# Patient Record
Sex: FEMALE | Race: WHITE | NOT HISPANIC OR LATINO | Employment: OTHER
[De-identification: names, ages, dates, MRNs, and addresses within clinical notes are randomized per-mention and may not be internally consistent; named-entity substitution may affect disease eponyms.]

---

## 2021-04-15 DIAGNOSIS — Z23 ENCOUNTER FOR IMMUNIZATION: ICD-10-CM

## 2023-11-08 ENCOUNTER — TRANSCRIBE ORDERS (OUTPATIENT)
Dept: SCHEDULING | Age: 73
End: 2023-11-08

## 2023-11-08 DIAGNOSIS — N95.0 POSTMENOPAUSAL BLEEDING: Primary | ICD-10-CM

## 2024-12-04 ENCOUNTER — TRANSCRIBE ORDERS (OUTPATIENT)
Dept: SCHEDULING | Age: 74
End: 2024-12-04

## 2024-12-04 DIAGNOSIS — N95.0 POSTMENOPAUSAL BLEEDING: Primary | ICD-10-CM

## 2024-12-11 ENCOUNTER — TRANSCRIBE ORDERS (OUTPATIENT)
Dept: SCHEDULING | Age: 74
End: 2024-12-11

## 2024-12-11 DIAGNOSIS — R31.0 GROSS HEMATURIA: Primary | ICD-10-CM

## 2024-12-19 ENCOUNTER — HOSPITAL ENCOUNTER (OUTPATIENT)
Dept: RADIOLOGY | Age: 74
Discharge: HOME | End: 2024-12-19
Attending: STUDENT IN AN ORGANIZED HEALTH CARE EDUCATION/TRAINING PROGRAM
Payer: MEDICARE

## 2024-12-19 DIAGNOSIS — R31.0 GROSS HEMATURIA: ICD-10-CM

## 2024-12-19 PROCEDURE — 74178 CT ABD&PLV WO CNTR FLWD CNTR: CPT

## 2024-12-19 PROCEDURE — 63600105 HC IODINE BASED CONTRAST: Mod: JZ | Performed by: STUDENT IN AN ORGANIZED HEALTH CARE EDUCATION/TRAINING PROGRAM

## 2024-12-19 RX ORDER — IOPAMIDOL 755 MG/ML
100 INJECTION, SOLUTION INTRAVASCULAR ONCE
Status: COMPLETED | OUTPATIENT
Start: 2024-12-19 | End: 2024-12-19

## 2024-12-19 RX ADMIN — IOPAMIDOL 100 ML: 755 INJECTION, SOLUTION INTRAVENOUS at 13:25

## 2024-12-19 NOTE — PROGRESS NOTES
"Gladys Hendrickson Amna   [id number]    Your doctor has referred you for a CT UROGRAM that requires the injection of an iodinated contrast material into your bloodstream. This iodinated contrast material, sometimes referred to as \"x-ray dye\" allows for better interpretation of the x-ray films or CT images and results in a more accurate interpretation of the examination.     Without the use of iodinated contrast (x-ray dye), the examination may be less informative and result in a suboptimal examination, and possibly a delay in diagnosis and, if needed, treatment.     The iodinated contrast material is given through a small needle or catheter placed into a vein, usually on the inside of the elbow, on the back of hand, or in a vein in the foot or lower leg.    The most common, though still rare, potential reaction to an intravenous contrast injection is an allergic-like reaction. Most allergic-like reactions are mild, though a small subset of people can have more severe reactions. Mild reactions include mild / scattered hives, itching, scratchy throat, sneezing and nasal congestion. More severe reactions include facial swelling, severe difficulty breathing, wheezing and anaphylactic shock. Those with a prior history allergic-like reaction to the same class of contrast media (such as CT contrast or MRI contrast) are at the highest risk for an allergic reaction.     If you believe you had an allergic reaction to contrast in the past, please let our staff know. We can determine if this increases your risk for a future reaction and provide steps to decrease the risk. This may delay your examination, but it decreases the risk of having a new and possibly more severe reaction to the contrast injection.    People with a history of prior allergic reactions to other substances (such as unrelated medications and food) and patients with a history of asthma have slightly increased risk for an allergic reaction from contrast " material when compared with the general population, but do not require to be pretreated prior to a contrast injection.    You should notify the physician, nurse or technologist if you have ever had any of these conditions or if you have any questions.

## 2025-01-08 ENCOUNTER — TRANSCRIBE ORDERS (OUTPATIENT)
Dept: LAB | Age: 75
End: 2025-01-08

## 2025-01-08 ENCOUNTER — APPOINTMENT (OUTPATIENT)
Dept: LAB | Age: 75
End: 2025-01-08
Attending: STUDENT IN AN ORGANIZED HEALTH CARE EDUCATION/TRAINING PROGRAM
Payer: MEDICARE

## 2025-01-08 DIAGNOSIS — Z01.818 ENCOUNTER FOR OTHER PREPROCEDURAL EXAMINATION: ICD-10-CM

## 2025-01-08 DIAGNOSIS — D41.4 NEOPLASM OF UNCERTAIN BEHAVIOR OF BLADDER: Primary | ICD-10-CM

## 2025-01-08 DIAGNOSIS — D41.4 NEOPLASM OF UNCERTAIN BEHAVIOR OF BLADDER: ICD-10-CM

## 2025-01-08 LAB
ANION GAP SERPL CALC-SCNC: 7 MEQ/L (ref 3–15)
BASOPHILS # BLD: 0.1 K/UL (ref 0.01–0.1)
BASOPHILS NFR BLD: 0.5 %
BUN SERPL-MCNC: 16 MG/DL (ref 7–25)
CALCIUM SERPL-MCNC: 9.2 MG/DL (ref 8.6–10.3)
CHLORIDE SERPL-SCNC: 104 MEQ/L (ref 98–107)
CO2 SERPL-SCNC: 27 MEQ/L (ref 21–31)
CREAT SERPL-MCNC: 1.7 MG/DL (ref 0.6–1.2)
DIFFERENTIAL METHOD BLD: ABNORMAL
EGFRCR SERPLBLD CKD-EPI 2021: 31.5 ML/MIN/1.73M*2
EOSINOPHIL # BLD: 0.74 K/UL (ref 0.04–0.36)
EOSINOPHIL NFR BLD: 3.7 %
ERYTHROCYTE [DISTWIDTH] IN BLOOD BY AUTOMATED COUNT: 12.6 % (ref 11.7–14.4)
GLUCOSE SERPL-MCNC: 112 MG/DL (ref 70–99)
HCT VFR BLD AUTO: 30.7 % (ref 35–45)
HGB BLD-MCNC: 9.4 G/DL (ref 11.8–15.7)
IMM GRANULOCYTES # BLD AUTO: 0.1 K/UL (ref 0–0.08)
IMM GRANULOCYTES NFR BLD AUTO: 0.5 %
LYMPHOCYTES # BLD: 3 K/UL (ref 1.2–3.5)
LYMPHOCYTES NFR BLD: 14.8 %
MCH RBC QN AUTO: 28.7 PG (ref 28–33.2)
MCHC RBC AUTO-ENTMCNC: 30.6 G/DL (ref 32.2–35.5)
MCV RBC AUTO: 93.6 FL (ref 83–98)
MONOCYTES # BLD: 1.19 K/UL (ref 0.28–0.8)
MONOCYTES NFR BLD: 5.9 %
NEUTROPHILS # BLD: 15.12 K/UL (ref 1.7–7)
NEUTS SEG NFR BLD: 74.6 %
NRBC BLD-RTO: 0 %
PLATELET # BLD AUTO: 431 K/UL (ref 150–369)
PMV BLD AUTO: 9.7 FL (ref 9.4–12.3)
POTASSIUM SERPL-SCNC: 4.9 MEQ/L (ref 3.5–5.1)
RBC # BLD AUTO: 3.28 M/UL (ref 3.93–5.22)
SODIUM SERPL-SCNC: 138 MEQ/L (ref 136–145)
WBC # BLD AUTO: 20.25 K/UL (ref 3.8–10.5)

## 2025-01-08 PROCEDURE — 85025 COMPLETE CBC W/AUTO DIFF WBC: CPT

## 2025-01-08 PROCEDURE — 36415 COLL VENOUS BLD VENIPUNCTURE: CPT

## 2025-01-08 PROCEDURE — 80048 BASIC METABOLIC PNL TOTAL CA: CPT

## 2025-01-24 ENCOUNTER — HOSPITAL ENCOUNTER (INPATIENT)
Facility: HOSPITAL | Age: 75
LOS: 7 days | Discharge: HOME HEALTH | DRG: 669 | End: 2025-02-01
Attending: EMERGENCY MEDICINE | Admitting: STUDENT IN AN ORGANIZED HEALTH CARE EDUCATION/TRAINING PROGRAM
Payer: MEDICARE

## 2025-01-24 ENCOUNTER — APPOINTMENT (EMERGENCY)
Dept: RADIOLOGY | Facility: HOSPITAL | Age: 75
DRG: 669 | End: 2025-01-24
Payer: MEDICARE

## 2025-01-24 ENCOUNTER — APPOINTMENT (EMERGENCY)
Dept: RADIOLOGY | Facility: HOSPITAL | Age: 75
DRG: 669 | End: 2025-01-24
Attending: EMERGENCY MEDICINE
Payer: MEDICARE

## 2025-01-24 DIAGNOSIS — N32.89 BLADDER MASS: Primary | ICD-10-CM

## 2025-01-24 DIAGNOSIS — N13.5 OBSTRUCTION OF URETER, UNSPECIFIED LATERALITY: ICD-10-CM

## 2025-01-24 DIAGNOSIS — I47.10 SVT (SUPRAVENTRICULAR TACHYCARDIA) (CMS/HCC): ICD-10-CM

## 2025-01-24 PROBLEM — F41.1 GENERALIZED ANXIETY DISORDER WITH PANIC ATTACKS: Status: ACTIVE | Noted: 2024-10-01

## 2025-01-24 PROBLEM — I49.9 ARRHYTHMIA: Status: ACTIVE | Noted: 2024-10-01

## 2025-01-24 PROBLEM — F32.9 MAJOR DEPRESSIVE DISORDER: Status: ACTIVE | Noted: 2024-10-01

## 2025-01-24 PROBLEM — G47.33 OBSTRUCTIVE SLEEP APNEA: Status: ACTIVE | Noted: 2024-11-22

## 2025-01-24 PROBLEM — K64.9 HEMORRHOIDS: Status: ACTIVE | Noted: 2025-01-24

## 2025-01-24 PROBLEM — F33.41 RECURRENT MAJOR DEPRESSIVE DISORDER IN PARTIAL REMISSION (CMS/HCC): Status: ACTIVE | Noted: 2025-01-24

## 2025-01-24 PROBLEM — D41.4 NEOPLASM OF UNCERTAIN BEHAVIOR OF BLADDER: Status: ACTIVE | Noted: 2024-12-18

## 2025-01-24 PROBLEM — R31.0 FRANK HEMATURIA: Status: ACTIVE | Noted: 2024-12-10

## 2025-01-24 PROBLEM — F41.0 GENERALIZED ANXIETY DISORDER WITH PANIC ATTACKS: Status: ACTIVE | Noted: 2024-10-01

## 2025-01-24 PROBLEM — R93.89 ABNORMAL FINDING OF DIAGNOSTIC IMAGING: Status: ACTIVE | Noted: 2025-01-24

## 2025-01-24 PROBLEM — E78.5 HYPERLIPEMIA: Status: ACTIVE | Noted: 2024-10-01

## 2025-01-24 PROBLEM — Z95.0 PACEMAKER: Status: ACTIVE | Noted: 2019-10-24

## 2025-01-24 LAB
ALBUMIN SERPL-MCNC: 3.6 G/DL (ref 3.5–5.7)
ALP SERPL-CCNC: 67 IU/L (ref 34–125)
ALT SERPL-CCNC: 11 IU/L (ref 7–52)
ANION GAP SERPL CALC-SCNC: 9 MEQ/L (ref 3–15)
AST SERPL-CCNC: 20 IU/L (ref 13–39)
BASOPHILS # BLD: 0 K/UL (ref 0.01–0.1)
BASOPHILS NFR BLD: 0 %
BILIRUB SERPL-MCNC: 0.5 MG/DL (ref 0.3–1.2)
BUN SERPL-MCNC: 16 MG/DL (ref 7–25)
CALCIUM SERPL-MCNC: 9.3 MG/DL (ref 8.6–10.3)
CHLORIDE SERPL-SCNC: 101 MEQ/L (ref 98–107)
CO2 SERPL-SCNC: 23 MEQ/L (ref 21–31)
CREAT SERPL-MCNC: 1.6 MG/DL (ref 0.6–1.2)
D DIMER PPP IA.FEU-MCNC: 1.16 UG/ML FEU (ref 0–0.5)
DIFFERENTIAL METHOD BLD: ABNORMAL
EGFRCR SERPLBLD CKD-EPI 2021: 33.9 ML/MIN/1.73M*2
EOSINOPHIL # BLD: 0.27 K/UL (ref 0.04–0.36)
EOSINOPHIL NFR BLD: 1 %
ERYTHROCYTE [DISTWIDTH] IN BLOOD BY AUTOMATED COUNT: 12.7 % (ref 11.7–14.4)
GLUCOSE SERPL-MCNC: 122 MG/DL (ref 70–99)
HCT VFR BLD AUTO: 31.5 % (ref 35–45)
HGB BLD-MCNC: 9.8 G/DL (ref 11.8–15.7)
LACTATE SERPL-SCNC: 1.2 MMOL/L (ref 0.4–2)
LYMPHOCYTES # BLD: 3.23 K/UL (ref 1.2–3.5)
LYMPHOCYTES NFR BLD: 12 %
MAGNESIUM SERPL-MCNC: 2.1 MG/DL (ref 1.8–2.5)
MCH RBC QN AUTO: 28.3 PG (ref 28–33.2)
MCHC RBC AUTO-ENTMCNC: 31.1 G/DL (ref 32.2–35.5)
MCV RBC AUTO: 91 FL (ref 83–98)
METAMYELOCYTES # BLD MANUAL: 0.27 K/UL
METAMYELOCYTES NFR BLD MANUAL: 1 %
MONOCYTES # BLD: 1.88 K/UL (ref 0.28–0.8)
MONOCYTES NFR BLD: 7 %
NEUTS BAND # BLD: 0.81 K/UL (ref 0–0.53)
NEUTS BAND # BLD: 20.46 K/UL (ref 1.7–7)
NEUTS BAND NFR BLD: 3 %
NEUTS SEG NFR BLD: 76 %
PLAT MORPH BLD: NORMAL
PLATELET # BLD AUTO: 512 K/UL (ref 150–369)
PLATELET # BLD EST: ABNORMAL 10*3/UL
PMV BLD AUTO: 9.7 FL (ref 9.4–12.3)
POTASSIUM SERPL-SCNC: 4.5 MEQ/L (ref 3.5–5.1)
PROT SERPL-MCNC: 7.2 G/DL (ref 6–8.2)
RBC # BLD AUTO: 3.46 M/UL (ref 3.93–5.22)
SODIUM SERPL-SCNC: 133 MEQ/L (ref 136–145)
TOXIC GRANULES BLD QL SMEAR: ABNORMAL
TROPONIN I SERPL HS-MCNC: 9.4 PG/ML
TSH SERPL DL<=0.05 MIU/L-ACNC: 4.7 MIU/L (ref 0.34–5.6)
WBC # BLD AUTO: 26.92 K/UL (ref 3.8–10.5)

## 2025-01-24 PROCEDURE — 25800000 HC PHARMACY IV SOLUTIONS: Performed by: STUDENT IN AN ORGANIZED HEALTH CARE EDUCATION/TRAINING PROGRAM

## 2025-01-24 PROCEDURE — 87637 SARSCOV2&INF A&B&RSV AMP PRB: CPT | Performed by: STUDENT IN AN ORGANIZED HEALTH CARE EDUCATION/TRAINING PROGRAM

## 2025-01-24 PROCEDURE — 84484 ASSAY OF TROPONIN QUANT: CPT | Mod: 91 | Performed by: EMERGENCY MEDICINE

## 2025-01-24 PROCEDURE — 84484 ASSAY OF TROPONIN QUANT: CPT | Performed by: EMERGENCY MEDICINE

## 2025-01-24 PROCEDURE — 93005 ELECTROCARDIOGRAM TRACING: CPT | Performed by: EMERGENCY MEDICINE

## 2025-01-24 PROCEDURE — 71275 CT ANGIOGRAPHY CHEST: CPT

## 2025-01-24 PROCEDURE — 83605 ASSAY OF LACTIC ACID: CPT | Performed by: STUDENT IN AN ORGANIZED HEALTH CARE EDUCATION/TRAINING PROGRAM

## 2025-01-24 PROCEDURE — 81003 URINALYSIS AUTO W/O SCOPE: CPT | Performed by: EMERGENCY MEDICINE

## 2025-01-24 PROCEDURE — 99285 EMERGENCY DEPT VISIT HI MDM: CPT | Mod: 25

## 2025-01-24 PROCEDURE — 96361 HYDRATE IV INFUSION ADD-ON: CPT

## 2025-01-24 PROCEDURE — 36415 COLL VENOUS BLD VENIPUNCTURE: CPT | Performed by: EMERGENCY MEDICINE

## 2025-01-24 PROCEDURE — 87086 URINE CULTURE/COLONY COUNT: CPT | Performed by: EMERGENCY MEDICINE

## 2025-01-24 PROCEDURE — 74177 CT ABD & PELVIS W/CONTRAST: CPT

## 2025-01-24 PROCEDURE — 85025 COMPLETE CBC W/AUTO DIFF WBC: CPT | Performed by: EMERGENCY MEDICINE

## 2025-01-24 PROCEDURE — 84443 ASSAY THYROID STIM HORMONE: CPT | Performed by: STUDENT IN AN ORGANIZED HEALTH CARE EDUCATION/TRAINING PROGRAM

## 2025-01-24 PROCEDURE — 83735 ASSAY OF MAGNESIUM: CPT | Performed by: STUDENT IN AN ORGANIZED HEALTH CARE EDUCATION/TRAINING PROGRAM

## 2025-01-24 PROCEDURE — 63600000 HC DRUGS/DETAIL CODE: Mod: JZ | Performed by: EMERGENCY MEDICINE

## 2025-01-24 PROCEDURE — 71046 X-RAY EXAM CHEST 2 VIEWS: CPT

## 2025-01-24 PROCEDURE — 96374 THER/PROPH/DIAG INJ IV PUSH: CPT | Mod: 59

## 2025-01-24 PROCEDURE — 85379 FIBRIN DEGRADATION QUANT: CPT | Performed by: STUDENT IN AN ORGANIZED HEALTH CARE EDUCATION/TRAINING PROGRAM

## 2025-01-24 PROCEDURE — 80053 COMPREHEN METABOLIC PANEL: CPT | Performed by: EMERGENCY MEDICINE

## 2025-01-24 RX ORDER — MORPHINE SULFATE 4 MG/ML
4 INJECTION, SOLUTION INTRAMUSCULAR; INTRAVENOUS ONCE
Status: COMPLETED | OUTPATIENT
Start: 2025-01-24 | End: 2025-01-24

## 2025-01-24 RX ORDER — IOPAMIDOL 755 MG/ML
100 INJECTION, SOLUTION INTRAVASCULAR
Status: COMPLETED | OUTPATIENT
Start: 2025-01-24 | End: 2025-01-25

## 2025-01-24 RX ADMIN — MORPHINE SULFATE 4 MG: 4 INJECTION, SOLUTION INTRAMUSCULAR; INTRAVENOUS at 23:51

## 2025-01-24 RX ADMIN — SODIUM CHLORIDE 1000 ML: 9 INJECTION, SOLUTION INTRAVENOUS at 23:22

## 2025-01-24 ASSESSMENT — ENCOUNTER SYMPTOMS
FATIGUE: 1
ABDOMINAL PAIN: 1
COUGH: 0
FEVER: 0
DIZZINESS: 0
LIGHT-HEADEDNESS: 0
FREQUENCY: 0
HEADACHES: 1
DYSURIA: 1
NAUSEA: 1
BACK PAIN: 0
ARTHRALGIAS: 0
DIARRHEA: 0
CONSTIPATION: 1
PALPITATIONS: 0
CHILLS: 1
DIFFICULTY URINATING: 1
SHORTNESS OF BREATH: 1
CHEST TIGHTNESS: 0
VOMITING: 0
WHEEZING: 0
APPETITE CHANGE: 1
HEMATURIA: 1
ACTIVITY CHANGE: 1

## 2025-01-25 PROBLEM — C67.9 BLADDER CANCER (CMS/HCC): Status: ACTIVE | Noted: 2025-01-25

## 2025-01-25 PROBLEM — N32.89 BLADDER MASS: Status: ACTIVE | Noted: 2025-01-25

## 2025-01-25 PROBLEM — N18.9 CKD (CHRONIC KIDNEY DISEASE): Status: ACTIVE | Noted: 2025-01-25

## 2025-01-25 LAB
AMORPH CRY #/AREA URNS HPF: 1 /HPF
ANION GAP SERPL CALC-SCNC: 6 MEQ/L (ref 3–15)
ATRIAL RATE: 103
BACTERIA #/AREA URNS HPF: ABNORMAL /HPF
BILIRUB UR QL STRIP.AUTO: NEGATIVE MG/DL
BUN SERPL-MCNC: 14 MG/DL (ref 7–25)
CALCIUM SERPL-MCNC: 8 MG/DL (ref 8.6–10.3)
CHLORIDE SERPL-SCNC: 107 MEQ/L (ref 98–107)
CLARITY UR REFRACT.AUTO: ABNORMAL
CO2 SERPL-SCNC: 21 MEQ/L (ref 21–31)
COLOR UR AUTO: ABNORMAL
CREAT SERPL-MCNC: 1.4 MG/DL (ref 0.6–1.2)
EGFRCR SERPLBLD CKD-EPI 2021: 39.8 ML/MIN/1.73M*2
FLUAV RNA SPEC QL NAA+PROBE: NEGATIVE
FLUBV RNA SPEC QL NAA+PROBE: NEGATIVE
GLUCOSE SERPL-MCNC: 153 MG/DL (ref 70–99)
GLUCOSE UR STRIP.AUTO-MCNC: NEGATIVE MG/DL
HGB UR QL STRIP.AUTO: 3
HYALINE CASTS #/AREA URNS LPF: ABNORMAL /LPF
IRON SATN MFR SERPL: 9 % (ref 15–45)
IRON SERPL-MCNC: 16 UG/DL (ref 35–150)
KETONES UR STRIP.AUTO-MCNC: NEGATIVE MG/DL
LEUKOCYTE ESTERASE UR QL STRIP.AUTO: 3
MAGNESIUM SERPL-MCNC: 1.9 MG/DL (ref 1.8–2.5)
NITRITE UR QL STRIP.AUTO: NEGATIVE
P AXIS: 42
PH UR STRIP.AUTO: 6.5 [PH]
POTASSIUM SERPL-SCNC: 4 MEQ/L (ref 3.5–5.1)
PR INTERVAL: 154
PROT UR QL STRIP.AUTO: 3
QRS DURATION: 132
QT INTERVAL: 384
QTC CALCULATION(BAZETT): 503
R AXIS: -59
RBC #/AREA URNS HPF: ABNORMAL /HPF
RSV RNA SPEC QL NAA+PROBE: NEGATIVE
SARS-COV-2 RNA RESP QL NAA+PROBE: NEGATIVE
SODIUM SERPL-SCNC: 134 MEQ/L (ref 136–145)
SP GR UR REFRACT.AUTO: 1.02
SQUAMOUS #/AREA URNS HPF: 1 /HPF
T WAVE AXIS: 94
TIBC SERPL-MCNC: 188 UG/DL (ref 270–460)
TRANS CELLS URNS QL MICRO: 1 /HPF
TROPONIN I SERPL HS-MCNC: 9.9 PG/ML
UIBC SERPL-MCNC: 172 UG/DL (ref 180–360)
UROBILINOGEN UR STRIP-ACNC: 0.2 EU/DL
VENTRICULAR RATE: 103
WBC #/AREA URNS HPF: ABNORMAL /HPF

## 2025-01-25 PROCEDURE — 96361 HYDRATE IV INFUSION ADD-ON: CPT

## 2025-01-25 PROCEDURE — 200200 PR NO CHARGE: Performed by: STUDENT IN AN ORGANIZED HEALTH CARE EDUCATION/TRAINING PROGRAM

## 2025-01-25 PROCEDURE — G0378 HOSPITAL OBSERVATION PER HR: HCPCS

## 2025-01-25 PROCEDURE — 63600000 HC DRUGS/DETAIL CODE: Mod: JZ | Performed by: INTERNAL MEDICINE

## 2025-01-25 PROCEDURE — 36415 COLL VENOUS BLD VENIPUNCTURE: CPT | Performed by: STUDENT IN AN ORGANIZED HEALTH CARE EDUCATION/TRAINING PROGRAM

## 2025-01-25 PROCEDURE — 63700000 HC SELF-ADMINISTRABLE DRUG: Performed by: STUDENT IN AN ORGANIZED HEALTH CARE EDUCATION/TRAINING PROGRAM

## 2025-01-25 PROCEDURE — 63600105 HC IODINE BASED CONTRAST: Mod: JZ | Performed by: EMERGENCY MEDICINE

## 2025-01-25 PROCEDURE — 83735 ASSAY OF MAGNESIUM: CPT | Performed by: INTERNAL MEDICINE

## 2025-01-25 PROCEDURE — 12000000 HC ROOM AND CARE MED/SURG

## 2025-01-25 PROCEDURE — 63600000 HC DRUGS/DETAIL CODE: Mod: JZ | Performed by: STUDENT IN AN ORGANIZED HEALTH CARE EDUCATION/TRAINING PROGRAM

## 2025-01-25 PROCEDURE — 63700000 HC SELF-ADMINISTRABLE DRUG: Performed by: INTERNAL MEDICINE

## 2025-01-25 PROCEDURE — 83540 ASSAY OF IRON: CPT | Performed by: INTERNAL MEDICINE

## 2025-01-25 PROCEDURE — 80048 BASIC METABOLIC PNL TOTAL CA: CPT | Performed by: INTERNAL MEDICINE

## 2025-01-25 PROCEDURE — 87040 BLOOD CULTURE FOR BACTERIA: CPT | Performed by: STUDENT IN AN ORGANIZED HEALTH CARE EDUCATION/TRAINING PROGRAM

## 2025-01-25 PROCEDURE — 99223 1ST HOSP IP/OBS HIGH 75: CPT | Performed by: INTERNAL MEDICINE

## 2025-01-25 PROCEDURE — 96365 THER/PROPH/DIAG IV INF INIT: CPT | Mod: 59

## 2025-01-25 PROCEDURE — 25800000 HC PHARMACY IV SOLUTIONS: Performed by: EMERGENCY MEDICINE

## 2025-01-25 PROCEDURE — 1124F ACP DISCUSS-NO DSCNMKR DOCD: CPT | Performed by: INTERNAL MEDICINE

## 2025-01-25 RX ORDER — CEFTRIAXONE 1 G/50ML
1 INJECTION, SOLUTION INTRAVENOUS ONCE
Status: COMPLETED | OUTPATIENT
Start: 2025-01-25 | End: 2025-01-25

## 2025-01-25 RX ORDER — IBUPROFEN/PSEUDOEPHEDRINE HCL 200MG-30MG
3 TABLET ORAL NIGHTLY PRN
Status: DISCONTINUED | OUTPATIENT
Start: 2025-01-25 | End: 2025-02-01 | Stop reason: HOSPADM

## 2025-01-25 RX ORDER — ONDANSETRON HYDROCHLORIDE 2 MG/ML
4 INJECTION, SOLUTION INTRAVENOUS EVERY 8 HOURS PRN
Status: DISCONTINUED | OUTPATIENT
Start: 2025-01-25 | End: 2025-01-25

## 2025-01-25 RX ORDER — MORPHINE SULFATE 4 MG/ML
3 INJECTION, SOLUTION INTRAMUSCULAR; INTRAVENOUS
Status: DISCONTINUED | OUTPATIENT
Start: 2025-01-25 | End: 2025-02-01 | Stop reason: HOSPADM

## 2025-01-25 RX ORDER — CEFTRIAXONE 1 G/50ML
1 INJECTION, SOLUTION INTRAVENOUS
Status: DISCONTINUED | OUTPATIENT
Start: 2025-01-26 | End: 2025-01-27

## 2025-01-25 RX ORDER — BUSPIRONE HYDROCHLORIDE 5 MG/1
5 TABLET ORAL 3 TIMES DAILY
Status: DISCONTINUED | OUTPATIENT
Start: 2025-01-25 | End: 2025-02-01 | Stop reason: HOSPADM

## 2025-01-25 RX ORDER — ROSUVASTATIN CALCIUM 40 MG/1
40 TABLET, COATED ORAL DAILY
Status: DISCONTINUED | OUTPATIENT
Start: 2025-01-25 | End: 2025-02-01 | Stop reason: HOSPADM

## 2025-01-25 RX ORDER — DEXTROSE 50 % IN WATER (D50W) INTRAVENOUS SYRINGE
25 AS NEEDED
Status: DISCONTINUED | OUTPATIENT
Start: 2025-01-25 | End: 2025-02-01 | Stop reason: HOSPADM

## 2025-01-25 RX ORDER — POTASSIUM CHLORIDE 20 MEQ/1
40 TABLET, EXTENDED RELEASE ORAL AS NEEDED
Status: DISCONTINUED | OUTPATIENT
Start: 2025-01-25 | End: 2025-02-01 | Stop reason: HOSPADM

## 2025-01-25 RX ORDER — IBUPROFEN 200 MG
16-32 TABLET ORAL AS NEEDED
Status: DISCONTINUED | OUTPATIENT
Start: 2025-01-25 | End: 2025-02-01 | Stop reason: HOSPADM

## 2025-01-25 RX ORDER — ENOXAPARIN SODIUM 100 MG/ML
40 INJECTION SUBCUTANEOUS
Status: DISCONTINUED | OUTPATIENT
Start: 2025-01-25 | End: 2025-02-01 | Stop reason: HOSPADM

## 2025-01-25 RX ORDER — POTASSIUM CHLORIDE 20 MEQ/1
20 TABLET, EXTENDED RELEASE ORAL AS NEEDED
Status: DISCONTINUED | OUTPATIENT
Start: 2025-01-25 | End: 2025-02-01 | Stop reason: HOSPADM

## 2025-01-25 RX ORDER — DEXTROSE 40 %
15-30 GEL (GRAM) ORAL AS NEEDED
Status: DISCONTINUED | OUTPATIENT
Start: 2025-01-25 | End: 2025-02-01 | Stop reason: HOSPADM

## 2025-01-25 RX ORDER — ACETAMINOPHEN 325 MG/1
650 TABLET ORAL EVERY 4 HOURS PRN
Status: DISCONTINUED | OUTPATIENT
Start: 2025-01-25 | End: 2025-02-01 | Stop reason: HOSPADM

## 2025-01-25 RX ORDER — AMOXICILLIN 250 MG
1 CAPSULE ORAL 2 TIMES DAILY
Status: DISCONTINUED | OUTPATIENT
Start: 2025-01-25 | End: 2025-02-01 | Stop reason: HOSPADM

## 2025-01-25 RX ADMIN — ROSUVASTATIN CALCIUM 40 MG: 40 TABLET, FILM COATED ORAL at 17:04

## 2025-01-25 RX ADMIN — SENNOSIDES AND DOCUSATE SODIUM 1 TABLET: 8.6; 5 TABLET ORAL at 08:49

## 2025-01-25 RX ADMIN — BUSPIRONE HYDROCHLORIDE 5 MG: 5 TABLET ORAL at 13:05

## 2025-01-25 RX ADMIN — IOPAMIDOL 100 ML: 755 INJECTION, SOLUTION INTRAVENOUS at 00:18

## 2025-01-25 RX ADMIN — MORPHINE SULFATE 3 MG: 4 INJECTION, SOLUTION INTRAMUSCULAR; INTRAVENOUS at 21:09

## 2025-01-25 RX ADMIN — BUSPIRONE HYDROCHLORIDE 5 MG: 5 TABLET ORAL at 08:49

## 2025-01-25 RX ADMIN — ENOXAPARIN SODIUM 40 MG: 40 INJECTION SUBCUTANEOUS at 17:05

## 2025-01-25 RX ADMIN — CEFTRIAXONE 1 G: 1 INJECTION, SOLUTION INTRAVENOUS at 01:08

## 2025-01-25 RX ADMIN — SENNOSIDES AND DOCUSATE SODIUM 1 TABLET: 8.6; 5 TABLET ORAL at 22:46

## 2025-01-25 RX ADMIN — ACETAMINOPHEN 650 MG: 325 TABLET, FILM COATED ORAL at 19:49

## 2025-01-25 RX ADMIN — SODIUM CHLORIDE 1000 ML: 9 INJECTION, SOLUTION INTRAVENOUS at 02:25

## 2025-01-25 RX ADMIN — BUSPIRONE HYDROCHLORIDE 5 MG: 5 TABLET ORAL at 22:46

## 2025-01-25 ASSESSMENT — COGNITIVE AND FUNCTIONAL STATUS - GENERAL
STANDING UP FROM CHAIR USING ARMS: 4 - NONE
MOVING TO AND FROM BED TO CHAIR: 4 - NONE
MOVING TO AND FROM BED TO CHAIR: 4 - NONE
WALKING IN HOSPITAL ROOM: 4 - NONE
WALKING IN HOSPITAL ROOM: 4 - NONE
STANDING UP FROM CHAIR USING ARMS: 4 - NONE
CLIMB 3 TO 5 STEPS WITH RAILING: 4 - NONE
CLIMB 3 TO 5 STEPS WITH RAILING: 4 - NONE

## 2025-01-25 NOTE — UM PHYSICIAN REVIEW NOTE
Utilization Secondary Review Note      Patient Name: Gladys Woo      MRN: [id number]  Insurance: MEDICARE  Admission date: 1/24/2025  Initial order:    Observation  Planned admission: No  Post Discharge Review: No            Inpatient services are appropriate for this 74 y.o.  female admitted for malaise and fatigue concerning for UTI and new L hydroureteronephrosis. Urology consulted with plans for continued IV Abx , IVF and continued monitoring of culture results. Patient will require > 2 Mn of hLOC.       Manisha Rucker DO  1/25/2025

## 2025-01-25 NOTE — CONSULTS
UROLOGY CONSULTATION    Subjective   74-year-old female with known bladder tumor, R hydronephrosis admitted for concerns of worsening fatigue, nausea, vomiting, reduced urine output.  She is scheduled for TURBT 1/28/25.  On admission, she was note to have a creatinine of 1.4, improved.  CT shows new left mild hydroureteronephrosis in addition to her previously identified right hydronephrosis and her 9 cm bladder mass resting on the bladder base and right lateral wall.  Since admission, with hydration and antiemetics, she is feeling markedly better.      Pertinent radiology results reviewed. Pertinent lab results reviewed..    Medical History:   Past Medical History:   Diagnosis Date    Coronary artery disease     KERRI (generalized anxiety disorder)     TREVOR (obstructive sleep apnea)        Surgical History:   Past Surgical History   Procedure Laterality Date    A-v cardiac pacemaker insertion      Insert / replace / remove pacemaker      Tonsillectomy         Social History:   Social History     Socioeconomic History    Marital status:      Spouse name: Not on file    Number of children: Not on file    Years of education: Not on file    Highest education level: Not on file   Occupational History    Not on file   Tobacco Use    Smoking status: Former     Types: Cigarettes    Smokeless tobacco: Never   Substance and Sexual Activity    Alcohol use: Yes     Alcohol/week: 2.0 standard drinks of alcohol     Types: 2 Glasses of wine per week     Comment: socially    Drug use: Never    Sexual activity: Not on file   Other Topics Concern    Not on file   Social History Narrative    Not on file     Social Drivers of Health     Financial Resource Strain: Not on file   Food Insecurity: No Food Insecurity (1/24/2025)    Hunger Vital Sign     Worried About Running Out of Food in the Last Year: Never true     Ran Out of Food in the Last Year: Never true   Transportation Needs: Not on file   Physical Activity: Not on file    Stress: Not on file   Social Connections: Not on file   Intimate Partner Violence: Not on file   Housing Stability: Not on file       Family History:   Family History   Problem Relation Name Age of Onset    Diabetes Biological Mother      Coronary artery disease Biological Father         Allergies: Amoxicillin    Current Facility-Administered Medications   Medication Dose Route Frequency Provider Last Rate Last Admin    acetaminophen (TYLENOL) tablet 650 mg  650 mg oral q4h PRN Varsha Lang MD        busPIRone (BUSPAR) tablet 5 mg  5 mg oral TID Varsha Lang MD        [START ON 1/26/2025] cefTRIAXone (ROCEPHIN) IVPB 1 g  1 g intravenous q24h INT Varsha Lang MD        glucose chewable tablet 16-32 g of dextrose  16-32 g of dextrose oral PRN Varsha Lang MD        Or    dextrose 40 % oral gel 15-30 g of dextrose  15-30 g of dextrose oral PRN Varsha Lang MD        Or    glucagon (GLUCAGEN) injection 1 mg  1 mg intramuscular PRN Varsha Lang MD        Or    dextrose 50 % in water (D50) injection 12.5 g  25 mL intravenous PRN Varsha Lang MD        enoxaparin (LOVENOX) syringe 40 mg  40 mg subcutaneous Daily (6p) Varsha Lang MD        magnesium sulfate IVPB 1g in 100 mL NSS/D5W/SWFI  1 g intravenous PRN Varsha Lang MD        Or    magnesium sulfate IVPB 2g in 50 mL NSS/D5W/SWFI  2 g intravenous PRN Varsha Lang MD        melatonin ODT 3 mg  3 mg oral Nightly PRN Varsha Lang MD        morphine injection 3 mg  3 mg intravenous q3h PRN Varsha Lang MD        potassium chloride (KLOR-CON M) ER tablet (particles/crystals) 20 mEq  20 mEq oral PRN Varsha Lang MD        potassium chloride (KLOR-CON M) ER tablet (particles/crystals) 40 mEq  40 mEq oral PRN Varsha Lang MD        sennosides-docusate sodium (SENOKOT-S) 8.6-50 mg per tablet 1 tablet  1 tablet oral BID Varsha Lang MD          Current Outpatient Medications   Medication Sig Dispense Refill    busPIRone (BUSPAR) 5 mg tablet Take 5 mg by mouth 3 times daily.      rosuvastatin (CRESTOR) 40 mg tablet Take 40 mg by mouth daily.         Review of Systems  All other systems reviewed and negative except as noted in the HPI.    Objective   Labs  Cr 1.4 W 26.9K H 9.8  UA TNTC WBC, RBC    Imaging  As above on CT      Physicial Exam  General appearance: alert, appears stated age, and cooperative  Head: normocephalic, without obvious abnormality, atraumatic  Eyes: Anicteric  Neck: supple, symmetrical, trachea midline  Abdomen: soft, non-tender; bowel sounds normal; no masses, no organomegaly  Extremities: extremities normal, warm and well-perfused; no cyanosis, clubbing, or edema  Pulses: 2+ and symmetric  Skin: Skin color, texture, turgor normal. No rashes or lesions      Assessment & Plan     Patient Active Problem List   Diagnosis    Abnormal finding of diagnostic imaging    Arrhythmia    Suhail hematuria    Generalized anxiety disorder with panic attacks    Hemorrhoids    Hyperlipemia    Major depressive disorder    Neoplasm of uncertain behavior of bladder    Obstructive sleep apnea    Pacemaker    Recurrent major depressive disorder in partial remission (CMS/HCC)    SVT (supraventricular tachycardia) (CMS/HCC)    Bladder cancer (CMS/HCC)     A/P  74-year-old female with very large bladder tumor and concern for bilateral hydronephrosis admitted with malaise and fatigue, concerns for UTI.  There is new left hydroureteronephrosis at this time compared to previous imaging in addition to her right hydronephrosis previous identified.  Presently she is voiding to completion without incomplete emptying.    Rec:  -Continue antibiotics and hydration as you are pending urine culture  -We will plan on TUR bladder tumor when medically stable  -Monitor voids      Quincy Cortez MD  [facility]  [phone]  1/25/2025

## 2025-01-25 NOTE — ASSESSMENT & PLAN NOTE
History of SVT and arrhythmia  She sees [facility]  Troponins are negative  EKG is stable  She did have some transient chest pressure however no evidence of cardiac disease, likely more related to her anxiety

## 2025-01-25 NOTE — PROGRESS NOTES
74-year-old female admitted with symptoms of intermittent bladder obstruction  Reports ongoing hematuria and workup being done as an outpatient for bladder mass  Urology consulted  UA positive, on Rocephin, follow-up urine culture   Blood cultures pending  Monitor WBC  Plan for TURP bladder tumor when medically stable by urology  Monitor postvoid residuals    Patient reports feeling well, urinary symptoms improving  Denied any chest pain or shortness of breath currently  Review of system negative

## 2025-01-25 NOTE — H&P
...            Hospital Medicine    History & Physical        CHIEF COMPLAINT   Abdominal pain     HISTORY OF PRESENT ILLNESS      Gladys Woo is a 74 y.o. female with a past medical history of hematuria for several months.  Initially she was being treated by GYN who felt it was due to vaginal atrophy after a negative and extensive GYN evaluation.  When her symptoms did not resolve she was referred to urology and she was found to have a tumor on her  bladder.  As an outpatient she underwent a cystoscopy and CT urogram.  Cystoscopy revealed large papillary tumors within the bladder.  Plans were made to do a TURBT, which was planned as an outpatient for Tuesday.  The patient developed urinary hesitancy severe pain and unable to get more than a few drops out.  She also complains of 15 pound weight loss in the last 6 weeks due to nausea.  CT scan of the abdomen reveals ureter dilation on the left due to obstruction of the UVJ by the tumor, and partial obstruction of the right ureter which is new.  Patient was able to void in the emergency room after receiving IV fluids and her bladder scans are low.  Urine analysis reveals UTI.  Blood work reveals leukocytosis.  PAST MEDICAL AND SURGICAL HISTORY      Past Medical History:   Diagnosis Date    Coronary artery disease     KERRI (generalized anxiety disorder)     TREVOR (obstructive sleep apnea)        Past Surgical History   Procedure Laterality Date    A-v cardiac pacemaker insertion      Insert / replace / remove pacemaker      Tonsillectomy         PCP: Zoe Sanders MD    MEDICATIONS      Prior to Admission medications    Medication Sig Start Date End Date Taking? Authorizing Provider   busPIRone (BUSPAR) 5 mg tablet Take 5 mg by mouth 3 times daily. 8/25/24   ProviderIsabela MD   rosuvastatin (CRESTOR) 40 mg tablet Take 40 mg by mouth daily.    Provider, Isabela Castillo MD       ALLERGIES      Amoxicillin    FAMILY HISTORY      Family History   Problem  Relation Name Age of Onset    Diabetes Biological Mother      Coronary artery disease Biological Father         SOCIAL HISTORY      Social History     Substance and Sexual Activity   Drug Use Never     Tobacco Use: Medium Risk (1/25/2025)    Patient History     Smoking Tobacco Use: Former     Smokeless Tobacco Use: Never     Passive Exposure: Not on file     Alcohol Use: Not on file     Within the past week have you used heroin or used opioid medications other than as prescribed? (OxyContin, Fentanyl, Subutex): No  Do you get sick if you cannot use heroin, methadone, or opioid medications?: No         REVIEW OF SYSTEMS      All other systems reviewed and negative except as noted in HPI    PHYSICAL EXAMINATION      Temp:  [36.7 °C (98.1 °F)] 36.7 °C (98.1 °F)  Heart Rate:  [] 78  Resp:  [18-23] 20  BP: (100-142)/(53-79) 119/58  Body mass index is 28.25 kg/m².    Physical Exam  Constitutional:       Comments: Very anxious and mildly confused, son and daughter-in-law are at bedside   HENT:      Head: Normocephalic and atraumatic.      Mouth/Throat:      Pharynx: Oropharynx is clear.   Eyes:      Extraocular Movements: Extraocular movements intact.      Pupils: Pupils are equal, round, and reactive to light.   Cardiovascular:      Rate and Rhythm: Normal rate and regular rhythm.   Pulmonary:      Effort: Pulmonary effort is normal.      Breath sounds: Normal breath sounds.   Abdominal:      General: There is distension.      Palpations: There is no mass.      Tenderness: There is abdominal tenderness. There is guarding. There is no rebound.   Musculoskeletal:         General: Normal range of motion.      Cervical back: Normal range of motion.   Skin:     General: Skin is warm and dry.   Neurological:      General: No focal deficit present.      Mental Status: She is alert and oriented to person, place, and time.   Psychiatric:      Comments: Extremely anxious         LABS / IMAGING / EKG        Labs     Lab  Results   Component Value Date    GLUCOSE 122 (H) 01/24/2025    CALCIUM 9.3 01/24/2025     (L) 01/24/2025    K 4.5 01/24/2025    CO2 23 01/24/2025     01/24/2025    BUN 16 01/24/2025    CREATININE 1.6 (H) 01/24/2025      Lab Results   Component Value Date    WBC 26.92 (H) 01/24/2025    HGB 9.8 (L) 01/24/2025    HCT 31.5 (L) 01/24/2025    MCV 91.0 01/24/2025     (H) 01/24/2025        Imaging  Significant findings include:  CT abdomen and pelvis:    Large bladder mass, which has increased in size in comparison patient's previous study.    Once again seen is right hydronephrosis and hydroureter, secondary to obstruction at the UVJ by the bladder mass.    There is now mild left hydroureter, which was not present previously, apparently due to a small degree of obstruction by the bladder mass.    Increased stool.    No bowel distention. No free fluid or free air.       ECG/Telemetry     ECG  Rate: 103  Rhythm: paced  AK Interval: 154  QRS Duration: 132  QT/QTc: 384/503  ST segment: no acute ischemic changes    Impression: paced rhythm        ASSESSMENT AND PLAN              Assessment & Plan  Neoplasm of uncertain behavior of bladder  Enlarging size  Now with symptoms of intermittent bladder obstruction  Tumor expanding from the left ureter towards the right ureter with bilateral hydronephrosis  Appears she is able to void and had bladder emptying in the emergency room which apparently she had difficulty with at home  Continue with IV fluids  Treat for UTI with Rocephin  Urology consultation  She is due for TURBT on Tuesday  Keep n.p.o. in case she needs earlier procedure  Pacemaker  History of SVT and arrhythmia  She sees [facility]  Troponins are negative  EKG is stable  She did have some transient chest pressure however no evidence of cardiac disease, likely more related to her anxiety  Generalized anxiety disorder with panic attacks  Continue outpatient BuSpar  If she remains this anxious she may need  something a little bit stronger  Obstructive sleep apnea  Patient was anticipating outpatient sleep study which I do not believe has been completed yet  VTE Assessment: Padua    VTE Prophylaxis: Current anticoagulants:  enoxaparin (LOVENOX) syringe 40 mg, subcutaneous, Daily (6p)      Code Status: Full Code       Estimated Discharge Date: 1/26/2025  Disposition Planning: Discharge to home once medically stabilized, observation admission for now however she stays in house for urologic procedure she will need to be switched over to inpatient     Varsha Lang MD  1/25/2025

## 2025-01-25 NOTE — ED PROVIDER NOTES
"HPI   HISTORY OF PRESENT ILLNESS     Patient is a 74-year-old female with history of bladder tumor, SVT, depression, hyperlipidemia presenting to the emergency department for evaluation of dysuria, difficulty with urination, shortness of breath and fatigue x 6 weeks.  She notes worsening dysuria and difficulty with urination over the past week.  She recently completed a source of doxycycline a week ago for suspected UTI however continues to have symptoms.  She also notes worsening confusion, generalized chills over the past few weeks as well.  Per her family at bedside, she has been acting \"strange\" for the last few weeks as well and is not her normal self.  They also think she looks more pale and has been exertionally short of breath with activities of daily living over the past few days.  She denies chest pain, headache.  She denies fevers.  She is scheduled to undergo a bladder removal with Dr. Jimenez.  She also notes severe loss of appetite and decrease in weight about 15 pounds over the last few weeks as well.  She also notes of suprapubic fullness which has been constant since December 2024 when she was diagnosed with a bladder tumor.  She also notes constipation x 7 days and has been unable to have a bowel movement despite using Colace.          Patient History   PAST HISTORY     Reviewed from Nursing Triage:       History reviewed. No pertinent past medical history.    Past Surgical History   Procedure Laterality Date    Insert / replace / remove pacemaker         No family history on file.    Social History     Tobacco Use    Smoking status: Former     Types: Cigarettes    Smokeless tobacco: Never   Substance Use Topics    Alcohol use: Yes     Comment: socially    Drug use: Never         Review of Systems   REVIEW OF SYSTEMS     Review of Systems   Constitutional:  Positive for activity change, appetite change, chills and fatigue. Negative for fever.   Respiratory:  Positive for shortness of breath. Negative " for cough, chest tightness and wheezing.    Cardiovascular:  Negative for chest pain, palpitations and leg swelling.   Gastrointestinal:  Positive for abdominal pain, constipation and nausea. Negative for diarrhea and vomiting.   Genitourinary:  Positive for difficulty urinating, dysuria and hematuria. Negative for frequency and genital sores.   Musculoskeletal:  Negative for arthralgias and back pain.   Neurological:  Positive for headaches. Negative for dizziness and light-headedness.         VITALS     ED Vitals      Date/Time Temp Pulse Resp BP SpO2 [facility]   01/24/25 2137 -- -- -- 142/79 -- [facility]   01/24/25 2135 36.7 °C (98.1 °F) 60 18 -- 97 % [facility]          Pulse Ox %: 97 % (01/24/25 2151)  Pulse Ox Interpretation: Normal (01/24/25 2151)  Heart Rate: 60 (01/24/25 2151)  Rhythm Strip Interpretation: Paced Rhythm (01/24/25 2151)     Physical Exam   PHYSICAL EXAM     Physical Exam  Vitals and nursing note reviewed.   Constitutional:       Appearance: She is well-developed and normal weight.   HENT:      Head: Normocephalic and atraumatic.   Cardiovascular:      Rate and Rhythm: Normal rate and regular rhythm.      Comments: Heart is regular rate and rhythm.  No murmurs rubs gallops appreciated.  Pulmonary:      Effort: Pulmonary effort is normal.      Breath sounds: Normal breath sounds. No decreased breath sounds, wheezing, rhonchi or rales.      Comments: Lungs clear auscultation bilaterally, no signs respiratory distress.  Abdominal:      Comments: Abdomen is soft, nontender, nondistended.   Musculoskeletal:         General: Normal range of motion.      Cervical back: Normal range of motion.      Right lower leg: No tenderness. No edema.      Left lower leg: No tenderness. No edema.   Skin:     General: Skin is warm and dry.   Neurological:      General: No focal deficit present.      Mental Status: She is alert and oriented to person, place, and time.           PROCEDURES     Procedures     DATA     Results        Procedure Component Value Units Date/Time    [facility] Draw Panel [[id number]] Collected: 01/24/25 2143    Specimen: Blood, Venous Updated: 01/24/25 2148    Narrative:      The following orders were created for panel order [facility] Panel.  Procedure                               Abnormality         Status                     ---------                               -----------         ------                     RAINBOW LT BLUE[[id number]]                                  In process                   Please view results for these tests on the individual orders.    RAINBOW LT BLUE [[id number]] Collected: 01/24/25 2143    Specimen: Blood, Venous Updated: 01/24/25 2148    CBC and differential [[id number]] Collected: 01/24/25 2143    Specimen: Blood, Venous Updated: 01/24/25 2148    HS Troponin I (with 2 hour reflex) [[id number]] Collected: 01/24/25 2143    Specimen: Blood, Venous Updated: 01/24/25 2148    Comprehensive metabolic panel [[id number]] Collected: 01/24/25 2143    Specimen: Blood, Venous Updated: 01/24/25 2148            Imaging Results    None         ECG 12 lead   Independent Interpretation by ED Provider   ECG  Rate: 103  Rhythm: paced  OR Interval: 154  QRS Duration: 132  QT/QTc: 384/503  ST segment: no acute ischemic changes     Impression: paced rhythm              Scoring tools                                  ED Course & MDM   MDM / ED COURSE / CLINICAL IMPRESSION / DISPO   Vital Signs Review: Vital signs have been reviewed.   The oxygen saturation is SpO2: 97 % which is normal.    Medical Decision Making  Patient is a 74-year-old female presenting to the emergency department for evaluation of multiple symptoms including effusion, difficulty with urination, dysuria, abdominal pressure, generalized weakness, chills x 6 weeks, worsening over the past 1 to 2 weeks.  On exam patient appears very anxious but otherwise in no acute distress.  Abdomen is soft, nontender, nondistended.  She is mildly tachycardic on  auscultation without murmurs rubs or gallops.  Lungs clear auscultation bilaterally, no signs of respiratory distress. Lab work today significant for elevated white blood cell count of 26.92, elevated D-dimer 1.16.  UA consistent with UTI and was started on Rocephin in the ED.  Lactate reassuring at 1.2.  Otherwise labs overall reassuring.  Chest x-ray on our wet read did not reveal an acute abnormality.  Due to the patient's history, exam, lab work CT PE study as well as the abdomen pelvis warranted.  Patient was signed out to my attending, Dr. Hoover, prior to CT imaging results.    Amount and/or Complexity of Data Reviewed  Labs: ordered. Decision-making details documented in ED Course.  Radiology: ordered. Decision-making details documented in ED Course.  ECG/medicine tests: ordered and independent interpretation performed.    Risk  Prescription drug management.  Decision regarding hospitalization.          ED Course as of 01/25/25 0133   Fri Jan 24, 2025 2238 Impression: Evaluation for confusion, difficulty with urination, dysuria, abdominal pressure, generalized weakness x 6 weeks, worsening over the past 1 to 2 weeks    Plan: Labs, dimer, EKG, troponin, TSH, UA, viral panel, lactate, chest x-ray, reeval [MO]   2239 D-Dimer, Quant(!): 1.16 [MO]   2239 WBC(!): 26.92  Increased from 20,000, 2 weeks ago [MO]   2331 X-RAY CHEST 2 VIEWS  COMMENT:  The lungs are clear.  The heart size is normal.  There is thoracic  degenerative change.  There is a left chest wall pacemaker.     --  IMPRESSION: No definite active disease.   [MO]   Sat Jan 25, 2025   0015 Leukocyte Esterase(!): +3 [MO]   0132 Signed out to Dr. Hoover [MO]      ED Course User Index  [MO] Emory Grace PA C     Clinical Impression      None               Patient seen during a time of significantly increased volumes, increased boarding in ED, decreased capacity and staff which may have contributed to lengthened stay in ED. Portion of management  and initial evaluation may have been done while in the waiting room because of this.  Extensive detailed charting also may be limited secondary to high ED volumes and may not reflect all conversations and decisions made between patient and provider.     This document was created using dragon dictation software.  There might be some typographical errors due to this technology.       Emory Grace PA C  01/26/25 0206

## 2025-01-25 NOTE — ED ATTESTATION NOTE
I have personally seen and examined Gladys Woo.  I was involved in the care and medical decision making for this patient.    I reviewed and agree with physician assistant / nurse practitioner’s assessment and plan of care; any exceptions are documented below.      My focused history, examination, assessment and plan of care of Galdys Woo is as follows:    Brief History:  HPI  74-year-old female with history of recently diagnosed bladder tumor, hyperlipidemia presents to the emergency department for evaluation of increasing lower abdominal cramps over the past several weeks.  She reports pain will wax and wane and is associate with nausea and feelings of faintness.  She also reports mild shortness of breath, generalized weakness, decreased appetite and decreased oral intake.  She is currently on antibiotics for UTI.  She is followed by urology, Dr. Jimenez.  Joe denies any recent falls or trauma.  No chest pain.      Focused Physical Exam:  Physical Exam  Vitals and nursing note reviewed.   Constitutional:       General: She is in acute distress.      Appearance: She is well-developed and normal weight. She is not toxic-appearing.   HENT:      Head: Normocephalic and atraumatic.      Mouth/Throat:      Mouth: Mucous membranes are moist.   Eyes:      Extraocular Movements: Extraocular movements intact.      Pupils: Pupils are equal, round, and reactive to light.   Cardiovascular:      Rate and Rhythm: Normal rate and regular rhythm.      Pulses: Normal pulses.   Pulmonary:      Effort: Pulmonary effort is normal.      Breath sounds: Normal breath sounds. No wheezing, rhonchi or rales.   Abdominal:      Tenderness: There is abdominal tenderness. There is no guarding or rebound.   Musculoskeletal:         General: Normal range of motion.      Cervical back: Normal range of motion and neck supple.      Right lower leg: No edema.      Left lower leg: No edema.   Skin:     General: Skin is warm and  dry.      Capillary Refill: Capillary refill takes less than 2 seconds.   Neurological:      General: No focal deficit present.      Mental Status: She is alert and oriented to person, place, and time.      Cranial Nerves: No cranial nerve deficit.      Motor: No weakness.   Psychiatric:         Mood and Affect: Mood normal.         Behavior: Behavior normal.             Assessment / Plan:        Medical Decision Making  High suspicion for metabolic derangements, dehydration, likely worsening malignancy, possible UTI, pyelonephritis, low suspicion for arrhythmia, pulmonary embolism. Plan EKG, labs including UA, IV hydration, will reassess. Anticipate pt will need admission.    Amount and/or Complexity of Data Reviewed  Labs: ordered. Decision-making details documented in ED Course.  Radiology: ordered. Decision-making details documented in ED Course.  ECG/medicine tests: ordered and independent interpretation performed.    Risk  Prescription drug management.  Decision regarding hospitalization.        I was physically present for the key/critical portions of the following procedures:  Procedures           Jose Alfredo Hoover MD  01/25/25 2820

## 2025-01-25 NOTE — ASSESSMENT & PLAN NOTE
Continue outpatient BuSpar  If she remains this anxious she may need something a little bit stronger

## 2025-01-25 NOTE — PLAN OF CARE
Problem: Adult Inpatient Plan of Care  Goal: Plan of Care Review  Outcome: Progressing  Flowsheets (Taken 1/25/2025 7364)  Progress: no change  Outcome Evaluation: Pt VSS, reports mild pain, refusing pain medications at this time. Oriented to care setting, independent in room. I&Os. Call bell within reach. No further complaints at this time.  Plan of Care Reviewed With: patient   Plan of Care Review  Plan of Care Reviewed With: patient  Progress: no change  Outcome Evaluation: Pt VSS, reports mild pain, refusing pain medications at this time. Oriented to care setting, independent in room. I&Os. Call bell within reach. No further complaints at this time.

## 2025-01-25 NOTE — PROGRESS NOTES
"Gladys Hendrickson Amna   [id number]    Your doctor has referred you for a   that requires the injection of an iodinated contrast material into your bloodstream. This iodinated contrast material, sometimes referred to as \"x-ray dye\" allows for better interpretation of the x-ray films or CT images and results in a more accurate interpretation of the examination.     Without the use of iodinated contrast (x-ray dye), the examination may be less informative and result in a suboptimal examination, and possibly a delay in diagnosis and, if needed, treatment.     The iodinated contrast material is given through a small needle or catheter placed into a vein, usually on the inside of the elbow, on the back of hand, or in a vein in the foot or lower leg.    The most common, though still rare, potential reaction to an intravenous contrast injection is an allergic-like reaction. Most allergic-like reactions are mild, though a small subset of people can have more severe reactions. Mild reactions include mild / scattered hives, itching, scratchy throat, sneezing and nasal congestion. More severe reactions include facial swelling, severe difficulty breathing, wheezing and anaphylactic shock. Those with a prior history allergic-like reaction to the same class of contrast media (such as CT contrast or MRI contrast) are at the highest risk for an allergic reaction.     If you believe you had an allergic reaction to contrast in the past, please let our staff know. We can determine if this increases your risk for a future reaction and provide steps to decrease the risk. This may delay your examination, but it decreases the risk of having a new and possibly more severe reaction to the contrast injection.    People with a history of prior allergic reactions to other substances (such as unrelated medications and food) and patients with a history of asthma have slightly increased risk for an allergic reaction from contrast material when " compared with the general population, but do not require to be pretreated prior to a contrast injection.    You should notify the physician, nurse or technologist if you have ever had any of these conditions or if you have any questions.

## 2025-01-25 NOTE — ASSESSMENT & PLAN NOTE
Enlarging size  Now with symptoms of intermittent bladder obstruction  Tumor expanding from the left ureter towards the right ureter with bilateral hydronephrosis  Appears she is able to void and had bladder emptying in the emergency room which apparently she had difficulty with at home  Continue with IV fluids  Treat for UTI with Rocephin  Urology consultation  She is due for TURBT on Tuesday  Keep n.p.o. in case she needs earlier procedure

## 2025-01-26 PROBLEM — D64.9 ANEMIA: Status: ACTIVE | Noted: 2025-01-26

## 2025-01-26 PROBLEM — N39.0 UTI (URINARY TRACT INFECTION): Status: ACTIVE | Noted: 2025-01-26

## 2025-01-26 LAB
ANION GAP SERPL CALC-SCNC: 7 MEQ/L (ref 3–15)
BUN SERPL-MCNC: 16 MG/DL (ref 7–25)
CALCIUM SERPL-MCNC: 8.7 MG/DL (ref 8.6–10.3)
CHLORIDE SERPL-SCNC: 107 MEQ/L (ref 98–107)
CO2 SERPL-SCNC: 23 MEQ/L (ref 21–31)
CREAT SERPL-MCNC: 1.4 MG/DL (ref 0.6–1.2)
EGFRCR SERPLBLD CKD-EPI 2021: 39.8 ML/MIN/1.73M*2
ERYTHROCYTE [DISTWIDTH] IN BLOOD BY AUTOMATED COUNT: 13 % (ref 11.7–14.4)
GLUCOSE SERPL-MCNC: 139 MG/DL (ref 70–99)
HCT VFR BLD AUTO: 27.1 % (ref 35–45)
HGB BLD-MCNC: 8.4 G/DL (ref 11.8–15.7)
MAGNESIUM SERPL-MCNC: 2.1 MG/DL (ref 1.8–2.5)
MCH RBC QN AUTO: 28.4 PG (ref 28–33.2)
MCHC RBC AUTO-ENTMCNC: 31 G/DL (ref 32.2–35.5)
MCV RBC AUTO: 91.6 FL (ref 83–98)
PLATELET # BLD AUTO: 377 K/UL (ref 150–369)
PMV BLD AUTO: 9.7 FL (ref 9.4–12.3)
POTASSIUM SERPL-SCNC: 4.1 MEQ/L (ref 3.5–5.1)
RBC # BLD AUTO: 2.96 M/UL (ref 3.93–5.22)
SODIUM SERPL-SCNC: 137 MEQ/L (ref 136–145)
WBC # BLD AUTO: 21.27 K/UL (ref 3.8–10.5)

## 2025-01-26 PROCEDURE — 63700000 HC SELF-ADMINISTRABLE DRUG: Performed by: STUDENT IN AN ORGANIZED HEALTH CARE EDUCATION/TRAINING PROGRAM

## 2025-01-26 PROCEDURE — 99232 SBSQ HOSP IP/OBS MODERATE 35: CPT | Performed by: STUDENT IN AN ORGANIZED HEALTH CARE EDUCATION/TRAINING PROGRAM

## 2025-01-26 PROCEDURE — 80048 BASIC METABOLIC PNL TOTAL CA: CPT | Performed by: INTERNAL MEDICINE

## 2025-01-26 PROCEDURE — 36415 COLL VENOUS BLD VENIPUNCTURE: CPT | Performed by: INTERNAL MEDICINE

## 2025-01-26 PROCEDURE — 12000000 HC ROOM AND CARE MED/SURG

## 2025-01-26 PROCEDURE — 83735 ASSAY OF MAGNESIUM: CPT | Performed by: INTERNAL MEDICINE

## 2025-01-26 PROCEDURE — 63600000 HC DRUGS/DETAIL CODE: Mod: JZ | Performed by: INTERNAL MEDICINE

## 2025-01-26 PROCEDURE — 63700000 HC SELF-ADMINISTRABLE DRUG: Performed by: INTERNAL MEDICINE

## 2025-01-26 PROCEDURE — 85027 COMPLETE CBC AUTOMATED: CPT | Performed by: INTERNAL MEDICINE

## 2025-01-26 RX ORDER — POLYETHYLENE GLYCOL 3350 17 G/17G
17 POWDER, FOR SOLUTION ORAL DAILY
Status: DISCONTINUED | OUTPATIENT
Start: 2025-01-26 | End: 2025-02-01 | Stop reason: HOSPADM

## 2025-01-26 RX ORDER — POLYETHYLENE GLYCOL 3350 17 G/17G
17 POWDER, FOR SOLUTION ORAL DAILY
Status: DISCONTINUED | OUTPATIENT
Start: 2025-01-26 | End: 2025-01-26

## 2025-01-26 RX ADMIN — SENNOSIDES AND DOCUSATE SODIUM 1 TABLET: 8.6; 5 TABLET ORAL at 19:35

## 2025-01-26 RX ADMIN — BUSPIRONE HYDROCHLORIDE 5 MG: 5 TABLET ORAL at 08:04

## 2025-01-26 RX ADMIN — SENNOSIDES AND DOCUSATE SODIUM 1 TABLET: 8.6; 5 TABLET ORAL at 08:04

## 2025-01-26 RX ADMIN — ROSUVASTATIN CALCIUM 40 MG: 40 TABLET, FILM COATED ORAL at 08:04

## 2025-01-26 RX ADMIN — ACETAMINOPHEN 650 MG: 325 TABLET, FILM COATED ORAL at 19:34

## 2025-01-26 RX ADMIN — ENOXAPARIN SODIUM 40 MG: 40 INJECTION SUBCUTANEOUS at 17:07

## 2025-01-26 RX ADMIN — MORPHINE SULFATE 3 MG: 4 INJECTION, SOLUTION INTRAMUSCULAR; INTRAVENOUS at 20:46

## 2025-01-26 RX ADMIN — BUSPIRONE HYDROCHLORIDE 5 MG: 5 TABLET ORAL at 19:34

## 2025-01-26 RX ADMIN — CEFTRIAXONE 1 G: 1 INJECTION, SOLUTION INTRAVENOUS at 00:29

## 2025-01-26 RX ADMIN — BUSPIRONE HYDROCHLORIDE 5 MG: 5 TABLET ORAL at 15:17

## 2025-01-26 RX ADMIN — POLYETHYLENE GLYCOL 3350 17 G: 17 POWDER, FOR SOLUTION ORAL at 09:13

## 2025-01-26 ASSESSMENT — COGNITIVE AND FUNCTIONAL STATUS - GENERAL
CLIMB 3 TO 5 STEPS WITH RAILING: 4 - NONE
MOVING TO AND FROM BED TO CHAIR: 4 - NONE
STANDING UP FROM CHAIR USING ARMS: 4 - NONE
WALKING IN HOSPITAL ROOM: 4 - NONE
STANDING UP FROM CHAIR USING ARMS: 4 - NONE
MOVING TO AND FROM BED TO CHAIR: 4 - NONE
CLIMB 3 TO 5 STEPS WITH RAILING: 4 - NONE
WALKING IN HOSPITAL ROOM: 4 - NONE

## 2025-01-26 NOTE — ASSESSMENT & PLAN NOTE
Enlarging size  Now with symptoms of intermittent bladder obstruction  Tumor expanding from the left ureter towards the right ureter with bilateral hydronephrosis  Appears she is able to void and had bladder emptying in the emergency room which apparently she had difficulty with at home  On Rocephin for UTI  Monitor PVR  Urology Following  Plan for TURBT once medically stable

## 2025-01-26 NOTE — PROGRESS NOTES
"Urology Progress Note    Subjective    Interval History: Feels better this am.  Voiding small, frequent amounts.     Objective    Vital signs in last 24 hours:  Temp:  [36.2 °C (97.2 °F)-38.3 °C (101 °F)] 37.2 °C (99 °F)  Heart Rate:  [70-92] 82  Resp:  [18] 18  BP: (103-136)/(55-62) 135/62      Intake/Output Summary (Last 24 hours) at 1/26/2025 0814  Last data filed at 1/26/2025 0400  Gross per 24 hour   Intake 1000 ml   Output 375 ml   Net 625 ml        Physical Exam:  Visit Vitals  /62 (BP Location: Left upper arm, Patient Position: Lying)   Pulse 82   Temp 37.2 °C (99 °F) (Oral)   Resp 18   Ht 1.676 m (5' 6\")   Wt 79.4 kg (175 lb)   SpO2 95%   BMI 28.25 kg/m²       General Appearance:  Alert, cooperative, no distress, appears stated age   Head:  Normocephalic, without obvious abnormality, atraumatic   Back:   No CVA tenderness   Lungs:   Respirations unlabored   Chest wall:  No tenderness or deformity       Abdomen:   Soft, non-tender, bowel sounds active all four quadrants,  no masses, no organomegaly           Extremities:  Musculoskeletal: Extremities normal, atraumatic, no cyanosis or edema  No injury or deformity       Skin: Skin color, texture, turgor normal, no rashes or lesions     Labs  Lab Results   Component Value Date    WBC 21.27 (H) 01/26/2025    HGB 8.4 (L) 01/26/2025    HCT 27.1 (L) 01/26/2025     (H) 01/26/2025    ALT 11 01/24/2025    AST 20 01/24/2025     01/26/2025    K 4.1 01/26/2025     01/26/2025    CREATININE 1.4 (H) 01/26/2025    BUN 16 01/26/2025    CO2 23 01/26/2025    TSH 4.70 01/24/2025         Imaging  Not applicable       Assessment & Plan     Patient Active Problem List   Diagnosis    Abnormal finding of diagnostic imaging    Arrhythmia    Suhail hematuria    Generalized anxiety disorder with panic attacks    Hemorrhoids    Hyperlipemia    Major depressive disorder    Neoplasm of uncertain behavior of bladder    Obstructive sleep apnea    Pacemaker    " Recurrent major depressive disorder in partial remission (CMS/HCC)    SVT (supraventricular tachycardia) (CMS/HCC)    Bladder cancer (CMS/HCC)    Bladder mass    CKD (chronic kidney disease)     A/P  74-year-old female with very large bladder tumor and concern for right hydronephrosis admitted with malaise and fatigue, concerns for UTI.  She continues with  variable fevers.  Voiding in small, frequent amounts.  Renal function remains stable, leukocytosis improving.    Rec:  -Continue antibiotics and hydration as you are pending urine culture  -We will plan on TUR bladder tumor when medically stable  -Monitor voids    Expected Discharge Date:  1/26/2025  No discharge date for patient encounter.  Quincy Cortez MD  1/26/2025

## 2025-01-26 NOTE — PLAN OF CARE
Plan of Care Review  Plan of Care Reviewed With: patient  Progress: no change  Outcome Evaluation: Patient reports of 6/10 lower abdominal pain relieved with prn morphine. Patient's Tmax 101 this evening, prn tylenol given. Patient with frequency but urinating small amounts. Bladder scans <200. Patient refusing bed alarm, ambulating independently in room. Call bell within reach.

## 2025-01-26 NOTE — PROGRESS NOTES
[facility]     Daily Progress Note                SUBJECTIVE   Interval History: Pt seen and examined. D/W Nurse, no acute events   Patient complains of minimal discomfort lower abdomen.  Continues to have difficulty urinating, postvoid residuals being monitored  Reported fever overnight.  Denied nausea, vomiting, chest pain, shortness of breath  Blood culture no growth to date, urine culture pending   OBJECTIVE      Vital signs in last 24 hours:  Temp:  [36.3 °C (97.4 °F)-38.3 °C (101 °F)] 36.8 °C (98.3 °F)  Heart Rate:  [70-92] 82  Resp:  [18] 18  BP: (109-136)/(57-65) 124/65    Intake/Output Summary (Last 24 hours) at 1/26/2025 1225  Last data filed at 1/26/2025 0915  Gross per 24 hour   Intake 1000 ml   Output 475 ml   Net 525 ml         PHYSICAL EXAMINATION      Physical Exam    General: Pt Alert and Interactive, No Acute Distress  HEENT: PERRLA, EOMI, Supple  Lungs: Clear to Auscultation B/L, No Wheeze/Rhonchi  CVS: S1 S2 heard, No Murmurs  Abdomen: Soft, min lower abd tender, Non distended, BS heard  CNS: AAOX3, Power 5/5 B/L UL and LL, Sensations grossly intact  Psychiatric: Calm and Cooperative    LINES, CATHETERS, DRAINS, AIRWAYS, AND WOUNDS   Lines, Drains, and Airways:  Wounds (agree with documentation and present on admission):  Peripheral IV (Adult) 01/24/25 Left;Posterior Hand (Active)   Number of days: 2       Peripheral IV (Adult) 01/24/25 Right Antecubital (Active)   Number of days: 2         Comments:    LABS / IMAGING / TELE      CBC Results         01/26/25 01/24/25 01/08/25     0401 2143 0806    WBC 21.27 26.92 20.25    RBC 2.96 3.46 3.28    HGB 8.4 9.8 9.4    HCT 27.1 31.5 30.7    MCV 91.6 91.0 93.6    MCH 28.4 28.3 28.7    MCHC 31.0 31.1 30.6     512 431           CMP Results         01/26/25 01/25/25 01/24/25     0401 0526 2143     134 133    K 4.1 4.0 4.5    Cl 107 107 101    CO2 23 21 23    Glucose 139 153 122    BUN 16 14 16    Creatinine 1.4 1.4 1.6    Calcium 8.7  8.0 9.3    Anion Gap 7 6 9    AST -- -- 20    ALT -- -- 11    Albumin -- -- 3.6    EGFR 39.8 39.8 33.9           Comment for K at 0526 on 01/25/25: Results obtained on plasma. Plasma Potassium values may be up to 0.4 mEQ/L less than serum values. The differences may be greater for patients with high platelet or white cell counts.    Comment for K at 2143 on 01/24/25: Results obtained on plasma. Plasma Potassium values may be up to 0.4 mEQ/L less than serum values. The differences may be greater for patients with high platelet or white cell counts.    Comment for EGFR at 0401 on 01/26/25: Calculation based on the Chronic Kidney Disease Epidemiology Collaboration (CKD-EPI) equation refit without adjustment for race.    Comment for EGFR at 0526 on 01/25/25: Calculation based on the Chronic Kidney Disease Epidemiology Collaboration (CKD-EPI) equation refit without adjustment for race.    Comment for EGFR at 2143 on 01/24/25: Calculation based on the Chronic Kidney Disease Epidemiology Collaboration (CKD-EPI) equation refit without adjustment for race.           Microbiology Results       Procedure Component Value Units Date/Time    Blood Culture Blood, Venous [[id number]]  (Normal) Collected: 01/25/25 0107    Specimen: Blood, Venous Updated: 01/26/25 0802     Culture No growth at 18-24 hours    Blood Culture Blood, Venous [[id number]]  (Normal) Collected: 01/25/25 0107    Specimen: Blood, Venous Updated: 01/26/25 0802     Culture No growth at 18-24 hours    SARS-COV-2 (COVID-19)/ FLU A/B, AND RSV, PCR Nasopharynx [[id number]]  (Normal) Collected: 01/24/25 2332    Specimen: Nasopharyngeal Swab from Nasopharynx Updated: 01/25/25 0023     SARS-CoV-2 (COVID-19) Negative     Influenza A Negative     Influenza B Negative     Respiratory Syncytial Virus Negative    Narrative:      Testing performed using real-time PCR for detection of COVID-19. EUA approved validation studies performed on site.          CT ANGIOGRAPHY CHEST  PULMONARY EMBOLISM WITH IV CONTRAST, CT ABDOMEN PELVIS WITH IV CONTRAST    Result Date: 1/25/2025  Narrative: CLINICAL HISTORY: Pain. Dysuria. Shortness of breath. History of bladder cancer. Abdominal pain, acute, nonlocalized TECHNIQUE: CT chest: PE protocol.  CT angiogram of the chest with axial reconstructions after administration of  intravenous contrast.  Coronal and sagittal reformats also obtained. MIP reforms also created separate workstation. MIP reconstructions with 2-D and/ or 3-D reformatted imaging was performed to better evaluate the vasculature CT abdomen and pelvis: Helical acquisition after administration of intravenous contrast. Coronal and sagittal reconstructions also performed. 100mL of iopamidoL (ISOVUE-370) 370 mg iodine /mL (76 %) injection 100 mL CT DOSE:  One or more dose reduction techniques (e.g. automated exposure control, adjustment of the mA and/or kV according to patient size, use of iterative reconstruction technique) utilized for this examination COMPARISON: Chest radiographs 1/24/2025 CT abdomen and pelvis 12/19/2024 FINDINGS: CHEST: -Lungs And Large Airways:Lungs clear. -Stephenie/Mediastinum/Axilla: No adenopathy or mass. -Pleura: No pleural effusion or nodule. No pneumothorax. -Vascular: No pulmonary embolism.  Pulmonary arteries normal in caliber.  Aorta normal in caliber.  No aortic dissection. -Chest Wall And Lower Neck: Unremarkable. -Bones: No acute findings. -Heart: Normal size. No pericardial effusion. ABDOMEN: -Liver: No mass or any significant abnormality. -Gallbladder: No calcified gallstones. -Bile Ducts: No significant biliary ductal dilatation. -Spleen:  No splenomegaly or focal lesion. -Pancreas: No mass, ductal dilatation, or inflammatory changes. -Kidneys: Moderate right hydronephrosis and hydroureter, similar to prior. Right ureter dilated to the level of the of bladder mass. No left hydronephrosis. -Adrenals:  Left adrenal nodule similar to prior. -Lymph Nodes: No  adenopathy. -Vascular: No aortic aneurysm or any significant vascular abnormalities. -Abdominal Wall: No hernia, fluid collection, or any significant findings. -Bones: No acute findings BOWEL/MESENTERY: Bowel normal in caliber.  No inflammatory changes. No ascites.  No collection.  No free air. PELVIS: -Bladder: Irregular bladder mass, not significantly changed. -Reproductive Organs:No gross mass. -Lymph Nodes: No mass or lymphadenopathy. -Miscellaneous: No free fluid     Impression: IMPRESSION: No pulmonary embolism. No acute pulmonary process. Large bladder mass, similar to prior CT 12/19/2024. Moderate right hydronephrosis and hydroureter, not significantly changed. /     X-RAY CHEST 2 VIEWS    Result Date: 1/24/2025  Narrative: CLINICAL HISTORY: SOB PRIOR STUDY: None TECHNIQUE: Frontal/lateral chest COMMENT:  The lungs are clear.  The heart size is normal.  There is thoracic degenerative change.  There is a left chest wall pacemaker.     Impression: IMPRESSION: No definite active disease.       ECG/Telemetry  I have independently reviewed the telemetry. No events for the last 24 hours.    ASSESSMENT AND PLAN        Assessment & Plan  Neoplasm of uncertain behavior of bladder  Enlarging size  Now with symptoms of intermittent bladder obstruction  Tumor expanding from the left ureter towards the right ureter with bilateral hydronephrosis  Appears she is able to void and had bladder emptying in the emergency room which apparently she had difficulty with at home  On Rocephin for UTI  Monitor PVR  Urology Following  Plan for TURBT once medically stable  Pacemaker  History of SVT and arrhythmia  [facility]  Troponins are negative  EKG is stable  No chest pain/SOB currently  Generalized anxiety disorder with panic attacks  Continue Buspar  Obstructive sleep apnea  F/U OP for Sleep study  Bladder mass    CKD (chronic kidney disease)  Monitor  Avoid Nephrotoxics  UTI (urinary tract infection)  On Rocephin, UC and BC  pending  Fever spike overnight but WBC better  Monitor  Anemia  Likely in setting of infection/Hematuria  Monitor H/H    VTE Assessment: Padua    VTE Prophylaxis:  Current anticoagulants:  enoxaparin (LOVENOX) syringe 40 mg, subcutaneous, Daily (6p)      Code Status: Full Code      Estimated Discharge Date: 1/29/2025   Disposition Planning: Pending UC, TURB once stable          Linda Mcintyre MD  1/26/2025

## 2025-01-26 NOTE — PLAN OF CARE
Aox4. Reports some lower abdominal pain.Miralax added for constipation. Call bell in reach. Falls precautions maintained.  Problem: Adult Inpatient Plan of Care  Goal: Plan of Care Review  Outcome: Progressing

## 2025-01-26 NOTE — ASSESSMENT & PLAN NOTE
History of SVT and arrhythmia  [facility]  Troponins are negative  EKG is stable  No chest pain/SOB currently

## 2025-01-27 PROBLEM — K59.00 CONSTIPATION: Status: ACTIVE | Noted: 2025-01-27

## 2025-01-27 LAB
ANION GAP SERPL CALC-SCNC: 7 MEQ/L (ref 3–15)
BACTERIA UR CULT: NORMAL
BACTERIA UR CULT: NORMAL
BACTERIA URNS QL MICRO: 1 /HPF
BILIRUB UR QL STRIP.AUTO: ABNORMAL MG/DL
BUN SERPL-MCNC: 16 MG/DL (ref 7–25)
CALCIUM SERPL-MCNC: 9 MG/DL (ref 8.6–10.3)
CHLORIDE SERPL-SCNC: 106 MEQ/L (ref 98–107)
CLARITY UR REFRACT.AUTO: ABNORMAL
CO2 SERPL-SCNC: 25 MEQ/L (ref 21–31)
COLOR UR AUTO: ABNORMAL
CREAT SERPL-MCNC: 1.4 MG/DL (ref 0.6–1.2)
EGFRCR SERPLBLD CKD-EPI 2021: 39.8 ML/MIN/1.73M*2
ERYTHROCYTE [DISTWIDTH] IN BLOOD BY AUTOMATED COUNT: 12.9 % (ref 11.7–14.4)
GLUCOSE SERPL-MCNC: 119 MG/DL (ref 70–99)
GLUCOSE UR STRIP.AUTO-MCNC: ABNORMAL MG/DL
HCT VFR BLD AUTO: 28.7 % (ref 35–45)
HGB BLD-MCNC: 9 G/DL (ref 11.8–15.7)
HGB UR QL STRIP.AUTO: ABNORMAL
HYALINE CASTS #/AREA URNS LPF: ABNORMAL /LPF
KETONES UR STRIP.AUTO-MCNC: ABNORMAL MG/DL
LEUKOCYTE ESTERASE UR QL STRIP.AUTO: ABNORMAL
MAGNESIUM SERPL-MCNC: 2.1 MG/DL (ref 1.8–2.5)
MCH RBC QN AUTO: 28.6 PG (ref 28–33.2)
MCHC RBC AUTO-ENTMCNC: 31.4 G/DL (ref 32.2–35.5)
MCV RBC AUTO: 91.1 FL (ref 83–98)
MUCOUS THREADS URNS QL MICRO: ABNORMAL /LPF
NITRITE UR QL STRIP.AUTO: ABNORMAL
PH UR STRIP.AUTO: ABNORMAL [PH]
PLATELET # BLD AUTO: 407 K/UL (ref 150–369)
PMV BLD AUTO: 9.3 FL (ref 9.4–12.3)
POTASSIUM SERPL-SCNC: 4.2 MEQ/L (ref 3.5–5.1)
PROT UR QL STRIP.AUTO: ABNORMAL
RBC # BLD AUTO: 3.15 M/UL (ref 3.93–5.22)
RBC #/AREA URNS HPF: ABNORMAL /HPF
SODIUM SERPL-SCNC: 138 MEQ/L (ref 136–145)
SP GR UR REFRACT.AUTO: 1.01
SQUAMOUS URNS QL MICRO: 2 /HPF
TRANS CELLS URNS QL MICRO: ABNORMAL /HPF
UROBILINOGEN UR STRIP-ACNC: ABNORMAL EU/DL
WBC # BLD AUTO: 22.7 K/UL (ref 3.8–10.5)
WBC #/AREA URNS HPF: ABNORMAL /HPF

## 2025-01-27 PROCEDURE — 81001 URINALYSIS AUTO W/SCOPE: CPT | Performed by: STUDENT IN AN ORGANIZED HEALTH CARE EDUCATION/TRAINING PROGRAM

## 2025-01-27 PROCEDURE — 63600000 HC DRUGS/DETAIL CODE: Mod: JZ | Performed by: INTERNAL MEDICINE

## 2025-01-27 PROCEDURE — 99232 SBSQ HOSP IP/OBS MODERATE 35: CPT | Performed by: STUDENT IN AN ORGANIZED HEALTH CARE EDUCATION/TRAINING PROGRAM

## 2025-01-27 PROCEDURE — 36415 COLL VENOUS BLD VENIPUNCTURE: CPT | Performed by: INTERNAL MEDICINE

## 2025-01-27 PROCEDURE — 80048 BASIC METABOLIC PNL TOTAL CA: CPT | Performed by: INTERNAL MEDICINE

## 2025-01-27 PROCEDURE — 12000000 HC ROOM AND CARE MED/SURG

## 2025-01-27 PROCEDURE — 85027 COMPLETE CBC AUTOMATED: CPT | Performed by: INTERNAL MEDICINE

## 2025-01-27 PROCEDURE — 63700000 HC SELF-ADMINISTRABLE DRUG: Performed by: STUDENT IN AN ORGANIZED HEALTH CARE EDUCATION/TRAINING PROGRAM

## 2025-01-27 PROCEDURE — 63700000 HC SELF-ADMINISTRABLE DRUG: Performed by: INTERNAL MEDICINE

## 2025-01-27 PROCEDURE — 83735 ASSAY OF MAGNESIUM: CPT | Performed by: INTERNAL MEDICINE

## 2025-01-27 RX ORDER — CEFTRIAXONE 1 G/50ML
1 INJECTION, SOLUTION INTRAVENOUS
Status: DISCONTINUED | OUTPATIENT
Start: 2025-01-28 | End: 2025-01-31

## 2025-01-27 RX ORDER — BISACODYL 10 MG/1
10 SUPPOSITORY RECTAL DAILY PRN
Status: DISCONTINUED | OUTPATIENT
Start: 2025-01-27 | End: 2025-02-01 | Stop reason: HOSPADM

## 2025-01-27 RX ADMIN — POLYETHYLENE GLYCOL 3350 17 G: 17 POWDER, FOR SOLUTION ORAL at 08:36

## 2025-01-27 RX ADMIN — BUSPIRONE HYDROCHLORIDE 5 MG: 5 TABLET ORAL at 13:02

## 2025-01-27 RX ADMIN — ENOXAPARIN SODIUM 40 MG: 40 INJECTION SUBCUTANEOUS at 17:43

## 2025-01-27 RX ADMIN — CEFTRIAXONE 1 G: 1 INJECTION, SOLUTION INTRAVENOUS at 23:42

## 2025-01-27 RX ADMIN — ACETAMINOPHEN 650 MG: 325 TABLET, FILM COATED ORAL at 20:32

## 2025-01-27 RX ADMIN — SENNOSIDES AND DOCUSATE SODIUM 1 TABLET: 8.6; 5 TABLET ORAL at 08:36

## 2025-01-27 RX ADMIN — BUSPIRONE HYDROCHLORIDE 5 MG: 5 TABLET ORAL at 20:32

## 2025-01-27 RX ADMIN — ROSUVASTATIN CALCIUM 40 MG: 40 TABLET, FILM COATED ORAL at 08:36

## 2025-01-27 RX ADMIN — SENNOSIDES AND DOCUSATE SODIUM 1 TABLET: 8.6; 5 TABLET ORAL at 20:32

## 2025-01-27 RX ADMIN — BUSPIRONE HYDROCHLORIDE 5 MG: 5 TABLET ORAL at 08:36

## 2025-01-27 RX ADMIN — CEFTRIAXONE 1 G: 1 INJECTION, SOLUTION INTRAVENOUS at 00:41

## 2025-01-27 ASSESSMENT — COGNITIVE AND FUNCTIONAL STATUS - GENERAL
WALKING IN HOSPITAL ROOM: 4 - NONE
WALKING IN HOSPITAL ROOM: 4 - NONE
STANDING UP FROM CHAIR USING ARMS: 4 - NONE
MOVING TO AND FROM BED TO CHAIR: 4 - NONE
CLIMB 3 TO 5 STEPS WITH RAILING: 4 - NONE
MOVING TO AND FROM BED TO CHAIR: 4 - NONE
CLIMB 3 TO 5 STEPS WITH RAILING: 4 - NONE
STANDING UP FROM CHAIR USING ARMS: 4 - NONE

## 2025-01-27 NOTE — CONSULTS
Nutrition Note    Clinical Course: Patient is a 74 y.o. female who was admitted on 1/24/2025 with a diagnosis of Bladder cancer (CMS/HCC) [C67.9]  Bladder mass [N32.89]  Obstruction of ureter, unspecified laterality [N13.5].   Past Medical History:   Diagnosis Date    Coronary artery disease     KERRI (generalized anxiety disorder)     TREVOR (obstructive sleep apnea)      Past Surgical History   Procedure Laterality Date    A-v cardiac pacemaker insertion      Insert / replace / remove pacemaker      Tonsillectomy       Nutrition Interventions/ Recommendations:   Regular diet as tolerated    - smaller more frequent meals    - monitor and record % meal intake / tolerance   Trial Ensure Plus (350 kcals / 13 g protein each) and Ensure Clear (240 kcals, 8 g protein, 52 g CHO each)   Trend labs/lytes, treat/replete prn   Weekly weight  Monitor GI function    - reviewed tips for management of nausea    - antiemetics prn    - continue bowel regimen and increase prn     Patient meets criteria for moderate malnutrition in setting of acute illness/injury as evidenced by meeting <75% of estimated energy needs for >1 week; >7.5% weight loss in <3 months     Nutrition Status Classification: Moderate nutritional compromise  Active Diet Orders (From admission, onward)       Start        01/27/25 1127  Dietary nutrition supplements  Once        Question Answer Comment   Select Supplement (PH): Ensure Clear [place]    Meal period Lunch           01/27/25 1127  Dietary nutrition supplements  Once        Question Answer Comment   Select Supplement (PH): Ensure Plus High Protein Chocolate    Meal period Lunch           01/25/25 0259  Adult Diet Regular; RD/LDN may adjust order  Diet effective now        Question Answer Comment   Diet Texture Regular    Delegation of Authority. Diet orders written by PA/Mitch may not be adjusted by RD/LDNs. RD/LDN may adjust order                               Reason for Assessment  Reason For Assessment:  "identified at risk by screening criteria (MST score >/=2)  Diagnosis: cancer diagnosis/related complications    MST Nutrition Screen Tool  Has patient lost weight without trying?: 2-->Yes, 14-23 lbs  Has patient been eating poorly due to decreased appetite?: 1-->Yes  [facility] Nutrition Screen Score: 3    Nutrition/Diet History  Typical Food/Fluid Intake: from home, lives alone  Diet Prior to Admission: regular  Appetite Prior to Admission: Poor-0-25% (>1 month 2/2 nausea)  Meal/Snack Patterns: 2 meals/day  Functional Status: ambulatory, able to purchase food, able to prepare meals  Food Allergies: no known food allergies  Factors Affecting Nutritional Intake: nausea    Physical Findings  Overall Physical Appearance: overweight  Gastrointestinal: constipation, nausea  Last Bowel Movement: 01/16/25  Skin: intact    Last Bowel Movement: 01/16/25    Nutrition Order  Nutrition Order: meets nutritional requirements  Nutrition Order Comments: regular  Current TF/TPN Regimen: none    Anthropometrics  Height: 167.6 cm (5' 6\")    Weights (last 7 days)       Date/Time Weight    01/24/25 2135 79.4 kg (175 lb)            Admit Weight  Admit Weight Method: measured  Admit Weight: 79.4 kg (175 lb)    Current Weight  Weight Method: Bed scale  Weight: 79.4 kg (175 lb)    Ideal Body Weight (IBW)  Ideal Body Weight (IBW) (kg): 59.58  % Ideal Body Weight: 133.24    Usual Body Weight (UBW)  Usual Body Weight: 86.2 kg (190 lb) (pt reports UBW of 190 lbs, consistent with care everywhere weight on 12/4/24)  Weight Loss: unintentional  Time Frame: Other (comments) (15 lb / 7.9% weight loss in <3 months - severe)    Body Mass Index (BMI)  BMI (Calculated): 28.3  BMI Assessment: BMI 25-29.9: overweight  Nutritional Status/Malnutrition: Acute illness or Injury - Non-Severe (Moderate Malnutrition)    Malnutrition (Moderate to Severe)  Inadequate Oral Intake: less than 75% for greater than 7 days  Severe Weight Loss (Malnutrition): greater than 7.5% " "in 3 months       Labs/Procedures/Meds  Lab Results Reviewed: reviewed      Results from last 7 days   Lab Units 01/27/25  0413 01/26/25  0401 01/25/25  0526   SODIUM mEQ/L 138 137 134*   POTASSIUM mEQ/L 4.2 4.1 4.0   CHLORIDE mEQ/L 106 107 107   CO2 mEQ/L 25 23 21   BUN mg/dL 16 16 14   CREATININE mg/dL 1.4* 1.4* 1.4*   GLUCOSE mg/dL 119* 139* 153*   CALCIUM mg/dL 9.0 8.7 8.0*      Results from last 7 days   Lab Units 01/24/25  2143   ALK PHOS IU/L 67   BILIRUBIN TOTAL mg/dL 0.5   ALBUMIN g/dL 3.6   ALT IU/L 11   AST IU/L 20          No results found for: \"HGBA1C\"  No results found for: \"YAOWXAPH21\"  Lab Results   Component Value Date    CALCIUM 9.0 01/27/2025     Results from last 7 days   Lab Units 01/27/25 0413 01/26/25  0401 01/24/25  2143   WBC K/uL 22.70* 21.27* 26.92*   HEMOGLOBIN g/dL 9.0* 8.4* 9.8*   HEMATOCRIT % 28.7* 27.1* 31.5*   PLATELETS K/uL 407* 377* 512*      Results from last 7 days   Lab Units 01/25/25  0526   IRON ug/dL 16*   TIBC ug/dL 188*          Results from last 7 days   Lab Units 01/27/25  0413 01/26/25  0401 01/25/25  0526   MAGNESIUM mg/dL 2.1 2.1 1.9          Diagnostic Tests/Procedures  Diagnostic Test/Procedure Reviewed: reviewed, pertinent    Medications  Pertinent Medications Reviewed: reviewed   busPIRone  5 mg oral TID    [START ON 1/28/2025] cefTRIAXone  1 g intravenous q24h INT    enoxaparin  40 mg subcutaneous Daily (6p)    polyethylene glycol  17 g oral Daily    rosuvastatin  40 mg oral Daily    sennosides-docusate sodium  1 tablet oral BID         Estimated/Assessed Needs  Additional Documentation: Additional Requirements (Group), Calorie Requirements (Group), Fluid Requirements (Group), Protein Requirements (Group)    Calorie Requirements  Estimated kCal Needs: Actual Body Weight (79 Kg)  Estimated Calorie Need Method: kcal/kg  Calorie/kg Recommended: 22-25  Calorie Recommendations: 2835-1819 Kcal/day    Protein Requirements  Recommended Dosing Weight (Estimated Protein " Needs): Actual Body Weight (79 Kg)  Protein Recommendations: 79-95 g protein/day (1-1.2 g protein/Kg)    Fluid Requirements  Fluid Recommendation (mL): 25-30ml  Recommended Fluid Needs Dosing Weight: Actual Body Weight (79 Kg)  Fluid Requirements (mL/day): 2228-2510 ml/day    PES  Statement: PES Statement  Nutrition Diagnosis: Malnutrition  Related To:: Decreased ability to consume sufficient energy  As Evidenced By:: >7.5% weight loss in 3 months; meeting <75% of EER for >1 week  Nutritional Needs Met?: No      Clinical Comments: Patient screened for MST score >/=2. 75 y/o female with PMHx significant for CAD, TREVOR, anxiety and known bladder tumor with plan for TURBT. Admitted on 1/24 with abdominal pain. Being treated for UTI. Ordered for a regular diet.     Met with patient at bedside. Lives at home alone. Reports poor po intake >1 month attributed to nausea. At baseline she consumes 2 meals/day. Diet recall over the past month includes mashed potatoes, yogurt, oatmeal. Took 1 bite of English muffin this morning. Agreed to trial Ensure Plus HP and Ensure clear with lunch. Reports nausea and constipation PTA. Last BM 1/16 (constipated x11 days). Receiving bowel regimen, increase prn. Labs/Meds reviewed. RD to follow per protocol.     Goals:  Meet >75% of estimated Kcal/Protein needs via PO intake     Monitor and Evaluation:   PO intake/ tolerance   Supplement intake   Trends in weights/labs/lytes  Medications  GI function  I/O  Medical course    Discussed with: Patient     Date: 01/27/25  Signature: Lori Artis RD

## 2025-01-27 NOTE — PLAN OF CARE
Care Coordination Admission Assessment Note    General Information:  Readmission Within the last 30 days:    Does patient have a : No  Patient-Specific Goals (include timeframe): to go home    Living Arrangements:  Arrived From: home  Current Living Arrangements: home  People in Home: alone  Home Accessibility:    Living Arrangement Comments: Pt lives alone in 1Sh-2 steps to enter    Housing Stability and Utility Access (SDOH):  In the last 12 months, was there a time when you were not able to pay the mortgage or rent on time?: No  In the past 12 months, how many times have you moved?:    At any time in the past 12 months, were you homeless or living in a shelter (including now)?: No  In the past 12 months has the electric, gas, oil, or water company threatened to shut off services in your home?: No    Functional Status Prior to Admission:   Assistive Device/Animal Currently Used at Home: none  Functional Status Comments: Independent  IADL Comments: Pt is I with ADl'S     Supports and Services:  Current Outpatient/Agency/Support Group:    Type of Current Home Care Services:    History of home care episode or rehab stay: Pt does not have hx of SNF/HHc in the past    Discharge Needs Assessment:   Concerns to be Addressed: discharge planning, care coordination/care conferences  Current Discharge Risk:    Anticipated Changes Related to Illness: none    Patient/Family Anticipated Discharge Plan:  Patient/Family Anticipates Transition To: home  Patient/Family Anticipated Services at Transition: none    Connection to Community  Patient declined offered resources.    Patient Choice:   Offered/Gave Vendor List: no  Patient's Choice of Community Agency(s):         Anticipated Discharge Plan:  Met with patient. Provided education and contact information for Care Coordination services.: yes  Anticipated Discharge Disposition: home without assistance or services     Transportation Needs (SDOH):  Transportation  Concerns:    Transportation Anticipated: family or friend will provide  Is Out of Hospital DNR needed at discharge?: no    In the past 12 months, has lack of transportation kept you from medical appointments or from getting medications?: No  In the past 12 months, has lack of transportation kept you from meetings, work, or from getting things needed for daily living?: No    Concerns - comments:

## 2025-01-27 NOTE — PROGRESS NOTES
[facility] Medicine     Daily Progress Note                SUBJECTIVE   Interval History: Pt seen and examined. D/W Nurse, no acute events   Patient reports feeling okay, had mild fever overnight  Continues to have some difficulty urinating, however not retaining much.  PVRs being monitored  Reported light hematuria  Denied any nausea, vomiting, chest pain, shortness of breath  Reports constipation, bowel regimen ordered     OBJECTIVE      Vital signs in last 24 hours:  Temp:  [37.1 °C (98.7 °F)-38.2 °C (100.8 °F)] 37.1 °C (98.7 °F)  Heart Rate:  [88-90] 88  Resp:  [18] 18  BP: (113-124)/(59-66) 113/63    Intake/Output Summary (Last 24 hours) at 1/27/2025 1347  Last data filed at 1/27/2025 1200  Gross per 24 hour   Intake 453 ml   Output 850 ml   Net -397 ml         PHYSICAL EXAMINATION      Physical Exam    General: Pt Alert and Interactive, No Acute Distress  HEENT: PERRLA, EOMI, Supple  Lungs: Clear to Auscultation B/L, No Wheeze/Rhonchi  CVS: S1 S2 heard, No Murmurs  Abdomen: Soft, min lower abd tender, Non distended, BS heard  CNS: AAOX3, Power 5/5 B/L UL and LL, Sensations grossly intact  Psychiatric: Calm and Cooperative    LINES, CATHETERS, DRAINS, AIRWAYS, AND WOUNDS   Lines, Drains, and Airways:  Wounds (agree with documentation and present on admission):  Peripheral IV (Adult) 01/24/25 Left;Posterior Hand (Active)   Number of days: 3         Comments:    LABS / IMAGING / TELE      CBC Results         01/27/25 01/26/25 01/24/25     0413 0401 2143    WBC 22.70 21.27 26.92    RBC 3.15 2.96 3.46    HGB 9.0 8.4 9.8    HCT 28.7 27.1 31.5    MCV 91.1 91.6 91.0    MCH 28.6 28.4 28.3    MCHC 31.4 31.0 31.1     377 512           CMP Results         01/27/25 01/26/25 01/25/25     0413 0401 0526     137 134    K 4.2 4.1 4.0    Cl 106 107 107    CO2 25 23 21    Glucose 119 139 153    BUN 16 16 14    Creatinine 1.4 1.4 1.4    Calcium 9.0 8.7 8.0    Anion Gap 7 7 6    EGFR 39.8 39.8 39.8           Comment  for K at 0526 on 01/25/25: Results obtained on plasma. Plasma Potassium values may be up to 0.4 mEQ/L less than serum values. The differences may be greater for patients with high platelet or white cell counts.    Comment for EGFR at 0413 on 01/27/25: Calculation based on the Chronic Kidney Disease Epidemiology Collaboration (CKD-EPI) equation refit without adjustment for race.    Comment for EGFR at 0401 on 01/26/25: Calculation based on the Chronic Kidney Disease Epidemiology Collaboration (CKD-EPI) equation refit without adjustment for race.    Comment for EGFR at 0526 on 01/25/25: Calculation based on the Chronic Kidney Disease Epidemiology Collaboration (CKD-EPI) equation refit without adjustment for race.           Microbiology Results       Procedure Component Value Units Date/Time    Blood Culture Blood, Venous [[id number]]  (Normal) Collected: 01/25/25 0107    Specimen: Blood, Venous Updated: 01/27/25 0802     Culture No growth at 48 hours    Blood Culture Blood, Venous [[id number]]  (Normal) Collected: 01/25/25 0107    Specimen: Blood, Venous Updated: 01/27/25 0802     Culture No growth at 48 hours    SARS-COV-2 (COVID-19)/ FLU A/B, AND RSV, PCR Nasopharynx [[id number]]  (Normal) Collected: 01/24/25 2332    Specimen: Nasopharyngeal Swab from Nasopharynx Updated: 01/25/25 0023     SARS-CoV-2 (COVID-19) Negative     Influenza A Negative     Influenza B Negative     Respiratory Syncytial Virus Negative    Narrative:      Testing performed using real-time PCR for detection of COVID-19. EUA approved validation studies performed on site.     Urine culture Urine, Clean Catch [[id number]] Collected: 01/24/25 2329    Specimen: Urine, Clean Catch Updated: 01/27/25 0557     Urine Culture Probable contaminants, suggest recollection      >=100,000 cfu/mL Mixed Gram Positive Organisms         CT ANGIOGRAPHY CHEST PULMONARY EMBOLISM WITH IV CONTRAST, CT ABDOMEN PELVIS WITH IV CONTRAST    Result Date: 1/25/2025  Narrative:  CLINICAL HISTORY: Pain. Dysuria. Shortness of breath. History of bladder cancer. Abdominal pain, acute, nonlocalized TECHNIQUE: CT chest: PE protocol.  CT angiogram of the chest with axial reconstructions after administration of  intravenous contrast.  Coronal and sagittal reformats also obtained. MIP reforms also created separate workstation. MIP reconstructions with 2-D and/ or 3-D reformatted imaging was performed to better evaluate the vasculature CT abdomen and pelvis: Helical acquisition after administration of intravenous contrast. Coronal and sagittal reconstructions also performed. 100mL of iopamidoL (ISOVUE-370) 370 mg iodine /mL (76 %) injection 100 mL CT DOSE:  One or more dose reduction techniques (e.g. automated exposure control, adjustment of the mA and/or kV according to patient size, use of iterative reconstruction technique) utilized for this examination COMPARISON: Chest radiographs 1/24/2025 CT abdomen and pelvis 12/19/2024 FINDINGS: CHEST: -Lungs And Large Airways:Lungs clear. -Stephenie/Mediastinum/Axilla: No adenopathy or mass. -Pleura: No pleural effusion or nodule. No pneumothorax. -Vascular: No pulmonary embolism.  Pulmonary arteries normal in caliber.  Aorta normal in caliber.  No aortic dissection. -Chest Wall And Lower Neck: Unremarkable. -Bones: No acute findings. -Heart: Normal size. No pericardial effusion. ABDOMEN: -Liver: No mass or any significant abnormality. -Gallbladder: No calcified gallstones. -Bile Ducts: No significant biliary ductal dilatation. -Spleen:  No splenomegaly or focal lesion. -Pancreas: No mass, ductal dilatation, or inflammatory changes. -Kidneys: Moderate right hydronephrosis and hydroureter, similar to prior. Right ureter dilated to the level of the of bladder mass. No left hydronephrosis. -Adrenals:  Left adrenal nodule similar to prior. -Lymph Nodes: No adenopathy. -Vascular: No aortic aneurysm or any significant vascular abnormalities. -Abdominal Wall: No  hernia, fluid collection, or any significant findings. -Bones: No acute findings BOWEL/MESENTERY: Bowel normal in caliber.  No inflammatory changes. No ascites.  No collection.  No free air. PELVIS: -Bladder: Irregular bladder mass, not significantly changed. -Reproductive Organs:No gross mass. -Lymph Nodes: No mass or lymphadenopathy. -Miscellaneous: No free fluid     Impression: IMPRESSION: No pulmonary embolism. No acute pulmonary process. Large bladder mass, similar to prior CT 12/19/2024. Moderate right hydronephrosis and hydroureter, not significantly changed. /     X-RAY CHEST 2 VIEWS    Result Date: 1/24/2025  Narrative: CLINICAL HISTORY: SOB PRIOR STUDY: None TECHNIQUE: Frontal/lateral chest COMMENT:  The lungs are clear.  The heart size is normal.  There is thoracic degenerative change.  There is a left chest wall pacemaker.     Impression: IMPRESSION: No definite active disease.       ECG/Telemetry  I have independently reviewed the telemetry. No events for the last 24 hours.    ASSESSMENT AND PLAN        Assessment & Plan  Neoplasm of uncertain behavior of bladder  Enlarging size  Now with symptoms of intermittent bladder obstruction  Tumor expanding from the left ureter towards the right ureter with bilateral hydronephrosis  Appears she is able to void and had bladder emptying in the emergency room which apparently she had difficulty with at home  On Rocephin for UTI  Monitor PVR  Urology Following  Plan for TURBT once medically stable  Pacemaker  History of SVT and arrhythmia  [facility]  Troponins are negative  EKG is stable  No chest pain/SOB currently  Generalized anxiety disorder with panic attacks  Continue Buspar  Obstructive sleep apnea  F/U OP for Sleep study  Bladder mass    CKD (chronic kidney disease)  Monitor  Avoid Nephrotoxics  UTI (urinary tract infection)  On Rocephin, UC-mixed growth, repeat UA ordered and  Blood culture no growth to date  Fever spike overnight, leukocytosis  persistent  ID consulted, continue Rocephin for now  If continues to spike fever, might need obstruction revealed  Monitor  Anemia  Likely in setting of infection/Hematuria  Monitor H/H  Constipation  Bowel regimen ordered    VTE Assessment: Padua    VTE Prophylaxis:  Current anticoagulants:  enoxaparin (LOVENOX) syringe 40 mg, subcutaneous, Daily (6p)      Code Status: Full Code      Estimated Discharge Date: 1/29/2025   Disposition Planning: Pending clinical improvement          Linda Mcintyre MD  1/27/2025

## 2025-01-27 NOTE — PLAN OF CARE
Plan of Care Review  Plan of Care Reviewed With: patient  Progress: no change  Outcome Evaluation: Patient with reports of lower abdominal pain. Pain relieved with prn morphine. Tmax this evening 100.8, prn tylenol given with relief. Patient reports of being able to urinate more, monitoring PVR's. Ambulating independently in room, call bell within reach.

## 2025-01-27 NOTE — ASSESSMENT & PLAN NOTE
On Rocephin, UC-mixed growth, repeat UA ordered and  Blood culture no growth to date  Fever spike overnight, leukocytosis persistent  ID consulted, continue Rocephin for now  If continues to spike fever, might need obstruction revealed  Monitor

## 2025-01-27 NOTE — PLAN OF CARE
Problem: Adult Inpatient Plan of Care  Goal: Plan of Care Review  Flowsheets (Taken 1/27/2025 1417)  Progress: no change  Outcome Evaluation: Pt VSS, reports mild abdominal discomfort. Afebrile. Indep in room, call bell within reach. No further complaints at this time.  Plan of Care Reviewed With: patient   Plan of Care Review  Plan of Care Reviewed With: patient  Progress: no change  Outcome Evaluation: Pt VSS, reports mild abdominal discomfort. Afebrile. Indep in room, call bell within reach. No further complaints at this time.

## 2025-01-27 NOTE — PLAN OF CARE
Problem: Adult Inpatient Plan of Care  Goal: Plan of Care Review  Flowsheets (Taken 1/27/2025 1411)  Plan of Care Reviewed With: patient     Problem: Malnutrition  Goal: Improved Nutritional Intake  Intervention: Promote and Optimize Oral Intake  Flowsheets (Taken 1/27/2025 1411)  Oral Nutrition Promotion:   calorie-dense liquids provided   nutrition counseling provided   Nutrition Interventions/ Recommendations:   Regular diet as tolerated    - smaller more frequent meals    - monitor and record % meal intake / tolerance   Trial Ensure Plus (350 kcals / 13 g protein each) and Ensure Clear (240 kcals, 8 g protein, 52 g CHO each)   Trend labs/lytes, treat/replete prn   Weekly weight  Monitor GI function    - reviewed tips for management of nausea    - antiemetics prn    - continue bowel regimen and increase prn     Patient meets criteria for moderate malnutrition in setting of acute illness/injury as evidenced by meeting <75% of estimated energy needs for >1 week; >7.5% weight loss in <3 months

## 2025-01-27 NOTE — ASSESSMENT & PLAN NOTE
History of SVT and arrhythmia  [facility]  Troponins are negative  EKG is stable  No chest pain/SOB currently

## 2025-01-27 NOTE — PROGRESS NOTES
"Urology Progress Note    Subjective    Interval History: Feels better this am.  Voiding with improved volumes     Objective    Vital signs in last 24 hours:  Temp:  [36.8 °C (98.3 °F)-38.2 °C (100.8 °F)] 37.1 °C (98.7 °F)  Heart Rate:  [82-90] 88  Resp:  [18] 18  BP: (113-124)/(59-66) 113/63      Intake/Output Summary (Last 24 hours) at 1/27/2025 0816  Last data filed at 1/27/2025 0400  Gross per 24 hour   Intake 213 ml   Output 800 ml   Net -587 ml        Physical Exam:  Visit Vitals  /63 (BP Location: Left upper arm, Patient Position: Lying)   Pulse 88   Temp 37.1 °C (98.7 °F) (Temporal)   Resp 18   Ht 1.676 m (5' 6\")   Wt 79.4 kg (175 lb)   SpO2 93%   BMI 28.25 kg/m²       General Appearance:  Alert, cooperative, no distress, appears stated age   Head:  Normocephalic, without obvious abnormality, atraumatic   Back:   No CVA tenderness   Lungs:   Respirations unlabored   Chest wall:  No tenderness or deformity       Abdomen:   Soft, non-tender, bowel sounds active all four quadrants,  no masses, no organomegaly           Extremities:  Musculoskeletal: Extremities normal, atraumatic, no cyanosis or edema  No injury or deformity       Skin: Skin color, texture, turgor normal, no rashes or lesions     Labs  Lab Results   Component Value Date    WBC 22.70 (H) 01/27/2025    HGB 9.0 (L) 01/27/2025    HCT 28.7 (L) 01/27/2025     (H) 01/27/2025    ALT 11 01/24/2025    AST 20 01/24/2025     01/27/2025    K 4.2 01/27/2025     01/27/2025    CREATININE 1.4 (H) 01/27/2025    BUN 16 01/27/2025    CO2 25 01/27/2025    TSH 4.70 01/24/2025         Imaging  Not applicable       Assessment & Plan     Patient Active Problem List   Diagnosis    Abnormal finding of diagnostic imaging    Arrhythmia    Suhail hematuria    Generalized anxiety disorder with panic attacks    Hemorrhoids    Hyperlipemia    Major depressive disorder    Neoplasm of uncertain behavior of bladder    Obstructive sleep apnea    Pacemaker    " Recurrent major depressive disorder in partial remission (CMS/HCC)    SVT (supraventricular tachycardia) (CMS/HCC)    Bladder cancer (CMS/HCC)    Bladder mass    CKD (chronic kidney disease)    UTI (urinary tract infection)    Anemia     A/P  74-year-old female with very large bladder tumor and concern for right hydronephrosis admitted with malaise and fatigue, concerns for UTI.  She continues with  variable fevers.  Voiding in small, frequent amounts.  Renal function remains stable, leukocytosis remains elevated.  Ucx shows mixed growth.    Rec:  -Continue antibiotics and hydration as you are pending urine culture  -We will plan on TUR bladder tumor when medically stable  -May need right nephrostomy  -Monitor voids    Expected Discharge Date:  1/29/2025  No discharge date for patient encounter.  Quincy Cortez MD  1/27/2025

## 2025-01-27 NOTE — CONSULTS
Reason for Consult: UTI, Recurrent fevers    History of Present Illness:      Gladys Woo is a 74 y.o. female with    TREVOR  Pacer    18mths of what was thought to be vag bleed from estrogen ring  Past 2mth c chills, sweats, loss of appetite     Last mth found c bladder CA, R hydro  Past few days c fevers  No dysuria but has slow flow, dribbling    Allergies:   Amoxicillin    Current Facility-Administered Medications   Medication Dose Route Frequency Provider Last Rate Last Admin    acetaminophen (TYLENOL) tablet 650 mg  650 mg oral q4h PRN Varsha Lang MD   650 mg at 01/26/25 1934    bisacodyL (DULCOLAX) 10 mg suppository 10 mg  10 mg rectal Daily PRN Linda Mcintyre MD        busPIRone (BUSPAR) tablet 5 mg  5 mg oral TID Varsha Lang MD   5 mg at 01/27/25 0836    cefTRIAXone (ROCEPHIN) IVPB 1 g  1 g intravenous q24h INT Varsha Lang MD   Stopped at 01/27/25 0132    glucose chewable tablet 16-32 g of dextrose  16-32 g of dextrose oral PRN Varsha Lang MD        Or    dextrose 40 % oral gel 15-30 g of dextrose  15-30 g of dextrose oral PRN Varsha Lang MD        Or    glucagon (GLUCAGEN) injection 1 mg  1 mg intramuscular PRN Varsha Lang MD        Or    dextrose 50 % in water (D50) injection 12.5 g  25 mL intravenous PRN Varsha Lang MD        enoxaparin (LOVENOX) syringe 40 mg  40 mg subcutaneous Daily (6p) Varsha Lang MD   40 mg at 01/26/25 1707    magnesium sulfate IVPB 1g in 100 mL NSS/D5W/SWFI  1 g intravenous PRN Varsha Lang MD        Or    magnesium sulfate IVPB 2g in 50 mL NSS/D5W/SWFI  2 g intravenous PRN Varsha Lang MD        melatonin ODT 3 mg  3 mg oral Nightly PRN Varsha Lang MD        morphine injection 3 mg  3 mg intravenous q3h PRN Varsha Lang MD   3 mg at 01/26/25 2046    polyethylene glycol (MIRALAX) 17 gram packet 17 g  17 g oral Daily Linda Mcintyre MD   17 g at 01/27/25 9674     potassium chloride (KLOR-CON M) ER tablet (particles/crystals) 20 mEq  20 mEq oral PRN Varsha Lang MD        potassium chloride (KLOR-CON M) ER tablet (particles/crystals) 40 mEq  40 mEq oral PRN Varsha Lang MD        rosuvastatin (CRESTOR) tablet 40 mg  40 mg oral Daily Linda Mcintyre MD   40 mg at 01/27/25 0836    sennosides-docusate sodium (SENOKOT-S) 8.6-50 mg per tablet 1 tablet  1 tablet oral BID Varsha Lang MD   1 tablet at 01/27/25 0836      Social History:   Former  for [employer]  Former tob, +EtOH    Lives alone, no pets, travel    Family History: [place]    Review of Systems  Negative x as stated above    Temp:  [36.8 °C (98.3 °F)-38.2 °C (100.8 °F)] 37.1 °C (98.7 °F)  Heart Rate:  [82-90] 88  Resp:  [18] 18  BP: (113-124)/(59-66) 113/63  SpO2 Readings from Last 3 Encounters:   01/27/25 93%     Physical Exam  WD WN NAD    Head: Atraumatic  Mouth: Clear  Skin: No rash  Sclera: Anicteric  Neck: Supple  LN:  No significant enlargement  Lungs: clr  CV: Nl S1 and S2, no m, no embolic changes  Abd: Soft, NT, no HSM  Extr: No jt effusions, no edema  Neuro: Alert, lucid    Labs  I have reviewed the patient's pertinent labs.     Imaging:     CT ANGIOGRAPHY CHEST PULMONARY EMBOLISM WITH IV CONTRAST ABDOMEN PELVIS   Result Date: 1/25/2025  IMPRESSION: No pulmonary embolism. No acute pulmonary process. Large bladder mass, similar to prior CT 12/19/2024. Moderate right hydronephrosis and hydroureter, not significantly changed.     X-RAY CHEST 2 VIEWS  Result Date: 1/24/2025  IMPRESSION: No definite active disease.      Impression    Possible UTI  Hard to tell if all CA or CA c hydro and now pyelo  WBC incr    urCx is contaminated  Pt dribbles so not a surprise    Ceftriaxone is prob adeq, given lack of prior abx use etc    If wbc cont incr, then will need obstruction relieved    PATIENCE Escalante MD

## 2025-01-27 NOTE — ASSESSMENT & PLAN NOTE
F/U OP for Sleep study   Nutrition Care Plan    Nutrition Diagnosis:   Inadequate intake related to decreased appetite d/t unknown etiology but possibly advancing age as evidenced by patient reports of 2-3 weeks of decreased appetite, and no intakes PO during admission.    Intervention:  General/healthful diet:   Currently NPO for MRI today  · Recommend to advance back to Consistent Carb Moderate as soon as medically able  · Encouraged intakes    Other:   Will provide tayla crackers for hs snack and hard boiled egg for am snack per patient request.     Commerical food:   Will provide high protein jello with dinner.     Monitoring and Evaluation:  Amount of food:   Goal for patient to consume >75% of meals.  · Patient has been NPO for majority of admission in preparation for procedure today.

## 2025-01-28 LAB
ANION GAP SERPL CALC-SCNC: 5 MEQ/L (ref 3–15)
BUN SERPL-MCNC: 13 MG/DL (ref 7–25)
CALCIUM SERPL-MCNC: 8.6 MG/DL (ref 8.6–10.3)
CHLORIDE SERPL-SCNC: 108 MEQ/L (ref 98–107)
CO2 SERPL-SCNC: 27 MEQ/L (ref 21–31)
CREAT SERPL-MCNC: 1.3 MG/DL (ref 0.6–1.2)
EGFRCR SERPLBLD CKD-EPI 2021: 43.5 ML/MIN/1.73M*2
ERYTHROCYTE [DISTWIDTH] IN BLOOD BY AUTOMATED COUNT: 13 % (ref 11.7–14.4)
GLUCOSE SERPL-MCNC: 108 MG/DL (ref 70–99)
HCT VFR BLD AUTO: 24.6 % (ref 35–45)
HGB BLD-MCNC: 7.8 G/DL (ref 11.8–15.7)
MCH RBC QN AUTO: 28.7 PG (ref 28–33.2)
MCHC RBC AUTO-ENTMCNC: 31.7 G/DL (ref 32.2–35.5)
MCV RBC AUTO: 90.4 FL (ref 83–98)
PLATELET # BLD AUTO: 364 K/UL (ref 150–369)
PMV BLD AUTO: 9.2 FL (ref 9.4–12.3)
POTASSIUM SERPL-SCNC: 4.1 MEQ/L (ref 3.5–5.1)
RBC # BLD AUTO: 2.72 M/UL (ref 3.93–5.22)
SODIUM SERPL-SCNC: 140 MEQ/L (ref 136–145)
WBC # BLD AUTO: 19.54 K/UL (ref 3.8–10.5)

## 2025-01-28 PROCEDURE — 36415 COLL VENOUS BLD VENIPUNCTURE: CPT | Performed by: STUDENT IN AN ORGANIZED HEALTH CARE EDUCATION/TRAINING PROGRAM

## 2025-01-28 PROCEDURE — 12000000 HC ROOM AND CARE MED/SURG

## 2025-01-28 PROCEDURE — 82310 ASSAY OF CALCIUM: CPT | Performed by: STUDENT IN AN ORGANIZED HEALTH CARE EDUCATION/TRAINING PROGRAM

## 2025-01-28 PROCEDURE — 63600000 HC DRUGS/DETAIL CODE: Mod: JZ | Performed by: INTERNAL MEDICINE

## 2025-01-28 PROCEDURE — 99232 SBSQ HOSP IP/OBS MODERATE 35: CPT | Performed by: STUDENT IN AN ORGANIZED HEALTH CARE EDUCATION/TRAINING PROGRAM

## 2025-01-28 PROCEDURE — 85027 COMPLETE CBC AUTOMATED: CPT | Performed by: INTERNAL MEDICINE

## 2025-01-28 PROCEDURE — 63700000 HC SELF-ADMINISTRABLE DRUG: Performed by: INTERNAL MEDICINE

## 2025-01-28 PROCEDURE — 63700000 HC SELF-ADMINISTRABLE DRUG: Performed by: STUDENT IN AN ORGANIZED HEALTH CARE EDUCATION/TRAINING PROGRAM

## 2025-01-28 RX ORDER — HYDROCORTISONE 1 %
CREAM (GRAM) TOPICAL 3 TIMES DAILY PRN
Status: DISCONTINUED | OUTPATIENT
Start: 2025-01-28 | End: 2025-01-28

## 2025-01-28 RX ORDER — HYDROCORTISONE 25 MG/G
OINTMENT TOPICAL 2 TIMES DAILY PRN
Status: DISCONTINUED | OUTPATIENT
Start: 2025-01-28 | End: 2025-02-01 | Stop reason: HOSPADM

## 2025-01-28 RX ADMIN — BUSPIRONE HYDROCHLORIDE 5 MG: 5 TABLET ORAL at 20:33

## 2025-01-28 RX ADMIN — ENOXAPARIN SODIUM 40 MG: 40 INJECTION SUBCUTANEOUS at 17:42

## 2025-01-28 RX ADMIN — BUSPIRONE HYDROCHLORIDE 5 MG: 5 TABLET ORAL at 08:44

## 2025-01-28 RX ADMIN — ROSUVASTATIN CALCIUM 40 MG: 40 TABLET, FILM COATED ORAL at 08:44

## 2025-01-28 RX ADMIN — SODIUM PHOSPHATE, DIBASIC AND SODIUM PHOSPHATE, MONOBASIC 1 ENEMA: 7; 19 ENEMA RECTAL at 13:49

## 2025-01-28 RX ADMIN — POLYETHYLENE GLYCOL 3350 17 G: 17 POWDER, FOR SOLUTION ORAL at 08:44

## 2025-01-28 RX ADMIN — SENNOSIDES AND DOCUSATE SODIUM 1 TABLET: 8.6; 5 TABLET ORAL at 08:44

## 2025-01-28 RX ADMIN — BUSPIRONE HYDROCHLORIDE 5 MG: 5 TABLET ORAL at 13:59

## 2025-01-28 ASSESSMENT — COGNITIVE AND FUNCTIONAL STATUS - GENERAL
WALKING IN HOSPITAL ROOM: 3 - A LITTLE
STANDING UP FROM CHAIR USING ARMS: 4 - NONE
MOVING TO AND FROM BED TO CHAIR: 4 - NONE
CLIMB 3 TO 5 STEPS WITH RAILING: 3 - A LITTLE

## 2025-01-28 NOTE — PLAN OF CARE
Plan of Care Review  Plan of Care Reviewed With: patient  Progress: no change  Outcome Evaluation: PT aox4. medsurg. c/o abd/pelvic pain discomfort, tylelnol administered with relief. IV abx administered for UTI. dysuria reported. VSS. independent in room. safety measures in place.

## 2025-01-28 NOTE — PROGRESS NOTES
[facility] Medicine     Daily Progress Note                SUBJECTIVE   Interval History: Pt seen and examined. D/W Nurse, no acute events   Patient reports feeling better, no fevers  No bowel movement yet.  Enema ordered  Plan for TURB this admission       OBJECTIVE      Vital signs in last 24 hours:  Temp:  [36.5 °C (97.7 °F)-37.4 °C (99.4 °F)] 36.5 °C (97.7 °F)  Heart Rate:  [60-83] 60  Resp:  [16-18] 16  BP: (113-145)/(56-72) 145/72    Intake/Output Summary (Last 24 hours) at 1/28/2025 1606  Last data filed at 1/28/2025 1200  Gross per 24 hour   Intake 360 ml   Output --   Net 360 ml         PHYSICAL EXAMINATION      Physical Exam    General: Pt Alert and Interactive, No Acute Distress  HEENT: PERRLA, EOMI, Supple  Lungs: Clear to Auscultation B/L, No Wheeze/Rhonchi  CVS: S1 S2 heard, No Murmurs  Abdomen: Soft, min lower abd tender, Non distended, BS heard  CNS: AAOX3, Power 5/5 B/L UL and LL, Sensations grossly intact  Psychiatric: Calm and Cooperative    LINES, CATHETERS, DRAINS, AIRWAYS, AND WOUNDS   Lines, Drains, and Airways:  Wounds (agree with documentation and present on admission):  Peripheral IV (Adult) 01/24/25 Left;Posterior Hand (Active)   Number of days: 4         Comments:    LABS / IMAGING / TELE      CBC Results         01/28/25 01/27/25 01/26/25     0433 0413 0401    WBC 19.54 22.70 21.27    RBC 2.72 3.15 2.96    HGB 7.8 9.0 8.4    HCT 24.6 28.7 27.1    MCV 90.4 91.1 91.6    MCH 28.7 28.6 28.4    MCHC 31.7 31.4 31.0     407 377           CMP Results         01/28/25 01/27/25 01/26/25     0433 0413 0401     138 137    K 4.1 4.2 4.1    Cl 108 106 107    CO2 27 25 23    Glucose 108 119 139    BUN 13 16 16    Creatinine 1.3 1.4 1.4    Calcium 8.6 9.0 8.7    Anion Gap 5 7 7    EGFR 43.5 39.8 39.8           Comment for EGFR at 0433 on 01/28/25: Calculation based on the Chronic Kidney Disease Epidemiology Collaboration (CKD-EPI) equation refit without adjustment for race.    Comment for  EGFR at 0413 on 01/27/25: Calculation based on the Chronic Kidney Disease Epidemiology Collaboration (CKD-EPI) equation refit without adjustment for race.    Comment for EGFR at 0401 on 01/26/25: Calculation based on the Chronic Kidney Disease Epidemiology Collaboration (CKD-EPI) equation refit without adjustment for race.           Microbiology Results       Procedure Component Value Units Date/Time    Blood Culture Blood, Venous [[id number]]  (Normal) Collected: 01/25/25 0107    Specimen: Blood, Venous Updated: 01/28/25 0802     Culture No growth at 72 hours    Blood Culture Blood, Venous [[id number]]  (Normal) Collected: 01/25/25 0107    Specimen: Blood, Venous Updated: 01/28/25 0802     Culture No growth at 72 hours    SARS-COV-2 (COVID-19)/ FLU A/B, AND RSV, PCR Nasopharynx [[id number]]  (Normal) Collected: 01/24/25 2332    Specimen: Nasopharyngeal Swab from Nasopharynx Updated: 01/25/25 0023     SARS-CoV-2 (COVID-19) Negative     Influenza A Negative     Influenza B Negative     Respiratory Syncytial Virus Negative    Narrative:      Testing performed using real-time PCR for detection of COVID-19. EUA approved validation studies performed on site.     Urine culture Urine, Clean Catch [[id number]] Collected: 01/24/25 2329    Specimen: Urine, Clean Catch Updated: 01/27/25 0557     Urine Culture Probable contaminants, suggest recollection      >=100,000 cfu/mL Mixed Gram Positive Organisms         CT ANGIOGRAPHY CHEST PULMONARY EMBOLISM WITH IV CONTRAST, CT ABDOMEN PELVIS WITH IV CONTRAST    Result Date: 1/25/2025  Narrative: CLINICAL HISTORY: Pain. Dysuria. Shortness of breath. History of bladder cancer. Abdominal pain, acute, nonlocalized TECHNIQUE: CT chest: PE protocol.  CT angiogram of the chest with axial reconstructions after administration of  intravenous contrast.  Coronal and sagittal reformats also obtained. MIP reforms also created separate workstation. MIP reconstructions with 2-D and/ or 3-D reformatted  imaging was performed to better evaluate the vasculature CT abdomen and pelvis: Helical acquisition after administration of intravenous contrast. Coronal and sagittal reconstructions also performed. 100mL of iopamidoL (ISOVUE-370) 370 mg iodine /mL (76 %) injection 100 mL CT DOSE:  One or more dose reduction techniques (e.g. automated exposure control, adjustment of the mA and/or kV according to patient size, use of iterative reconstruction technique) utilized for this examination COMPARISON: Chest radiographs 1/24/2025 CT abdomen and pelvis 12/19/2024 FINDINGS: CHEST: -Lungs And Large Airways:Lungs clear. -Stephenie/Mediastinum/Axilla: No adenopathy or mass. -Pleura: No pleural effusion or nodule. No pneumothorax. -Vascular: No pulmonary embolism.  Pulmonary arteries normal in caliber.  Aorta normal in caliber.  No aortic dissection. -Chest Wall And Lower Neck: Unremarkable. -Bones: No acute findings. -Heart: Normal size. No pericardial effusion. ABDOMEN: -Liver: No mass or any significant abnormality. -Gallbladder: No calcified gallstones. -Bile Ducts: No significant biliary ductal dilatation. -Spleen:  No splenomegaly or focal lesion. -Pancreas: No mass, ductal dilatation, or inflammatory changes. -Kidneys: Moderate right hydronephrosis and hydroureter, similar to prior. Right ureter dilated to the level of the of bladder mass. No left hydronephrosis. -Adrenals:  Left adrenal nodule similar to prior. -Lymph Nodes: No adenopathy. -Vascular: No aortic aneurysm or any significant vascular abnormalities. -Abdominal Wall: No hernia, fluid collection, or any significant findings. -Bones: No acute findings BOWEL/MESENTERY: Bowel normal in caliber.  No inflammatory changes. No ascites.  No collection.  No free air. PELVIS: -Bladder: Irregular bladder mass, not significantly changed. -Reproductive Organs:No gross mass. -Lymph Nodes: No mass or lymphadenopathy. -Miscellaneous: No free fluid     Impression: IMPRESSION: No  pulmonary embolism. No acute pulmonary process. Large bladder mass, similar to prior CT 12/19/2024. Moderate right hydronephrosis and hydroureter, not significantly changed. /     X-RAY CHEST 2 VIEWS    Result Date: 1/24/2025  Narrative: CLINICAL HISTORY: SOB PRIOR STUDY: None TECHNIQUE: Frontal/lateral chest COMMENT:  The lungs are clear.  The heart size is normal.  There is thoracic degenerative change.  There is a left chest wall pacemaker.     Impression: IMPRESSION: No definite active disease.       ECG/Telemetry  I have independently reviewed the telemetry. No events for the last 24 hours.    ASSESSMENT AND PLAN        Assessment & Plan  Neoplasm of uncertain behavior of bladder  Enlarging size  Now with symptoms of intermittent bladder obstruction  Tumor expanding from the left ureter towards the right ureter with bilateral hydronephrosis  Appears she is able to void and had bladder emptying in the emergency room which apparently she had difficulty with at home  On Rocephin for UTI  Monitor PVR  Urology Following  Plan for TURBT   Pt Medically stable for surgery, No further testing needed  Pacemaker  History of SVT and arrhythmia  She sees [facility]  Troponins are negative  EKG is stable  No chest pain/SOB currently  Generalized anxiety disorder with panic attacks  Continue Buspar  Obstructive sleep apnea  F/U OP for Sleep study  Bladder mass    CKD (chronic kidney disease)  Monitor  Avoid Nephrotoxics  UTI (urinary tract infection)  On Rocephin, UC-mixed growth, repeat UA ordered and  Blood culture no growth to date  No fevers, WBC improving  ID consulted, continue Rocephin for now    Anemia  Likely in setting of infection/Hematuria  Monitor H/H  Hb 7.8 today  No Hematuria  Constipation  Bowel regimen ordered, no BM  Enema ordered    VTE Assessment: Padua    VTE Prophylaxis:  Current anticoagulants:  enoxaparin (LOVENOX) syringe 40 mg, subcutaneous, Daily (6p)      Code Status: Full Code      Estimated Discharge  Date: 1/29/2025   Disposition Planning: Plan for TURB, Might need Nephrostomy pending course          Linda Mcintyre MD  1/28/2025

## 2025-01-28 NOTE — PROGRESS NOTES
"Urology Progress Note    Subjective    Interval History: Feels better this am.  Voiding with improved volumes. Admits to some constipation and nausea    Objective    Vital signs in last 24 hours:  Temp:  [37.3 °C (99.1 °F)-37.4 °C (99.4 °F)] 37.3 °C (99.1 °F)  Heart Rate:  [70-83] 70  Resp:  [16-18] 16  BP: (113-115)/(56-60) 113/56      Intake/Output Summary (Last 24 hours) at 1/28/2025 0837  Last data filed at 1/27/2025 1200  Gross per 24 hour   Intake 0 ml   Output 150 ml   Net -150 ml        Physical Exam:  Visit Vitals  BP (!) 113/56 (BP Location: Right upper arm, Patient Position: Lying)   Pulse 70   Temp 37.3 °C (99.1 °F) (Temporal)   Resp 16   Ht 1.676 m (5' 6\")   Wt 79.4 kg (175 lb)   SpO2 97%   BMI 28.25 kg/m²       General Appearance:  Alert, cooperative, no distress, appears stated age   Head:  Normocephalic, without obvious abnormality, atraumatic   Back:   No CVA tenderness   Lungs:   Respirations unlabored   Chest wall:  No tenderness or deformity       Abdomen:   Soft, mild tenderness along LLQ           Extremities:  Musculoskeletal: Extremities normal, atraumatic, no cyanosis or edema  No injury or deformity       Skin: Skin color, texture, turgor normal, no rashes or lesions     Labs  Lab Results   Component Value Date    WBC 19.54 (H) 01/28/2025    HGB 7.8 (L) 01/28/2025    HCT 24.6 (L) 01/28/2025     01/28/2025    ALT 11 01/24/2025    AST 20 01/24/2025     01/28/2025    K 4.1 01/28/2025     (H) 01/28/2025    CREATININE 1.3 (H) 01/28/2025    BUN 13 01/28/2025    CO2 27 01/28/2025    TSH 4.70 01/24/2025         Imaging  Not applicable       Assessment & Plan     Patient Active Problem List   Diagnosis    Abnormal finding of diagnostic imaging    Arrhythmia    Suhail hematuria    Generalized anxiety disorder with panic attacks    Hemorrhoids    Hyperlipemia    Major depressive disorder    Neoplasm of uncertain behavior of bladder    Obstructive sleep apnea    Pacemaker    Recurrent " major depressive disorder in partial remission (CMS/HCC)    SVT (supraventricular tachycardia) (CMS/HCC)    Bladder cancer (CMS/HCC)    Bladder mass    CKD (chronic kidney disease)    UTI (urinary tract infection)    Anemia    Constipation     A/P  74-year-old female with very large bladder tumor and concern for right hydronephrosis admitted with malaise and fatigue, concerns for UTI.  She continues with  variable fevers.  Voiding in small, frequent amounts.  Renal function remains stable, leukocytosis remains elevated.  Ucx shows mixed growth.    Rec:  -Continue antibiotics and hydration as you are pending   -We will plan on TUR bladder tumor when medically stable/cleared by hospital team  -May need right nephrostomy  -Monitor voids  -bowel regimen    Expected Discharge Date:  1/29/2025  No discharge date for patient encounter.  PEARL Callahan  1/28/2025

## 2025-01-28 NOTE — ASSESSMENT & PLAN NOTE
On Rocephin, UC-mixed growth, repeat UA ordered and  Blood culture no growth to date  No fevers, WBC improving  ID consulted, continue Rocephin for now

## 2025-01-28 NOTE — PLAN OF CARE
Plan of Care Review  Plan of Care Reviewed With: patient  Progress: no change  Outcome Evaluation: Pt VSS, reports mild abdominal discomfort. Scheudled miralax, senokot and fleet enema given.Indep in room, ambulates in fisher. Reports minimal appetite and continued hesitancy and dysuria. Call bell within reach.

## 2025-01-28 NOTE — PROGRESS NOTES
Major Events:  none    Subjective:  No fever  No sweats, rigors    Temp:  [37.3 °C (99.1 °F)-37.4 °C (99.4 °F)] 37.3 °C (99.1 °F)  Heart Rate:  [70-83] 70  Resp:  [16-18] 16  BP: (113-115)/(56-60) 113/56     SpO2 Readings from Last 3 Encounters:   01/28/25 97%     Anti-infectives (From admission, onward)      Start     Dose/Rate Route Frequency Ordered Stop    01/28/25 0030  cefTRIAXone (ROCEPHIN) IVPB 1 g         1 g  100 mL/hr over 30 Minutes intravenous Every 24 hours interval 01/27/25 1042            Physical Exam:  Skin: No rash  Sclera: Anicteric  Lungs: clr  CV: S1, S2 nl, no embolic changes  Abd: Soft, nt  Extr: No jt eff, no edema  Neuro: Alert, lucid    Lab Results   Component Value Date    WBC 19.54 (H) 01/28/2025    HGB 7.8 (L) 01/28/2025    HCT 24.6 (L) 01/28/2025    MCV 90.4 01/28/2025     01/28/2025     Lab Results   Component Value Date    GLUCOSE 108 (H) 01/28/2025    CALCIUM 8.6 01/28/2025     01/28/2025    K 4.1 01/28/2025    CO2 27 01/28/2025     (H) 01/28/2025    BUN 13 01/28/2025    CREATININE 1.3 (H) 01/28/2025     Lab Results   Component Value Date    ALBUMIN 3.6 01/24/2025    BILITOT 0.5 01/24/2025    ALKPHOS 67 01/24/2025    AST 20 01/24/2025    ALT 11 01/24/2025    PROTEIN 7.2 01/24/2025     Microbiology Results (last 3 days)       ** No results found for the last 72 hours. **            Imaging:     CT ANGIOGRAPHY CHEST PULMONARY EMBOLISM WITH IV CONTRAST    Result Date: 1/25/2025  IMPRESSION: No pulmonary embolism. No acute pulmonary process. Large bladder mass, similar to prior CT 12/19/2024. Moderate right hydronephrosis and hydroureter, not significantly changed. /     CT ABDOMEN PELVIS WITH IV CONTRAST    Result Date: 1/25/2025  IMPRESSION: No pulmonary embolism. No acute pulmonary process. Large bladder mass, similar to prior CT 12/19/2024. Moderate right hydronephrosis and hydroureter, not significantly changed. /     X-RAY CHEST 2 VIEWS    Result Date:  Patient would like an order uploaded to her [facility] for her to get her mammogram ahead of her annual appointment. She gets this done through [facility] and they are requiring it. 1/24/2025  IMPRESSION: No definite active disease.      Impression    Suspected UTI  urCx contaminated  BC neg  Empiric ceftriaxone    Needs bladder CA surg to relieve ur obstruction  No contraindication to surg from ID end      YS MD Ava

## 2025-01-28 NOTE — ASSESSMENT & PLAN NOTE
Enlarging size  Now with symptoms of intermittent bladder obstruction  Tumor expanding from the left ureter towards the right ureter with bilateral hydronephrosis  Appears she is able to void and had bladder emptying in the emergency room which apparently she had difficulty with at home  On Rocephin for UTI  Monitor PVR  Urology Following  Plan for TURBT   Pt Medically stable for surgery, No further testing needed

## 2025-01-28 NOTE — ASSESSMENT & PLAN NOTE
History of SVT and arrhythmia  [facility]  Troponins are negative  EKG is stable  No chest pain/SOB currently

## 2025-01-29 LAB
ABO + RH BLD: NORMAL
ANION GAP SERPL CALC-SCNC: 8 MEQ/L (ref 3–15)
BACTERIA BLD CULT: NORMAL
BACTERIA BLD CULT: NORMAL
BASOPHILS # BLD: 0.1 K/UL (ref 0.01–0.1)
BASOPHILS NFR BLD: 0.5 %
BLD GP AB SCN SERPL QL: NEGATIVE
BLOOD BANK CMNT PATIENT-IMP: NORMAL
BUN SERPL-MCNC: 10 MG/DL (ref 7–25)
CALCIUM SERPL-MCNC: 8.3 MG/DL (ref 8.6–10.3)
CHLORIDE SERPL-SCNC: 108 MEQ/L (ref 98–107)
CO2 SERPL-SCNC: 23 MEQ/L (ref 21–31)
CREAT SERPL-MCNC: 1.2 MG/DL (ref 0.6–1.2)
D AG BLD QL: POSITIVE
DIFFERENTIAL METHOD BLD: ABNORMAL
EGFRCR SERPLBLD CKD-EPI 2021: 47.9 ML/MIN/1.73M*2
EOSINOPHIL # BLD: 0.6 K/UL (ref 0.04–0.36)
EOSINOPHIL NFR BLD: 3 %
ERYTHROCYTE [DISTWIDTH] IN BLOOD BY AUTOMATED COUNT: 13.1 % (ref 11.7–14.4)
GLUCOSE SERPL-MCNC: 123 MG/DL (ref 70–99)
HCT VFR BLD AUTO: 23.1 % (ref 35–45)
HGB BLD-MCNC: 7.3 G/DL (ref 11.8–15.7)
IMM GRANULOCYTES # BLD AUTO: 0.13 K/UL (ref 0–0.08)
IMM GRANULOCYTES NFR BLD AUTO: 0.7 %
LABORATORY COMMENT REPORT: NORMAL
LYMPHOCYTES # BLD: 2.83 K/UL (ref 1.2–3.5)
LYMPHOCYTES NFR BLD: 14.3 %
MCH RBC QN AUTO: 27.9 PG (ref 28–33.2)
MCHC RBC AUTO-ENTMCNC: 31.6 G/DL (ref 32.2–35.5)
MCV RBC AUTO: 88.2 FL (ref 83–98)
MONOCYTES # BLD: 1.07 K/UL (ref 0.28–0.8)
MONOCYTES NFR BLD: 5.4 %
NEUTROPHILS # BLD: 15.02 K/UL (ref 1.7–7)
NEUTS SEG NFR BLD: 76.1 %
NRBC BLD-RTO: 0 %
PLATELET # BLD AUTO: 363 K/UL (ref 150–369)
PMV BLD AUTO: 9 FL (ref 9.4–12.3)
POTASSIUM SERPL-SCNC: 3.9 MEQ/L (ref 3.5–5.1)
RBC # BLD AUTO: 2.62 M/UL (ref 3.93–5.22)
SODIUM SERPL-SCNC: 139 MEQ/L (ref 136–145)
SPECIMEN EXP DATE BLD: NORMAL
WBC # BLD AUTO: 19.75 K/UL (ref 3.8–10.5)

## 2025-01-29 PROCEDURE — 99232 SBSQ HOSP IP/OBS MODERATE 35: CPT | Performed by: STUDENT IN AN ORGANIZED HEALTH CARE EDUCATION/TRAINING PROGRAM

## 2025-01-29 PROCEDURE — 63700000 HC SELF-ADMINISTRABLE DRUG: Performed by: STUDENT IN AN ORGANIZED HEALTH CARE EDUCATION/TRAINING PROGRAM

## 2025-01-29 PROCEDURE — 86901 BLOOD TYPING SEROLOGIC RH(D): CPT

## 2025-01-29 PROCEDURE — 93005 ELECTROCARDIOGRAM TRACING: CPT

## 2025-01-29 PROCEDURE — 36430 TRANSFUSION BLD/BLD COMPNT: CPT

## 2025-01-29 PROCEDURE — 86900 BLOOD TYPING SEROLOGIC ABO: CPT

## 2025-01-29 PROCEDURE — 63600000 HC DRUGS/DETAIL CODE: Mod: JZ

## 2025-01-29 PROCEDURE — 36415 COLL VENOUS BLD VENIPUNCTURE: CPT | Performed by: STUDENT IN AN ORGANIZED HEALTH CARE EDUCATION/TRAINING PROGRAM

## 2025-01-29 PROCEDURE — 63700000 HC SELF-ADMINISTRABLE DRUG

## 2025-01-29 PROCEDURE — 12000000 HC ROOM AND CARE MED/SURG

## 2025-01-29 PROCEDURE — 86923 COMPATIBILITY TEST ELECTRIC: CPT

## 2025-01-29 PROCEDURE — 63700000 HC SELF-ADMINISTRABLE DRUG: Performed by: INTERNAL MEDICINE

## 2025-01-29 PROCEDURE — 63600000 HC DRUGS/DETAIL CODE: Mod: JZ | Performed by: INTERNAL MEDICINE

## 2025-01-29 PROCEDURE — 80048 BASIC METABOLIC PNL TOTAL CA: CPT | Performed by: STUDENT IN AN ORGANIZED HEALTH CARE EDUCATION/TRAINING PROGRAM

## 2025-01-29 PROCEDURE — 85025 COMPLETE CBC W/AUTO DIFF WBC: CPT | Performed by: STUDENT IN AN ORGANIZED HEALTH CARE EDUCATION/TRAINING PROGRAM

## 2025-01-29 RX ORDER — ONDANSETRON HYDROCHLORIDE 2 MG/ML
4 INJECTION, SOLUTION INTRAVENOUS ONCE
Status: COMPLETED | OUTPATIENT
Start: 2025-01-29 | End: 2025-01-29

## 2025-01-29 RX ORDER — SODIUM CHLORIDE 9 MG/ML
5 INJECTION, SOLUTION INTRAVENOUS AS NEEDED
Status: ACTIVE | OUTPATIENT
Start: 2025-01-29 | End: 2025-01-30

## 2025-01-29 RX ADMIN — CEFTRIAXONE 1 G: 1 INJECTION, SOLUTION INTRAVENOUS at 23:36

## 2025-01-29 RX ADMIN — ACETAMINOPHEN 650 MG: 325 TABLET, FILM COATED ORAL at 23:43

## 2025-01-29 RX ADMIN — ROSUVASTATIN CALCIUM 40 MG: 40 TABLET, FILM COATED ORAL at 09:31

## 2025-01-29 RX ADMIN — BUSPIRONE HYDROCHLORIDE 5 MG: 5 TABLET ORAL at 13:48

## 2025-01-29 RX ADMIN — BUSPIRONE HYDROCHLORIDE 5 MG: 5 TABLET ORAL at 20:21

## 2025-01-29 RX ADMIN — POTASSIUM CHLORIDE 20 MEQ: 1500 TABLET, EXTENDED RELEASE ORAL at 05:27

## 2025-01-29 RX ADMIN — ONDANSETRON 4 MG: 2 INJECTION INTRAMUSCULAR; INTRAVENOUS at 20:59

## 2025-01-29 RX ADMIN — SENNOSIDES AND DOCUSATE SODIUM 1 TABLET: 8.6; 5 TABLET ORAL at 09:31

## 2025-01-29 RX ADMIN — BUSPIRONE HYDROCHLORIDE 5 MG: 5 TABLET ORAL at 09:31

## 2025-01-29 RX ADMIN — CEFTRIAXONE 1 G: 1 INJECTION, SOLUTION INTRAVENOUS at 00:15

## 2025-01-29 RX ADMIN — HYDROCORTISONE: 25 OINTMENT TOPICAL at 01:00

## 2025-01-29 ASSESSMENT — COGNITIVE AND FUNCTIONAL STATUS - GENERAL
STANDING UP FROM CHAIR USING ARMS: 4 - NONE
CLIMB 3 TO 5 STEPS WITH RAILING: 3 - A LITTLE
MOVING TO AND FROM BED TO CHAIR: 4 - NONE
WALKING IN HOSPITAL ROOM: 3 - A LITTLE

## 2025-01-29 NOTE — ASSESSMENT & PLAN NOTE
Likely in setting of infection/Hematuria  Monitor H/H  Hb 7.3 today  Discussed with patient and she was agreeable with blood transfusion no  1 PRBC ordered

## 2025-01-29 NOTE — PLAN OF CARE
Plan of Care Review  Plan of Care Reviewed With: patient  Progress: no change  Outcome Evaluation: Patient with bowel movement, reports feeling much better. Voiding in bathroom. Receiving IV abx. Independent in room. PRN hydrocortisone cream for hemorrhoid pain. Pleasant and cooperative with care. Call bell within reach.

## 2025-01-29 NOTE — ASSESSMENT & PLAN NOTE
On Rocephin, UC-mixed growth, repeat UA ordered and  Blood culture no growth to date  No fevers, WBC improving  ID consulted, continue Rocephin

## 2025-01-29 NOTE — PROGRESS NOTES
Major Events:  none    Subjective:  No fever  No sweats, rigors    Temp:  [37.1 °C (98.7 °F)-37.4 °C (99.4 °F)] 37.1 °C (98.7 °F)  Heart Rate:  [60-86] 60  Resp:  [18] 18  BP: ()/(55-74) 128/64     SpO2 Readings from Last 3 Encounters:   01/29/25 97%     Anti-infectives (From admission, onward)      Start     Dose/Rate Route Frequency Ordered Stop    01/28/25 0030  cefTRIAXone (ROCEPHIN) IVPB 1 g         1 g  100 mL/hr over 30 Minutes intravenous Every 24 hours interval 01/27/25 1042            Physical Exam:  Skin: No rash  Sclera: Anicteric  Lungs: clr  CV: S1, S2 nl, no embolic changes  Abd: Soft, nt  Extr: No jt eff, no edema  Neuro: Alert, lucid    Lab Results   Component Value Date    WBC 19.75 (H) 01/29/2025    HGB 7.3 (L) 01/29/2025    HCT 23.1 (L) 01/29/2025    MCV 88.2 01/29/2025     01/29/2025     Lab Results   Component Value Date    GLUCOSE 123 (H) 01/29/2025    CALCIUM 8.3 (L) 01/29/2025     01/29/2025    K 3.9 01/29/2025    CO2 23 01/29/2025     (H) 01/29/2025    BUN 10 01/29/2025    CREATININE 1.2 01/29/2025     Lab Results   Component Value Date    ALBUMIN 3.6 01/24/2025    BILITOT 0.5 01/24/2025    ALKPHOS 67 01/24/2025    AST 20 01/24/2025    ALT 11 01/24/2025    PROTEIN 7.2 01/24/2025     Microbiology Results (last 3 days)       ** No results found for the last 72 hours. **            Imaging:     CT ANGIOGRAPHY CHEST PULMONARY EMBOLISM WITH IV CONTRAST    Result Date: 1/25/2025  IMPRESSION: No pulmonary embolism. No acute pulmonary process. Large bladder mass, similar to prior CT 12/19/2024. Moderate right hydronephrosis and hydroureter, not significantly changed. /     CT ABDOMEN PELVIS WITH IV CONTRAST    Result Date: 1/25/2025  IMPRESSION: No pulmonary embolism. No acute pulmonary process. Large bladder mass, similar to prior CT 12/19/2024. Moderate right hydronephrosis and hydroureter, not significantly changed. /     X-RAY CHEST 2 VIEWS    Result Date:  1/24/2025  IMPRESSION: No definite active disease.      Impression    Suspected UTI  urCx contaminated  BC neg  Empiric ceftriaxone    WBC still 19K  For surg tomorrow    YS MD Ava

## 2025-01-29 NOTE — ASSESSMENT & PLAN NOTE
Enlarging size. Pt was planned TURBT as OP  Now with symptoms of intermittent bladder obstruction  Tumor expanding from the left ureter towards the right ureter with bilateral hydronephrosis  Urology Following  ID following, no contraindication for surgery from ID end  Pt Medically stable for surgery, No further testing needed  Plan for TURBT tomorrow

## 2025-01-29 NOTE — PLAN OF CARE
Aox4. Received 1 unit PRBCs today for hgb 7.3. No reports of pain/SOB. Independent in the room. Call bell in reach. Fall precautions in place.   Problem: Adult Inpatient Plan of Care  Goal: Plan of Care Review  Outcome: Progressing     Problem: Fall Injury Risk  Goal: Absence of Fall and Fall-Related Injury  Outcome: Progressing

## 2025-01-29 NOTE — PROGRESS NOTES
[facility] Medicine     Daily Progress Note                SUBJECTIVE   Interval History: Pt seen and examined. D/W Nurse, no acute events   And reports feeling well, no active complaints currently  Abdominal pain improved  Patient had bowel movement  Hemoglobin discussed and patient was agreeable for PRBC transfusion today  Plan for OR tomorrow     OBJECTIVE      Vital signs in last 24 hours:  Temp:  [36.9 °C (98.5 °F)-37.4 °C (99.4 °F)] 36.9 °C (98.5 °F)  Heart Rate:  [60-86] 79  Resp:  [18] 18  BP: ()/(55-80) 142/80    Intake/Output Summary (Last 24 hours) at 1/29/2025 1558  Last data filed at 1/29/2025 1200  Gross per 24 hour   Intake 720 ml   Output 231 ml   Net 489 ml         PHYSICAL EXAMINATION      Physical Exam  General: Pt Alert and Interactive, No Acute Distress  HEENT: PERRLA, EOMI, Supple  Lungs: Clear to Auscultation B/L, No Wheeze/Rhonchi  CVS: S1 S2 heard, No Murmurs  Abdomen: Soft, Non tender, Non distended, BS heard  CNS: AAOX3, Power 5/5 B/L UL and LL, Sensations grossly intact  Psychiatric: Calm and Cooperative      LINES, CATHETERS, DRAINS, AIRWAYS, AND WOUNDS   Lines, Drains, and Airways:  Wounds (agree with documentation and present on admission):  Peripheral IV (Adult) 01/24/25 Left;Posterior Hand (Active)   Number of days: 5         Comments:    LABS / IMAGING / TELE      CBC Results         01/29/25 01/28/25 01/27/25     0346 0433 0413    WBC 19.75 19.54 22.70    RBC 2.62 2.72 3.15    HGB 7.3 7.8 9.0    HCT 23.1 24.6 28.7    MCV 88.2 90.4 91.1    MCH 27.9 28.7 28.6    MCHC 31.6 31.7 31.4     364 407           CMP Results         01/29/25 01/28/25 01/27/25     0346 0433 0413     140 138    K 3.9 4.1 4.2    Cl 108 108 106    CO2 23 27 25    Glucose 123 108 119    BUN 10 13 16    Creatinine 1.2 1.3 1.4    Calcium 8.3 8.6 9.0    Anion Gap 8 5 7    EGFR 47.9 43.5 39.8           Comment for EGFR at 0346 on 01/29/25: Calculation based on the Chronic Kidney Disease  Epidemiology Collaboration (CKD-EPI) equation refit without adjustment for race.    Comment for EGFR at 0433 on 01/28/25: Calculation based on the Chronic Kidney Disease Epidemiology Collaboration (CKD-EPI) equation refit without adjustment for race.    Comment for EGFR at 0413 on 01/27/25: Calculation based on the Chronic Kidney Disease Epidemiology Collaboration (CKD-EPI) equation refit without adjustment for race.           Microbiology Results       Procedure Component Value Units Date/Time    Blood Culture Blood, Venous [[id number]]  (Normal) Collected: 01/25/25 0107    Specimen: Blood, Venous Updated: 01/29/25 0801     Culture No growth at 96 hours    Blood Culture Blood, Venous [[id number]]  (Normal) Collected: 01/25/25 0107    Specimen: Blood, Venous Updated: 01/29/25 0801     Culture No growth at 96 hours    SARS-COV-2 (COVID-19)/ FLU A/B, AND RSV, PCR Nasopharynx [[id number]]  (Normal) Collected: 01/24/25 2332    Specimen: Nasopharyngeal Swab from Nasopharynx Updated: 01/25/25 0023     SARS-CoV-2 (COVID-19) Negative     Influenza A Negative     Influenza B Negative     Respiratory Syncytial Virus Negative    Narrative:      Testing performed using real-time PCR for detection of COVID-19. EUA approved validation studies performed on site.     Urine culture Urine, Clean Catch [[id number]] Collected: 01/24/25 2329    Specimen: Urine, Clean Catch Updated: 01/27/25 0557     Urine Culture Probable contaminants, suggest recollection      >=100,000 cfu/mL Mixed Gram Positive Organisms         CT ANGIOGRAPHY CHEST PULMONARY EMBOLISM WITH IV CONTRAST, CT ABDOMEN PELVIS WITH IV CONTRAST    Result Date: 1/25/2025  Narrative: CLINICAL HISTORY: Pain. Dysuria. Shortness of breath. History of bladder cancer. Abdominal pain, acute, nonlocalized TECHNIQUE: CT chest: PE protocol.  CT angiogram of the chest with axial reconstructions after administration of  intravenous contrast.  Coronal and sagittal reformats also obtained. MIP  reforms also created separate workstation. MIP reconstructions with 2-D and/ or 3-D reformatted imaging was performed to better evaluate the vasculature CT abdomen and pelvis: Helical acquisition after administration of intravenous contrast. Coronal and sagittal reconstructions also performed. 100mL of iopamidoL (ISOVUE-370) 370 mg iodine /mL (76 %) injection 100 mL CT DOSE:  One or more dose reduction techniques (e.g. automated exposure control, adjustment of the mA and/or kV according to patient size, use of iterative reconstruction technique) utilized for this examination COMPARISON: Chest radiographs 1/24/2025 CT abdomen and pelvis 12/19/2024 FINDINGS: CHEST: -Lungs And Large Airways:Lungs clear. -Stephenie/Mediastinum/Axilla: No adenopathy or mass. -Pleura: No pleural effusion or nodule. No pneumothorax. -Vascular: No pulmonary embolism.  Pulmonary arteries normal in caliber.  Aorta normal in caliber.  No aortic dissection. -Chest Wall And Lower Neck: Unremarkable. -Bones: No acute findings. -Heart: Normal size. No pericardial effusion. ABDOMEN: -Liver: No mass or any significant abnormality. -Gallbladder: No calcified gallstones. -Bile Ducts: No significant biliary ductal dilatation. -Spleen:  No splenomegaly or focal lesion. -Pancreas: No mass, ductal dilatation, or inflammatory changes. -Kidneys: Moderate right hydronephrosis and hydroureter, similar to prior. Right ureter dilated to the level of the of bladder mass. No left hydronephrosis. -Adrenals:  Left adrenal nodule similar to prior. -Lymph Nodes: No adenopathy. -Vascular: No aortic aneurysm or any significant vascular abnormalities. -Abdominal Wall: No hernia, fluid collection, or any significant findings. -Bones: No acute findings BOWEL/MESENTERY: Bowel normal in caliber.  No inflammatory changes. No ascites.  No collection.  No free air. PELVIS: -Bladder: Irregular bladder mass, not significantly changed. -Reproductive Organs:No gross mass. -Lymph Nodes:  No mass or lymphadenopathy. -Miscellaneous: No free fluid     Impression: IMPRESSION: No pulmonary embolism. No acute pulmonary process. Large bladder mass, similar to prior CT 12/19/2024. Moderate right hydronephrosis and hydroureter, not significantly changed. /     X-RAY CHEST 2 VIEWS    Result Date: 1/24/2025  Narrative: CLINICAL HISTORY: SOB PRIOR STUDY: None TECHNIQUE: Frontal/lateral chest COMMENT:  The lungs are clear.  The heart size is normal.  There is thoracic degenerative change.  There is a left chest wall pacemaker.     Impression: IMPRESSION: No definite active disease.       ECG/Telemetry  I have independently reviewed the telemetry. No events for the last 24 hours.    ASSESSMENT AND PLAN        Assessment & Plan  Neoplasm of uncertain behavior of bladder  Enlarging size. Pt was planned TURBT as OP  Now with symptoms of intermittent bladder obstruction  Tumor expanding from the left ureter towards the right ureter with bilateral hydronephrosis  Urology Following  ID following, no contraindication for surgery from ID end  Pt Medically stable for surgery, No further testing needed  Plan for TURBT tomorrow  Pacemaker  History of SVT and arrhythmia  Telemetry- No events  Follow-up cardiology as outpatient  Generalized anxiety disorder with panic attacks  Continue Buspar  Obstructive sleep apnea  F/U OP for Sleep study  Bladder mass    CKD (chronic kidney disease)  Monitor  Avoid Nephrotoxics  UTI (urinary tract infection)  On Rocephin, UC-mixed growth, repeat UA ordered and  Blood culture no growth to date  No fevers, WBC improving  ID consulted, continue Rocephin  Anemia  Likely in setting of infection/Hematuria  Monitor H/H  Hb 7.3 today  Discussed with patient and she was agreeable with blood transfusion no  1 PRBC ordered  Constipation  Patient had bowel movement today  Laxatives as needed    VTE Assessment: Padua    VTE Prophylaxis:  Current anticoagulants:  enoxaparin (LOVENOX) syringe 40 mg,  subcutaneous, Daily (6p)      Code Status: Full Code      Estimated Discharge Date: 1/30/2025   Disposition Planning: TURBT tomorrow          Linda cMintyre MD  1/29/2025

## 2025-01-29 NOTE — PROGRESS NOTES
"Urology Progress Note    Subjective    Interval History: Feels better this am.  Voiding with improved volumes. Admits to some constipation and nausea    Objective    Vital signs in last 24 hours:  Temp:  [36.5 °C (97.7 °F)-37.4 °C (99.4 °F)] 37.1 °C (98.7 °F)  Heart Rate:  [60-86] 63  Resp:  [16-18] 18  BP: ()/(55-72) 121/61      Intake/Output Summary (Last 24 hours) at 1/29/2025 0909  Last data filed at 1/28/2025 1824  Gross per 24 hour   Intake 240 ml   Output 631 ml   Net -391 ml        Physical Exam:  Visit Vitals  /61 (BP Location: Right upper arm, Patient Position: Lying)   Pulse 63   Temp 37.1 °C (98.7 °F) (Oral)   Resp 18   Ht 1.676 m (5' 6\")   Wt 79.4 kg (175 lb)   SpO2 95%   BMI 28.25 kg/m²       General Appearance:  Alert, cooperative, no distress, appears stated age   Head:  Normocephalic, without obvious abnormality, atraumatic   Back:   No CVA tenderness   Lungs:   Respirations unlabored   Chest wall:  No tenderness or deformity       Abdomen:   Soft, mild tenderness along LLQ           Extremities:  Musculoskeletal: Extremities normal, atraumatic, no cyanosis or edema  No injury or deformity       Skin: Skin color, texture, turgor normal, no rashes or lesions     Labs  Lab Results   Component Value Date    WBC 19.75 (H) 01/29/2025    HGB 7.3 (L) 01/29/2025    HCT 23.1 (L) 01/29/2025     01/29/2025    ALT 11 01/24/2025    AST 20 01/24/2025     01/29/2025    K 3.9 01/29/2025     (H) 01/29/2025    CREATININE 1.2 01/29/2025    BUN 10 01/29/2025    CO2 23 01/29/2025    TSH 4.70 01/24/2025         Imaging  Not applicable       Assessment & Plan     Patient Active Problem List   Diagnosis    Abnormal finding of diagnostic imaging    Arrhythmia    Suhail hematuria    Generalized anxiety disorder with panic attacks    Hemorrhoids    Hyperlipemia    Major depressive disorder    Neoplasm of uncertain behavior of bladder    Obstructive sleep apnea    Pacemaker    Recurrent major " depressive disorder in partial remission (CMS/HCC)    SVT (supraventricular tachycardia) (CMS/HCC)    Bladder cancer (CMS/HCC)    Bladder mass    CKD (chronic kidney disease)    UTI (urinary tract infection)    Anemia    Constipation     A/P  74-year-old female with very large bladder tumor and concern for right hydronephrosis admitted with malaise and fatigue, concerns for UTI.  She continues with  variable fevers.  Voiding in small, frequent amounts.  Renal function remains stable, leukocytosis remains elevated.  Ucx shows mixed growth.    Rec:  -Continue antibiotics and hydration as you are pending   -N.p.o. at midnight for TURBT tomorrow (1/30)  -Discussed with the patient that the imaging findings are concerning for muscle invasive bladder cancer.  If confirmed on TURBT, we discussed the role of systemic chemotherapy as well as cystectomy versus radiation.  -Continue to monitor creatinine, though should he rise further, we will need to consider renal drainage to optimize renal function such that she will be a candidate for platinum based chemotherapy if necessary.    Expected Discharge Date:  1/29/2025  No discharge date for patient encounter.  Eugenio Jimenez MD  1/29/2025

## 2025-01-30 ENCOUNTER — ANESTHESIA (INPATIENT)
Dept: OPERATING ROOM | Facility: HOSPITAL | Age: 75
DRG: 669 | End: 2025-01-30
Payer: MEDICARE

## 2025-01-30 ENCOUNTER — ANESTHESIA EVENT (INPATIENT)
Dept: OPERATING ROOM | Facility: HOSPITAL | Age: 75
DRG: 669 | End: 2025-01-30
Payer: MEDICARE

## 2025-01-30 LAB
ATRIAL RATE: 73
CROSSMATCH: NORMAL
ISBT CODE: 7300
P AXIS: 40
PR INTERVAL: 176
PRODUCT CODE: NORMAL
PRODUCT STATUS: NORMAL
QRS DURATION: 122
QT INTERVAL: 392
QTC CALCULATION(BAZETT): 431
R AXIS: -57
SPECIMEN EXP DATE BLD: NORMAL
T WAVE AXIS: 89
UNIT ABO: NORMAL
UNIT ID: NORMAL
UNIT RH: POSITIVE
VENTRICULAR RATE: 73

## 2025-01-30 PROCEDURE — 71000011 HC PACU PHASE 1 EA ADDL MIN: Performed by: STUDENT IN AN ORGANIZED HEALTH CARE EDUCATION/TRAINING PROGRAM

## 2025-01-30 PROCEDURE — 12000000 HC ROOM AND CARE MED/SURG

## 2025-01-30 PROCEDURE — 36000002 HC OR LEVEL 2 INITIAL 30MIN: Performed by: STUDENT IN AN ORGANIZED HEALTH CARE EDUCATION/TRAINING PROGRAM

## 2025-01-30 PROCEDURE — 63600000 HC DRUGS/DETAIL CODE: Mod: JZ | Performed by: NURSE ANESTHETIST, CERTIFIED REGISTERED

## 2025-01-30 PROCEDURE — 27200000 HC STERILE SUPPLY: Performed by: STUDENT IN AN ORGANIZED HEALTH CARE EDUCATION/TRAINING PROGRAM

## 2025-01-30 PROCEDURE — 36000012 HC OR LEVEL 2 EA ADDL MIN: Performed by: STUDENT IN AN ORGANIZED HEALTH CARE EDUCATION/TRAINING PROGRAM

## 2025-01-30 PROCEDURE — 88307 TISSUE EXAM BY PATHOLOGIST: CPT | Performed by: STUDENT IN AN ORGANIZED HEALTH CARE EDUCATION/TRAINING PROGRAM

## 2025-01-30 PROCEDURE — 63600000 HC DRUGS/DETAIL CODE: Mod: JZ | Performed by: STUDENT IN AN ORGANIZED HEALTH CARE EDUCATION/TRAINING PROGRAM

## 2025-01-30 PROCEDURE — P9016 RBC LEUKOCYTES REDUCED: HCPCS

## 2025-01-30 PROCEDURE — 25800000 HC PHARMACY IV SOLUTIONS: Performed by: STUDENT IN AN ORGANIZED HEALTH CARE EDUCATION/TRAINING PROGRAM

## 2025-01-30 PROCEDURE — 25800000 HC PHARMACY IV SOLUTIONS: Performed by: NURSE ANESTHETIST, CERTIFIED REGISTERED

## 2025-01-30 PROCEDURE — 63600000 HC DRUGS/DETAIL CODE: Mod: JZ | Performed by: ANESTHESIOLOGY

## 2025-01-30 PROCEDURE — 25000000 HC PHARMACY GENERAL: Performed by: NURSE ANESTHETIST, CERTIFIED REGISTERED

## 2025-01-30 PROCEDURE — 63700000 HC SELF-ADMINISTRABLE DRUG: Performed by: STUDENT IN AN ORGANIZED HEALTH CARE EDUCATION/TRAINING PROGRAM

## 2025-01-30 PROCEDURE — 25000000 HC PHARMACY GENERAL

## 2025-01-30 PROCEDURE — 36430 TRANSFUSION BLD/BLD COMPNT: CPT

## 2025-01-30 PROCEDURE — 99233 SBSQ HOSP IP/OBS HIGH 50: CPT | Performed by: STUDENT IN AN ORGANIZED HEALTH CARE EDUCATION/TRAINING PROGRAM

## 2025-01-30 PROCEDURE — 71000001 HC PACU PHASE 1 INITIAL 30MIN: Performed by: STUDENT IN AN ORGANIZED HEALTH CARE EDUCATION/TRAINING PROGRAM

## 2025-01-30 PROCEDURE — 36430 TRANSFUSION BLD/BLD COMPNT: CPT | Performed by: ANESTHESIOLOGY

## 2025-01-30 PROCEDURE — 0TBB8ZX EXCISION OF BLADDER, VIA NATURAL OR ARTIFICIAL OPENING ENDOSCOPIC, DIAGNOSTIC: ICD-10-PCS | Performed by: STUDENT IN AN ORGANIZED HEALTH CARE EDUCATION/TRAINING PROGRAM

## 2025-01-30 PROCEDURE — 37000001 HC ANESTHESIA GENERAL: Performed by: STUDENT IN AN ORGANIZED HEALTH CARE EDUCATION/TRAINING PROGRAM

## 2025-01-30 PROCEDURE — 63700000 HC SELF-ADMINISTRABLE DRUG: Performed by: INTERNAL MEDICINE

## 2025-01-30 RX ORDER — DEXAMETHASONE SODIUM PHOSPHATE 4 MG/ML
INJECTION, SOLUTION INTRA-ARTICULAR; INTRALESIONAL; INTRAMUSCULAR; INTRAVENOUS; SOFT TISSUE AS NEEDED
Status: DISCONTINUED | OUTPATIENT
Start: 2025-01-30 | End: 2025-01-30 | Stop reason: SURG

## 2025-01-30 RX ORDER — EPHEDRINE SULFATE 50 MG/ML
INJECTION, SOLUTION INTRAVENOUS AS NEEDED
Status: DISCONTINUED | OUTPATIENT
Start: 2025-01-30 | End: 2025-01-30 | Stop reason: SURG

## 2025-01-30 RX ORDER — DEXTROSE 40 %
15-30 GEL (GRAM) ORAL AS NEEDED
Status: DISCONTINUED | OUTPATIENT
Start: 2025-01-30 | End: 2025-01-30 | Stop reason: HOSPADM

## 2025-01-30 RX ORDER — ONDANSETRON HYDROCHLORIDE 2 MG/ML
INJECTION, SOLUTION INTRAVENOUS AS NEEDED
Status: DISCONTINUED | OUTPATIENT
Start: 2025-01-30 | End: 2025-01-30 | Stop reason: SURG

## 2025-01-30 RX ORDER — ONDANSETRON HYDROCHLORIDE 2 MG/ML
4 INJECTION, SOLUTION INTRAVENOUS EVERY 8 HOURS PRN
Status: DISCONTINUED | OUTPATIENT
Start: 2025-01-30 | End: 2025-02-01 | Stop reason: HOSPADM

## 2025-01-30 RX ORDER — PHENYLEPHRINE HYDROCHLORIDE 10 MG/ML
INJECTION INTRAVENOUS AS NEEDED
Status: DISCONTINUED | OUTPATIENT
Start: 2025-01-30 | End: 2025-01-30 | Stop reason: SURG

## 2025-01-30 RX ORDER — FENTANYL CITRATE 50 UG/ML
50 INJECTION, SOLUTION INTRAMUSCULAR; INTRAVENOUS EVERY 5 MIN PRN
Status: COMPLETED | OUTPATIENT
Start: 2025-01-30 | End: 2025-01-30

## 2025-01-30 RX ORDER — ROCURONIUM BROMIDE 10 MG/ML
INJECTION, SOLUTION INTRAVENOUS AS NEEDED
Status: DISCONTINUED | OUTPATIENT
Start: 2025-01-30 | End: 2025-01-30 | Stop reason: SURG

## 2025-01-30 RX ORDER — ONDANSETRON 4 MG/1
4 TABLET, ORALLY DISINTEGRATING ORAL EVERY 8 HOURS PRN
Status: DISCONTINUED | OUTPATIENT
Start: 2025-01-30 | End: 2025-02-01 | Stop reason: HOSPADM

## 2025-01-30 RX ORDER — ONDANSETRON HYDROCHLORIDE 2 MG/ML
4 INJECTION, SOLUTION INTRAVENOUS
Status: DISCONTINUED | OUTPATIENT
Start: 2025-01-30 | End: 2025-01-30 | Stop reason: HOSPADM

## 2025-01-30 RX ORDER — MIDAZOLAM HYDROCHLORIDE 2 MG/2ML
INJECTION, SOLUTION INTRAMUSCULAR; INTRAVENOUS AS NEEDED
Status: DISCONTINUED | OUTPATIENT
Start: 2025-01-30 | End: 2025-01-30 | Stop reason: SURG

## 2025-01-30 RX ORDER — SODIUM CHLORIDE 9 MG/ML
5 INJECTION, SOLUTION INTRAVENOUS AS NEEDED
Status: ACTIVE | OUTPATIENT
Start: 2025-01-30 | End: 2025-01-31

## 2025-01-30 RX ORDER — SODIUM CHLORIDE, SODIUM GLUCONATE, SODIUM ACETATE, POTASSIUM CHLORIDE AND MAGNESIUM CHLORIDE 30; 37; 368; 526; 502 MG/100ML; MG/100ML; MG/100ML; MG/100ML; MG/100ML
INJECTION, SOLUTION INTRAVENOUS CONTINUOUS PRN
Status: DISCONTINUED | OUTPATIENT
Start: 2025-01-30 | End: 2025-01-30 | Stop reason: SURG

## 2025-01-30 RX ORDER — SODIUM CHLORIDE 0.9 G/100ML
3000 IRRIGANT IRRIGATION CONTINUOUS
Status: DISCONTINUED | OUTPATIENT
Start: 2025-01-30 | End: 2025-02-01 | Stop reason: HOSPADM

## 2025-01-30 RX ORDER — HYDROMORPHONE HYDROCHLORIDE 1 MG/ML
0.5 INJECTION, SOLUTION INTRAMUSCULAR; INTRAVENOUS; SUBCUTANEOUS
Status: DISCONTINUED | OUTPATIENT
Start: 2025-01-30 | End: 2025-01-30 | Stop reason: HOSPADM

## 2025-01-30 RX ORDER — IBUPROFEN 200 MG
16-32 TABLET ORAL AS NEEDED
Status: DISCONTINUED | OUTPATIENT
Start: 2025-01-30 | End: 2025-01-30 | Stop reason: HOSPADM

## 2025-01-30 RX ORDER — LIDOCAINE HYDROCHLORIDE 10 MG/ML
INJECTION, SOLUTION INFILTRATION; PERINEURAL AS NEEDED
Status: DISCONTINUED | OUTPATIENT
Start: 2025-01-30 | End: 2025-01-30 | Stop reason: SURG

## 2025-01-30 RX ORDER — PROPOFOL 10 MG/ML
INJECTION, EMULSION INTRAVENOUS AS NEEDED
Status: DISCONTINUED | OUTPATIENT
Start: 2025-01-30 | End: 2025-01-30 | Stop reason: SURG

## 2025-01-30 RX ORDER — PHENYLEPHRINE HCL IN 0.9% NACL 50MG/250ML
PLASTIC BAG, INJECTION (ML) INTRAVENOUS CONTINUOUS PRN
Status: DISCONTINUED | OUTPATIENT
Start: 2025-01-30 | End: 2025-01-30 | Stop reason: SURG

## 2025-01-30 RX ORDER — DEXTROSE 50 % IN WATER (D50W) INTRAVENOUS SYRINGE
25 AS NEEDED
Status: DISCONTINUED | OUTPATIENT
Start: 2025-01-30 | End: 2025-01-30 | Stop reason: HOSPADM

## 2025-01-30 RX ORDER — FENTANYL CITRATE 50 UG/ML
INJECTION, SOLUTION INTRAMUSCULAR; INTRAVENOUS AS NEEDED
Status: DISCONTINUED | OUTPATIENT
Start: 2025-01-30 | End: 2025-01-30 | Stop reason: SURG

## 2025-01-30 RX ADMIN — FENTANYL CITRATE 50 MCG: 50 INJECTION, SOLUTION INTRAMUSCULAR; INTRAVENOUS at 11:35

## 2025-01-30 RX ADMIN — PHENYLEPHRINE HYDROCHLORIDE 200 MCG: 10 INJECTION INTRAVENOUS at 12:38

## 2025-01-30 RX ADMIN — ROCURONIUM BROMIDE 10 MG: 10 INJECTION INTRAVENOUS at 11:13

## 2025-01-30 RX ADMIN — HYDROMORPHONE HYDROCHLORIDE 0.5 MG: 1 INJECTION, SOLUTION INTRAMUSCULAR; INTRAVENOUS; SUBCUTANEOUS at 13:20

## 2025-01-30 RX ADMIN — PHENYLEPHRINE HYDROCHLORIDE 200 MCG: 10 INJECTION INTRAVENOUS at 12:49

## 2025-01-30 RX ADMIN — EPHEDRINE SULFATE 20 MG: 50 INJECTION, SOLUTION INTRAVENOUS at 12:38

## 2025-01-30 RX ADMIN — FENTANYL CITRATE 50 MCG: 50 INJECTION INTRAMUSCULAR; INTRAVENOUS at 13:08

## 2025-01-30 RX ADMIN — PHENYLEPHRINE HYDROCHLORIDE 50 MCG/MIN: 50 INJECTION INTRAVENOUS at 12:52

## 2025-01-30 RX ADMIN — ROCURONIUM BROMIDE 40 MG: 10 INJECTION INTRAVENOUS at 10:58

## 2025-01-30 RX ADMIN — HYDROMORPHONE HYDROCHLORIDE 0.5 MG: 1 INJECTION, SOLUTION INTRAMUSCULAR; INTRAVENOUS; SUBCUTANEOUS at 14:44

## 2025-01-30 RX ADMIN — ROCURONIUM BROMIDE 20 MG: 10 INJECTION INTRAVENOUS at 12:03

## 2025-01-30 RX ADMIN — DEXAMETHASONE SODIUM PHOSPHATE 4 MG: 4 INJECTION, SOLUTION INTRA-ARTICULAR; INTRALESIONAL; INTRAMUSCULAR; INTRAVENOUS; SOFT TISSUE at 11:02

## 2025-01-30 RX ADMIN — FENTANYL CITRATE 50 MCG: 50 INJECTION INTRAMUSCULAR; INTRAVENOUS at 13:15

## 2025-01-30 RX ADMIN — BUSPIRONE HYDROCHLORIDE 5 MG: 5 TABLET ORAL at 08:14

## 2025-01-30 RX ADMIN — PHENYLEPHRINE HYDROCHLORIDE 200 MCG: 10 INJECTION INTRAVENOUS at 12:30

## 2025-01-30 RX ADMIN — BUSPIRONE HYDROCHLORIDE 5 MG: 5 TABLET ORAL at 20:28

## 2025-01-30 RX ADMIN — MIDAZOLAM 2 MG: 1 INJECTION INTRAMUSCULAR; INTRAVENOUS at 10:50

## 2025-01-30 RX ADMIN — FENTANYL CITRATE 100 MCG: 50 INJECTION, SOLUTION INTRAMUSCULAR; INTRAVENOUS at 10:58

## 2025-01-30 RX ADMIN — PROPOFOL 150 MG: 10 INJECTION, EMULSION INTRAVENOUS at 10:58

## 2025-01-30 RX ADMIN — CEFTRIAXONE 1 G: 1 INJECTION, SOLUTION INTRAVENOUS at 23:38

## 2025-01-30 RX ADMIN — PHENYLEPHRINE HYDROCHLORIDE 100 MCG: 10 INJECTION INTRAVENOUS at 11:13

## 2025-01-30 RX ADMIN — ONDANSETRON 4 MG: 2 INJECTION INTRAMUSCULAR; INTRAVENOUS at 17:25

## 2025-01-30 RX ADMIN — ONDANSETRON 4 MG: 2 INJECTION INTRAMUSCULAR; INTRAVENOUS at 12:36

## 2025-01-30 RX ADMIN — SODIUM CHLORIDE, SODIUM GLUCONATE, SODIUM ACETATE, POTASSIUM CHLORIDE AND MAGNESIUM CHLORIDE: 526; 502; 368; 37; 30 INJECTION, SOLUTION INTRAVENOUS at 12:35

## 2025-01-30 RX ADMIN — PHENYLEPHRINE HYDROCHLORIDE 100 MCG: 10 INJECTION INTRAVENOUS at 12:14

## 2025-01-30 RX ADMIN — FENTANYL CITRATE 50 MCG: 50 INJECTION, SOLUTION INTRAMUSCULAR; INTRAVENOUS at 11:19

## 2025-01-30 RX ADMIN — ROSUVASTATIN CALCIUM 40 MG: 40 TABLET, FILM COATED ORAL at 08:15

## 2025-01-30 RX ADMIN — SODIUM CHLORIDE: 9 INJECTION, SOLUTION INTRAVENOUS at 10:50

## 2025-01-30 RX ADMIN — PHENYLEPHRINE HYDROCHLORIDE 100 MCG: 10 INJECTION INTRAVENOUS at 12:22

## 2025-01-30 RX ADMIN — SODIUM CHLORIDE 3000 ML: 900 IRRIGANT IRRIGATION at 23:48

## 2025-01-30 RX ADMIN — MORPHINE SULFATE 3 MG: 4 INJECTION, SOLUTION INTRAMUSCULAR; INTRAVENOUS at 20:32

## 2025-01-30 RX ADMIN — SUGAMMADEX 200 MG: 100 INJECTION, SOLUTION INTRAVENOUS at 12:40

## 2025-01-30 RX ADMIN — PHENYLEPHRINE HYDROCHLORIDE 100 MCG: 10 INJECTION INTRAVENOUS at 11:49

## 2025-01-30 RX ADMIN — LIDOCAINE HYDROCHLORIDE 5 ML: 10 INJECTION, SOLUTION INFILTRATION; PERINEURAL at 10:58

## 2025-01-30 ASSESSMENT — COGNITIVE AND FUNCTIONAL STATUS - GENERAL
CLIMB 3 TO 5 STEPS WITH RAILING: 3 - A LITTLE
WALKING IN HOSPITAL ROOM: 3 - A LITTLE
MOVING TO AND FROM BED TO CHAIR: 4 - NONE
MOVING TO AND FROM BED TO CHAIR: 4 - NONE
WALKING IN HOSPITAL ROOM: 3 - A LITTLE
STANDING UP FROM CHAIR USING ARMS: 4 - NONE
STANDING UP FROM CHAIR USING ARMS: 4 - NONE
CLIMB 3 TO 5 STEPS WITH RAILING: 3 - A LITTLE

## 2025-01-30 NOTE — OR SURGEON
Pre-Procedure patient identification:  I am the primary operating surgeon/proceduralist and I have reviewed the applicable pathology reports and radiology studies for this procedure. I have identified the patient on 01/30/25 at 7:25 AM Eugenio Jimenez MD  Phone Number: [phone]

## 2025-01-30 NOTE — ANESTHESIOLOGIST PRE-PROCEDURE ATTESTATION
Pre-Procedure Patient Identification:  I am the Primary Anesthesiologist and have identified the patient on 01/30/25 at 11:09 AM.   I have confirmed the procedure(s) will be performed by the following surgeon/proceduralist Eugenio Jimenez MD.

## 2025-01-30 NOTE — ANESTHESIA PREPROCEDURE EVALUATION
Relevant Problems   CARDIOVASCULAR   (+) Arrhythmia   (+) Pacemaker   (+) SVT (supraventricular tachycardia) (CMS/HCC)      HEMATOLOGY   (+) Anemia      NEUROLOGY   (+) Generalized anxiety disorder with panic attacks   (+) Major depressive disorder   (+) Recurrent major depressive disorder in partial remission (CMS/HCC)      RESPIRATORY SYSTEM   (+) Obstructive sleep apnea      Other   (+) UTI (urinary tract infection)       Anesthesia ROS/MED HX      Pulmonary    Sleep apnea  Cardiovascular   CAD  ROS/MED HX Comments:    Renal Disease: Bladder tumor       Past Surgical History   Procedure Laterality Date    A-v cardiac pacemaker insertion      Insert / replace / remove pacemaker      Tonsillectomy         Physical Exam    Airway   Mallampati: II   TM distance: >3 FB   Neck ROM: full  Cardiovascular - normal   Rhythm: regular   Rate: normalPulmonary - normal   clear to auscultation  Dental - normal        Anesthesia Plan    Plan: general    Technique: general endotracheal     Lines and Monitors: PIV     Airway: direct visual laryngoscopy    3 ASA  Anesthetic plan and risks discussed with: patient  Postop Plan:   Patient Disposition: inpatient floor planned admission   Pain Management: IV analgesics

## 2025-01-30 NOTE — OP NOTE
Name: Gladys Woo  YOB: 1950  MRN: [id number]  Date of Surgery:  1/30/2025    ATTENDING SURGEON: Eugenio Jimenez MD     PROCEDURE: Transurethral resection of bladder tumor (8cm)    ANETHESIA: General  ANTIBIOTICS: On ceftriaxone actively   DRAINS: 22 Russian 3 way benson catheter with 30ccs in the balloon  SPECIMENS: Bladder tumor    PRE-OPERATIVE DIAGNOSIS: Bladder tumor  FINDINGS: Large and invasive bladder tumor measuring at least 8 cm  POST-OPERATIVE DIAGNOSIS: Same    INDICATIONS: Gladys Woo is a 74 y.o. with a history of gross hematuria recently found to have a large invasive bladder tumor who presents today for transurethral resection of a bladder tumor.  Explained risk, benefits, and expected postoperative course.  Patient expressed good understanding and ultimately was in agreement the plan.    PROCEDURE IN DETAIL: The patient was met in the preoperative holding area. her identity was confirmed by name and date of birth by the preoperative staff. All questions were answered to her satisfaction and informed consent was then obtained. The patient was then brought back to the operating room and placed supine on the operating room table. Following an uneventful induction of general anesthesia, the patient was placed in a relaxed dorsal lithotomy position with careful attention being paid to pad all pressure points and to ensure that no joint was flexed beyond 90 degrees. The patient was prepped and draped in normal sterile fashion, and a time out for patient safety was performed.    At this point, a 26-Russian REAL SAMURAI resectoscope was inserted into the urethra.  Cystoscopic evaluation demonstrated a large and invasive appearing tumor encompassing the entirety of the trigone and left lateral wall measuring at least 8 cm.  A monopolar working element was inserted and the tumor was then  systematically resected down to the level of the  Normal bladder wall.  There did  appear to be a large volume of bladder wall invasion visually.  The bladder tumor specimens were then irrigated from the bladder and repeat cystoscopy demonstrated no evidence of hannah perforation.  Monopolar cautery was then used to obtain hemostasis.  A 22 Mongolian three-way catheter was inserted through the urethra and into the bladder with return of blood-tinged urine.  Catheter was left to gravity drainage and CBI was initiated.      The patient was then taken out of relaxed dorsal lithotomy position. The catheter was secured on tension with tape. and awoken uneventfully from general anesthesia. The patient was transferred to the postanesthesia care unit in good and stable condition.     DISPOSITION: Stable to the PACU.  Patient will return to the floor and will be further monitored.  Plan to trend hemoglobin considering her anemia which may be exacerbated postoperatively. Next steps in treatment will be dictated by the pathology results.

## 2025-01-30 NOTE — PROGRESS NOTES
Major Events:  TURBT    Subjective:  No fever  No sweats, rigors    Temp:  [36.5 °C (97.7 °F)-37.6 °C (99.6 °F)] 36.5 °C (97.7 °F)  Heart Rate:  [] 102  Resp:  [15-20] 18  BP: (106-139)/(53-74) 120/74     SpO2 Readings from Last 3 Encounters:   01/30/25 95%     Anti-infectives (From admission, onward)      Start     Dose/Rate Route Frequency Ordered Stop    01/28/25 0030  [Transfer Hold]  cefTRIAXone (ROCEPHIN) IVPB 1 g        (Medication was not reviewed on transfer)    1 g  100 mL/hr over 30 Minutes intravenous Every 24 hours interval 01/27/25 1042            Physical Exam:  Skin: No rash  Sclera: Anicteric  Abd: Soft, nt  Extr: No jt eff, no edema  Neuro: Alert, lucid    Lab Results   Component Value Date    WBC 19.75 (H) 01/29/2025    HGB 7.3 (L) 01/29/2025    HCT 23.1 (L) 01/29/2025    MCV 88.2 01/29/2025     01/29/2025     Lab Results   Component Value Date    GLUCOSE 123 (H) 01/29/2025    CALCIUM 8.3 (L) 01/29/2025     01/29/2025    K 3.9 01/29/2025    CO2 23 01/29/2025     (H) 01/29/2025    BUN 10 01/29/2025    CREATININE 1.2 01/29/2025     Lab Results   Component Value Date    ALBUMIN 3.6 01/24/2025    BILITOT 0.5 01/24/2025    ALKPHOS 67 01/24/2025    AST 20 01/24/2025    ALT 11 01/24/2025    PROTEIN 7.2 01/24/2025     Microbiology Results (last 3 days)       ** No results found for the last 72 hours. **            Imaging:     CT ANGIOGRAPHY CHEST PULMONARY EMBOLISM WITH IV CONTRAST    Result Date: 1/25/2025  IMPRESSION: No pulmonary embolism. No acute pulmonary process. Large bladder mass, similar to prior CT 12/19/2024. Moderate right hydronephrosis and hydroureter, not significantly changed. /     CT ABDOMEN PELVIS WITH IV CONTRAST    Result Date: 1/25/2025  IMPRESSION: No pulmonary embolism. No acute pulmonary process. Large bladder mass, similar to prior CT 12/19/2024. Moderate right hydronephrosis and hydroureter, not significantly changed. /     X-RAY CHEST 2  VIEWS    Result Date: 1/24/2025  IMPRESSION: No definite active disease.      Impression    Suspected UTI  urCx contaminated  BC neg  Empiric ceftriaxone    WBC 19K  No labs today  Had surg  May change to cefurox if cont to improve tomorrow  Would complete 2wk rx      YS MD Ava

## 2025-01-30 NOTE — PROGRESS NOTES
"Urology Progress Note    Subjective    Interval History: Voiding well without complaints    Objective    Vital signs in last 24 hours:  Temp:  [36.9 °C (98.5 °F)-37.6 °C (99.6 °F)] 37.6 °C (99.6 °F)  Heart Rate:  [60-87] 87  Resp:  [16-18] 16  BP: (121-142)/(57-80) 124/57      Intake/Output Summary (Last 24 hours) at 1/30/2025 0725  Last data filed at 1/29/2025 1600  Gross per 24 hour   Intake 1055.92 ml   Output --   Net 1055.92 ml        Physical Exam:  Visit Vitals  BP (!) 124/57 (BP Location: Left upper arm, Patient Position: Lying)   Pulse 87   Temp 37.6 °C (99.6 °F) (Oral)   Resp 16   Ht 1.676 m (5' 6\")   Wt 79.4 kg (175 lb)   SpO2 95%   BMI 28.25 kg/m²       General Appearance:  Alert, cooperative, no distress, appears stated age   Head:  Normocephalic, without obvious abnormality, atraumatic   Back:   No CVA tenderness   Lungs:   Respirations unlabored   Chest wall:  No tenderness or deformity       Abdomen:   Soft, mild tenderness along LLQ           Extremities:  Musculoskeletal: Extremities normal, atraumatic, no cyanosis or edema  No injury or deformity       Skin: Skin color, texture, turgor normal, no rashes or lesions     Labs  Lab Results   Component Value Date    WBC 19.75 (H) 01/29/2025    HGB 7.3 (L) 01/29/2025    HCT 23.1 (L) 01/29/2025     01/29/2025    ALT 11 01/24/2025    AST 20 01/24/2025     01/29/2025    K 3.9 01/29/2025     (H) 01/29/2025    CREATININE 1.2 01/29/2025    BUN 10 01/29/2025    CO2 23 01/29/2025    TSH 4.70 01/24/2025         Imaging  Not applicable       Assessment & Plan     Patient Active Problem List   Diagnosis    Abnormal finding of diagnostic imaging    Arrhythmia    Suhail hematuria    Generalized anxiety disorder with panic attacks    Hemorrhoids    Hyperlipemia    Major depressive disorder    Neoplasm of uncertain behavior of bladder    Obstructive sleep apnea    Pacemaker    Recurrent major depressive disorder in partial remission (CMS/HCC)    SVT " (supraventricular tachycardia) (CMS/HCC)    Bladder cancer (CMS/HCC)    Bladder mass    CKD (chronic kidney disease)    UTI (urinary tract infection)    Anemia    Constipation     A/P  74-year-old female with very large bladder tumor and concern for right hydronephrosis admitted with malaise and fatigue, concerns for UTI.  She continues with  variable fevers.  Voiding in small, frequent amounts.  Renal function remains stable, leukocytosis remains elevated.  Ucx shows mixed growth.    Rec:  -Continue Abx  -OR for TURBT today    Expected Discharge Date:  1/30/2025  No discharge date for patient encounter.  Eugenio Jimenez MD  1/30/2025

## 2025-01-30 NOTE — ANESTHESIOLOGIST PRE-PROCEDURE ATTESTATION
Pre-Procedure Patient Identification:  I am the Primary Anesthesiologist and have identified the patient on 01/30/25 at 10:45 AM.   I have confirmed the procedure(s) will be performed by the following surgeon/proceduralist Eugenio Jimenez MD.

## 2025-01-30 NOTE — PROGRESS NOTES
Hospital Medicine     Daily Progress Note       SUBJECTIVE   Seen and examined laying in bed, post-op. Feels nauseous, otherwise no complaints or pain.     OBJECTIVE   Vital signs in last 24 hours:  Temp:  [36.5 °C (97.7 °F)-37.6 °C (99.6 °F)] 36.5 °C (97.7 °F)  Heart Rate:  [] 102  Resp:  [15-20] 18  BP: (106-139)/(53-74) 120/74    Intake/Output Summary (Last 24 hours) at 1/30/2025 1813  Last data filed at 1/30/2025 1630  Gross per 24 hour   Intake 1950 ml   Output 2800 ml   Net -850 ml       Physical Exam  Constitutional:       Appearance: Normal appearance.   HENT:      Head: Normocephalic and atraumatic.      Nose: Nose normal.      Mouth/Throat:      Mouth: Mucous membranes are moist.   Eyes:      Extraocular Movements: Extraocular movements intact.      Pupils: Pupils are equal, round, and reactive to light.   Cardiovascular:      Rate and Rhythm: Normal rate and regular rhythm.   Pulmonary:      Breath sounds: Normal breath sounds.   Abdominal:      General: Bowel sounds are normal.      Palpations: Abdomen is soft.   Genitourinary:     Comments: +CBI  Skin:     General: Skin is warm.   Neurological:      General: No focal deficit present.      Mental Status: She is alert and oriented to person, place, and time. Mental status is at baseline.          LINES, DRAINS, AIRWAYS, WOUNDS   Peripheral IV (Adult) 01/24/25 Posterior;Right Hand (Active)   Number of days: 6       Peripheral IV (Adult) 01/30/25 Left Hand (Active)   Number of days: 0       Urethral Catheter Double-lumen 22 Fr (Active)   Number of days: 0      LABS, IMAGING, TELEMETRY   CHEMISTRIES   Results from last 7 days   Lab Units 01/29/25  0346 01/28/25  0433 01/27/25  0413 01/26/25  0401 01/25/25  0526   SODIUM mEQ/L 139 140 138 137 134*   CHLORIDE mEQ/L 108* 108* 106 107 107   CO2 mEQ/L 23 27 25 23 21   BUN mg/dL 10 13 16 16 14   CREATININE mg/dL 1.2 1.3* 1.4* 1.4* 1.4*   GLUCOSE mg/dL 123* 108* 119* 139* 153*   CALCIUM mg/dL  8.3* 8.6 9.0 8.7 8.0*   EGFR mL/min/1.73m*2 47.9* 43.5* 39.8* 39.8* 39.8*   ANION GAP mEQ/L 8 5 7 7 6   MAGNESIUM mg/dL  --   --  2.1 2.1 1.9     CBC RESULTS   Results from last 7 days   Lab Units 01/29/25  0346 01/28/25  0433 01/27/25  0413 01/26/25  0401 01/24/25  2143   WBC K/uL 19.75* 19.54* 22.70* 21.27* 26.92*   HEMOGLOBIN g/dL 7.3* 7.8* 9.0* 8.4* 9.8*   HEMATOCRIT % 23.1* 24.6* 28.7* 27.1* 31.5*   PLATELETS K/uL 363 364 407* 377* 512*       Radiology Reports in last 24hNo results found.     ASSESSMENT AND PLAN     Assessment and Plan     # Neoplasm of uncertain behavior of bladder  Enlarging size. Pt was planned TURBT as OP  Now with symptoms of intermittent bladder obstruction  Tumor expanding from the left ureter towards the right ureter with bilateral hydronephrosis  Urology on board, appreciate input  - POD#0 TURBT  - Heme/onc consult  - Continue on CBI for now  - Follow morning labs     # Pacemaker  History of SVT and arrhythmia  Telemetry- No events  Follow-up cardiology as outpatient     # Generalized anxiety disorder with panic attacks     - continue home: buspirone 5 mg po TID     # Obstructive sleep apnea  -F/U OP for Sleep study     # CKD (chronic kidney disease)     - creatinine: 1.2 (1/29/25) down from 1.3 (1/28/25)  - est Cr baseline: 1.70 mg/dL, eGFR: 30-35     # UTI (urinary tract infection)  - On Rocephin, UC-mixed growth, repeat UA ordered and blood culture no growth to date  - No fevers, WBC improving  - ID consulted, continue Rocephin     # Anemia  Likely in setting of infection/Hematuria  Monitor H/H  Hb 7.3 today  Discussed with patient and she was agreeable with blood transfusion no  1 PRBC ordered     # Constipation  - Laxatives as needed         VTE Prophylaxis:  Current anticoagulants:  [Provider Managed Hold] enoxaparin (LOVENOX) syringe 40 mg, subcutaneous, Daily (6p)      Code Status: Full Code        Estimated Discharge Date: 1/31/2025   Disposition Planning: Pending clinical  improvement; Hgb, WBC, CBI, final urology recs, final ID recs, heme/onc consult       Monique Dallas, DO  1/30/2025

## 2025-01-30 NOTE — ANESTHESIA PROCEDURE NOTES
Airway  Urgency: elective    Start Time: 1/30/2025 11:00 AM    General Information and Staff    Patient location during procedure: OR  Anesthesiologist: Juan C Ball MD  Resident/CRNA: Nima Robledo CRNA  Performed: resident/CRNA   Performed by: Nima Robledo CRNA  Authorized by: Juan C Ball MD      Indications and Patient Condition  Indications for airway management: anesthesia  Sedation level: deep  Preoxygenated: yes  Patient position: sniffing  Mask difficulty assessment: 1 - vent by mask    Final Airway Details  Final airway type: endotracheal airway      Successful airway: ETT     Successful intubation technique: direct laryngoscopy  Facilitating devices/methods: intubating stylet  Endotracheal tube insertion site: oral  Blade: Sammy  Blade size: #3  ETT size (mm): 7.0  Cormack-Lehane Classification: grade I - full view of glottis  Placement verified by: chest auscultation and capnometry   Measured from: lips  ETT to lips (cm): 22  Number of attempts at approach: 1  Number of other approaches attempted: 0  Atraumatic airway insertion

## 2025-01-31 PROBLEM — N18.9 CHRONIC KIDNEY DISEASE: Status: ACTIVE | Noted: 2025-01-31

## 2025-01-31 PROBLEM — N17.9 AKI (ACUTE KIDNEY INJURY) (CMS/HCC): Status: ACTIVE | Noted: 2025-01-25

## 2025-01-31 LAB
ANION GAP SERPL CALC-SCNC: 8 MEQ/L (ref 3–15)
BUN SERPL-MCNC: 20 MG/DL (ref 7–25)
CALCIUM SERPL-MCNC: 8.6 MG/DL (ref 8.6–10.3)
CHLORIDE SERPL-SCNC: 105 MEQ/L (ref 98–107)
CO2 SERPL-SCNC: 21 MEQ/L (ref 21–31)
CREAT SERPL-MCNC: 1.7 MG/DL (ref 0.6–1.2)
CROSSMATCH: NORMAL
CROSSMATCH: NORMAL
EGFRCR SERPLBLD CKD-EPI 2021: 31.5 ML/MIN/1.73M*2
ERYTHROCYTE [DISTWIDTH] IN BLOOD BY AUTOMATED COUNT: 14.5 % (ref 11.7–14.4)
GLUCOSE SERPL-MCNC: 178 MG/DL (ref 70–99)
HCT VFR BLD AUTO: 29 % (ref 35–45)
HGB BLD-MCNC: 9.2 G/DL (ref 11.8–15.7)
ISBT CODE: 7300
ISBT CODE: 7300
MCH RBC QN AUTO: 27.8 PG (ref 28–33.2)
MCHC RBC AUTO-ENTMCNC: 31.7 G/DL (ref 32.2–35.5)
MCV RBC AUTO: 87.6 FL (ref 83–98)
PLATELET # BLD AUTO: 355 K/UL (ref 150–369)
PMV BLD AUTO: 9.9 FL (ref 9.4–12.3)
POTASSIUM SERPL-SCNC: 4.5 MEQ/L (ref 3.5–5.1)
PRODUCT CODE: NORMAL
PRODUCT CODE: NORMAL
PRODUCT STATUS: NORMAL
PRODUCT STATUS: NORMAL
RBC # BLD AUTO: 3.31 M/UL (ref 3.93–5.22)
SODIUM SERPL-SCNC: 134 MEQ/L (ref 136–145)
SPECIMEN EXP DATE BLD: NORMAL
SPECIMEN EXP DATE BLD: NORMAL
UNIT ABO: NORMAL
UNIT ABO: NORMAL
UNIT ID: NORMAL
UNIT ID: NORMAL
UNIT RH: POSITIVE
UNIT RH: POSITIVE
WBC # BLD AUTO: 19.4 K/UL (ref 3.8–10.5)

## 2025-01-31 PROCEDURE — 80048 BASIC METABOLIC PNL TOTAL CA: CPT | Performed by: STUDENT IN AN ORGANIZED HEALTH CARE EDUCATION/TRAINING PROGRAM

## 2025-01-31 PROCEDURE — 36415 COLL VENOUS BLD VENIPUNCTURE: CPT | Performed by: STUDENT IN AN ORGANIZED HEALTH CARE EDUCATION/TRAINING PROGRAM

## 2025-01-31 PROCEDURE — 63700000 HC SELF-ADMINISTRABLE DRUG: Performed by: STUDENT IN AN ORGANIZED HEALTH CARE EDUCATION/TRAINING PROGRAM

## 2025-01-31 PROCEDURE — 12000000 HC ROOM AND CARE MED/SURG

## 2025-01-31 PROCEDURE — 25000000 HC PHARMACY GENERAL

## 2025-01-31 PROCEDURE — 85027 COMPLETE CBC AUTOMATED: CPT | Performed by: STUDENT IN AN ORGANIZED HEALTH CARE EDUCATION/TRAINING PROGRAM

## 2025-01-31 PROCEDURE — 99232 SBSQ HOSP IP/OBS MODERATE 35: CPT | Performed by: STUDENT IN AN ORGANIZED HEALTH CARE EDUCATION/TRAINING PROGRAM

## 2025-01-31 RX ORDER — CEFUROXIME AXETIL 250 MG/1
500 TABLET ORAL EVERY 12 HOURS
Status: DISCONTINUED | OUTPATIENT
Start: 2025-02-01 | End: 2025-02-01 | Stop reason: HOSPADM

## 2025-01-31 RX ADMIN — BUSPIRONE HYDROCHLORIDE 5 MG: 5 TABLET ORAL at 13:36

## 2025-01-31 RX ADMIN — BUSPIRONE HYDROCHLORIDE 5 MG: 5 TABLET ORAL at 08:33

## 2025-01-31 RX ADMIN — BUSPIRONE HYDROCHLORIDE 5 MG: 5 TABLET ORAL at 20:59

## 2025-01-31 RX ADMIN — SODIUM CHLORIDE 3000 ML: 900 IRRIGANT IRRIGATION at 01:51

## 2025-01-31 RX ADMIN — ROSUVASTATIN CALCIUM 40 MG: 40 TABLET, FILM COATED ORAL at 08:33

## 2025-01-31 ASSESSMENT — COGNITIVE AND FUNCTIONAL STATUS - GENERAL
STANDING UP FROM CHAIR USING ARMS: 4 - NONE
WALKING IN HOSPITAL ROOM: 3 - A LITTLE
WALKING IN HOSPITAL ROOM: 3 - A LITTLE
STANDING UP FROM CHAIR USING ARMS: 4 - NONE
CLIMB 3 TO 5 STEPS WITH RAILING: 3 - A LITTLE
MOVING TO AND FROM BED TO CHAIR: 4 - NONE
CLIMB 3 TO 5 STEPS WITH RAILING: 3 - A LITTLE
MOVING TO AND FROM BED TO CHAIR: 4 - NONE

## 2025-01-31 ASSESSMENT — PAIN SCALES - GENERAL: PAIN_LEVEL: 2

## 2025-01-31 NOTE — PLAN OF CARE
Care Coordination Discharge Plan Note     Discharge Needs Assessment  Concerns to be Addressed: discharge planning, care coordination/care conferences  Current Discharge Risk:      Anticipated Discharge Plan  Anticipated Discharge Disposition: home without assistance or services       Patient Choice  Offered/Gave Vendor List: no       ---------------------------------------------------------------------------------------------------------------------    Interdisciplinary Discharge Plan Review:  Participants:     Concerns Comments:  Possible d/c Sat, CM met with pt at bedside went over d/c plan, pt in agreement with [facility] for Nsg. Pt has private transport. IMM signed    Discharge Plan:   Disposition/Destination: Home Health Care - [facility] / Home  Discharge Facility:    Community Resources:      Discharge Transportation:  Is Out of Hospital DNR needed at Discharge: no  Does patient need discharge transport?

## 2025-01-31 NOTE — PROGRESS NOTES
[facility]…referral sent to Weekend Hold Bin…..  Please Email  to [facility] Office in [place] or Call [phone] if patient is Discharged over the Weekend to home.       Referral rec'd from CM.  Per CM, pt agreeable to homecare.  Chart reviewed.  Spoke with pt who is agreeable to home RN visits.  Demographics verified.  Reviewed our Homecare services and answered all homecare questions.   No DME needs.   HC referral to be completed by JASON.

## 2025-01-31 NOTE — PLAN OF CARE
Problem: Malnutrition  Goal: Improved Nutritional Intake  Outcome: Progressing     Problem: Adult Inpatient Plan of Care  Goal: Plan of Care Review  Flowsheets (Taken 1/31/2025 3341)  Progress: improving  Plan of Care Reviewed With: patient     Nutrition Interventions/ Recommendations:   Regular diet as tolerated               - smaller more frequent meals               - monitor and record % meal intake / tolerance   Ensure Plus (350 kcals / 13 g protein each) vs Ensure Clear (240 kcals, 8 g protein, 52 g CHO each) prn   Trend labs/lytes, treat/replete prn   Weekly weight  Monitor GI function               - antiemetics and bowel regimen prn

## 2025-01-31 NOTE — PROGRESS NOTES
Possible d/c Sat, CM met with pt at bedside went over d/c plan, pt in agreement with [facility] for Nsg. Pt has private transport.

## 2025-01-31 NOTE — ASSESSMENT & PLAN NOTE
Likely in setting of infection/Hematuria  Status post 1 PRBC on 1/29 and 2 PRBC during OR 1/30  Hemoglobin stable today  Monitor

## 2025-01-31 NOTE — PLAN OF CARE
Aox4. No c/o pain/nausea. Minor in place. Call bell in reach. Fall precautions in place.   Problem: Fall Injury Risk  Goal: Absence of Fall and Fall-Related Injury  Outcome: Progressing

## 2025-01-31 NOTE — ASSESSMENT & PLAN NOTE
ID following  On Rocephin, can switch to cefuroxime if continues to improve  Recommend 2 weeks course

## 2025-01-31 NOTE — CONSULTS
"   Hematology/Oncology Consult Note       Reason for Consult:  bladder mass concerning for neoplasm     History of Present Illness: Gladys Woo is a 74 y.o. woman without significant history, admitted with new bladder mass concerning for neoplasm.     The patient presented with several months of hematuria.  She had initially been treated by her GYN for what was felt to be vaginal atrophy.  An extensive GYN evaluation was negative and when her symptoms did not resolve, GYN referred her to urology.  On evaluation with urology, imaging found a 9 cm bladder tumor.  Cystoscopy revealed large papillary tumors within the bladder.      Outpatient TURBT was planned for 1/28/2025.  It is in this setting, that she developed urinary hesitancy and dysuria and presented to the ED on 1/25/2025.  She reported a 15 pound weight loss over a 6 week period.    In the ED, CT of the abdomen revealed ureter dilation on the left due to obstruction of the UVJ tumor, and partial obstruction of the right ureter, which was new. UA was concerning for a UTI.  She was started on Rocephin.  ID and urology were consulted and are following.  On 1/30/2025, the patient underwent a TURBT with urology, that revealed a large and invasive bladder tumor measuring at least 8 cm; pathology is pending.     Today, Gladys reports feeling \"nervous\". She reports that she feels very anxious regarding the concern for cancer. She admits she is nervous at the thought of going home with a benson catheter, as she lives alone, and is hoping to stay in the hospital for an additional day for further teaching on benson care. She denies any lower abdominal pain but reports that she feels \"pressure like urgency\".     She denies any shortness of breath, chest pain, nausea, vomiting, diarrhea, abdominal pain or pain otherwise.      Medical History  Past Medical History:   Diagnosis Date    Coronary artery disease     KERRI (generalized anxiety disorder)     TREVOR " (obstructive sleep apnea)        Surgical History   Past Surgical History   Procedure Laterality Date    A-v cardiac pacemaker insertion      Insert / replace / remove pacemaker      Tonsillectomy         Allergies  Amoxicillin    Current Meds  Current Facility-Administered Medications   Medication Dose Route Frequency    acetaminophen  650 mg oral q4h PRN    bisacodyL  10 mg rectal Daily PRN    busPIRone  5 mg oral TID    cefTRIAXone  1 g intravenous q24h INT    glucose  16-32 g of dextrose oral PRN    Or    dextrose  15-30 g of dextrose oral PRN    Or    glucagon  1 mg intramuscular PRN    Or    dextrose 50 % in water (D50)  25 mL intravenous PRN    [Provider Managed Hold] enoxaparin  40 mg subcutaneous Daily (6p)    hydrocortisone   Topical 2x daily PRN    magnesium sulfate  1 g intravenous PRN    Or    magnesium sulfate  2 g intravenous PRN    melatonin  3 mg oral Nightly PRN    morphine  3 mg intravenous q3h PRN    ondansetron ODT  4 mg oral q8h PRN    Or    ondansetron  4 mg intravenous q8h PRN    phenyleph-min oil-petrolatum   rectal 4x daily PRN    polyethylene glycol  17 g oral Daily    potassium  20 mEq oral PRN    potassium  40 mEq oral PRN    rosuvastatin  40 mg oral Daily    sennosides-docusate sodium  1 tablet oral BID    sodium chloride 0.9 %  5 mL/hr intravenous PRN    sodium chloride for bladder irrigation  3,000 mL urinary catheter irrigation Continuous       Home Meds   acetaminophen (TYLENOL) tablet 650 mg  650 mg oral q4h PRN    bisacodyL (DULCOLAX) 10 mg suppository 10 mg  10 mg rectal Daily PRN    busPIRone (BUSPAR) tablet 5 mg  5 mg oral TID    cefTRIAXone (ROCEPHIN) IVPB 1 g  1 g intravenous q24h INT    glucose chewable tablet 16-32 g of dextrose  16-32 g of dextrose oral PRN    Or    dextrose 40 % oral gel 15-30 g of dextrose  15-30 g of dextrose oral PRN    Or    glucagon (GLUCAGEN) injection 1 mg  1 mg intramuscular PRN    Or    dextrose 50 % in water (D50) injection 12.5 g  25 mL  intravenous PRN    [Provider Managed Hold] enoxaparin (LOVENOX) syringe 40 mg  40 mg subcutaneous Daily (6p)    hydrocortisone 2.5 % ointment   Topical 2x daily PRN    magnesium sulfate IVPB 1g in 100 mL NSS/D5W/SWFI  1 g intravenous PRN    Or    magnesium sulfate IVPB 2g in 50 mL NSS/D5W/SWFI  2 g intravenous PRN    melatonin ODT 3 mg  3 mg oral Nightly PRN    morphine injection 3 mg  3 mg intravenous q3h PRN    ondansetron ODT (ZOFRAN-ODT) disintegrating tablet 4 mg  4 mg oral q8h PRN    Or    ondansetron (ZOFRAN) injection 4 mg  4 mg intravenous q8h PRN    phenyleph-min oil-petrolatum (PREPARATION H) rectal ointment   rectal 4x daily PRN    polyethylene glycol (MIRALAX) 17 gram packet 17 g  17 g oral Daily    potassium chloride (KLOR-CON M) ER tablet (particles/crystals) 20 mEq  20 mEq oral PRN    potassium chloride (KLOR-CON M) ER tablet (particles/crystals) 40 mEq  40 mEq oral PRN    rosuvastatin (CRESTOR) tablet 40 mg  40 mg oral Daily    sennosides-docusate sodium (SENOKOT-S) 8.6-50 mg per tablet 1 tablet  1 tablet oral BID    sodium chloride 0.9 % infusion  5 mL/hr intravenous PRN    sodium chloride for bladder irrigation (NS) 0.9 % solution 3,000 mL  3,000 mL urinary catheter irrigation Continuous       Social History  Social History     Socioeconomic History    Marital status:      Spouse name: None    Number of children: None    Years of education: None    Highest education level: None   Tobacco Use    Smoking status: Former     Types: Cigarettes    Smokeless tobacco: Never   Substance and Sexual Activity    Alcohol use: Yes     Alcohol/week: 2.0 standard drinks of alcohol     Types: 2 Glasses of wine per week     Comment: socially    Drug use: Never     Social Drivers of Health     Food Insecurity: No Food Insecurity (1/24/2025)    Hunger Vital Sign     Worried About Running Out of Food in the Last Year: Never true     Ran Out of Food in the Last Year: Never true   Transportation Needs: No  Transportation Needs (1/27/2025)    PRAPARE - Transportation     Lack of Transportation (Medical): No     Lack of Transportation (Non-Medical): No   Housing Stability: Unknown (1/27/2025)    Housing Stability Vital Sign     Unable to Pay for Housing in the Last Year: No     Homeless in the Last Year: No       Family History  Family History   Problem Relation Name Age of Onset    Diabetes Biological Mother      Coronary artery disease Biological Father         REVIEW OF SYSTEMS  16 point review of systems is as above and otherwise negative in detail.    VITAL SIGNS  Temp:  [36.4 °C (97.5 °F)-37.2 °C (98.9 °F)] 36.4 °C (97.5 °F)  Heart Rate:  [] 94  Resp:  [15-20] 18  BP: (104-125)/(53-74) 104/68      Intake/Output Summary (Last 24 hours) at 1/31/2025 0731  Last data filed at 1/31/2025 0151  Gross per 24 hour   Intake 1950 ml   Output 3375 ml   Net -1425 ml       PHYSICAL EXAM  Constitutional: She appears well-developed and well-nourished. Laying in bed.  No acute distress. pOX 95% on RA   HEENT: Normocephalic, atraumatic. Scl ancit. Nares Clear. O/P clear.   Neck: Supple.   Cardiovascular: Normal and regular S1 and S2, no murmurs.  Chest: Clear to auscultation bilaterally, no wheezing.  Abdomen: Soft and nontender, bowel sounds are present.  No hepatosplenomegaly.  : benson draining tea colored urine  Musculoskeletal: Normal range of motion.  No spinal or CVAT.  Extremities: Tr BLE edema.  Neurological: Grossly nonfocal.  She is oriented to person, place, and time.  Skin: Skin is warm and dry.  No rash.  Hematologic: No petechiae, purpura, or echymoses.  Lymph Nodes: No cervical lymphadenopathy.    LAB RESULTS  CBC  Results from last 7 days   Lab Units 01/29/25  0346 01/28/25  0433 01/27/25  0413 01/26/25  0401 01/24/25  2143   WBC K/uL 19.75* 19.54* 22.70* 21.27* 26.92*   RBC M/uL 2.62* 2.72* 3.15* 2.96* 3.46*   HEMOGLOBIN g/dL 7.3* 7.8* 9.0* 8.4* 9.8*   HEMATOCRIT % 23.1* 24.6* 28.7* 27.1* 31.5*   MCV fL  88.2 90.4 91.1 91.6 91.0   PLATELETS K/uL 363 364 407* 377* 512*     Diff  Results from last 7 days   Lab Units 01/29/25  0346 01/24/25  2143   NRBC % 0.0  --    IMM GRANULOCYTES % 0.7  --    NEUTROPHILS % 76.1  --    NEUTROS PCT MAN %  --  76   LYMPHOCYTES % 14.3  --    LYMPHO PCT MAN %  --  12   MONOCYTES % 5.4  --    MONO PCT MAN %  --  7   EOSINOPHILS % 3.0  --    EOSINO PCT MAN %  --  1   BASOPHILS % 0.5  --    BASOS PCT MAN %  --  0   IMM GRANUCOCYTES ABS K/uL 0.13*  --    NEUTRO ABS K/uL 15.02*  --      Chemistry   Results from last 7 days   Lab Units 01/29/25  0346 01/28/25  0433 01/27/25  0413 01/25/25  0526 01/24/25  2143   SODIUM mEQ/L 139 140 138   < > 133*   POTASSIUM mEQ/L 3.9 4.1 4.2   < > 4.5   CHLORIDE mEQ/L 108* 108* 106   < > 101   CO2 mEQ/L 23 27 25   < > 23   BUN mg/dL 10 13 16   < > 16   CREATININE mg/dL 1.2 1.3* 1.4*   < > 1.6*   CALCIUM mg/dL 8.3* 8.6 9.0   < > 9.3   ALBUMIN g/dL  --   --   --   --  3.6   BILIRUBIN TOTAL mg/dL  --   --   --   --  0.5   ALK PHOS IU/L  --   --   --   --  67   ALT IU/L  --   --   --   --  11   AST IU/L  --   --   --   --  20   GLUCOSE mg/dL 123* 108* 119*   < > 122*    < > = values in this interval not displayed.     Coag      Micro  Microbiology Results       Procedure Component Value Units Date/Time    Blood Culture Blood, Venous [[id number]]  (Normal) Collected: 01/25/25 0107    Specimen: Blood, Venous Updated: 01/29/25 0801     Culture No growth at 96 hours    Blood Culture Blood, Venous [[id number]]  (Normal) Collected: 01/25/25 0107    Specimen: Blood, Venous Updated: 01/29/25 0801     Culture No growth at 96 hours    SARS-COV-2 (COVID-19)/ FLU A/B, AND RSV, PCR Nasopharynx [[id number]]  (Normal) Collected: 01/24/25 2332    Specimen: Nasopharyngeal Swab from Nasopharynx Updated: 01/25/25 0023     SARS-CoV-2 (COVID-19) Negative     Influenza A Negative     Influenza B Negative     Respiratory Syncytial Virus Negative    Narrative:      Testing performed using  real-time PCR for detection of COVID-19. EUA approved validation studies performed on site.     Urine culture Urine, Clean Catch [[id number]] Collected: 01/24/25 5379    Specimen: Urine, Clean Catch Updated: 01/27/25 0557     Urine Culture Probable contaminants, suggest recollection      >=100,000 cfu/mL Mixed Gram Positive Organisms            IMAGING  CT ANGIOGRAPHY CHEST PULMONARY EMBOLISM WITH IV CONTRAST    Result Date: 1/25/2025  IMPRESSION: No pulmonary embolism. No acute pulmonary process. Large bladder mass, similar to prior CT 12/19/2024. Moderate right hydronephrosis and hydroureter, not significantly changed. /     CT ABDOMEN PELVIS WITH IV CONTRAST    Result Date: 1/25/2025  IMPRESSION: No pulmonary embolism. No acute pulmonary process. Large bladder mass, similar to prior CT 12/19/2024. Moderate right hydronephrosis and hydroureter, not significantly changed. /     X-RAY CHEST 2 VIEWS    Result Date: 1/24/2025  IMPRESSION: No definite active disease.      PATHOLOGY  Pathology Results       ** No results found for the last 2160 hours. **             ASSESSMENT & PLAN  Gladys Woo is a 74 y.o. woman without significant history, admitted with new bladder mass concerning for neoplasm.    Bladder mass, concerning for neoplasm, s/p TURBT 1/30/25, await path  *d/w'd pt role for neoadj chemotx if muscle inv bladder ca, although pt at this time adamantly refusing consideration of cystectomy; should this remain true after path discussed, t/c Rad onc consult (outpt)  Anemia, in setting of hematuria, follow CBC  -t/c pRBC trfn if Hb <7/gdL  Leukocytosis, likely reactive, follow CBC  UTI, on Ceftriaxone, ID t/c change to Cefuroxime (to complete 2wks)  Hyperglycemia, Hypocalcemia, and FEN mgmt per [facility]    All of the above has been reviewed with the patient, who is in agreement with the plan of action.  I appreciate the opportunity to participate in the care of this patient, who I will follow with you.       Pt advised to f/u my office in 2 wks t/d path and set further tx planning.    Rhonda Corona MD.

## 2025-01-31 NOTE — ANESTHESIA POSTPROCEDURE EVALUATION
Patient: Gladys Woo    Procedure Summary       Date: 01/30/25 Room / Location:  OR 12 / PH OR    Anesthesia Start: 1050 Anesthesia Stop: 1306    Procedure: CYSTO, TURBT TRANS-URETHRAL RESECTION BLADDER TUMOR (Bladder) Diagnosis:       Bladder mass      (Bladder mass [N32.89])    Surgeons: Eugenio Jimenez MD Responsible Provider: Juan C Ball MD    Anesthesia Type: general ASA Status: 3            Anesthesia Type: general  PACU Vitals  1/30/2025 1257 - 1/30/2025 1357        1/30/2025  1305 1/30/2025  1315 1/30/2025  1330 1/30/2025  1338    BP: 106/58 110/54 109/53 111/57    Temp: 37.2 °C (98.9 °F) -- -- 36.9 °C (98.5 °F)    Pulse: 100 96 92 92    Resp: 15 16 18 15    SpO2: 98 % 97 % 100 % 99 %                1/30/2025  1345             BP: 110/58       Temp: --       Pulse: 96       Resp: 18       SpO2: 99 %                 Anesthesia Post Evaluation    Pain score: 2  Pain management: adequate  Patient location during evaluation: PACU  Patient participation: complete - patient participated  Level of consciousness: awake and alert  Cardiovascular status: acceptable  Airway Patency: adequate  Respiratory status: acceptable  Hydration status: acceptable  Anesthetic complications: no

## 2025-01-31 NOTE — PROGRESS NOTES
"Urology Progress Note    Subjective    Interval History: Tolerating Minor catheter with mild discomfort at the urethral meatus.  Feels that her pain is overall improved.    Objective    Vital signs in last 24 hours:  Temp:  [36.4 °C (97.5 °F)-37.2 °C (98.9 °F)] 36.5 °C (97.7 °F)  Heart Rate:  [] 79  Resp:  [15-20] 18  BP: (100-125)/(53-74) 100/62      Intake/Output Summary (Last 24 hours) at 1/31/2025 1145  Last data filed at 1/31/2025 0151  Gross per 24 hour   Intake 1950 ml   Output 3375 ml   Net -1425 ml        Physical Exam:  Visit Vitals  /62 (BP Location: Right upper arm, Patient Position: Lying)   Pulse 79   Temp 36.5 °C (97.7 °F) (Oral)   Resp 18   Ht 1.676 m (5' 6\")   Wt 79.4 kg (175 lb)   SpO2 95%   BMI 28.25 kg/m²       General Appearance:  Alert, cooperative, no distress, appears stated age   Head:  Normocephalic, without obvious abnormality, atraumatic   Back:   No CVA tenderness   Lungs:   Respirations unlabored   Chest wall:  No tenderness or deformity       Abdomen:   Soft, mild tenderness along LLQ           Extremities:  Musculoskeletal: Extremities normal, atraumatic, no cyanosis or edema  No injury or deformity       Skin: Skin color, texture, turgor normal, no rashes or lesions     Labs  Lab Results   Component Value Date    WBC 19.40 (H) 01/31/2025    HGB 9.2 (L) 01/31/2025    HCT 29.0 (L) 01/31/2025     01/31/2025    ALT 11 01/24/2025    AST 20 01/24/2025     (L) 01/31/2025    K 4.5 01/31/2025     01/31/2025    CREATININE 1.7 (H) 01/31/2025    BUN 20 01/31/2025    CO2 21 01/31/2025    TSH 4.70 01/24/2025         Imaging  Not applicable       Assessment & Plan     Patient Active Problem List   Diagnosis    Abnormal finding of diagnostic imaging    Arrhythmia    Suhail hematuria    Generalized anxiety disorder with panic attacks    Hemorrhoids    Hyperlipemia    Major depressive disorder    Neoplasm of uncertain behavior of bladder    Obstructive sleep apnea    " Pacemaker    Recurrent major depressive disorder in partial remission (CMS/HCC)    SVT (supraventricular tachycardia) (CMS/HCC)    Bladder cancer (CMS/HCC)    Bladder mass    CKD (chronic kidney disease)    UTI (urinary tract infection)    Anemia    Constipation     A/P  74-year-old female with very large bladder tumor and concern for right hydronephrosis admitted with malaise and fatigue, concerns for UTI.  She continues with  variable fevers.  Voiding in small, frequent amounts.  Renal function remains stable, leukocytosis remains elevated.  Ucx shows mixed growth.    -Hemoglobin improved to 9.2mg/dL.  -Urine clear with CBI clamped  -Creatinine did rise to 1.7 mg/dL.  Due to extensive tumor, ureteral orifices were unable to be identified intraoperatively.  Plan to trend for an additional day, though did discuss pursuing nephrostomy tube placement should the creatinine remained elevated to ensure that her renal function is optimized for chemotherapy which she will most certainly will require once the pathology finalizes.  -If creatinine remains elevated tomorrow, obtain renal ultrasound    Expected Discharge Date:  2/1/2025  No discharge date for patient encounter.  Eugenio Jimenez MD  1/31/2025

## 2025-01-31 NOTE — ASSESSMENT & PLAN NOTE
Enlarging size. Pt was planned TURBT as OP  Now with symptoms of intermittent bladder obstruction  Tumor expanding from the left ureter towards the right ureter with bilateral hydronephrosis  Urology on board, appreciate input  - POD#1 TURBT  - Heme/onc consulted, follow-up outpatient with pathology results  - S/P CBI  Patient to be discharged home with Minor in place  Follow-up urology as outpatient

## 2025-01-31 NOTE — PROGRESS NOTES
Pathology results and available.  Called patient over phone today, discussed that pathology results are now available.  Patient has appointment with Dr. Sanchez tomorrow to discuss the results and discuss further treatment plan   Hospital Medicine     Daily Progress Note                SUBJECTIVE   Interval History: Pt seen and examined. D/W Nurse, no acute events   Patient reports feeling okay, no new complaints  Denied fever, chills, chest pain, shortness of breath, GI/ complaints  Off CBI now  Per urology patient will go home with Minor in.     OBJECTIVE      Vital signs in last 24 hours:  Temp:  [36.4 °C (97.5 °F)-36.8 °C (98.2 °F)] 36.8 °C (98.2 °F)  Heart Rate:  [79-94] 82  Resp:  [18] 18  BP: (100-137)/(62-72) 137/72    Intake/Output Summary (Last 24 hours) at 1/31/2025 1547  Last data filed at 1/31/2025 0151  Gross per 24 hour   Intake --   Output 1725 ml   Net -1725 ml         PHYSICAL EXAMINATION      Physical Exam    General: Pt Alert and Interactive, No Acute Distress  HEENT: PERRLA, EOMI, Supple  Lungs: Clear to Auscultation B/L, No Wheeze/Rhonchi  CVS: S1 S2 heard, No Murmurs  Abdomen: Soft, Non tender, Non distended, BS heard  CNS: AAOX3, Power 5/5 B/L UL and LL, Sensations grossly intact  Psychiatric: Calm and Cooperative    LINES, CATHETERS, DRAINS, AIRWAYS, AND WOUNDS   Lines, Drains, and Airways:  Wounds (agree with documentation and present on admission):  Peripheral IV (Adult) 01/24/25 Posterior;Right Hand (Active)   Number of days: 7       Urethral Catheter Double-lumen 22 Fr (Active)   Number of days: 1         Comments:    LABS / IMAGING / TELE      CBC Results         01/31/25 01/29/25 01/28/25     0822 0346 0433    WBC 19.40 19.75 19.54    RBC 3.31 2.62 2.72    HGB 9.2 7.3 7.8    HCT 29.0 23.1 24.6    MCV 87.6 88.2 90.4    MCH 27.8 27.9 28.7    MCHC 31.7 31.6 31.7     363 364           Comment for HGB at 0822 on 01/31/25: SPECIMEN CHECKED. PATIENT RECEIVED BLOOD.           CMP  Results         01/31/25 01/29/25 01/28/25     0822 0346 0433     139 140    K 4.5 3.9 4.1    Cl 105 108 108    CO2 21 23 27    Glucose 178 123 108    BUN 20 10 13    Creatinine 1.7 1.2 1.3    Calcium 8.6 8.3 8.6    Anion Gap 8 8 5    EGFR 31.5 47.9 43.5           Comment for EGFR at 0822 on 01/31/25: Calculation based on the Chronic Kidney Disease Epidemiology Collaboration (CKD-EPI) equation refit without adjustment for race.    Comment for EGFR at 0346 on 01/29/25: Calculation based on the Chronic Kidney Disease Epidemiology Collaboration (CKD-EPI) equation refit without adjustment for race.    Comment for EGFR at 0433 on 01/28/25: Calculation based on the Chronic Kidney Disease Epidemiology Collaboration (CKD-EPI) equation refit without adjustment for race.           Microbiology Results       Procedure Component Value Units Date/Time    Blood Culture Blood, Venous [[id number]]  (Normal) Collected: 01/25/25 0107    Specimen: Blood, Venous Updated: 01/29/25 0801     Culture No growth at 96 hours    Blood Culture Blood, Venous [[id number]]  (Normal) Collected: 01/25/25 0107    Specimen: Blood, Venous Updated: 01/29/25 0801     Culture No growth at 96 hours    SARS-COV-2 (COVID-19)/ FLU A/B, AND RSV, PCR Nasopharynx [[id number]]  (Normal) Collected: 01/24/25 2332    Specimen: Nasopharyngeal Swab from Nasopharynx Updated: 01/25/25 0023     SARS-CoV-2 (COVID-19) Negative     Influenza A Negative     Influenza B Negative     Respiratory Syncytial Virus Negative    Narrative:      Testing performed using real-time PCR for detection of COVID-19. EUA approved validation studies performed on site.     Urine culture Urine, Clean Catch [[id number]] Collected: 01/24/25 2329    Specimen: Urine, Clean Catch Updated: 01/27/25 0557     Urine Culture Probable contaminants, suggest recollection      >=100,000 cfu/mL Mixed Gram Positive Organisms         CT ANGIOGRAPHY CHEST PULMONARY EMBOLISM WITH IV CONTRAST, CT ABDOMEN PELVIS  WITH IV CONTRAST    Result Date: 1/25/2025  Narrative: CLINICAL HISTORY: Pain. Dysuria. Shortness of breath. History of bladder cancer. Abdominal pain, acute, nonlocalized TECHNIQUE: CT chest: PE protocol.  CT angiogram of the chest with axial reconstructions after administration of  intravenous contrast.  Coronal and sagittal reformats also obtained. MIP reforms also created separate workstation. MIP reconstructions with 2-D and/ or 3-D reformatted imaging was performed to better evaluate the vasculature CT abdomen and pelvis: Helical acquisition after administration of intravenous contrast. Coronal and sagittal reconstructions also performed. 100mL of iopamidoL (ISOVUE-370) 370 mg iodine /mL (76 %) injection 100 mL CT DOSE:  One or more dose reduction techniques (e.g. automated exposure control, adjustment of the mA and/or kV according to patient size, use of iterative reconstruction technique) utilized for this examination COMPARISON: Chest radiographs 1/24/2025 CT abdomen and pelvis 12/19/2024 FINDINGS: CHEST: -Lungs And Large Airways:Lungs clear. -Stephenie/Mediastinum/Axilla: No adenopathy or mass. -Pleura: No pleural effusion or nodule. No pneumothorax. -Vascular: No pulmonary embolism.  Pulmonary arteries normal in caliber.  Aorta normal in caliber.  No aortic dissection. -Chest Wall And Lower Neck: Unremarkable. -Bones: No acute findings. -Heart: Normal size. No pericardial effusion. ABDOMEN: -Liver: No mass or any significant abnormality. -Gallbladder: No calcified gallstones. -Bile Ducts: No significant biliary ductal dilatation. -Spleen:  No splenomegaly or focal lesion. -Pancreas: No mass, ductal dilatation, or inflammatory changes. -Kidneys: Moderate right hydronephrosis and hydroureter, similar to prior. Right ureter dilated to the level of the of bladder mass. No left hydronephrosis. -Adrenals:  Left adrenal nodule similar to prior. -Lymph Nodes: No adenopathy. -Vascular: No aortic aneurysm or any  significant vascular abnormalities. -Abdominal Wall: No hernia, fluid collection, or any significant findings. -Bones: No acute findings BOWEL/MESENTERY: Bowel normal in caliber.  No inflammatory changes. No ascites.  No collection.  No free air. PELVIS: -Bladder: Irregular bladder mass, not significantly changed. -Reproductive Organs:No gross mass. -Lymph Nodes: No mass or lymphadenopathy. -Miscellaneous: No free fluid     Impression: IMPRESSION: No pulmonary embolism. No acute pulmonary process. Large bladder mass, similar to prior CT 12/19/2024. Moderate right hydronephrosis and hydroureter, not significantly changed. /     X-RAY CHEST 2 VIEWS    Result Date: 1/24/2025  Narrative: CLINICAL HISTORY: SOB PRIOR STUDY: None TECHNIQUE: Frontal/lateral chest COMMENT:  The lungs are clear.  The heart size is normal.  There is thoracic degenerative change.  There is a left chest wall pacemaker.     Impression: IMPRESSION: No definite active disease.       ASSESSMENT AND PLAN        Assessment & Plan  Neoplasm of uncertain behavior of bladder  Enlarging size. Pt was planned TURBT as OP  Now with symptoms of intermittent bladder obstruction  Tumor expanding from the left ureter towards the right ureter with bilateral hydronephrosis  Urology on board, appreciate input  - POD#1 TURBT  - Heme/onc consulted, follow-up outpatient with pathology results  - S/P CBI  Patient to be discharged home with Minor in place  Follow-up urology as outpatient  Pacemaker  History of SVT and arrhythmia  Telemetry- No events  Follow-up cardiology as outpatient  Generalized anxiety disorder with panic attacks  Continue Buspar  Obstructive sleep apnea  F/U OP for Sleep study  Bladder mass    FIDELIA (acute kidney injury) (CMS/HCC)  In setting of bladder mass  Increased today  BMP AM if remains elevated, to get Renal US tomorrow  UTI (urinary tract infection)  ID following  On Rocephin, can switch to cefuroxime if continues to improve  Recommend 2  weeks course  Anemia  Likely in setting of infection/Hematuria  Status post 1 PRBC on 1/29 and 2 PRBC during OR 1/30  Hemoglobin stable today  Monitor  Constipation  Laxatives as needed    VTE Assessment: Padua    VTE Prophylaxis:  Current anticoagulants:  [Provider Managed Hold] enoxaparin (LOVENOX) syringe 40 mg, subcutaneous, Daily (6p)      Code Status: Full Code      Estimated Discharge Date: 2/1/2025   Disposition Planning: Possible tomorrow if creatinine improves          Linda Mcintyre MD  1/31/2025

## 2025-01-31 NOTE — PLAN OF CARE
Plan of Care Review  Plan of Care Reviewed With: patient  Progress: improving  Outcome Evaluation: Pt AOx4. Pain in early shift, 3mg morphine IV given. CBI continued with clear pink output. IV rocephin given. No further complaints of pain. Call bell within reach.

## 2025-02-01 VITALS
HEART RATE: 75 BPM | SYSTOLIC BLOOD PRESSURE: 127 MMHG | BODY MASS INDEX: 28.12 KG/M2 | RESPIRATION RATE: 18 BRPM | HEIGHT: 66 IN | DIASTOLIC BLOOD PRESSURE: 73 MMHG | OXYGEN SATURATION: 98 % | WEIGHT: 175 LBS | TEMPERATURE: 97.5 F

## 2025-02-01 LAB
ANION GAP SERPL CALC-SCNC: 6 MEQ/L (ref 3–15)
BUN SERPL-MCNC: 22 MG/DL (ref 7–25)
CALCIUM SERPL-MCNC: 8.5 MG/DL (ref 8.6–10.3)
CHLORIDE SERPL-SCNC: 109 MEQ/L (ref 98–107)
CO2 SERPL-SCNC: 24 MEQ/L (ref 21–31)
CREAT SERPL-MCNC: 1.3 MG/DL (ref 0.6–1.2)
EGFRCR SERPLBLD CKD-EPI 2021: 43.5 ML/MIN/1.73M*2
ERYTHROCYTE [DISTWIDTH] IN BLOOD BY AUTOMATED COUNT: 14.3 % (ref 11.7–14.4)
GLUCOSE SERPL-MCNC: 100 MG/DL (ref 70–99)
HCT VFR BLD AUTO: 25.9 % (ref 35–45)
HGB BLD-MCNC: 8.3 G/DL (ref 11.8–15.7)
MCH RBC QN AUTO: 27.9 PG (ref 28–33.2)
MCHC RBC AUTO-ENTMCNC: 32 G/DL (ref 32.2–35.5)
MCV RBC AUTO: 86.9 FL (ref 83–98)
PLATELET # BLD AUTO: 325 K/UL (ref 150–369)
PMV BLD AUTO: 9.5 FL (ref 9.4–12.3)
POTASSIUM SERPL-SCNC: 4.4 MEQ/L (ref 3.5–5.1)
RBC # BLD AUTO: 2.98 M/UL (ref 3.93–5.22)
SODIUM SERPL-SCNC: 139 MEQ/L (ref 136–145)
WBC # BLD AUTO: 13.9 K/UL (ref 3.8–10.5)

## 2025-02-01 PROCEDURE — 36415 COLL VENOUS BLD VENIPUNCTURE: CPT | Performed by: STUDENT IN AN ORGANIZED HEALTH CARE EDUCATION/TRAINING PROGRAM

## 2025-02-01 PROCEDURE — 63700000 HC SELF-ADMINISTRABLE DRUG: Performed by: STUDENT IN AN ORGANIZED HEALTH CARE EDUCATION/TRAINING PROGRAM

## 2025-02-01 PROCEDURE — 99239 HOSP IP/OBS DSCHRG MGMT >30: CPT | Performed by: STUDENT IN AN ORGANIZED HEALTH CARE EDUCATION/TRAINING PROGRAM

## 2025-02-01 PROCEDURE — 80048 BASIC METABOLIC PNL TOTAL CA: CPT | Performed by: STUDENT IN AN ORGANIZED HEALTH CARE EDUCATION/TRAINING PROGRAM

## 2025-02-01 PROCEDURE — 25800000 HC PHARMACY IV SOLUTIONS: Performed by: INTERNAL MEDICINE

## 2025-02-01 PROCEDURE — 85027 COMPLETE CBC AUTOMATED: CPT | Performed by: STUDENT IN AN ORGANIZED HEALTH CARE EDUCATION/TRAINING PROGRAM

## 2025-02-01 PROCEDURE — 63700000 HC SELF-ADMINISTRABLE DRUG: Performed by: INTERNAL MEDICINE

## 2025-02-01 PROCEDURE — 63600000 HC DRUGS/DETAIL CODE: Mod: JZ | Performed by: INTERNAL MEDICINE

## 2025-02-01 RX ORDER — CEFUROXIME AXETIL 500 MG/1
500 TABLET ORAL 2 TIMES DAILY
Qty: 12 TABLET | Refills: 0 | Status: SHIPPED | OUTPATIENT
Start: 2025-02-01 | End: 2025-02-07

## 2025-02-01 RX ADMIN — ROSUVASTATIN CALCIUM 40 MG: 40 TABLET, FILM COATED ORAL at 10:02

## 2025-02-01 RX ADMIN — CEFUROXIME AXETIL 500 MG: 250 TABLET, FILM COATED ORAL at 10:02

## 2025-02-01 RX ADMIN — BUSPIRONE HYDROCHLORIDE 5 MG: 5 TABLET ORAL at 10:02

## 2025-02-01 RX ADMIN — CEFTRIAXONE SODIUM 1 G: 1 INJECTION, POWDER, FOR SOLUTION INTRAMUSCULAR; INTRAVENOUS at 00:11

## 2025-02-01 ASSESSMENT — COGNITIVE AND FUNCTIONAL STATUS - GENERAL
CLIMB 3 TO 5 STEPS WITH RAILING: 3 - A LITTLE
MOVING TO AND FROM BED TO CHAIR: 4 - NONE
STANDING UP FROM CHAIR USING ARMS: 4 - NONE
WALKING IN HOSPITAL ROOM: 3 - A LITTLE

## 2025-02-01 NOTE — PROGRESS NOTES
"Urology Progress Note    Subjective    Interval History: Tolerating Minor catheter with mild discomfort at the urethral meatus.  Feels that her pain is overall improved.    Objective    Vital signs in last 24 hours:  Temp:  [36.4 °C (97.5 °F)-36.8 °C (98.2 °F)] 36.4 °C (97.5 °F)  Heart Rate:  [57-82] 75  Resp:  [18] 18  BP: (116-137)/(58-73) 127/73      Intake/Output Summary (Last 24 hours) at 2/1/2025 0801  Last data filed at 2/1/2025 0600  Gross per 24 hour   Intake --   Output 1750 ml   Net -1750 ml        Physical Exam:  Visit Vitals  /73 (BP Location: Right upper arm, Patient Position: Lying)   Pulse 75   Temp 36.4 °C (97.5 °F) (Temporal)   Resp 18   Ht 1.676 m (5' 6\")   Wt 79.4 kg (175 lb)   SpO2 98%   BMI 28.25 kg/m²       General Appearance:  Alert, cooperative, no distress, appears stated age   Head:  Normocephalic, without obvious abnormality, atraumatic   Back:   No CVA tenderness   Lungs:   Respirations unlabored   Chest wall:  No tenderness or deformity       Abdomen:   Soft, mild tenderness along LLQ           Extremities:  Musculoskeletal: Extremities normal, atraumatic, no cyanosis or edema  No injury or deformity       Skin: Skin color, texture, turgor normal, no rashes or lesions     Labs  Lab Results   Component Value Date    WBC 13.90 (H) 02/01/2025    HGB 8.3 (L) 02/01/2025    HCT 25.9 (L) 02/01/2025     02/01/2025    ALT 11 01/24/2025    AST 20 01/24/2025     02/01/2025    K 4.4 02/01/2025     (H) 02/01/2025    CREATININE 1.3 (H) 02/01/2025    BUN 22 02/01/2025    CO2 24 02/01/2025    TSH 4.70 01/24/2025         Imaging  Not applicable       Assessment & Plan     Patient Active Problem List   Diagnosis    Abnormal finding of diagnostic imaging    Arrhythmia    Suhail hematuria    Generalized anxiety disorder with panic attacks    Hemorrhoids    Hyperlipemia    Major depressive disorder    Neoplasm of uncertain behavior of bladder    Obstructive sleep apnea    Pacemaker "    Recurrent major depressive disorder in partial remission (CMS/HCC)    SVT (supraventricular tachycardia) (CMS/HCC)    Bladder cancer (CMS/HCC)    Bladder mass    FIDELIA (acute kidney injury) (CMS/HCC)    UTI (urinary tract infection)    Anemia    Constipation    Chronic kidney disease     A/P  74-year-old female with very large bladder tumor and concern for right hydronephrosis admitted with malaise and fatigue, concerns for UTI.  She continues with  variable fevers.  Voiding in small, frequent amounts.  Renal function remains stable, leukocytosis remains elevated.  Ucx shows mixed growth.    -Hemoglobin downtreded to 8.3, though urine clear thus likely dilutional.  -Urine clear with CBI clamped  -Cr today improved to 1.3 mg/dL. Patient would like to avoid nephrostomy tube if at all possible, thus will continue to monitor  -Follow-up for outpatient void trial in 2-3 days.    Expected Discharge Date:  2/1/2025  No discharge date for patient encounter.  Eugenio Jimenez MD  2/1/2025

## 2025-02-01 NOTE — NURSING NOTE
RN proved education to pt regarding benson care and maintenance. All questions answered. Pt demonstrated emptying benson bag and verbalized understanding of  teaching.  Pt proved with additional benson supplies.

## 2025-02-01 NOTE — DISCHARGE SUMMARY
"   Hospital Medicine    Inpatient Discharge Summary        BRIEF OVERVIEW   Patient: Gladys Woo (1950)    Admitting Provider: Linda Mcintyre MD  Attending Provider: Carito Posey MD Attending phys phone: [phone]    PCP: Zoe Sanders [phone]    Admission Date: 1/24/2025  Discharge Date: 2/1/2025     DISCHARGE DIAGNOSES      Primary Discharge Diagnosis  Neoplasm of uncertain behavior of bladder    Secondary Discharge Diagnoses  Active Hospital Problems    Diagnosis Date Noted    Chronic kidney disease 01/31/2025    Constipation 01/27/2025    UTI (urinary tract infection) 01/26/2025    Anemia 01/26/2025    Bladder mass 01/25/2025    FIDELIA (acute kidney injury) (CMS/[facility]) 01/25/2025    Neoplasm of uncertain behavior of bladder 12/18/2024    Obstructive sleep apnea 11/22/2024    Generalized anxiety disorder with panic attacks 10/01/2024    Pacemaker 10/24/2019      Resolved Hospital Problems   No resolved problems to display.       SUMMARY OF HOSPITALIZATION      Presenting Problem/History of Present Illness  HPI on 1/25/25 by Dr. Lang:     \"Gladys Woo is a 74 y.o. female with a past medical history of hematuria for several months.  Initially she was being treated by GYN who felt it was due to vaginal atrophy after a negative and extensive GYN evaluation.  When her symptoms did not resolve she was referred to urology and she was found to have a tumor on her  bladder.  As an outpatient she underwent a cystoscopy and CT urogram.  Cystoscopy revealed large papillary tumors within the bladder.  Plans were made to do a TURBT, which was planned as an outpatient for Tuesday.  The patient developed urinary hesitancy severe pain and unable to get more than a few drops out.  She also complains of 15 pound weight loss in the last 6 weeks due to nausea.  CT scan of the abdomen reveals ureter dilation on the left due to obstruction of the UVJ by the tumor, and partial obstruction of the " "right ureter which is new.  Patient was able to void in the emergency room after receiving IV fluids and her bladder scans are low.  Urine analysis reveals UTI.  Blood work reveals leukocytosis. \"    Hospital Course    Ms Woo presented as above with difficulty urinating and was found to have L>R ureteral compression as a result of her tumor. She was seen by urology who performed a TURBT procedure on 1/30. She tolerating this well. She had a mild FIDELIA which was likely of post-renal/obstructive etiology. This is improving at the time of discharge. She will be discharged with bneson in place and f/u with urology early next week for a void trial and post-op visit. UA upon admission was indicative of infection. She was treated with ceftriaxone which has been de-escalated to cefuroxime as per ID recs. She will continue cefuroxime to complete a 14 day total treatment course.     Exam on Day of Discharge  Physical Exam  Vitals and nursing note reviewed.   Constitutional:       General: She is not in acute distress.     Appearance: She is well-developed.   HENT:      Head: Normocephalic and atraumatic.   Eyes:      Pupils: Pupils are equal, round, and reactive to light.   Neck:      Vascular: No JVD.   Cardiovascular:      Rate and Rhythm: Normal rate and regular rhythm.      Heart sounds: No murmur heard.  Pulmonary:      Effort: Pulmonary effort is normal.      Breath sounds: Normal breath sounds. No wheezing or rales.   Abdominal:      General: Bowel sounds are normal.      Palpations: Abdomen is soft.   Genitourinary:     Comments: Benson catheter in place  Musculoskeletal:      Cervical back: Neck supple.   Skin:     General: Skin is warm and dry.      Findings: No erythema.   Neurological:      Mental Status: She is alert and oriented to person, place, and time.       Consults During Admission  IP CONSULT TO UROLOGY  IP CONSULT TO INFECTIOUS DISEASE  IP CONSULT TO HEMATOLOGY/ONCOLOGY    DISCHARGE MEDICATIONS      " "         Medication List        START taking these medications      cefuroxime 500 mg tablet  Commonly known as: CEFTIN  Take 1 tablet (500 mg total) by mouth 2 (two) times a day for 6 days.  Dose: 500 mg            CONTINUE taking these medications      busPIRone 5 mg tablet  Commonly known as: BUSPAR  Take 5 mg by mouth 3 times daily.  Dose: 5 mg     rosuvastatin 40 mg tablet  Commonly known as: CRESTOR  Take 40 mg by mouth daily.  Dose: 40 mg               Instructions for after discharge       Call provider for:  difficulty breathing      Call provider for:  persistent nausea or vomiting      Call provider for:  redness, tenderness, or signs of infection (pain, swelling, redness, odor or green/yellow discharge around incision site)      Call provider for:  severe uncontrolled pain      Call provider for:  temperature >100.4      Discharge Diet      Diet Type / Texture: Regular    Review additional activity direction in \"instructions\" section                 PROCEDURES / LABS / IMAGING      Operative Procedures  None    Other Procedures  none    Pertinent Labs  Lab Results   Component Value Date    WBC 13.90 (H) 02/01/2025    HGB 8.3 (L) 02/01/2025    HCT 25.9 (L) 02/01/2025    MCV 86.9 02/01/2025     02/01/2025       Lab Results   Component Value Date    GLUCOSE 100 (H) 02/01/2025    CALCIUM 8.5 (L) 02/01/2025     02/01/2025    K 4.4 02/01/2025    CO2 24 02/01/2025     (H) 02/01/2025    BUN 22 02/01/2025    CREATININE 1.3 (H) 02/01/2025       Pertinent Imaging  CT ANGIOGRAPHY CHEST PULMONARY EMBOLISM WITH IV CONTRAST    Result Date: 1/25/2025  IMPRESSION: No pulmonary embolism. No acute pulmonary process. Large bladder mass, similar to prior CT 12/19/2024. Moderate right hydronephrosis and hydroureter, not significantly changed. /     CT ABDOMEN PELVIS WITH IV CONTRAST    Result Date: 1/25/2025  IMPRESSION: No pulmonary embolism. No acute pulmonary process. Large bladder mass, similar to prior " CT 12/19/2024. Moderate right hydronephrosis and hydroureter, not significantly changed. /     X-RAY CHEST 2 VIEWS    Result Date: 1/24/2025  IMPRESSION: No definite active disease.      OUTPATIENT  FOLLOW-UP / REFERRALS / PENDING TESTS        Outpatient Follow-Up Appointments  Encounter Information    This patient does not currently have any appointments scheduled.         Referrals  No orders of the defined types were placed in this encounter.        DISCHARGE DISPOSITION AND DESTINATION      Disposition: [facility]  Destination: Home                            Code Status At Discharge: Full Code    Physician Order for Life-Sustaining Treatment Document Status        No documents found                     >30 mins spent for coordination/counselling related to discharge plan, including final examination of patient, discussion regarding discharge instructions, reconciliation/prescription of medications, preparation of discharge records, etc.

## 2025-02-01 NOTE — PLAN OF CARE
Plan of Care Review  Plan of Care Reviewed With: patient  Progress: improving  Outcome Evaluation: Pt AAOx4. VSS. No c/o pain. 3 way benson intact & CBI off. Clear yellow pink tinged urine. IV antibiotics adminstered per order. Motivated for d/c. Bed alarmed. Call bell within reach.

## 2025-02-01 NOTE — DISCHARGE INSTRUCTIONS
Discharge Summary & Instructions    Specialists who saw you in the hospital: Emergency Medicine, Internal Medicine, Urology, Infectious Diseases    Summary of Hospital Course: You came to the hospital due to difficulty urinating. You had imaging that showed compression of your ureters (the tubes that connect the kidneys to the bladder) which was blocking the flow of urine. You had a procedure called a TURBT to relieve this obstruction. You tolerated the procedure well. You will be discharged with a benson catheter and will need to see the urologist early next week for a void trial. You were treated with antibiotics for a urinary infection. A prescription has been sent for an antibiotic called cefuroxime to complete 14 total days of antibiotics (you have 6 days of treatment remaining).       Medications:   -Take your medications according to the attached list.  Take cefuroxime for 6 more days to complete the treatment for your urinary infection.    Follow up visits and testing:   -Make an appointment to see Dr. Jimenez early next week for a void trial and post-op follow up.  -Make an appointment to see your Primary Care Doctor in 1-2 weeks for follow up. If you do not have a primary care doctor, you may call [facility] at [phone] to make an appointment.

## 2025-02-01 NOTE — PLAN OF CARE
Care Coordination Discharge Plan Note     Discharge Needs Assessment  Concerns to be Addressed: discharge planning, care coordination/care conferences  Current Discharge Risk:      Anticipated Discharge Plan  Anticipated Discharge Disposition: home without assistance or services       Patient Choice  Offered/Gave Vendor List: no       ---------------------------------------------------------------------------------------------------------------------    Interdisciplinary Discharge Plan Review:  Participants:     Concerns Comments:      Discharge Plan:   Disposition/Destination: Home [facility] / Home  Discharge Facility:    Community Resources:      Discharge Transportation:  Is Out of Hospital DNR needed at Discharge: no  Does patient need discharge transport?          Internal message sent to give notice that patient will be discharged today. Call placed to [facility] to give verbal notice that patient will be discharged today.

## 2025-02-11 ENCOUNTER — TRANSCRIBE ORDERS (OUTPATIENT)
Dept: SCHEDULING | Age: 75
End: 2025-02-11
Payer: MEDICARE

## 2025-02-11 DIAGNOSIS — C67.8 MALIGNANT NEOPLASM OF OVERLAPPING SITES OF BLADDER (CMS/HCC): Primary | ICD-10-CM

## 2025-02-20 ENCOUNTER — DOCUMENTATION (OUTPATIENT)
Dept: RADIATION ONCOLOGY | Facility: HOSPITAL | Age: 75
End: 2025-02-20
Payer: MEDICARE

## 2025-02-20 NOTE — PROGRESS NOTES
"Major depressive disorder/anxiety/PACEMAKER/CAD/SVT        Former smoker    Pt is Pacemaker Dependent, and has [employer] Assurity MRI, model VN1920, serial # [id number]  implanted 10/25/19.   Follows with [facility] in [facility].  Last appt noted 7/18/24.    Recently dx with bladder tumor    Late  had bladder cancer.    12/19/24 Pt had gross hematuria - Dr. Jimenez ordered CT Urogram:    1. Urinary bladder mass centered along right posterolateral urinary bladder  wall, which crosses midline. Moderate right hydroureteronephrosis to the level  of the right UVJ secondary to the mass, noting occlusive filling defect(s)  within distal right ureter, which although potentially related to debris/blood  clot could also be on the basis of neoplastic spread or metastasis. Irregularity  of the involved right urinary bladder wall with small areas of stranding in the  right extraperitoneal and right retroperitoneal space along the distal right  ureter, which although potentially related to obstructive physiology may also  represent areas of early neoplastic spread.  2. Indeterminate left adrenal gland nodule, for which enhanced abdominal MRI is  recommended for further characterization.  3. Scattered subcentimeter sclerotic foci are nonspecific but probably bone  islands. Nuclear medicine bone scan could be obtained to exclude metastatic  disease.      1/24/25 Pt to ED:  Worsening dysuria and difficulty with urination x 1 week.  Antibiotics not effective.  Pt was scheduled with Dr. Jimenez to undergo \"Bladder removal\".    CT angiography chest/abdomen:  Large bladder mass, similar to prior CT 12/19/2024. Moderate right  hydronephrosis and hydroureter, not significantly changed.    CT abdomen:  Large bladder mass, similar to prior CT 12/19/2024. Moderate right  hydronephrosis and hydroureter, not significantly changed.    Seen inpatient by Dr. Cortez:  Scheduled for TURBT 1/28/25.   CT shows new left mild hydroureteronephrosis in " "addition to her previously identified right hydronephrosis and her 9 cm bladder mass resting on the bladder base and right lateral wall.     1/30/25 TURBT with Dr. 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 Suspicious for lymphovascular invasion.  Detrusor muscle present; positive for tumor.     1/31/25 Dr. Jimenez:  Creatinine did rise to 1.7 mg/dL.  Due to extensive tumor, ureteral orifices were unable to be identified intraoperatively.  Plan to trend for an additional day, though did discuss pursuing nephrostomy tube placement should the creatinine remained elevated to ensure that her renal function is optimized for chemotherapy which she will most certainly will require once the pathology finalizes.  -If creatinine remains elevated tomorrow, obtain renal ultrasound    1/31/25 Dr. Corona:  *d/w'd pt role for neoadj chemotx if muscle inv bladder ca, although pt at this time adamantly refusing consideration of cystectomy; should this remain true after path discussed, t/c Rad onc consult (outpt)     2/1/25:  Creatinine 1.3.  Pt wanted to avoid nephrostomy tube at this time.  Pt discharged with benson catheter.    2/10/25 Outpt with Dr. Corona:  Detrusor muscle present and positive for tumor, 8 cm.  Recommend geeta-adjuvant chemo prior to cystectomy if pt agrees.  Can do concurrent radiation/chemo.   Pt to f/u with Dr. Jimenez regarding surgical options (\"geeta-bladder\").  Referred to radiation.    Requested notes from Dr. Jimenez.       "

## 2025-02-25 ENCOUNTER — HOSPITAL ENCOUNTER (OUTPATIENT)
Dept: RADIATION ONCOLOGY | Facility: HOSPITAL | Age: 75
Setting detail: RADIATION/ONCOLOGY SERIES
Discharge: HOME | End: 2025-02-25
Attending: RADIOLOGY
Payer: MEDICARE

## 2025-02-25 VITALS
HEART RATE: 92 BPM | WEIGHT: 177.7 LBS | BODY MASS INDEX: 28.68 KG/M2 | DIASTOLIC BLOOD PRESSURE: 60 MMHG | SYSTOLIC BLOOD PRESSURE: 122 MMHG | OXYGEN SATURATION: 96 %

## 2025-02-25 DIAGNOSIS — C67.8 MALIGNANT NEOPLASM OF OVERLAPPING SITES OF BLADDER (CMS/HCC): Primary | ICD-10-CM

## 2025-02-25 PROBLEM — D41.4 NEOPLASM OF UNCERTAIN BEHAVIOR OF BLADDER: Status: RESOLVED | Noted: 2024-12-18 | Resolved: 2025-02-25

## 2025-02-25 RX ORDER — ACETAMINOPHEN 500 MG
500 TABLET ORAL EVERY 6 HOURS PRN
COMMUNITY

## 2025-02-25 ASSESSMENT — ENCOUNTER SYMPTOMS
UNEXPECTED WEIGHT CHANGE: 1
DEPRESSION: 0
SLEEP DISTURBANCE: 0
MUSCULOSKELETAL NEGATIVE: 1
NERVOUS/ANXIOUS: 1
FATIGUE: 1
EYES NEGATIVE: 1
PALPITATIONS: 1
DIFFICULTY URINATING: 0
APPETITE CHANGE: 1
GASTROINTESTINAL NEGATIVE: 1
HEMATURIA: 0
SHORTNESS OF BREATH: 1
FREQUENCY: 0
COUGH: 0
NEUROLOGICAL NEGATIVE: 1
DYSURIA: 0

## 2025-02-25 ASSESSMENT — PATIENT HEALTH QUESTIONNAIRE - PHQ9: SUM OF ALL RESPONSES TO PHQ9 QUESTIONS 1 & 2: 0

## 2025-02-25 ASSESSMENT — PAIN SCALES - GENERAL: PAINLEVEL_OUTOF10: 0-NO PAIN

## 2025-02-25 NOTE — PROGRESS NOTES
Review of Systems   Constitutional:  Positive for appetite change, fatigue and unexpected weight change (20 lbs).   HENT:  Negative.     Eyes: Negative.    Respiratory:  Positive for shortness of breath (96% RA). Negative for cough.    Cardiovascular:  Positive for palpitations (Pt has a [employer] Pacemaker and is pacemaker dependent.).   Gastrointestinal: Negative.    Genitourinary: Negative.  Negative for bladder incontinence, difficulty urinating, dysuria, frequency, hematuria, nocturia and vaginal bleeding.    Musculoskeletal: Negative.    Skin: Negative.    Neurological: Negative.    Psychiatric/Behavioral:  Negative for depression and sleep disturbance. The patient is nervous/anxious (on BUspar).      Pt is obtaining 2nd opinions from [facility] and [facility] in [facility] - Dr. Patel (Friday).

## 2025-02-25 NOTE — PROGRESS NOTES
Patient ID:  Gladys Woo is a 75 y.o. female.    Referring Physician:   No referring provider defined for this encounter.    Primary Care Provider:  Zoe Sanders MD     Cancer Staging Information:  Cancer Staging   Bladder cancer (CMS/HCC)  Staging form: Urinary Bladder, AJCC 8th Edition  - Clinical: Stage IIIA (cT3, cN0, cM0) - Signed by [facility], Gregory J, MD on 2/25/2025      Problem List:  Patient Active Problem List    Diagnosis Date Noted    Chronic kidney disease 01/31/2025    Constipation 01/27/2025    UTI (urinary tract infection) 01/26/2025    Anemia 01/26/2025    Bladder cancer (CMS/HCC) 01/25/2025    Bladder mass 01/25/2025    FIDELIA (acute kidney injury) (CMS/HCC) 01/25/2025    Abnormal finding of diagnostic imaging 01/24/2025    Hemorrhoids 01/24/2025    Recurrent major depressive disorder in partial remission (CMS/HCC) 01/24/2025    SVT (supraventricular tachycardia) (CMS/HCC) 01/24/2025    [place] hematuria 12/10/2024    Obstructive sleep apnea 11/22/2024    Arrhythmia 10/01/2024    Generalized anxiety disorder with panic attacks 10/01/2024    Hyperlipemia 10/01/2024    Major depressive disorder 10/01/2024    Pacemaker 10/24/2019       Chief Complaint  Chief Complaint   Patient presents with    Other     Consult       Subjective      History of Present Illness:  The following portions of the patient's history were reviewed and updated as appropriate: allergies, current medications, past family history, past medical history, past social history, and past surgical history.     HPI patient is a 75-year-old female with approximately year history of blood basically in her underwear felt to be from GYN source.  She was examined and no evidence of GYN tumor and eventually noted to have bleeding from urethra.  Patient seen by Dr. Jimenez who ordered a CT urogram which revealed a 8.6 cm irregular urinary bladder mass centered along the right posterior lateral bladder wall.  There is stranding in the  right extraperitoneal space and right retroperitoneum along the course of the distalmost right ureter which may be from obstructive physiology without extension of neoplasm beyond the urinary bladder is theoretically possible.  Moderate right hydronephrosis to the level of the right UVJ's secondary to mass.  In addition an intermediate left adrenal nodule was noted for which MRI was suggested.  Scattered subcentimeter sclerotic foci nonspecific but likely bony islands.  Bone scan suggested to rule out metastatic disease.  Patient presented last month to [facility] emergency department and CAT scan of the abdomen and pelvis revealed a large bladder mass with moderate right hydronephrosis and hydroureter.  CT angiogram chest abdomen pelvis showed no evidence of metastatic disease.  Creatinine at the time was elevated 1.7.  On 1/30/2025 Dr. Jimenez performed TURBT.  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  Suspicious for lymphovascular invasion.  Muscle invasion present.  Patient has not noted further bleeding per urethra.  Recent creatinine has come down to 1.3.  Patient met with Dr. Corona medical oncology to discuss neoadjuvant chemotherapy prior to cystectomy.  Patient now presents for consideration of bladder sparing treatment with radiation and chemotherapy.  Denies ever having right flank pain.    Review of Systems:  Review of Systems   Constitutional:  Positive for appetite change, fatigue and unexpected weight change.   Respiratory:  Positive for shortness of breath.    Cardiovascular:  Positive for palpitations.   Psychiatric/Behavioral:  The patient is nervous/anxious.         Past Medical/Surgical History  Past Medical History:   Diagnosis Date    Bladder cancer (CMS/HCC)     Coronary artery disease     KERRI (generalized anxiety disorder)     TREVOR (obstructive sleep apnea)      Past Surgical History   Procedure Laterality Date     A-v cardiac pacemaker insertion      Colonoscopy      CYSTO, TURBT TRANS-URETHRAL RESECTION BLADDER TUMOR N/A 1/30/2025    Performed by Eugenio Jimenez MD at  OR    Insert / replace / remove pacemaker      Knee arthroscopy w/ meniscal repair      Tonsillectomy          Current Medications  Current Outpatient Medications   Medication Sig Dispense Refill    acetaminophen (TYLENOL) 500 mg tablet Take 500 mg by mouth every 6 (six) hours as needed.      busPIRone (BUSPAR) 5 mg tablet Take 5 mg by mouth daily.      rosuvastatin (CRESTOR) 40 mg tablet Take 40 mg by mouth daily.       No current facility-administered medications for this encounter.       Allergies  Allergies   Allergen Reactions    Amoxicillin      Diarrhea        Social History  Social History     Socioeconomic History    Marital status:      Spouse name: None    Number of children: 1    Years of education: None    Highest education level: None   Occupational History    Occupation: Retired  at [employer]   Tobacco Use    Smoking status: Former     Types: Cigarettes    Smokeless tobacco: Never   Substance and Sexual Activity    Alcohol use: Yes     Alcohol/week: 2.0 standard drinks of alcohol     Types: 2 Glasses of wine per week     Comment: socially    Drug use: Never    Sexual activity: Defer   Social History Narrative    Pt denies any distress at this time.  Denies need for SW.     Social Drivers of Health     Food Insecurity: No Food Insecurity (1/24/2025)    Hunger Vital Sign     Worried About Running Out of Food in the Last Year: Never true     Ran Out of Food in the Last Year: Never true   Transportation Needs: No Transportation Needs (1/27/2025)    PRAPARE - Transportation     Lack of Transportation (Medical): No     Lack of Transportation (Non-Medical): No   Housing Stability: Unknown (1/27/2025)    Housing Stability Vital Sign     Unable to Pay for Housing in the Last Year: No     Homeless in the Last Year: No        Family History  Family History   Problem Relation Name Age of Onset    Heart disease Biological Mother      Diabetes Biological Mother      Heart disease Biological Father      Coronary artery disease Biological Father      Liver cancer Biological Brother      Prostate cancer Biological Brother        Family Status   Relation Name Status    Bio Mother  (Not Specified)    Bio Father  (Not Specified)    Bio Brother  (Not Specified)    Bio Brother  Alive   No partnership data on file               Objective      Physical Exam:  Vital Signs for this encounter:  BSA: 1.94 meters squared  Visit Vitals  /60 (Patient Position: Sitting)   Pulse 92   Wt 80.6 kg (177 lb 11.2 oz)   SpO2 96%   BMI 28.68 kg/m²         Physical Exam healthy-appearing well-nourished appearing 79-year-old appearing female no apparent distress.  Pacemaker in place left anterior chest wall region.  Lungs clear.  Abdomen soft nontender without palpable mass or liver.  Performance Status:80     PAIN ASSESSMENT:  Score Zero    Location    Pain Intervention      LABS:         Assessment/Plan patient with clinical stage IIIa high-grade urothelial carcinoma of the bladder with mild right hydronephrosis.  Patient's status post TURBT.  Patient going to [facility] for second opinion.  Her late  is treated there by Dr. Patel for bladder cancer.  I feel the patient is a candidate for bladder sparing cancer treatment with radiation therapy with or without chemotherapy.  I feel she would be best served with combination chemotherapy and radiation therapy concurrent for local regional control purposes.  I estimate local control as high as 60 and possible 70% given situation.  Discussed with patient risks and benefits of radiation therapy including possibility of damaging Naoma adjacent tissue.  Reviewed acute effects of radiation therapy including urine frequency but is still and fatigue.  Patient is very interested pending second  opinion.  I cannot rule out her having a salvage cystectomy with radiation and chemo failed.  Have tentatively arranged CT simulation planning next week pending second opinion.  Will plan to deliver a total dose approximately 6600 cGy for 7 weeks using intensity modulated radiation therapy with image guidance daily.  Will likely start treatment using a full bladder then continue with an empty bladder.  Will coordinate with medical oncology.  Patient has requested to see another oncologist for second opinion.  Treatment would likely start shortly pending above.  Patient be followed by Dr. Jimenez closely to ensure response.    Diagnoses and Orders Associated With This Visit:  Malignant neoplasm of overlapping sites of bladder (CMS/HCC) (Primary)      TREATMENT PLAN SUMMARY:         IV CHEMOTHERAPY:  [No matching plan found]

## 2025-02-25 NOTE — LETTER
February 25, 2025                                                          Patient: Gladys Woo   YOB: 1950   Date of Visit: 2/25/2025       Dear Dr. Sanders:    The patient is seen at the [facility] today. Attached is my assessment and plan of care.  Thank you for the opportunity to share in Gladys Woo's care.       Sincerely,        Gregory J. [facility], MD      CC: No Recipients  
No

## 2025-02-26 NOTE — PROGRESS NOTES
Hematology/Oncology -  [facility]  New Patient Consult       Gladys Woo is a 75 y.o. female,   :  1950  REFERRING PHYSICIAN:   [facility], Gregory J, MD  [address] [place] [address]  PRIMARY CARE PROVIDER:  Zoe Sanders MD    No diagnosis found.    Care Team    Surgical: Eugenio Jimenez, urologist.   Radiation: Isabelsvan  Med Onc: Wenceslao Soria    Staging       Cancer Staging   Bladder cancer (CMS/HCC)  Staging form: Urinary Bladder, AJCC 8th Edition  - Clinical: Stage IIIA (cT3, cN0, cM0) - Signed by [facility], Gregory J, MD on 2025        Treatment Plan     Treatment Plans       No treatment plans exist              Oncology History     Oncology History   Bladder cancer (CMS/HCC)   2025 Cancer Staged    Staging form: Urinary Bladder, AJCC 8th Edition  - Clinical: Stage IIIA (cT3, cN0, cM0)           History Of Present Illness     Gladys Woo is a very pleasant 75 y.o. patient with PMHx anxiety and hyperlipidemia.  She noticed some blood in her underwear since 2024.  She was initially treated for vagina dystrophy with no success. She was examined and no evidence of GYN tumor and eventually noted to have bleeding from urethra.  Patient seen by Dr. Jimenez who ordered a CT urogram which revealed a 8.6 cm irregular urinary bladder mass centered along the right posterior lateral bladder wall.  There is stranding in the right extraperitoneal space and right retroperitoneum along the course of the distalmost right ureter which may be from obstructive physiology without extension of neoplasm beyond the urinary bladder is theoretically possible.  Moderate right hydronephrosis to the level of the right UVJ's secondary to mass.  In addition an intermediate left adrenal nodule was noted for which MRI was suggested.  Scattered subcentimeter sclerotic foci nonspecific but likely bony islands.  Bone scan suggested to rule out metastatic disease.  Patient presented  last month to [facility] emergency department and CAT scan of the abdomen and pelvis revealed a large bladder mass with moderate right hydronephrosis and hydroureter.  CT angiogram chest abdomen pelvis showed no evidence of metastatic disease.  Creatinine at the time was elevated 1.7.  On 1/30/2025 Dr. Jimenez performed TURBT.  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  Suspicious for lymphovascular invasion.  Muscle invasion present.  Patient has not noted further bleeding per urethra.  Recent creatinine has come down to 1.3.  Patient met with Dr. Corona medical oncology to discuss neoadjuvant chemotherapy prior to cystectomy.  Patient now presents for consideration of bladder sparing treatment with radiation and chemotherapy.  Denies ever having right flank pain.    She is here alone.  She is  but has a boyfriend.  Her daughter-in-law Lin was on the phone with us.  Patient is not interested in cystectomy because she does not want to live with a bag.  She is overall feeling well.  She was very active and walking.           Gladys Woo is seen today at the request of [facility], Gregory J, MD  [address] [place] [address] for No diagnosis found..      Patient denies any h/a, acute changes in vision, FND, f/c/ns, LAD, swelling, SOB, cough, chest pain, dysphagia, n/v/d, abd pain, changes in appetite, weight loss, weakness/fatigue, or bleeding.      ROS     Review of Systems - Oncology  Review of Systems   Constitutional:  Positive for fatigue and unexpected weight change (20lb since december).   HENT:  Negative.     Eyes: Negative.    Respiratory:  Positive for shortness of breath (with activity).    Cardiovascular: Negative.    Gastrointestinal:  Positive for constipation (uses senokot prn) and nausea.   Endocrine: Negative.    Genitourinary:  Negative for hematuria.    Musculoskeletal: Negative.    Skin:  "Negative.    Neurological: Negative.    Hematological:  Bruises/bleeds easily.   Psychiatric/Behavioral:  Positive for depression (situational sadness). The patient is nervous/anxious.         PHYSICAL EXAMINATION     Temp:  [36.6 °C (97.9 °F)] 36.6 °C (97.9 °F)  Heart Rate:  [61] 61  Resp:  [16] 16  BP: (117)/(51) 117/51  Visit Vitals  BP (!) 117/51   Pulse 61   Temp 36.6 °C (97.9 °F) (Temporal)   Resp 16   Ht 1.676 m (5' 6\")   Wt 77.4 kg (170 lb 9.6 oz)   SpO2 97%   BMI 27.54 kg/m²     Physical Exam  Vitals reviewed.   Constitutional:       Appearance: She is not ill-appearing.   Cardiovascular:      Rate and Rhythm: Normal rate and regular rhythm.   Pulmonary:      Effort: Pulmonary effort is normal.      Breath sounds: Normal breath sounds.   Abdominal:      Palpations: Abdomen is soft.   Musculoskeletal:         General: No swelling.   Skin:     General: Skin is warm.      Coloration: Skin is not jaundiced.   Neurological:      General: No focal deficit present.      Mental Status: She is alert.   Psychiatric:         Mood and Affect: Mood normal.           Past Medical History     Past Medical History[1]    Past Surgical History     Past Surgical History   Procedure Laterality Date    A-v cardiac pacemaker insertion      Colonoscopy      CYSTO, TURBT TRANS-URETHRAL RESECTION BLADDER TUMOR N/A 1/30/2025    Performed by Eugenio Jimenez MD at  OR    Insert / replace / remove pacemaker      Knee arthroscopy w/ meniscal repair      Tonsillectomy         Social History     Social History     Tobacco Use    Smoking status: Former     Types: Cigarettes    Smokeless tobacco: Never   Substance Use Topics    Alcohol use: Yes     Alcohol/week: 2.0 standard drinks of alcohol     Types: 2 Glasses of wine per week     Comment: socially    Drug use: Never       Family History     Family History   Problem Relation Name Age of Onset    Heart disease Biological Mother      Diabetes Biological Mother      Heart disease " "Biological Father      Coronary artery disease Biological Father      Liver cancer Biological Brother      Prostate cancer Biological Brother         Allergies     Amoxicillin    Medications     Current Outpatient Medications   Medication Sig Dispense Refill    acetaminophen (TYLENOL) 500 mg tablet Take 500 mg by mouth every 6 (six) hours as needed.      busPIRone (BUSPAR) 5 mg tablet Take 5 mg by mouth daily.      rosuvastatin (CRESTOR) 40 mg tablet Take 40 mg by mouth daily.       No current facility-administered medications for this visit.          Labs     Lab Results   Component Value Date    WBC 13.90 (H) 02/01/2025    HGB 8.3 (L) 02/01/2025    HCT 25.9 (L) 02/01/2025    MCV 86.9 02/01/2025     02/01/2025       Lab Results   Component Value Date    GLUCOSE 100 (H) 02/01/2025    CALCIUM 8.5 (L) 02/01/2025     02/01/2025    K 4.4 02/01/2025    CO2 24 02/01/2025     (H) 02/01/2025    BUN 22 02/01/2025    CREATININE 1.3 (H) 02/01/2025       Lab Results   Component Value Date    ALT 11 01/24/2025    AST 20 01/24/2025    ALKPHOS 67 01/24/2025    BILITOT 0.5 01/24/2025       Lab Results   Component Value Date    IRON 16 (L) 01/25/2025    TIBC 188 (L) 01/25/2025       No results found for: \"SPEP\", \"UPEP\"    No results found for: \"PTT\"    No results found for: \"INR\", \"PROTIME\"    No results found for: \"LTYRHSVV11\"    No results found for: \"FOLATE\"    No results found for: \"RETICCTPCT\"    Lab Results   Component Value Date    DDIMER 1.16 (H) 01/24/2025         Pathology     Pathology Results       ** No results found for the last 720 hours. **        Pathology Tissue Exam: [id number]  Order: [id number]   Collected 1/30/2025 12:39       Status: Final result       Visible to patient: Yes (seen)       Dx: Bladder mass    Specimen Information: Urinary Bladder; Tissue   0 Result Notes      Component    Clinical Information    Pre-op diagnosis:  Bladder mass [N32.89]   Final Diagnosis   A.  Bladder tumor:  "   High-grade invasive urothelial carcinoma with squamous metaplasia and necrosis.  Suspicious for lymphovascular invasion.  Detrusor muscle present; positive for tumor.           Radiology     I independently reviewed the images, tracings or specimen. Significant abnormals are as below.     1/25/2025  CT ANGIOGRAPHY CHEST PULMONARY EMBOLISM WITH IV CONTRAST   CT ABDOMEN PELVIS WITH IV CONTRAST  FINDINGS:     CHEST:  -Lungs And Large Airways:Lungs clear.  -Stephenie/Mediastinum/Axilla: No adenopathy or mass.  -Pleura: No pleural effusion or nodule. No pneumothorax.  -Vascular: No pulmonary embolism.  Pulmonary arteries normal in caliber.  Aorta  normal in caliber.  No aortic dissection.  -Chest Wall And Lower Neck: Unremarkable.  -Bones: No acute findings.  -Heart: Normal size. No pericardial effusion.        ABDOMEN:  -Liver: No mass or any significant abnormality.  -Gallbladder: No calcified gallstones.  -Bile Ducts: No significant biliary ductal dilatation.  -Spleen:  No splenomegaly or focal lesion.  -Pancreas: No mass, ductal dilatation, or inflammatory changes.  -Kidneys: Moderate right hydronephrosis and hydroureter, similar to prior. Right  ureter dilated to the level of the of bladder mass. No left hydronephrosis.  -Adrenals:  Left adrenal nodule similar to prior.  -Lymph Nodes: No adenopathy.  -Vascular: No aortic aneurysm or any significant vascular abnormalities.  -Abdominal Wall: No hernia, fluid collection, or any significant findings.  -Bones: No acute findings        BOWEL/MESENTERY: Bowel normal in caliber.  No inflammatory changes.     No ascites.  No collection.  No free air.     PELVIS:  -Bladder: Irregular bladder mass, not significantly changed.  -Reproductive Organs:No gross mass.  -Lymph Nodes: No mass or lymphadenopathy.  -Miscellaneous: No free fluid     --  IMPRESSION:     No pulmonary embolism.  No acute pulmonary process.     Large bladder mass, similar to prior CT 12/19/2024. Moderate  right  hydronephrosis and hydroureter, not significantly changed.    Assessment and Plan     No problem-specific Assessment & Plan notes found for this encounter.    In summary, Gladys Woo is a very pleasant 75 y.o. patient with PMHx of anxiety and dyslipidemia.    # Bladder cancer, stage III, T3N0M0. 1/30/3025 she had Transurethral resection of bladder tumor (8cm). OP note:  Cystoscopic evaluation demonstrated a large and invasive appearing tumor encompassing the entirety of the trigone and left lateral wall measuring at least 8 cm.  A monopolar working element was inserted and the tumor was then  systematically resected down to the level of the normal bladder wall.  There did appear to be a large volume of bladder wall invasion visually.  The bladder tumor specimens were then irrigated from the bladder and repeat cystoscopy demonstrated no evidence of hannah perforation.  - need to complete workup with MRI to better evaluate the left adrenal gland lesion.  Because she has no bone pain we can skip nuclear medicine bone scan at this point.  - Optimal candidates for bladder preservation with chemoradiotherapy include patients with tumors that present without moderate/severe hydronephrosis, are without concurrent extensive or multifocal Tis, and are <6 cm. Ideally, tumors should allow a visually complete or maximally debulking TURBT. Apparently she has no other lesions based on operating note.  Her creatinine was improving.  Based on operation note her cancer is 8 cm, bigger than the 6 cm recommendation.  So I did have a discussion of neoadjuvant chemotherapy followed by radical cystectomy.  She is not interested in radical cystectomy.  Indeed Dr. Corona talked to her about this.  She is going to see a surgeon at [place] tomorrow and the medical oncologist next Tuesday.  She will then have a simulation next Thursday.  - Data supporting the use of chemoradiation come from a pooled analysis of Radiation  Therapy Oncology Group (RTOG) studies [Toño RH, JCO 2014], a large [facility] randomized trial comparing chemoradiation versus radiation therapy (RT) without chemotherapy [Chuck BRAGG, DARIA 2012], and several single-institution series. In RTOG pooled analysis -- A pooled analysis of six NRG/RTOG studies demonstrated the efficacy of chemoradiation as part of a bladder-preserving trimodality therapy (TMT) approach, inclusing 468 patients with clinical T2 to T4a tumors; patients were excluded if they had evidence of biopsy-proven pearl or metastatic disease. At median follow-up of over four years among all patients, the main results included the following:  ?The complete response rate following chemoradiation was 69 percent.  ?The 5- and 10-year disease-specific survival (DSS) rates were 71 and 65 percent, respectively. The 5- and 10-year rates of muscle-invasive local failure were 13 and 14 percent, respectively; the 5- and 10-year rates of non-muscle-invasive recurrence were 31 and 36 percent, respectively; and the 5- and 10-year rates of distant metastases were 31 and 35 percent, respectively.  ?The 5- and 10-year overall survival (OS) rates were 57 and 36 percent, respectively. However, most deaths were not attributable to bladder cancer, which was the cause of death in only 24 percent of patients who had  by year 5.  ?Of the 205 patients alive at five years, 80 percent had an intact bladder.  - The Bladder Cancer  ([id number]) study was a multicenter randomized phase III trial that enrolled 360 patients with muscle-invasive bladder cancer. All patients underwent TURBT, followed by randomization to either RT alone or RT with concurrent chemotherapy using FU and mitomycin. At a median follow-up of 10 years, the addition of concurrent chemotherapy to RT resulted improved locoregional disease control (five-year locoregional recurrence-free rates of 63 versus 49 percent; hazard ratio [HR] 0.61, 95% CI 0.43-0.86); A  non-statistically significant trend towards higher DFS (five-year DFS 45 versus 34 percent, HR 0.78, 95% CI 0.60-1.02) and OS (five-year OS 49 versus 37 percent, HR 0.88, 95% CI 0.69-1.13); Reduced rate of cystectomy at five years (14 versus 22 percent, HR 0.54, 95% CI 0.31-0.95). Overall, 81 percent of cystectomies were performed for disease recurrence; bu higher grade 3 or 4 toxicity rates during treatment (36 versus 28 percent) but with lower grade 3 or 4 late toxicity rates in extended follow-up (9 versus 17 percent).  - For patients receiving concurrent chemoradiation, the optimal radiosensitizing chemotherapy regimen is not established, as many of these regimens have not been directly compared in randomized trials. Selection of chemotherapy is based upon patient performance status and comorbidities, kidney function, and provider experience. With her overall fit status and Cr 1.3, patient would be a candidate with Cisplatin based chemoradiation therapy. Other chemo regimen include biweekly Gemcitabine and FU plus mitomycin and paclitaxel. Cisplatin is best studied. Low-dose biweekly gemcitabine (27 mg/m2 twice per week with daily RT) is an effective regimen with less toxicity relative to combination cisplatin-based therapy.  -I went to through the side effect of cisplatin 20mg (50% dose reduction from 40mg weekly due to CrCl 40-50) with the patient in detail.  She gave the consent. We also discussed the possibility of using gemcitabine.  We went through all the side effects and focused on nausea and vomiting, bone marrow suppression, FIDELIA and neuropathy.   -I will arrange follow-up depending her second opinion and arrangement with the Dr. [facility].   - will get genetic referral.   - will arrange PORT placement  - will arrange twice a week Hydration.     Wenceslao Soria MD/PhD  Hematology/Oncology               [1]   Past Medical History:  Diagnosis Date    Bladder cancer (CMS/HCC)     Coronary artery disease     KERRI  (generalized anxiety disorder)     TREVOR (obstructive sleep apnea)

## 2025-02-26 NOTE — H&P (VIEW-ONLY)
Hematology/Oncology -  [facility]  New Patient Consult       Gladys Woo is a 75 y.o. female,   :  1950  REFERRING PHYSICIAN:   [facility], Gregory J, MD  [address] [place] [address]  PRIMARY CARE PROVIDER:  Zoe Sanders MD    No diagnosis found.    Care Team    Surgical: Eugenio Jimenez, urologist.   Radiation: Isabelsvan  Med Onc: Wenceslao Soria    Staging       Cancer Staging   Bladder cancer (CMS/HCC)  Staging form: Urinary Bladder, AJCC 8th Edition  - Clinical: Stage IIIA (cT3, cN0, cM0) - Signed by [facility], Gregory J, MD on 2025        Treatment Plan     Treatment Plans       No treatment plans exist              Oncology History     Oncology History   Bladder cancer (CMS/HCC)   2025 Cancer Staged    Staging form: Urinary Bladder, AJCC 8th Edition  - Clinical: Stage IIIA (cT3, cN0, cM0)           History Of Present Illness     Gladys Woo is a very pleasant 75 y.o. patient with PMHx anxiety and hyperlipidemia.  She noticed some blood in her underwear since 2024.  She was initially treated for vagina dystrophy with no success. She was examined and no evidence of GYN tumor and eventually noted to have bleeding from urethra.  Patient seen by Dr. Jimenez who ordered a CT urogram which revealed a 8.6 cm irregular urinary bladder mass centered along the right posterior lateral bladder wall.  There is stranding in the right extraperitoneal space and right retroperitoneum along the course of the distalmost right ureter which may be from obstructive physiology without extension of neoplasm beyond the urinary bladder is theoretically possible.  Moderate right hydronephrosis to the level of the right UVJ's secondary to mass.  In addition an intermediate left adrenal nodule was noted for which MRI was suggested.  Scattered subcentimeter sclerotic foci nonspecific but likely bony islands.  Bone scan suggested to rule out metastatic disease.  Patient presented  last month to [facility] emergency department and CAT scan of the abdomen and pelvis revealed a large bladder mass with moderate right hydronephrosis and hydroureter.  CT angiogram chest abdomen pelvis showed no evidence of metastatic disease.  Creatinine at the time was elevated 1.7.  On 1/30/2025 Dr. Jimenez performed TURBT.  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  Suspicious for lymphovascular invasion.  Muscle invasion present.  Patient has not noted further bleeding per urethra.  Recent creatinine has come down to 1.3.  Patient met with Dr. Corona medical oncology to discuss neoadjuvant chemotherapy prior to cystectomy.  Patient now presents for consideration of bladder sparing treatment with radiation and chemotherapy.  Denies ever having right flank pain.    She is here alone.  She is  but has a boyfriend.  Her daughter-in-law Lin was on the phone with us.  Patient is not interested in cystectomy because she does not want to live with a bag.  She is overall feeling well.  She was very active and walking.           Gladys Woo is seen today at the request of [facility], Gregory J, MD  [address] [place] [address] for No diagnosis found..      Patient denies any h/a, acute changes in vision, FND, f/c/ns, LAD, swelling, SOB, cough, chest pain, dysphagia, n/v/d, abd pain, changes in appetite, weight loss, weakness/fatigue, or bleeding.      ROS     Review of Systems - Oncology  Review of Systems   Constitutional:  Positive for fatigue and unexpected weight change (20lb since december).   HENT:  Negative.     Eyes: Negative.    Respiratory:  Positive for shortness of breath (with activity).    Cardiovascular: Negative.    Gastrointestinal:  Positive for constipation (uses senokot prn) and nausea.   Endocrine: Negative.    Genitourinary:  Negative for hematuria.    Musculoskeletal: Negative.    Skin:  "Negative.    Neurological: Negative.    Hematological:  Bruises/bleeds easily.   Psychiatric/Behavioral:  Positive for depression (situational sadness). The patient is nervous/anxious.         PHYSICAL EXAMINATION     Temp:  [36.6 °C (97.9 °F)] 36.6 °C (97.9 °F)  Heart Rate:  [61] 61  Resp:  [16] 16  BP: (117)/(51) 117/51  Visit Vitals  BP (!) 117/51   Pulse 61   Temp 36.6 °C (97.9 °F) (Temporal)   Resp 16   Ht 1.676 m (5' 6\")   Wt 77.4 kg (170 lb 9.6 oz)   SpO2 97%   BMI 27.54 kg/m²     Physical Exam  Vitals reviewed.   Constitutional:       Appearance: She is not ill-appearing.   Cardiovascular:      Rate and Rhythm: Normal rate and regular rhythm.   Pulmonary:      Effort: Pulmonary effort is normal.      Breath sounds: Normal breath sounds.   Abdominal:      Palpations: Abdomen is soft.   Musculoskeletal:         General: No swelling.   Skin:     General: Skin is warm.      Coloration: Skin is not jaundiced.   Neurological:      General: No focal deficit present.      Mental Status: She is alert.   Psychiatric:         Mood and Affect: Mood normal.           Past Medical History     Past Medical History[1]    Past Surgical History     Past Surgical History   Procedure Laterality Date    A-v cardiac pacemaker insertion      Colonoscopy      CYSTO, TURBT TRANS-URETHRAL RESECTION BLADDER TUMOR N/A 1/30/2025    Performed by Eugenio Jimenez MD at  OR    Insert / replace / remove pacemaker      Knee arthroscopy w/ meniscal repair      Tonsillectomy         Social History     Social History     Tobacco Use    Smoking status: Former     Types: Cigarettes    Smokeless tobacco: Never   Substance Use Topics    Alcohol use: Yes     Alcohol/week: 2.0 standard drinks of alcohol     Types: 2 Glasses of wine per week     Comment: socially    Drug use: Never       Family History     Family History   Problem Relation Name Age of Onset    Heart disease Biological Mother      Diabetes Biological Mother      Heart disease " "Biological Father      Coronary artery disease Biological Father      Liver cancer Biological Brother      Prostate cancer Biological Brother         Allergies     Amoxicillin    Medications     Current Outpatient Medications   Medication Sig Dispense Refill    acetaminophen (TYLENOL) 500 mg tablet Take 500 mg by mouth every 6 (six) hours as needed.      busPIRone (BUSPAR) 5 mg tablet Take 5 mg by mouth daily.      rosuvastatin (CRESTOR) 40 mg tablet Take 40 mg by mouth daily.       No current facility-administered medications for this visit.          Labs     Lab Results   Component Value Date    WBC 13.90 (H) 02/01/2025    HGB 8.3 (L) 02/01/2025    HCT 25.9 (L) 02/01/2025    MCV 86.9 02/01/2025     02/01/2025       Lab Results   Component Value Date    GLUCOSE 100 (H) 02/01/2025    CALCIUM 8.5 (L) 02/01/2025     02/01/2025    K 4.4 02/01/2025    CO2 24 02/01/2025     (H) 02/01/2025    BUN 22 02/01/2025    CREATININE 1.3 (H) 02/01/2025       Lab Results   Component Value Date    ALT 11 01/24/2025    AST 20 01/24/2025    ALKPHOS 67 01/24/2025    BILITOT 0.5 01/24/2025       Lab Results   Component Value Date    IRON 16 (L) 01/25/2025    TIBC 188 (L) 01/25/2025       No results found for: \"SPEP\", \"UPEP\"    No results found for: \"PTT\"    No results found for: \"INR\", \"PROTIME\"    No results found for: \"BRLOSDGG59\"    No results found for: \"FOLATE\"    No results found for: \"RETICCTPCT\"    Lab Results   Component Value Date    DDIMER 1.16 (H) 01/24/2025         Pathology     Pathology Results       ** No results found for the last 720 hours. **        Pathology Tissue Exam: [id number]  Order: [id number]   Collected 1/30/2025 12:39       Status: Final result       Visible to patient: Yes (seen)       Dx: Bladder mass    Specimen Information: Urinary Bladder; Tissue   0 Result Notes      Component    Clinical Information    Pre-op diagnosis:  Bladder mass [N32.89]   Final Diagnosis   A.  Bladder tumor:  "   High-grade invasive urothelial carcinoma with squamous metaplasia and necrosis.  Suspicious for lymphovascular invasion.  Detrusor muscle present; positive for tumor.           Radiology     I independently reviewed the images, tracings or specimen. Significant abnormals are as below.     1/25/2025  CT ANGIOGRAPHY CHEST PULMONARY EMBOLISM WITH IV CONTRAST   CT ABDOMEN PELVIS WITH IV CONTRAST  FINDINGS:     CHEST:  -Lungs And Large Airways:Lungs clear.  -Stephenie/Mediastinum/Axilla: No adenopathy or mass.  -Pleura: No pleural effusion or nodule. No pneumothorax.  -Vascular: No pulmonary embolism.  Pulmonary arteries normal in caliber.  Aorta  normal in caliber.  No aortic dissection.  -Chest Wall And Lower Neck: Unremarkable.  -Bones: No acute findings.  -Heart: Normal size. No pericardial effusion.        ABDOMEN:  -Liver: No mass or any significant abnormality.  -Gallbladder: No calcified gallstones.  -Bile Ducts: No significant biliary ductal dilatation.  -Spleen:  No splenomegaly or focal lesion.  -Pancreas: No mass, ductal dilatation, or inflammatory changes.  -Kidneys: Moderate right hydronephrosis and hydroureter, similar to prior. Right  ureter dilated to the level of the of bladder mass. No left hydronephrosis.  -Adrenals:  Left adrenal nodule similar to prior.  -Lymph Nodes: No adenopathy.  -Vascular: No aortic aneurysm or any significant vascular abnormalities.  -Abdominal Wall: No hernia, fluid collection, or any significant findings.  -Bones: No acute findings        BOWEL/MESENTERY: Bowel normal in caliber.  No inflammatory changes.     No ascites.  No collection.  No free air.     PELVIS:  -Bladder: Irregular bladder mass, not significantly changed.  -Reproductive Organs:No gross mass.  -Lymph Nodes: No mass or lymphadenopathy.  -Miscellaneous: No free fluid     --  IMPRESSION:     No pulmonary embolism.  No acute pulmonary process.     Large bladder mass, similar to prior CT 12/19/2024. Moderate  right  hydronephrosis and hydroureter, not significantly changed.    Assessment and Plan     No problem-specific Assessment & Plan notes found for this encounter.    In summary, Gladys Woo is a very pleasant 75 y.o. patient with PMHx of anxiety and dyslipidemia.    # Bladder cancer, stage III, T3N0M0. 1/30/3025 she had Transurethral resection of bladder tumor (8cm). OP note:  Cystoscopic evaluation demonstrated a large and invasive appearing tumor encompassing the entirety of the trigone and left lateral wall measuring at least 8 cm.  A monopolar working element was inserted and the tumor was then  systematically resected down to the level of the normal bladder wall.  There did appear to be a large volume of bladder wall invasion visually.  The bladder tumor specimens were then irrigated from the bladder and repeat cystoscopy demonstrated no evidence of hannah perforation.  - need to complete workup with MRI to better evaluate the left adrenal gland lesion.  Because she has no bone pain we can skip nuclear medicine bone scan at this point.  - Optimal candidates for bladder preservation with chemoradiotherapy include patients with tumors that present without moderate/severe hydronephrosis, are without concurrent extensive or multifocal Tis, and are <6 cm. Ideally, tumors should allow a visually complete or maximally debulking TURBT. Apparently she has no other lesions based on operating note.  Her creatinine was improving.  Based on operation note her cancer is 8 cm, bigger than the 6 cm recommendation.  So I did have a discussion of neoadjuvant chemotherapy followed by radical cystectomy.  She is not interested in radical cystectomy.  Indeed Dr. Corona talked to her about this.  She is going to see a surgeon at [place] tomorrow and the medical oncologist next Tuesday.  She will then have a simulation next Thursday.  - Data supporting the use of chemoradiation come from a pooled analysis of Radiation  Therapy Oncology Group (RTOG) studies [Toño RH, JCO 2014], a large [facility] randomized trial comparing chemoradiation versus radiation therapy (RT) without chemotherapy [Chuck BRAGG, DARIA 2012], and several single-institution series. In RTOG pooled analysis -- A pooled analysis of six NRG/RTOG studies demonstrated the efficacy of chemoradiation as part of a bladder-preserving trimodality therapy (TMT) approach, inclusing 468 patients with clinical T2 to T4a tumors; patients were excluded if they had evidence of biopsy-proven pearl or metastatic disease. At median follow-up of over four years among all patients, the main results included the following:  ?The complete response rate following chemoradiation was 69 percent.  ?The 5- and 10-year disease-specific survival (DSS) rates were 71 and 65 percent, respectively. The 5- and 10-year rates of muscle-invasive local failure were 13 and 14 percent, respectively; the 5- and 10-year rates of non-muscle-invasive recurrence were 31 and 36 percent, respectively; and the 5- and 10-year rates of distant metastases were 31 and 35 percent, respectively.  ?The 5- and 10-year overall survival (OS) rates were 57 and 36 percent, respectively. However, most deaths were not attributable to bladder cancer, which was the cause of death in only 24 percent of patients who had  by year 5.  ?Of the 205 patients alive at five years, 80 percent had an intact bladder.  - The Bladder Cancer  (BU1086) study was a multicenter randomized phase III trial that enrolled 360 patients with muscle-invasive bladder cancer. All patients underwent TURBT, followed by randomization to either RT alone or RT with concurrent chemotherapy using FU and mitomycin. At a median follow-up of 10 years, the addition of concurrent chemotherapy to RT resulted improved locoregional disease control (five-year locoregional recurrence-free rates of 63 versus 49 percent; hazard ratio [HR] 0.61, 95% CI 0.43-0.86); A  non-statistically significant trend towards higher DFS (five-year DFS 45 versus 34 percent, HR 0.78, 95% CI 0.60-1.02) and OS (five-year OS 49 versus 37 percent, HR 0.88, 95% CI 0.69-1.13); Reduced rate of cystectomy at five years (14 versus 22 percent, HR 0.54, 95% CI 0.31-0.95). Overall, 81 percent of cystectomies were performed for disease recurrence; bu higher grade 3 or 4 toxicity rates during treatment (36 versus 28 percent) but with lower grade 3 or 4 late toxicity rates in extended follow-up (9 versus 17 percent).  - For patients receiving concurrent chemoradiation, the optimal radiosensitizing chemotherapy regimen is not established, as many of these regimens have not been directly compared in randomized trials. Selection of chemotherapy is based upon patient performance status and comorbidities, kidney function, and provider experience. With her overall fit status and Cr 1.3, patient would be a candidate with Cisplatin based chemoradiation therapy. Other chemo regimen include biweekly Gemcitabine and FU plus mitomycin and paclitaxel. Cisplatin is best studied. Low-dose biweekly gemcitabine (27 mg/m2 twice per week with daily RT) is an effective regimen with less toxicity relative to combination cisplatin-based therapy.  -I went to through the side effect of cisplatin 20mg (50% dose reduction from 40mg weekly due to CrCl 40-50) with the patient in detail.  She gave the consent. We also discussed the possibility of using gemcitabine.  We went through all the side effects and focused on nausea and vomiting, bone marrow suppression, FIDELIA and neuropathy.   -I will arrange follow-up depending her second opinion and arrangement with the Dr. [facility].   - will get genetic referral.   - will arrange PORT placement  - will arrange twice a week Hydration.     Wenceslao Soria MD/PhD  Hematology/Oncology               [1]   Past Medical History:  Diagnosis Date    Bladder cancer (CMS/HCC)     Coronary artery disease     KERRI  (generalized anxiety disorder)     TREVOR (obstructive sleep apnea)

## 2025-02-27 ENCOUNTER — OFFICE VISIT (OUTPATIENT)
Dept: HEMATOLOGY/ONCOLOGY | Facility: CLINIC | Age: 75
End: 2025-02-27
Attending: HOSPITALIST
Payer: MEDICARE

## 2025-02-27 ENCOUNTER — TELEPHONE (OUTPATIENT)
Dept: HEMATOLOGY/ONCOLOGY | Facility: CLINIC | Age: 75
End: 2025-02-27

## 2025-02-27 VITALS
WEIGHT: 170.6 LBS | TEMPERATURE: 97.9 F | HEART RATE: 61 BPM | HEIGHT: 66 IN | DIASTOLIC BLOOD PRESSURE: 51 MMHG | RESPIRATION RATE: 16 BRPM | SYSTOLIC BLOOD PRESSURE: 117 MMHG | BODY MASS INDEX: 27.42 KG/M2 | OXYGEN SATURATION: 97 %

## 2025-02-27 DIAGNOSIS — C67.8 MALIGNANT NEOPLASM OF OVERLAPPING SITES OF BLADDER (CMS/HCC): ICD-10-CM

## 2025-02-27 DIAGNOSIS — C67.9 MALIGNANT NEOPLASM OF URINARY BLADDER, UNSPECIFIED SITE (CMS/HCC): Primary | ICD-10-CM

## 2025-02-27 DIAGNOSIS — N17.9 AKI (ACUTE KIDNEY INJURY) (CMS/HCC): ICD-10-CM

## 2025-02-27 PROCEDURE — 99205 OFFICE O/P NEW HI 60 MIN: CPT | Performed by: HOSPITALIST

## 2025-02-27 ASSESSMENT — ENCOUNTER SYMPTOMS
UNEXPECTED WEIGHT CHANGE: 1
BRUISES/BLEEDS EASILY: 1
MUSCULOSKELETAL NEGATIVE: 1
CARDIOVASCULAR NEGATIVE: 1
FATIGUE: 1
SHORTNESS OF BREATH: 1
EYES NEGATIVE: 1
ENDOCRINE NEGATIVE: 1
DEPRESSION: 1
HEMATURIA: 0
NAUSEA: 1
NEUROLOGICAL NEGATIVE: 1
NERVOUS/ANXIOUS: 1
CONSTIPATION: 1

## 2025-02-27 NOTE — PROGRESS NOTES
Review of Systems   Constitutional:  Positive for fatigue and unexpected weight change (20lb since december).   HENT:  Negative.     Eyes: Negative.    Respiratory:  Positive for shortness of breath (with activity).    Cardiovascular: Negative.    Gastrointestinal:  Positive for constipation (uses senokot prn) and nausea.   Endocrine: Negative.    Genitourinary:  Positive for vaginal bleeding (Patient reports use of Estrogen and progesterone for 1yr stopped in Fall of 2024). Negative for hematuria.         Pt reports UTI's started in Sept 2024     Musculoskeletal: Negative.    Skin: Negative.    Neurological: Negative.    Hematological:  Bruises/bleeds easily.   Psychiatric/Behavioral:  Positive for depression (situational sadness). The patient is nervous/anxious.

## 2025-02-27 NOTE — TELEPHONE ENCOUNTER
Needs cardio f/u in 3-4 weeks, please arrange    Thanks   RN met with patient after seen by Dr. Soria today.    Plan for treatment with Cisplatin with concurrent radiation.    RN provided medication sheets on Cisplatin,    Consent signed at OV and scanned to media.      RN reviewed Port insertion.  Spoke with Jojo in IR for port placement.    Patient will call with decision regarding treatment here at [place] after seen by Surgery and Oncology at [facility] next week.      Pt will need education session before start of treatment.

## 2025-02-27 NOTE — LETTER
February 27, 2025                                                                                                                             Patient: Gladys Woo   YOB: 1950   Date of Visit: 2/27/2025       Dear Dr. Sanders:    Thank you for referring Gladys Woo to me for evaluation at [facility]. Attached is my assessment and plan of care.    If you have questions, please do not hesitate to call me. I look forward to following Gladys Woo along with you.           Sincerely,        Wenceslao Soria MD    CC: Gregory J [facility], MD

## 2025-03-06 ENCOUNTER — DOCUMENTATION (OUTPATIENT)
Dept: RADIATION ONCOLOGY | Facility: HOSPITAL | Age: 75
End: 2025-03-06
Payer: MEDICARE

## 2025-03-06 ENCOUNTER — HOSPITAL ENCOUNTER (OUTPATIENT)
Dept: RADIATION ONCOLOGY | Facility: HOSPITAL | Age: 75
Setting detail: RADIATION/ONCOLOGY SERIES
Discharge: HOME | End: 2025-03-06
Attending: RADIOLOGY
Payer: MEDICARE

## 2025-03-06 ENCOUNTER — TELEPHONE (OUTPATIENT)
Dept: RADIATION ONCOLOGY | Facility: HOSPITAL | Age: 75
End: 2025-03-06
Payer: MEDICARE

## 2025-03-06 VITALS
WEIGHT: 176.4 LBS | OXYGEN SATURATION: 98 % | SYSTOLIC BLOOD PRESSURE: 114 MMHG | HEART RATE: 62 BPM | BODY MASS INDEX: 28.47 KG/M2 | DIASTOLIC BLOOD PRESSURE: 50 MMHG

## 2025-03-06 DIAGNOSIS — C67.8 MALIGNANT NEOPLASM OF OVERLAPPING SITES OF BLADDER (CMS/HCC): Primary | ICD-10-CM

## 2025-03-06 PROCEDURE — 77470 SPECIAL RADIATION TREATMENT: CPT | Performed by: RADIOLOGY

## 2025-03-06 PROCEDURE — 77332 RADIATION TREATMENT AID(S): CPT | Performed by: RADIOLOGY

## 2025-03-06 ASSESSMENT — PATIENT HEALTH QUESTIONNAIRE - PHQ9: SUM OF ALL RESPONSES TO PHQ9 QUESTIONS 1 & 2: 0

## 2025-03-06 ASSESSMENT — ENCOUNTER SYMPTOMS
GASTROINTESTINAL NEGATIVE: 1
APPETITE CHANGE: 1
PALPITATIONS: 0
EYES NEGATIVE: 1
MUSCULOSKELETAL NEGATIVE: 1
CARDIOVASCULAR NEGATIVE: 1
HEMATURIA: 1
NEUROLOGICAL NEGATIVE: 1
PSYCHIATRIC NEGATIVE: 1
SHORTNESS OF BREATH: 1
FATIGUE: 1

## 2025-03-06 ASSESSMENT — PAIN SCALES - GENERAL: PAINLEVEL_OUTOF10: 0-NO PAIN

## 2025-03-06 NOTE — PROGRESS NOTES
Patient was seen by Dr. Patel urologist at [facility].  Dr. Patel recommended upfront surgery but said surgery could be possible after chemotherapy radiation therapy for definitive purposes if necessary.  Patient would like to try radiation concurrent chemotherapy for local regional control and curative purposes.  Risks and benefits of treatment reviewed again with the patient.  Including possibility of damaging normal adjacent tissue.  Patient agrees and went ahead with CT simulation planning.  Plan to deliver a total dose of 6600 cGy in 33 fractions over several weeks starting 3/18/2025.  Will coordinate with  medical oncology.

## 2025-03-06 NOTE — PROGRESS NOTES
TREATMENT PLAN OF CARE AND GOALS PELVIS    Care Providers   Medical Oncologist Dr. Soria   Radiation Oncologist Dr. [facility]   Radiation Oncology Nurse Desire Diaz RN BSN OCN    GANESH Byers [phone]    Treatment Plan of Care   Chemotherapy, Hormonal, and Targeted therapies   Regimen Start date   Interval, Duration   Weekly Cisplatin/Hydration          Radiation Therapy   Treated area Start date Interval, Duration   Bladder 3/18/25 X 33 treatments        Potential side effects:     BLADDER IRRITATION: with increased need to urinate.       AZO - (over the counter) as needed. See handout.        Good nutrition and hydration 8 - 8 oz water daily          (minimum). We can provide you with a nutrition        consult if needed.      PELVIS PREP:  Drink 16-20 ounces of water 30 minutes prior to radiation treatment. Do not urinate right before treatment.    Will start treatment with full bladder then continue with empty bladder.     SKIN REDNESS: like a sunburn  Aquaphor as needed for redness (OTC).   1% Hydrocortisone (OTC) for itching as needed.   (Never use creams or lotions four hours prior to treatment).  Dove bar soap and warm water for bathing.  No hot tubs, heating pads or ice packs to treatment area.  Watch sun exposure - use sunscreen with zinc (Never use four hours prior to treatment).    FATIGUE: Exercise/Conserve energy    NO MULTIVITAMINS OR ANTIOXIDANTS during your radiation treatment.    You will meet with the Nurse/ Doctor once a week for an on treatment visit. Please plan for a longer visit on this day.     You will speak with the radiation therapists for scheduling and treatment specific requirements.         Contact Numbers:    Triage Line  [phone]    To schedule or cancel your radiation treatment appointments  [phone]  Reviewed by:    Desire Diaz RN  BSN OCN             Date completed:03/06/25

## 2025-03-06 NOTE — LETTER
March 6, 2025                                                          Patient: Gladys Woo   YOB: 1950   Date of Visit: 3/6/2025       Dear Dr. Sanders:    The patient is seen at the [facility] today. Attached is my assessment and plan of care.  Thank you for the opportunity to share in Gladys Woo's care.       Sincerely,        Gregory J. [facility], MD      CC: No Recipients

## 2025-03-06 NOTE — PROGRESS NOTES
Review of Systems   Constitutional:  Positive for appetite change (due to nausea/ will contact Dietican) and fatigue.   HENT:  Negative.     Eyes: Negative.    Respiratory:  Positive for shortness of breath (mild  98% RA).    Cardiovascular: Negative.  Negative for palpitations (Pt is Pacemaker Dependent).   Gastrointestinal: Negative.    Genitourinary:  Positive for hematuria (saw trace the other day with scab/ zero since then.).    Musculoskeletal: Negative.    Skin: Negative.    Neurological: Negative.    Psychiatric/Behavioral: Negative.     Pt choose Dr. Soria and Concurrent weekly Cisplatin.  She will have a Port placed on 3/13/25 and would like to wait to receive tx's on 3/17/25.  Will communicate with Dr. Soria's nurses.

## 2025-03-06 NOTE — TELEPHONE ENCOUNTER
Pt is here today for radiation treatment planning.  She would like to stay with Dr. Soria, as her med/onc.  She prefers to start weekly Cisplatin until 3/17/25 due to port placement on 3/13/25 - pt states she is a difficulty IV stick.  Radiation Port and treatment will start 3/18 at 7:15 am.  Will make Dr. Soria and her team aware.

## 2025-03-10 ENCOUNTER — TELEPHONE (OUTPATIENT)
Dept: HEMATOLOGY/ONCOLOGY | Facility: HOSPITAL | Age: 75
End: 2025-03-10
Payer: MEDICARE

## 2025-03-10 DIAGNOSIS — C67.8 MALIGNANT NEOPLASM OF OVERLAPPING SITES OF BLADDER (CMS/HCC): ICD-10-CM

## 2025-03-10 DIAGNOSIS — C67.9 MALIGNANT NEOPLASM OF URINARY BLADDER, UNSPECIFIED SITE (CMS/HCC): Primary | ICD-10-CM

## 2025-03-10 RX ORDER — DEXAMETHASONE 4 MG/1
8 TABLET ORAL SEE ADMIN INSTRUCTIONS
Qty: 30 TABLET | Refills: 3 | Status: SHIPPED | OUTPATIENT
Start: 2025-03-10 | End: 2025-03-24 | Stop reason: ALTCHOICE

## 2025-03-10 RX ORDER — PROCHLORPERAZINE MALEATE 10 MG
10 TABLET ORAL EVERY 6 HOURS PRN
Qty: 30 TABLET | Refills: 3 | Status: SHIPPED | OUTPATIENT
Start: 2025-03-10

## 2025-03-10 RX ORDER — ONDANSETRON HYDROCHLORIDE 8 MG/1
8 TABLET, FILM COATED ORAL EVERY 8 HOURS PRN
Qty: 30 TABLET | Refills: 3 | Status: SHIPPED | OUTPATIENT
Start: 2025-03-10

## 2025-03-10 NOTE — TELEPHONE ENCOUNTER
[facility]  Medical Nutrition Therapy Oncology Assessment    Patient Information  Gladys Woo   1950 75 y.o. female  Active Ambulatory Problems     Diagnosis Date Noted    Abnormal finding of diagnostic imaging 01/24/2025    Arrhythmia 10/01/2024    Suhail hematuria 12/10/2024    Generalized anxiety disorder with panic attacks 10/01/2024    Hemorrhoids 01/24/2025    Hyperlipemia 10/01/2024    Major depressive disorder 10/01/2024    Obstructive sleep apnea 11/22/2024    Pacemaker 10/24/2019    Recurrent major depressive disorder in partial remission (CMS/[facility]) 01/24/2025    SVT (supraventricular tachycardia) (CMS/HCC) 01/24/2025    Bladder cancer (CMS/HCC) 01/25/2025    Bladder mass 01/25/2025    FIDELIA (acute kidney injury) (CMS/HCC) 01/25/2025    UTI (urinary tract infection) 01/26/2025    Anemia 01/26/2025    Constipation 01/27/2025    Chronic kidney disease 01/31/2025     Resolved Ambulatory Problems     Diagnosis Date Noted    Neoplasm of uncertain behavior of bladder 12/18/2024     Past Medical History:   Diagnosis Date    Coronary artery disease     KERRI (generalized anxiety disorder)     TREVOR (obstructive sleep apnea)      Cancer Staging   Bladder cancer (CMS/HCC)  Staging form: Urinary Bladder, AJCC 8th Edition  - Clinical: Stage IIIA (cT3, cN0, cM0) - Signed by [facility], Gregory J, MD on 2/25/2025    [unfilled]  Patient Care Team     Relationship Specialty Notifications Start End   Zoe Sanders MD PCP - General Family Medicine  11/8/23     Address:  [address] [employer] [address] [facility] [address]   [facility], Gregory J, MD Radiation Oncologist Radiation Oncology Admissions 2/25/25     Address:  [address] [place] [address]   Wenceslao Soria MD Hospitalist Hematology and Oncology Admissions 2/26/25     Address:  [address] [place] [address]   Luda Chapin, RN Registered Nurse  Admissions 2/27/25    Yolande Ching, DO  "Referring Physician Cardiology Admissions 2/27/25     Address:  [address] [facility] [address]   Eugenio Jimenez MD Consulting Physician Urology Admissions 2/27/25     Address:  [address] [facility] [address]   Carlie Carrion LDN  Dietitian Admissions 3/10/25     Address:  [address] [facility] [place] [address]     No ref. provider found    Seeing patient for an initial    The patient is receiving: Not in active treatment    Estimated body surface area is 1.93 meters squared as calculated from the following:    Height as of 2/27/25: 1.676 m (5' 6\").    Weight as of 3/6/25: 80 kg (176 lb 6.4 oz).  Estimated body mass index is 28.47 kg/m² as calculated from the following:    Height as of 2/27/25: 1.676 m (5' 6\").    Weight as of 3/6/25: 80 kg (176 lb 6.4 oz).    Appetite: decreasing  Food Intake: decreased but adequate  PO Supplementation: No  If yes, type: N/a    Medications:   Current Outpatient Medications:     acetaminophen (TYLENOL) 500 mg tablet, Take 500 mg by mouth every 6 (six) hours as needed., Disp: , Rfl:     busPIRone (BUSPAR) 5 mg tablet, Take 5 mg by mouth daily., Disp: , Rfl:     rosuvastatin (CRESTOR) 40 mg tablet, Take 40 mg by mouth daily., Disp: , Rfl:   Allergies   Allergen Reactions    Amoxicillin      Diarrhea      Social History     Tobacco Use   Smoking Status Former    Types: Cigarettes   Smokeless Tobacco Never     Social History     Substance and Sexual Activity   Alcohol Use Yes    Alcohol/week: 2.0 standard drinks of alcohol    Types: 2 Glasses of wine per week    Comment: socially     Nausea, Weight loss  Past Medical History:   Diagnosis Date    Bladder cancer (CMS/HCC)     Coronary artery disease     KERRI (generalized anxiety disorder)     TREVOR (obstructive sleep apnea)      Social History     Socioeconomic History    Marital status:      Spouse name: Not on file    Number of children: 1    Years of education: Not on file    Highest " education level: Not on file   Occupational History    Occupation: Retired  at [employer]   Tobacco Use    Smoking status: Former     Types: Cigarettes    Smokeless tobacco: Never   Substance and Sexual Activity    Alcohol use: Yes     Alcohol/week: 2.0 standard drinks of alcohol     Types: 2 Glasses of wine per week     Comment: socially    Drug use: Never    Sexual activity: Defer   Other Topics Concern    Not on file   Social History Narrative    Pt denies any distress at this time.  Denies need for SW.     Social Drivers of Health     Financial Resource Strain: Not on file   Food Insecurity: No Food Insecurity (1/24/2025)    Hunger Vital Sign     Worried About Running Out of Food in the Last Year: Never true     Ran Out of Food in the Last Year: Never true   Transportation Needs: No Transportation Needs (1/27/2025)    PRAPARE - Transportation     Lack of Transportation (Medical): No     Lack of Transportation (Non-Medical): No   Physical Activity: Not on file   Stress: Not on file   Social Connections: Not on file   Intimate Partner Violence: Not on file   Housing Stability: Unknown (1/27/2025)    Housing Stability Vital Sign     Unable to Pay for Housing in the Last Year: No     Number of Times Moved in the Last Year: Not on file     Homeless in the Last Year: No       Estimated Needs:  Energy: 2200 kcals/day  Protein: 80-95 g/day  Fluid: 64oz/day    Nutritional Diagnosis:  Problem: Decreased ability to meet energy needs related to:  Etiology: nausea, fatigue as evidenced by:  Signs and Symptoms: wt loss    Nutrition Prescription/Intervention:  Spoke with pt to review current po intake and to provide recommendations to increase overall calories and protein as pt prepares to start treatment in the coming week. Noted nausea which has interfered with overall po intake, resulting in approx 30# (17%) wt loss over the past 3-4 months.  Wt loss significant.  Pt did state wt currently stable.  Ongoing  nausea has also resulted in diet modifications, also taking Zofran.  Pt consuming more carbohydrates from starches and overall less protein.  Certain protein foods such as eggs are not tolerated at this time.  Suggested lean/bland proteins such as chicken, bone broth, nut butters, yogurts.  Discussed the importance of adequate calories, protein and hydration during treatment.  Consider protein shakes prn.  Provided pt with RDN contact information.  Will follow up with pt during treatment to support as needed.    Follow-up recommendations:  4-6 small meals/snacks per day  Finney foods  Increase intake lean/bland protein sources  Monitor wts  F/u prn

## 2025-03-10 NOTE — TELEPHONE ENCOUNTER
RN reached out to patient to review schedule and plan to start treatment on 3/19/25.  Reviewed that patient will need education session prior to starting treatment.  Patient requests she receive education on D2 of treatment after her radiation appt.  RN released home premedications to pharmacy.  Confirmed pharmacy location with patient.  Plan to follow up with instructions on use.  Patient has a radiation calendar and she noted her chemo dates.  She will receive a full calendar when she arrives for education.  Patient agreeable to plan.

## 2025-03-11 LAB
ARIA ZRC COURSE ID: NORMAL
ARIA ZRC COURSE START DATE: NORMAL
ARIA ZRC TREATMENT ELAPSED DAYS: NORMAL
ARIA ZRP FRACTIONS TREATED TO DATE: NORMAL
ARIA ZRP PLAN ID: NORMAL
ARIA ZRP PLAN NAME: NORMAL
ARIA ZRP PRESCRIBED DOSE CGY: 1600
ARIA ZRP PRESCRIBED DOSE CGY: 5000
ARIA ZRP PRESCRIBED DOSE CGY: 5000
ARIA ZRP PRESCRIBED DOSE PER FRACTION: 2
ARIA ZRR DOSAGE GIVEN TO DATE: 0
ARIA ZRR REFERENCE POINT ID: NORMAL

## 2025-03-13 ENCOUNTER — HOSPITAL ENCOUNTER (OUTPATIENT)
Dept: RADIOLOGY | Facility: HOSPITAL | Age: 75
Discharge: HOME | End: 2025-03-13
Admitting: RADIOLOGY
Payer: MEDICARE

## 2025-03-13 VITALS
SYSTOLIC BLOOD PRESSURE: 112 MMHG | HEIGHT: 66 IN | WEIGHT: 172 LBS | DIASTOLIC BLOOD PRESSURE: 56 MMHG | HEART RATE: 68 BPM | RESPIRATION RATE: 28 BRPM | OXYGEN SATURATION: 94 % | BODY MASS INDEX: 27.64 KG/M2

## 2025-03-13 DIAGNOSIS — C67.8 MALIGNANT NEOPLASM OF OVERLAPPING SITES OF BLADDER (CMS/HCC): ICD-10-CM

## 2025-03-13 LAB
BASOPHILS # BLD: 0.12 K/UL (ref 0.01–0.1)
BASOPHILS NFR BLD: 0.5 %
DIFFERENTIAL METHOD BLD: ABNORMAL
EOSINOPHIL # BLD: 0.7 K/UL (ref 0.04–0.36)
EOSINOPHIL NFR BLD: 3.2 %
ERYTHROCYTE [DISTWIDTH] IN BLOOD BY AUTOMATED COUNT: 14.1 % (ref 11.7–14.4)
HCT VFR BLD AUTO: 26.6 % (ref 35–45)
HGB BLD-MCNC: 8.3 G/DL (ref 11.8–15.7)
IMM GRANULOCYTES # BLD AUTO: 0.12 K/UL (ref 0–0.08)
IMM GRANULOCYTES NFR BLD AUTO: 0.5 %
INR PPP: 1.1
LYMPHOCYTES # BLD: 3.6 K/UL (ref 1.2–3.5)
LYMPHOCYTES NFR BLD: 16.4 %
MCH RBC QN AUTO: 27.9 PG (ref 28–33.2)
MCHC RBC AUTO-ENTMCNC: 31.2 G/DL (ref 32.2–35.5)
MCV RBC AUTO: 89.3 FL (ref 83–98)
MONOCYTES # BLD: 1.18 K/UL (ref 0.28–0.8)
MONOCYTES NFR BLD: 5.4 %
NEUTROPHILS # BLD: 16.24 K/UL (ref 1.7–7)
NEUTS SEG NFR BLD: 74 %
NRBC BLD-RTO: 0 %
PLATELET # BLD AUTO: 428 K/UL (ref 150–369)
PMV BLD AUTO: 9 FL (ref 9.4–12.3)
PROTHROMBIN TIME: 13.7 SEC (ref 12.2–14.5)
RBC # BLD AUTO: 2.98 M/UL (ref 3.93–5.22)
WBC # BLD AUTO: 21.96 K/UL (ref 3.8–10.5)

## 2025-03-13 PROCEDURE — 0JH60WZ INSERTION OF TOTALLY IMPLANTABLE VASCULAR ACCESS DEVICE INTO CHEST SUBCUTANEOUS TISSUE AND FASCIA, OPEN APPROACH: ICD-10-PCS | Performed by: RADIOLOGY

## 2025-03-13 PROCEDURE — 27200000 HC STERILE SUPPLY

## 2025-03-13 PROCEDURE — 25000000 HC PHARMACY GENERAL: Performed by: PHYSICIAN ASSISTANT

## 2025-03-13 PROCEDURE — C1894 INTRO/SHEATH, NON-LASER: HCPCS

## 2025-03-13 PROCEDURE — 71000011 HC PACU PHASE 1 EA ADDL MIN

## 2025-03-13 PROCEDURE — 85025 COMPLETE CBC W/AUTO DIFF WBC: CPT | Performed by: PHYSICIAN ASSISTANT

## 2025-03-13 PROCEDURE — 25800000 HC PHARMACY IV SOLUTIONS: Performed by: PHYSICIAN ASSISTANT

## 2025-03-13 PROCEDURE — 85610 PROTHROMBIN TIME: CPT | Performed by: PHYSICIAN ASSISTANT

## 2025-03-13 PROCEDURE — 02HV33Z INSERTION OF INFUSION DEVICE INTO SUPERIOR VENA CAVA, PERCUTANEOUS APPROACH: ICD-10-PCS | Performed by: RADIOLOGY

## 2025-03-13 PROCEDURE — 63600000 HC DRUGS/DETAIL CODE: Mod: JZ | Performed by: RADIOLOGY

## 2025-03-13 PROCEDURE — C1788 PORT, INDWELLING, IMP: HCPCS

## 2025-03-13 PROCEDURE — B518ZZA FLUOROSCOPY OF SUPERIOR VENA CAVA, GUIDANCE: ICD-10-PCS | Performed by: RADIOLOGY

## 2025-03-13 PROCEDURE — 25000000 HC PHARMACY GENERAL: Performed by: RADIOLOGY

## 2025-03-13 PROCEDURE — 71000001 HC PACU PHASE 1 INITIAL 30MIN

## 2025-03-13 PROCEDURE — C1769 GUIDE WIRE: HCPCS

## 2025-03-13 PROCEDURE — 76937 US GUIDE VASCULAR ACCESS: CPT

## 2025-03-13 PROCEDURE — 36415 COLL VENOUS BLD VENIPUNCTURE: CPT | Performed by: PHYSICIAN ASSISTANT

## 2025-03-13 PROCEDURE — 36100360 IR PORT PLACEMENT

## 2025-03-13 PROCEDURE — 63600000 HC DRUGS/DETAIL CODE: Performed by: PHYSICIAN ASSISTANT

## 2025-03-13 RX ORDER — FENTANYL CITRATE 50 UG/ML
INJECTION, SOLUTION INTRAMUSCULAR; INTRAVENOUS
Status: COMPLETED | OUTPATIENT
Start: 2025-03-13 | End: 2025-03-13

## 2025-03-13 RX ORDER — HEPARIN 100 UNIT/ML
SYRINGE INTRAVENOUS
Status: COMPLETED | OUTPATIENT
Start: 2025-03-13 | End: 2025-03-13

## 2025-03-13 RX ORDER — VANCOMYCIN HYDROCHLORIDE
15 ONCE
Status: COMPLETED | OUTPATIENT
Start: 2025-03-13 | End: 2025-03-13

## 2025-03-13 RX ORDER — SODIUM CHLORIDE 9 MG/ML
40 INJECTION, SOLUTION INTRAVENOUS CONTINUOUS
Status: DISCONTINUED | OUTPATIENT
Start: 2025-03-13 | End: 2025-03-13 | Stop reason: HOSPADM

## 2025-03-13 RX ORDER — LIDOCAINE HYDROCHLORIDE 10 MG/ML
INJECTION, SOLUTION INFILTRATION; PERINEURAL
Status: COMPLETED | OUTPATIENT
Start: 2025-03-13 | End: 2025-03-13

## 2025-03-13 RX ORDER — MIDAZOLAM HYDROCHLORIDE 2 MG/2ML
INJECTION, SOLUTION INTRAMUSCULAR; INTRAVENOUS
Status: COMPLETED | OUTPATIENT
Start: 2025-03-13 | End: 2025-03-13

## 2025-03-13 RX ADMIN — LIDOCAINE HYDROCHLORIDE 5 ML: 10 INJECTION, SOLUTION INFILTRATION; PERINEURAL at 13:36

## 2025-03-13 RX ADMIN — FENTANYL CITRATE 50 MCG: 50 INJECTION, SOLUTION INTRAMUSCULAR; INTRAVENOUS at 13:34

## 2025-03-13 RX ADMIN — LIDOCAINE HYDROCHLORIDE 10 ML: 10 INJECTION, SOLUTION INFILTRATION; PERINEURAL at 13:42

## 2025-03-13 RX ADMIN — FENTANYL CITRATE 50 MCG: 50 INJECTION, SOLUTION INTRAMUSCULAR; INTRAVENOUS at 13:44

## 2025-03-13 RX ADMIN — HEPARIN 400 UNITS: 100 SYRINGE at 13:52

## 2025-03-13 RX ADMIN — SODIUM CHLORIDE 40 ML/HR: 9 INJECTION, SOLUTION INTRAVENOUS at 12:14

## 2025-03-13 RX ADMIN — MIDAZOLAM HYDROCHLORIDE 1 MG: 1 INJECTION, SOLUTION INTRAMUSCULAR; INTRAVENOUS at 13:34

## 2025-03-13 RX ADMIN — WATER 1250 MG: 1 INJECTION INTRAMUSCULAR; INTRAVENOUS; SUBCUTANEOUS at 12:15

## 2025-03-13 NOTE — POST-PROCEDURE NOTE
Interventional Radiology Brief Postprocedure Note    Gladys Woo     Attending: Quang Torres MD     Assistant:      Diagnosis: Bladder ca    Description of procedure: R IJ chest port placement    Contrast:       Anesthesia:  Conscious Sedation    Volume of Lidocaine Utilized (ml): 10     Medications: Versed 1 mg IV, Fentanyl 50 mcg IV, Vanco 1.25 g IV     Complications: None      Estimated Blood Loss: Estimated Blood Loss: 0-10 ml    Anticoagulation:      Specimens:      Findings: Successful RIJ chest port placement    3/13/2025 2:06 PM

## 2025-03-13 NOTE — OR SURGEON
Pre-Procedure patient identification:  I am the primary operating surgeon/proceduralist and I have reviewed the applicable pathology reports and radiology studies for this procedure. I have identified the patient on 03/13/25 at 12:04 PM Quang Torres MD   
[id number]

## 2025-03-13 NOTE — DISCHARGE INSTRUCTIONS
Chest Port Placement    DISCHARGE INSTRUCTIONS  You have had a port placed today in your chest.  You were given conscious sedation; therefore, you should not drive, operate heavy machinery, conduct business matters, or drink alcohol until tomorrow.   If the area is sore, you may apply ice to the area in 20 minute intervals; alternating 20 minutes ice with a 20 minute break.  If you have pain, you may take Tylenol 650mg by mouth every 6 hours for the first day. (DO NOT exceed 2000mg in a 24 hour period.)  The site is covered with skin glue. Avoid picking at or removing the skin glue. It will flake off in 7-10 days.  The sutures are internal and disolveable. These do not need to be removed.   You may shower. Keep the incision site clean and dry. Do not bathe, swim, or otherwise submerge the incision site for 3 weeks.   You may resume your normal diet.  You may resume your normal medications.     Notify your oncology physician and/or Interventional Radiologist IMMEDIATELY if any of the following occur:  · Fever of 101° F (38 ° C) or chills  · Pain, redness or swelling around the port.      Physician Contact Information  If you have a problem that you believe requires immediate attention, you should go to the Emergency Room, either at [facility] or the closest hospital. If you believe that your problem can safely wait until you speak to a physician, you should contact your doctor or Interventional Radiologist.   It is usually easier to contact your own physician by calling the office, or after hours through the answering service. If you do not know the number, the hospital  can connect you by calling [phone].   If you have concerns that need to be answered by the Interventional Radiologist, you can reach him/ her as follows:   During regular weekday hours (8AM to 5PM), call [phone] and ask the technologist to connect you with the Interventional Radiologist.   During off hours for emergencies call  [phone] and ask the hospital  to contact the Interventional Radiologist on-call.    If you would like to have your port accessed for lab work at the [facility], you can make an appointment by calling [phone].      [facility] strongly recommends that you visit a Primary Care Provider (PCP) regularly. Your PCP can help you implement the recommendations we gave you today, coordinate care among your specialists, as well as make sure you are up to date with wellness exams, immunizations and preventive screenings. Your PCP can also help when you are feeling sick, potentially avoiding the need for urgent care or emergency department visits. For these reasons, it is important that you follow up with your PCP at least annually or more often based upon your medical conditions. If you do not have a PCP, please [phone] ([phone]) or go to Find a Doctor for help with finding one.

## 2025-03-14 DIAGNOSIS — C67.8 MALIGNANT NEOPLASM OF OVERLAPPING SITES OF BLADDER (CMS/HCC): ICD-10-CM

## 2025-03-14 DIAGNOSIS — C67.9 MALIGNANT NEOPLASM OF URINARY BLADDER, UNSPECIFIED SITE (CMS/HCC): Primary | ICD-10-CM

## 2025-03-14 RX ORDER — DIPHENHYDRAMINE HCL 50 MG/ML
25 VIAL (ML) INJECTION ONCE AS NEEDED
Status: CANCELLED | OUTPATIENT
Start: 2025-03-14

## 2025-03-14 RX ORDER — EPINEPHRINE 1 MG/ML
0.5 INJECTION, SOLUTION INTRAMUSCULAR; SUBCUTANEOUS ONCE AS NEEDED
Status: CANCELLED | OUTPATIENT
Start: 2025-03-14

## 2025-03-14 RX ORDER — PALONOSETRON 0.05 MG/ML
250 INJECTION, SOLUTION INTRAVENOUS ONCE
Status: CANCELLED | OUTPATIENT
Start: 2025-03-14

## 2025-03-14 RX ORDER — SODIUM CHLORIDE 9 MG/ML
500 INJECTION, SOLUTION INTRAVENOUS CONTINUOUS
Status: CANCELLED | OUTPATIENT
Start: 2025-03-14

## 2025-03-14 RX ORDER — FAMOTIDINE 10 MG/ML
20 INJECTION, SOLUTION INTRAVENOUS ONCE AS NEEDED
Status: CANCELLED | OUTPATIENT
Start: 2025-03-14

## 2025-03-14 NOTE — TELEPHONE ENCOUNTER
RN received incoming call from patient; she picked up her home medications.  RN confirmed with Dr. Soria plan for patient to take PO decadron according to treatment plan orders, instructed patient on how to take decadron and reviewed that it is used for nausea.  Plan to write decadron dosing on patient's calendar that will be provided at education.  Patient states she has been experiencing nausea about every other day since her diagnosis and her PCP had prescribed zofran for her.  She has been taking it with mild side effect of constipation; managed with sennakot.  RN reviewed that she will receive aloxi on treatment day so she should hold zofran for 48 hours after treatment and use compazine for nausea.  Reviewed that we may need to increase the amount of sennakot that she is taking as she could experience increased constipation from the aloxi and emend.  Patient verbalizes understanding of education and confirms education appt on 3/19/25.

## 2025-03-18 ENCOUNTER — HOSPITAL ENCOUNTER (OUTPATIENT)
Dept: HEMATOLOGY/ONCOLOGY | Facility: HOSPITAL | Age: 75
Setting detail: INFUSION SERIES
Discharge: HOME | End: 2025-03-18
Attending: HOSPITALIST
Payer: MEDICARE

## 2025-03-18 ENCOUNTER — TELEPHONE (OUTPATIENT)
Dept: HEMATOLOGY/ONCOLOGY | Facility: CLINIC | Age: 75
End: 2025-03-18
Payer: MEDICARE

## 2025-03-18 ENCOUNTER — HOSPITAL ENCOUNTER (OUTPATIENT)
Dept: RADIATION ONCOLOGY | Facility: HOSPITAL | Age: 75
Setting detail: RADIATION/ONCOLOGY SERIES
Discharge: HOME | End: 2025-03-18
Attending: RADIOLOGY
Payer: MEDICARE

## 2025-03-18 DIAGNOSIS — C67.8 MALIGNANT NEOPLASM OF OVERLAPPING SITES OF BLADDER (CMS/HCC): ICD-10-CM

## 2025-03-18 DIAGNOSIS — C67.9 MALIGNANT NEOPLASM OF URINARY BLADDER, UNSPECIFIED SITE (CMS/HCC): Primary | ICD-10-CM

## 2025-03-18 LAB
ALBUMIN SERPL-MCNC: 3.3 G/DL (ref 3.5–5.7)
ALP SERPL-CCNC: 60 IU/L (ref 34–125)
ALT SERPL-CCNC: 7 IU/L (ref 7–52)
ANION GAP SERPL CALC-SCNC: 6 MEQ/L (ref 3–15)
ARIA ZRC COURSE ID: NORMAL
ARIA ZRC COURSE START DATE: NORMAL
ARIA ZRC TREATMENT ELAPSED DAYS: NORMAL
ARIA ZRP FRACTIONS TREATED TO DATE: NORMAL
ARIA ZRP PLAN ID: NORMAL
ARIA ZRP PLAN NAME: NORMAL
ARIA ZRP PRESCRIBED DOSE CGY: 1600
ARIA ZRP PRESCRIBED DOSE PER FRACTION: 2
ARIA ZRR DOSAGE GIVEN TO DATE: 2
ARIA ZRR DOSAGE GIVEN TO DATE: 2
ARIA ZRR DOSAGE GIVEN TO DATE: 2.01
ARIA ZRR REFERENCE POINT ID: NORMAL
ARIA ZRR SESSION DOSAGE GIVEN: 2
ARIA ZRR SESSION DOSAGE GIVEN: 2
ARIA ZRR SESSION DOSAGE GIVEN: 2.01
AST SERPL-CCNC: 9 IU/L (ref 13–39)
BASOPHILS # BLD: 0.12 K/UL (ref 0.01–0.1)
BASOPHILS NFR BLD: 0.5 %
BILIRUB SERPL-MCNC: 0.4 MG/DL (ref 0.3–1.2)
BUN SERPL-MCNC: 13 MG/DL (ref 7–25)
CALCIUM SERPL-MCNC: 9 MG/DL (ref 8.6–10.3)
CHLORIDE SERPL-SCNC: 103 MEQ/L (ref 98–107)
CO2 SERPL-SCNC: 24 MEQ/L (ref 21–31)
CREAT SERPL-MCNC: 1.2 MG/DL (ref 0.6–1.2)
DIFFERENTIAL METHOD BLD: ABNORMAL
EGFRCR SERPLBLD CKD-EPI 2021: 47.6 ML/MIN/1.73M*2
EOSINOPHIL # BLD: 0.46 K/UL (ref 0.04–0.36)
EOSINOPHIL NFR BLD: 1.9 %
ERYTHROCYTE [DISTWIDTH] IN BLOOD BY AUTOMATED COUNT: 14.1 % (ref 11.7–14.4)
GLUCOSE SERPL-MCNC: 124 MG/DL (ref 70–99)
HCT VFR BLD AUTO: 25.6 % (ref 35–45)
HGB BLD-MCNC: 8.1 G/DL (ref 11.8–15.7)
IMM GRANULOCYTES # BLD AUTO: 0.18 K/UL (ref 0–0.08)
IMM GRANULOCYTES NFR BLD AUTO: 0.7 %
LYMPHOCYTES # BLD: 2.64 K/UL (ref 1.2–3.5)
LYMPHOCYTES NFR BLD: 10.7 %
MAGNESIUM SERPL-MCNC: 1.9 MG/DL (ref 1.8–2.5)
MCH RBC QN AUTO: 28.6 PG (ref 28–33.2)
MCHC RBC AUTO-ENTMCNC: 31.6 G/DL (ref 32.2–35.5)
MCV RBC AUTO: 90.5 FL (ref 83–98)
MONOCYTES # BLD: 1.45 K/UL (ref 0.28–0.8)
MONOCYTES NFR BLD: 5.9 %
NEUTROPHILS # BLD: 19.73 K/UL (ref 1.7–7)
NEUTS SEG NFR BLD: 80.3 %
NRBC BLD-RTO: 0 %
PLATELET # BLD AUTO: 389 K/UL (ref 150–369)
PMV BLD AUTO: 9.6 FL (ref 9.4–12.3)
POTASSIUM SERPL-SCNC: 3.7 MEQ/L (ref 3.5–5.1)
PROT SERPL-MCNC: 6.4 G/DL (ref 6–8.2)
RBC # BLD AUTO: 2.83 M/UL (ref 3.93–5.22)
SODIUM SERPL-SCNC: 133 MEQ/L (ref 136–145)
WBC # BLD AUTO: 24.58 K/UL (ref 3.8–10.5)

## 2025-03-18 PROCEDURE — 85025 COMPLETE CBC W/AUTO DIFF WBC: CPT | Performed by: HOSPITALIST

## 2025-03-18 PROCEDURE — 8C02X6K COLLECTION OF BLOOD FROM INDWELLING DEVICE IN CIRCULATORY SYSTEM: ICD-10-PCS | Performed by: HOSPITALIST

## 2025-03-18 PROCEDURE — 77386 HC IMRT DELIVERY COMPLEX: CPT | Performed by: RADIOLOGY

## 2025-03-18 PROCEDURE — 83735 ASSAY OF MAGNESIUM: CPT | Performed by: HOSPITALIST

## 2025-03-18 PROCEDURE — 36591 DRAW BLOOD OFF VENOUS DEVICE: CPT

## 2025-03-18 PROCEDURE — 80053 COMPREHEN METABOLIC PANEL: CPT | Performed by: HOSPITALIST

## 2025-03-18 RX ORDER — HEPARIN 100 UNIT/ML
500 SYRINGE INTRAVENOUS AS NEEDED
Status: DISCONTINUED | OUTPATIENT
Start: 2025-03-18 | End: 2025-03-19 | Stop reason: HOSPADM

## 2025-03-18 RX ORDER — HEPARIN 100 UNIT/ML
500 SYRINGE INTRAVENOUS AS NEEDED
Status: CANCELLED | OUTPATIENT
Start: 2025-03-18

## 2025-03-18 RX ADMIN — HEPARIN 500 UNITS: 100 SYRINGE at 08:10

## 2025-03-18 NOTE — PROGRESS NOTES
Patient arrived for labs. Port accessed. Labs collected. Port de-accessed. Labs sent to lab. Patient left unit.

## 2025-03-19 ENCOUNTER — HOSPITAL ENCOUNTER (OUTPATIENT)
Dept: HEMATOLOGY/ONCOLOGY | Facility: HOSPITAL | Age: 75
Setting detail: INFUSION SERIES
Discharge: HOME | End: 2025-03-19
Attending: HOSPITALIST
Payer: MEDICARE

## 2025-03-19 ENCOUNTER — APPOINTMENT (OUTPATIENT)
Dept: HEMATOLOGY/ONCOLOGY | Facility: CLINIC | Age: 75
End: 2025-03-19
Attending: HOSPITALIST
Payer: MEDICARE

## 2025-03-19 ENCOUNTER — TELEPHONE (OUTPATIENT)
Dept: HEMATOLOGY/ONCOLOGY | Facility: CLINIC | Age: 75
End: 2025-03-19

## 2025-03-19 ENCOUNTER — HOSPITAL ENCOUNTER (OUTPATIENT)
Dept: RADIATION ONCOLOGY | Facility: HOSPITAL | Age: 75
Setting detail: RADIATION/ONCOLOGY SERIES
Discharge: HOME | End: 2025-03-19
Attending: RADIOLOGY
Payer: MEDICARE

## 2025-03-19 VITALS
HEART RATE: 60 BPM | TEMPERATURE: 97.7 F | OXYGEN SATURATION: 99 % | WEIGHT: 175.2 LBS | SYSTOLIC BLOOD PRESSURE: 130 MMHG | DIASTOLIC BLOOD PRESSURE: 64 MMHG | HEIGHT: 67 IN | BODY MASS INDEX: 27.5 KG/M2

## 2025-03-19 DIAGNOSIS — C67.9 MALIGNANT NEOPLASM OF URINARY BLADDER, UNSPECIFIED SITE (CMS/HCC): Primary | ICD-10-CM

## 2025-03-19 DIAGNOSIS — C67.8 MALIGNANT NEOPLASM OF OVERLAPPING SITES OF BLADDER (CMS/HCC): ICD-10-CM

## 2025-03-19 DIAGNOSIS — N17.9 AKI (ACUTE KIDNEY INJURY) (CMS/HCC): ICD-10-CM

## 2025-03-19 DIAGNOSIS — D64.9 ANEMIA, UNSPECIFIED TYPE: ICD-10-CM

## 2025-03-19 LAB
ARIA ZRC COURSE ID: NORMAL
ARIA ZRC COURSE START DATE: NORMAL
ARIA ZRC TREATMENT ELAPSED DAYS: NORMAL
ARIA ZRP FRACTIONS TREATED TO DATE: NORMAL
ARIA ZRP PLAN ID: NORMAL
ARIA ZRP PLAN NAME: NORMAL
ARIA ZRP PRESCRIBED DOSE CGY: 1600
ARIA ZRP PRESCRIBED DOSE PER FRACTION: 2
ARIA ZRR DOSAGE GIVEN TO DATE: 4
ARIA ZRR DOSAGE GIVEN TO DATE: 4
ARIA ZRR DOSAGE GIVEN TO DATE: 4.02
ARIA ZRR REFERENCE POINT ID: NORMAL
ARIA ZRR SESSION DOSAGE GIVEN: 2
ARIA ZRR SESSION DOSAGE GIVEN: 2
ARIA ZRR SESSION DOSAGE GIVEN: 2.01
FERRITIN SERPL-MCNC: 313 NG/ML (ref 11–250)
IRON SATN MFR SERPL: ABNORMAL %
IRON SERPL-MCNC: <10 UG/DL (ref 35–150)
TIBC SERPL-MCNC: 231 UG/DL (ref 270–460)
UIBC SERPL-MCNC: ABNORMAL UG/DL

## 2025-03-19 PROCEDURE — 82728 ASSAY OF FERRITIN: CPT | Performed by: HOSPITALIST

## 2025-03-19 PROCEDURE — 96361 HYDRATE IV INFUSION ADD-ON: CPT

## 2025-03-19 PROCEDURE — 77386 HC IMRT DELIVERY COMPLEX: CPT | Performed by: RADIOLOGY

## 2025-03-19 PROCEDURE — 63700000 HC SELF-ADMINISTRABLE DRUG: Performed by: HOSPITALIST

## 2025-03-19 PROCEDURE — 3E04305 INTRODUCTION OF OTHER ANTINEOPLASTIC INTO CENTRAL VEIN, PERCUTANEOUS APPROACH: ICD-10-PCS | Performed by: HOSPITALIST

## 2025-03-19 PROCEDURE — 83550 IRON BINDING TEST: CPT | Performed by: HOSPITALIST

## 2025-03-19 PROCEDURE — 99215 OFFICE O/P EST HI 40 MIN: CPT | Performed by: HOSPITALIST

## 2025-03-19 PROCEDURE — 3E043GC INTRODUCTION OF OTHER THERAPEUTIC SUBSTANCE INTO CENTRAL VEIN, PERCUTANEOUS APPROACH: ICD-10-PCS | Performed by: HOSPITALIST

## 2025-03-19 PROCEDURE — 36415 COLL VENOUS BLD VENIPUNCTURE: CPT | Performed by: HOSPITALIST

## 2025-03-19 PROCEDURE — 3E0437Z INTRODUCTION OF ELECTROLYTIC AND WATER BALANCE SUBSTANCE INTO CENTRAL VEIN, PERCUTANEOUS APPROACH: ICD-10-PCS | Performed by: HOSPITALIST

## 2025-03-19 PROCEDURE — 96367 TX/PROPH/DG ADDL SEQ IV INF: CPT

## 2025-03-19 PROCEDURE — 63600000 HC DRUGS/DETAIL CODE: Mod: JZ | Performed by: HOSPITALIST

## 2025-03-19 PROCEDURE — 25800000 HC PHARMACY IV SOLUTIONS: Performed by: HOSPITALIST

## 2025-03-19 PROCEDURE — 96375 TX/PRO/DX INJ NEW DRUG ADDON: CPT

## 2025-03-19 PROCEDURE — 96413 CHEMO IV INFUSION 1 HR: CPT

## 2025-03-19 RX ORDER — PALONOSETRON 0.05 MG/ML
250 INJECTION, SOLUTION INTRAVENOUS ONCE
Status: COMPLETED | OUTPATIENT
Start: 2025-03-19 | End: 2025-03-19

## 2025-03-19 RX ORDER — LIDOCAINE AND PRILOCAINE 25; 25 MG/G; MG/G
CREAM TOPICAL ONCE
Qty: 1 EACH | Refills: 11 | Status: SHIPPED | OUTPATIENT
Start: 2025-03-19 | End: 2025-03-19

## 2025-03-19 RX ORDER — SODIUM CHLORIDE 9 MG/ML
500 INJECTION, SOLUTION INTRAVENOUS CONTINUOUS
Status: ACTIVE | OUTPATIENT
Start: 2025-03-19 | End: 2025-03-19

## 2025-03-19 RX ORDER — HEPARIN 100 UNIT/ML
500 SYRINGE INTRAVENOUS AS NEEDED
Status: CANCELLED | OUTPATIENT
Start: 2025-03-19

## 2025-03-19 RX ORDER — ACETAMINOPHEN 325 MG/1
650 TABLET ORAL ONCE
Status: COMPLETED | OUTPATIENT
Start: 2025-03-19 | End: 2025-03-19

## 2025-03-19 RX ORDER — HEPARIN 100 UNIT/ML
500 SYRINGE INTRAVENOUS AS NEEDED
Status: DISCONTINUED | OUTPATIENT
Start: 2025-03-19 | End: 2025-03-20 | Stop reason: HOSPADM

## 2025-03-19 RX ADMIN — SODIUM CHLORIDE 500 ML/HR: 9 INJECTION, SOLUTION INTRAVENOUS at 13:35

## 2025-03-19 RX ADMIN — SODIUM CHLORIDE 39 MG: 9 INJECTION, SOLUTION INTRAVENOUS at 12:23

## 2025-03-19 RX ADMIN — HEPARIN 500 UNITS: 100 SYRINGE at 14:49

## 2025-03-19 RX ADMIN — PALONOSETRON HYDROCHLORIDE 250 MCG: 0.25 INJECTION INTRAVENOUS at 11:05

## 2025-03-19 RX ADMIN — DEXAMETHASONE SODIUM PHOSPHATE 12 MG: 4 INJECTION, SOLUTION INTRA-ARTICULAR; INTRALESIONAL; INTRAMUSCULAR; INTRAVENOUS; SOFT TISSUE at 11:46

## 2025-03-19 RX ADMIN — SODIUM CHLORIDE 150 MG: 900 INJECTION, SOLUTION INTRAVENOUS at 11:05

## 2025-03-19 RX ADMIN — ACETAMINOPHEN 650 MG: 325 TABLET, FILM COATED ORAL at 13:33

## 2025-03-19 ASSESSMENT — ENCOUNTER SYMPTOMS
OCCASIONAL FEELINGS OF UNSTEADINESS: 0
LOSS OF SENSATION IN FEET: 0
DEPRESSION: 0
SHORTNESS OF BREATH: 1

## 2025-03-19 ASSESSMENT — PAIN SCALES - GENERAL: PAINLEVEL_OUTOF10: 0-NO PAIN

## 2025-03-19 NOTE — PROGRESS NOTES
Hematology/Oncology -  [facility]  Follow up Progress Note       Gladys Woo is a 75 y.o. female,   :  1950  REFERRING PHYSICIAN:   Wenceslao Soria MD  [address]  [address]  [place] [address]  PRIMARY CARE PROVIDER:  Zoe Sanders MD    Encounter Diagnoses   Name Primary?    Malignant neoplasm of urinary bladder, unspecified site (CMS/HCC) Yes    Malignant neoplasm of overlapping sites of bladder (CMS/HCC)     Anemia, unspecified type        Care Team    Surgical:   Radiation: [facility]  Med Onc: [facility]    Staging       Cancer Staging   Bladder cancer (CMS/HCC)  Staging form: Urinary Bladder, AJCC 8th Edition  - Clinical: Stage IIIA (cT3, cN0, cM0) - Signed by [facility], Gregory J, MD on 2025        Treatment Plan     Treatment Plans       Name Type Plan Dates Plan Provider         Active    CISplatin with Concurrent Radiation, 7 Day Cycles - bladder cancer ONCOLOGY TREATMENT 3/5/2025 - Present Wenceslao Soria MD                        Oncology History     Oncology History Overview Note   Gladys Woo is a very pleasant 75 y.o. patient with PMHx anxiety and hyperlipidemia.  She noticed some blood in her underwear since 2024.  She was initially treated for vagina dystrophy with no success. She was examined and no evidence of GYN tumor and eventually noted to have bleeding from urethra.  Patient seen by Dr. Jimenez who ordered a CT urogram which revealed a 8.6 cm irregular urinary bladder mass centered along the right posterior lateral bladder wall.  There is stranding in the right extraperitoneal space and right retroperitoneum along the course of the distalmost right ureter which may be from obstructive physiology without extension of neoplasm beyond the urinary bladder is theoretically possible.  Moderate right hydronephrosis to the level of the right UVJ's secondary to mass.  In addition an intermediate left adrenal nodule was noted for which MRI was  As above     suggested.  Scattered subcentimeter sclerotic foci nonspecific but likely bony islands.  Bone scan suggested to rule out metastatic disease.  Patient presented last month to [facility] emergency department and CAT scan of the abdomen and pelvis revealed a large bladder mass with moderate right hydronephrosis and hydroureter.  CT angiogram chest abdomen pelvis showed no evidence of metastatic disease.  Creatinine at the time was elevated 1.7.  On 1/30/2025 Dr. Jimenez performed TURBT.  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  Suspicious for lymphovascular invasion.  Muscle invasion present.  Patient has not noted further bleeding per urethra. Patient met with Dr. Corona medical oncology to discuss neoadjuvant chemotherapy prior to cystectomy.  Patient wants to have bladder preserving strategy.    She went to [facility] and saw Dr. Patel with recommendation of neoadjuvant chemotherapy, followed up by surgery.    She is aware of her options but still want to try chemoradiation at this point.     Bladder cancer (CMS/[facility])   2/25/2025 Cancer Staged    Staging form: Urinary Bladder, AJCC 8th Edition  - Clinical: Stage IIIA (cT3, cN0, cM0)     3/6/2025 -  Chemotherapy    CISplatin with Concurrent Radiation, 7 Day Cycles - bladder cancer  Plan Provider: Wenceslao Soria MD  Treatment goal: Curative  Line of treatment: First Line     3/6/2025 -  Chemotherapy    GENERAL INFUSION ORDERS FOR HYDRATION  Plan Provider: Wenceslao Soria MD     3/18/2025 -  Chemotherapy    MEDIPORT FLUSH (SINGLE LUMEN) - PATIENT ON TREATMENT  Plan Provider: Wenceslao Soria MD           History Of Present Illness     Gladys Woo is a very pleasant 75 y.o. patient with PMHx of  Bladder cancer, stage III, T3N0M0, who is here for concurrent chemoradiation therapy.    Since last visit she got a port.  She went to [facility] and saw urologist Dr. Reji Patel who also recommended  "neoadjuvant chemotherapy followed by definitive surgery.  However Gladys is interested in concurrent chemoradiation.    Lab reviewed.  She is anemic with hemoglobin at 8.1.  Gladys mention of some shortness of breath in the last couple months when she tries to move around.  She denies any cough or wheezing.    Her daughter-in-law Keegan was over the phone.      Gladys Woo is seen today for follow up.       ROS     Review of Systems   Respiratory:  Positive for shortness of breath.    All other systems reviewed and are negative.    Review of Systems:  Nursing assessment reviewed. Pertinent positive and negative symptoms noted in HPI, all others negative.    PHYSICAL EXAMINATION     Temp:  [36.5 °C (97.7 °F)] 36.5 °C (97.7 °F)  Heart Rate:  [60] 60  BP: (130)/(64) 130/64  Visit Vitals  /64 (BP Location: Right upper arm, Patient Position: Sitting)   Pulse 60   Temp 36.5 °C (97.7 °F) (Temporal)   Ht 1.689 m (5' 6.5\")   Wt 79.5 kg (175 lb 3.2 oz)   SpO2 99%   BMI 27.85 kg/m²     Physical Exam  Vitals reviewed.   Constitutional:       Appearance: She is not ill-appearing.   Cardiovascular:      Rate and Rhythm: Normal rate and regular rhythm.      Heart sounds: No murmur heard.  Pulmonary:      Effort: Pulmonary effort is normal. No respiratory distress.      Breath sounds: Normal breath sounds. No wheezing.   Abdominal:      Palpations: Abdomen is soft.   Musculoskeletal:         General: No swelling.   Skin:     Coloration: Skin is pale. Skin is not jaundiced.   Neurological:      General: No focal deficit present.      Mental Status: She is alert.   Psychiatric:         Mood and Affect: Mood normal.           Past Medical History     Past Medical History[1]    Past Surgical History     Past Surgical History   Procedure Laterality Date    A-v cardiac pacemaker insertion      Colonoscopy      CYSTO, TURBT TRANS-URETHRAL RESECTION BLADDER TUMOR N/A 1/30/2025    Performed by Eugenio Jimenez MD at [facility] OR    " Insert / replace / remove pacemaker      Knee arthroscopy w/ meniscal repair      Tonsillectomy         OB/GYN History     OB History    Para Term  AB Living   3 1   2    SAB IAB Ectopic Multiple Live Births   2          # Outcome Date GA Lbr Franck/2nd Weight Sex Type Anes PTL Lv   3 SAB            2 SAB            1 Para              Gynecologic History:  Age at menarche: No age on file  Age at first live birth: No age on file   Age at menopause: No age on file    Social History     Social History     Tobacco Use    Smoking status: Former     Types: Cigarettes    Smokeless tobacco: Never   Substance Use Topics    Alcohol use: Yes     Alcohol/week: 2.0 standard drinks of alcohol     Types: 2 Glasses of wine per week     Comment: socially    Drug use: Never       Family History     Family History   Problem Relation Name Age of Onset    Heart disease Biological Mother      Diabetes Biological Mother      Heart disease Biological Father      Coronary artery disease Biological Father      Liver cancer Biological Brother      Prostate cancer Biological Brother         Allergies     Amoxicillin    Medications     Current Outpatient Medications   Medication Sig Dispense Refill    acetaminophen (TYLENOL) 500 mg tablet Take 500 mg by mouth every 6 (six) hours as needed.      busPIRone (BUSPAR) 5 mg tablet Take 5 mg by mouth daily.      dexAMETHasone (DECADRON) 4 mg tablet Take 2 tablets (8 mg total) by mouth See admin instr. Take 2 tabs once on day 2 of the cycle, then take 2 tabs orally twice daily on days 3-4 of the cycle. 30 tablet 3    ondansetron (ZOFRAN) 8 mg tablet Take 1 tablet (8 mg total) by mouth every 8 (eight) hours as needed for nausea or vomiting. 30 tablet 3    prochlorperazine (COMPAZINE) 10 mg tablet Take 1 tablet (10 mg total) by mouth every 6 (six) hours as needed for nausea or vomiting. 30 tablet 3    rosuvastatin (CRESTOR) 40 mg tablet Take 40 mg by mouth daily.       No current  "facility-administered medications for this visit.     Facility-Administered Medications Ordered in Other Visits   Medication Dose Route Frequency Provider Last Rate Last Admin    heparin, porcine (PF) flush 500 Units  500 Units intra-catheter PRN Wenceslao Soria MD              Labs     Lab Results   Component Value Date    WBC 24.58 (H) 03/18/2025    HGB 8.1 (L) 03/18/2025    HCT 25.6 (L) 03/18/2025    MCV 90.5 03/18/2025     (H) 03/18/2025       Lab Results   Component Value Date    GLUCOSE 124 (H) 03/18/2025    CALCIUM 9.0 03/18/2025     (L) 03/18/2025    K 3.7 03/18/2025    CO2 24 03/18/2025     03/18/2025    BUN 13 03/18/2025    CREATININE 1.2 03/18/2025       Lab Results   Component Value Date    ALT 7 03/18/2025    AST 9 (L) 03/18/2025    ALKPHOS 60 03/18/2025    BILITOT 0.4 03/18/2025       Lab Results   Component Value Date    IRON 16 (L) 01/25/2025    TIBC 188 (L) 01/25/2025       No results found for: \"SPEP\", \"UPEP\"    No results found for: \"PTT\"    Lab Results   Component Value Date    INR 1.1 03/13/2025       No results found for: \"TEYFQPWT42\"    No results found for: \"FOLATE\"    No results found for: \"RETICCTPCT\"    Lab Results   Component Value Date    DDIMER 1.16 (H) 01/24/2025         Pathology     Pathology Results       ** No results found for the last 720 hours. **            Radiology     No new Radiology.  IR PORT PLACEMENT  IR PORT PLACEMENT, ULTRASOUND GUIDED VASCULAR ACCESS  Result Date: 3/13/2025  Narrative: CLINICAL HISTORY:    Bladder cancer     Impression: IMPRESSION: Successful placement of a right IJ CT Injectable chest port with ultrasound and fluoroscopic guidance, which is ready for use. Device: [employer]oca PowerPort COMMENT: PROCEDURE:  Right IJ chest port placement, under ultrasound and fluoroscopic guidance. RADIOLOGIST:  Quang Torres MD ANESTHESIA : Lidocaine 1% CONTRAST:  None. MEDICATIONS: Vancomycin 1.25 gm IV REFERENCE AIR KERMA: 14 mGy Versed 1 mg IV and " Fentanyl 100 mcg IV were administered intravenously for moderate sedation, under the supervision of the physician.  Pulse oximetry, heart rate and blood pressure were continuously monitored by the trained nursing staff present.  The physician spent a total of 30 minutes of face to face sedation time with the patient. All the procedural conscious sedation guidelines were followed and documented including the Mallampati airway assessment, confirmation of NPO status, anesthesia history and  ASA classification.. DESCRIPTION OF PROCEDURE:  Written informed consent was obtained following explanation of risks and benefits of the procedure. The right neck and chest were draped and prepped in the usual sterile manner. The right internal jugular vein was noted to be compressible and patent on gray scale ultrasound. Under real-time grayscale ultrasound, the internal jugular vein was cannulated with a 21-gauge micropuncture needle following local anesthesia and a skin nick. An ultrasound-guided access image was saved to PACS. An 0.018 wire was advanced. The needle was exchanged for a 5 Syriac micropuncture catheter. The inner catheter and wire were removed, and an 0.035 Amplatz wire was advanced under fluoroscopic guidance into the IVC and secured with a flow-switch. An appropriate port site was selected in the upper chest and the area was anesthetized with Lidocaine. A 3 centimeter horizontal incision was created and blunt dissection was performed superiorly to create a port pocket. Hemostasis was achieved. A subcutaneous tunnel was then created to the IJ entry site following local anesthesia, with blunt dissection. An 8F Bard PowerPort catheter was advanced through the tunnel. The port was tested demonstrating no leakage. A peel-away sheath was advanced over the Amplatz wire, and the catheter was advanced through the peel-away sheath and positioned within the SVC/right atrial junction. The peel-away sheath was removed. The  port catheter was appropriately sized and connected to the port. The port was successfully positioned within the pocket with the distal catheter tip terminating at the cavoatrial junction. The port was tested demonstrating appropriate flush and aspiration. The port was then flushed with Heparin following good blood return and was subsequently deaccessed. . Tissues superficial to the port were closed with interrupted deep 2-0 Vicryl suture and running subcutaneous 4-0 Vicryl suture. Skin closure at the internal jugular vein access site was achieved with Dermabond adhesive. Dermabond was applied to the pocket incision site. The patient was discharged in good condition. Performance Met:  PQRS MEASURE 76, CPT II 6030F:  All elements of maximal sterile technique were utilized, including cap, mask, sterile gown, sterile gloves, and sterile drape.  Additionally, sterile ultrasound techniques were followed, including use of sterile probe cover and sterile ultrasound gel. Ultrasound-guided vascular access - Grayscale ultrasound evaluation demonstrated the right  internal jugular vein to be patent. The right internal jugular vein was accessed under real-time ultrasound access. An access image was saved/archived to PACS. The number of guidewires used, and their integrity, was confirmed at the end of the procedure.      Assessment and Plan     In summary, Gladys Woo is a very pleasant 75 y.o. patient with PMHx of anxiety and dyslipidemia.     # Bladder cancer, stage III, T3N0M0. 1/30/3025 she had Transurethral resection of bladder tumor (8cm). OP note:  Cystoscopic evaluation demonstrated a large and invasive appearing tumor encompassing the entirety of the trigone and left lateral wall measuring at least 8 cm.  A monopolar working element was inserted and the tumor was then  systematically resected down to the level of the normal bladder wall.  There did appear to be a large volume of bladder wall invasion  visually.  The bladder tumor specimens were then irrigated from the bladder and repeat cystoscopy demonstrated no evidence of hannah perforation.  - need to complete workup with MRI to better evaluate the left adrenal gland lesion.  Because she has no bone pain we can skip nuclear medicine bone scan at this point.  - per current NCCN guideline, optimal candidates for bladder preservation with chemoradiotherapy include patients with tumors that present without moderate/severe hydronephrosis, are without concurrent extensive or multifocal Tis, and are <6 cm. Ideally, tumors should allow a visually complete or maximally debulking TURBT. Apparently she has no other lesions based on operating note.  Her creatinine was improving.  Based on operation note her cancer is 8 cm, bigger than the 6 cm recommendation.  So I did have a discussion of neoadjuvant chemotherapy followed by radical cystectomy.  She is not interested in radical cystectomy.  Indeed Dr. Corona and Dr. Patel talked to her about this too.      - Data supporting the use of chemoradiation come from a pooled analysis of Radiation Therapy Oncology Group (RTOG) studies [Toño RH, JCO 2014], a large [facility] randomized trial comparing chemoradiation versus radiation therapy (RT) without chemotherapy [Chuck ND, NEJM 2012], and several single-institution series. In RTOG pooled analysis -- A pooled analysis of six NRG/RTOG studies demonstrated the efficacy of chemoradiation as part of a bladder-preserving trimodality therapy (TMT) approach, inclusing 468 patients with clinical T2 to T4a tumors; patients were excluded if they had evidence of biopsy-proven pearl or metastatic disease. At median follow-up of over four years among all patients, the main results included the following:  ?The complete response rate following chemoradiation was 69 percent.  ?The 5- and 10-year disease-specific survival (DSS) rates were 71 and 65 percent, respectively. The 5- and 10-year  rates of muscle-invasive local failure were 13 and 14 percent, respectively; the 5- and 10-year rates of non-muscle-invasive recurrence were 31 and 36 percent, respectively; and the 5- and 10-year rates of distant metastases were 31 and 35 percent, respectively.  ?The 5- and 10-year overall survival (OS) rates were 57 and 36 percent, respectively. However, most deaths were not attributable to bladder cancer, which was the cause of death in only 24 percent of patients who had  by year 5.  ?Of the 205 patients alive at five years, 80 percent had an intact bladder.  - The Bladder Cancer  ([id number]) study was a multicenter randomized phase III trial that enrolled 360 patients with muscle-invasive bladder cancer. All patients underwent TURBT, followed by randomization to either RT alone or RT with concurrent chemotherapy using FU and mitomycin. At a median follow-up of 10 years, the addition of concurrent chemotherapy to RT resulted improved locoregional disease control (five-year locoregional recurrence-free rates of 63 versus 49 percent; hazard ratio [HR] 0.61, 95% CI 0.43-0.86); A non-statistically significant trend towards higher DFS (five-year DFS 45 versus 34 percent, HR 0.78, 95% CI 0.60-1.02) and OS (five-year OS 49 versus 37 percent, HR 0.88, 95% CI 0.69-1.13); Reduced rate of cystectomy at five years (14 versus 22 percent, HR 0.54, 95% CI 0.31-0.95). Overall, 81 percent of cystectomies were performed for disease recurrence; bu higher grade 3 or 4 toxicity rates during treatment (36 versus 28 percent) but with lower grade 3 or 4 late toxicity rates in extended follow-up (9 versus 17 percent).  - For patients receiving concurrent chemoradiation, the optimal radiosensitizing chemotherapy regimen is not established, as many of these regimens have not been directly compared in randomized trials. Selection of chemotherapy is based upon patient performance status and comorbidities, kidney function, and provider  experience. With her overall fit status and Cr 1.2, patient would be a candidate with Cisplatin based chemoradiation therapy. Other chemo regimen include biweekly Gemcitabine and FU plus mitomycin and paclitaxel. Cisplatin is best studied. Low-dose biweekly gemcitabine (27 mg/m2 twice per week with daily RT) is an effective regimen with less toxicity relative to combination cisplatin-based therapy.  -I went to through the side effect of cisplatin 20mg (50% dose reduction from 40mg weekly due to CrCl 40-50) with the patient in detail.  She gave the consent. We also discussed the possibility of using gemcitabine.  We went through all the side effects and focused on nausea and vomiting, bone marrow suppression, FIDELIA and neuropathy.     03/19/25  - her creatinine clearance remains below 50. As such she will give cisplatin 20 mg/m².  -Okay to get treatment today.  I will repeat CBC CMP every week.  - will get genetic referral.   - will arrange twice a week Hydration if needed.    -I will see her weekly.  Low threshold to change to gemcitabine if she has significant toxicity.    #Normocytic anemia.  - Patient has a shortness of breath.  As such we will get iron panel.  We discussed V iron if needed.    No problem-specific Assessment & Plan notes found for this encounter.      Wenceslao Soria MD/PhD  Hematology/Oncology         [1]   Past Medical History:  Diagnosis Date    Bladder cancer (CMS/HCC)     Coronary artery disease     KERRI (generalized anxiety disorder)     TREVOR (obstructive sleep apnea)

## 2025-03-19 NOTE — TELEPHONE ENCOUNTER
Education on concurrent Cisplatin. Patient is aware of schedule / calendar - daily RAD / pre-tx labs prior to chemotherapy treatment days.     Nausea - patient has some baseline nausea - was already using zofran ODT w/ minimal relief. RN spent significant time reviewing when to call MD for nausea and how to manage at home.  Reviewed how to take zofran/compazine - in junction w/ aloxi and together. Reviewed how to take decadron days 1-4. Emphasized patient should not be vomiting. Emphasized what number to call for triage. Asked to document all symptoms specifically nausea . Reviewed what pre-medications are given for nausea prior to TX.   Emphasized importance of hydration while on TX. She was asked to schedule at least x 1 weekly hydration appointments - but has 2 appointments in if needed. Reviewed gaol for hydration when to call. Reviewed can see impact on electrolytes (potassium and magnesium specifically / what symptoms to call for) . Reviewed what symptoms to look for. Reviewed to call for symptoms of UTI or diarrhea (reviewed imodium) - all related to hydration  Reviewed peripheral neuropathy / ototoxicity   Reviewed mouth sores/ good oral care.    Reviewed Cisplatin hand out guide   Reviewed chemotherapy management sheet guide     A message was sent to  about questions for pulmonology - she will be doing iron studies. Asked  to order EMLA cream for pt.

## 2025-03-19 NOTE — PATIENT INSTRUCTIONS
Symptom Management Guide: Chemotherapy    This is a guideline for symptom management during chemotherapy.  Not all chemotherapy or treatments have the same side effects and everyone may have different symptoms. Your on-going communication is vital to help manage your concerns.      Your Doctor's office is available to answer your questions and to assist you throughout your treatment    Oncologist:       Office Number:      Common Side Effects That May Occur  Feeling Tired (Fatigue)  Use energy conservation techniques, like clustering tasks in between rest periods   Mild exercise in short periods for 10-15 minutes can improve fatigue symptoms. Please talk to your nurse or physician about more strenuous exercise  Keep a consistent sleeping pattern, wake up the same time every morning and go to sleep at the same time every night  Limit naps to no longer than 45 minutes during the day  Ask for help with activities such as cleaning/shopping when you know your energy is low. Do not be afraid to tell your family and friends that you need your rest period    Flu-Like Symptoms/Fever  Low-grade fever, chills, sweats, body aches, and/or headache can occur after treatment  Temperature greater than 100.4 could mean infection, please call your physician day or night  Use a heating pad or take a warm shower to relieve muscle aches  To manage flu-like symptoms, your nurse or physician may recommend taking acetaminophen (Tylenol) or ibuprofen (Advil) prior to your treatment or as needed at home  Get plenty of rest and drink at least two to three quarts of caffeine free fluid (e.g. - juices, broth, popsicles, water, gatorade) every 24 hours, unless instructed otherwise by your physician     Hair Loss (Alopecia)  Not all treatments cause hair loss  Hair loss usually starts around 10-14 days after your first chemotherapy   Your scalp, eye brows, and other areas with hair may feel sore or itchy as hair falls out  It is temporary,   hair usually grows back 2-3 months after treatment ends and may grow back in a different color/texture  Avoid using hair dryer/heat tools as it starts to grow back as hair may be fragile  To match your current color/style, selecting a wig before starting chemotherapy is best         Changes in Blood Cell Counts  Many chemotherapy treatments work to stop quickly dividing cells, like cancer cells. This can cause other normal cells in your body to also stop dividing and producing - like our blood cells  Red blood cells (carry oxygen), white blood cells (fight infection) and platelets (stop bleeding), during treatment with chemotherapy we will monitor your blood cell counts as there is less production of these cells.  Anemia (low red blood cell count)  Symptoms to report: Fatigue, dizziness, weakness, shortness of breath and racing heat beat  Your physician may recommend a blood transfusion or an injection to boost red blood cells if your count is low and you have symptoms   To help your body: Get plenty of rest, eat well, limit activities, and perform light exercises  Neutropenia (low white blood cell count)  Symptoms to report: Chills/sweats, burning with urination, sore throat/new cough, temperature greater than 100.4  Your physician may prescribe an injection to boost white blood cells  To help prevent infections: Wash your hands frequently, carry hand , wash fruits/vegetables before eating, do not eat raw or undercooked meats/fish, avoid crowds and people who have cold/flu symptoms  Thrombocytopenia (low platelet count)  Symptoms to report: unusual bleeding or bruising, small red dots under your skin, red or pink urine, black or bloody bowel movements  To help limit bleeding: Use a soft toothbrush and an electric razor, review any herbs/supplements or over the counter medications with your physician prior to use  Please call prior to your next scheduled treatment if you experience any new or worsening  symptoms that are causing concern    Nausea and Vomiting  At the first sign of stomach ache/upset, take your at home anti-nausea medications as prescribed   Stay hydrated with at least two to three quarts of caffeine free fluid (e.g. - broth, jell-o, popsicles, water, gatorade) every 24 hours, unless instructed otherwise by your physician   Ease your upset stomach with ice cubes, mints, homero or hard candy  Try drinking your liquids 30-60 mins prior to and after eating your meals  Instead of 3 large meals, try eating 5-6 smaller meals or snacks per day  Limit spicy/hot, fried/greasy, or sour foods  Try bland or BRAT diet foods (bananas, rice, applesauce and toast)  Try to avoid lying down flat for 2 hours after eating  If nausea and vomiting is uncontrolled by your at home anti-nausea medication, or you are unable to eat or drink, please call your physician     Taste/Appetite Changes and Dry Mouth   Chemotherapy can cause food to taste differently, try adding more herbs (e.g. - basil/rosemary) or sauces to your food  If you notice a metallic taste, try using plastic utensils, cookware, and containers   Avoid concentrated sweet, spicy, or sour foods  If you notice certain beverages are too sweet, dilute them with some water  Keep your mouth clean and rinse your mouth out before meals or use a mouth moisturizing spray  Carry some hard candy or mints with you when for your mouth is dry  If taste/appetite changes are limiting your food choices, please talk to your nurse or physician for referral to our nutritionist     Mouth Sores (Mucositis)  To help treat/prevent mouth sores, rinse your mouth after meals and at bedtime with 1 teaspoon of baking soda or salt mixed with 8 ounces of water - swish for 1-2 minutes trying not to swallow  Try using a straw to prevent more irritation and eat softer foods (e.g. - eggs/yogurt/soups/shakes)  Avoid mouth washes containing alcohol, use a soft toothbrush, and keep your mouth/lips  moist  Your nurse may have you suck on ice during your chemotherapy administration to help prevent mouth sores  If mouth sores are limiting your ability to eat/drink, you notice bleeding and/or white patches, or visible mouth sores, please call your physician                                                   Constipation  Constipation  or less frequent bowel  movements can occur after certain treatments  Increase caffeine-free fluid intake. (e.g. - jell-o, popsicles, water, gatorade)   Instead of 3 large meals, try eating 5-6 smaller meals or snacks per day  Eat  foods high in fiber (e.g. - apples, vegetables, bran, prunes, grains, raisins)  Try warm liquids (e.g. - tea, water with lemon, broth) before or after meals   Try being active, go on a walk for at least 15-30 minutes a day  Over the counter medication can help to treat and prevent constipation  Start with a stool softener with laxative (Senokot-S) and take 1 tablet two times daily, increase by 1 tablet per day if no bowel movement the day before with a maximum total of 8 tablets per day  You can trial one scoop of Miralax once nightly if no bowel movement that day  If you are taking pain medication, it is important to be on a bowel regimen to prevent constipation   If you have not had a bowel movement in 3 days, you have nausea/vomiting, stomach pain and/or bloating, are unable to pass gas, or have a temperature greater than 100.4, please call your physician day or night    Diarrhea  Diarrhea or more bowel movements than usual can occur after certain treatments  Increase caffeine-free fluid intake. (e.g. - broth, jell-o, popsicles, water, gatorade)  Instead of 3 big meals, try eating 5-6 smaller meals per day  Eat low fiber or BRAT diet foods (bananas, rice, applesauce and toast)  Try to avoid greasy, fatty, or fried foods   If you have more than 4-5 episodes of diarrhea per day, you can take an anti-diarrheal medication (lomotil, imodium) as  instructed by  your nurse or physician   If diarrhea continues for longer than 24 hours, you feel weak/dizzy, have severe abdominal pain, stools that are black/bloody, or have a temperature greater than 100.4, please call your physician day or night    Numbness and Tingling (Peripheral Neuropathy)  Some chemotherapy may cause numbness/tingling in your hands or feet. Peripheral neuropathy is usually temporary and can come and go after each treatment.   Keep track of any numbness, tingling, weakness, or loss of balance including the location, severity, and duration to report to your nurse or physician  To protect your nerves, use potholders when cooking, gloves/hat/scarves in cold temperatures, and avoid scalding hot water when cleaning or bathing  To ensure safety, use a bath mat in the shower/tub, wear well-fitting soled shoes, hold onto handrails when available, and be alert for objects   If you notice numbness or changes, inspect your skin for cuts, bruises, and burns to the area daily  Your physician may prescribe medication if the peripheral neuropathy symptoms do not subside  If you are receiving OXALIPLATIN, numbness/tingling/cramping can occur after each treatment. Cold temperature/liquid/food can also cause the feeling of difficulty swallowing or shortness of breath. Limiting your exposure to cold or cooler weather/items can help   Avoid cold temperatures and cold/frozen objects including food/drink; choose room temperature or hot beverages or food.  Wear gloves when placing your hands in the freezer and in cooler/colder weather  Wear a scarf around your mouth/nose to breathe in warm air  Wear a hat, gloves, and socks to prevent or lessen cold sensitivity    Skin Changes/Sun Sensitivity  Skin changes, like rash, itching, and dryness, can occur with treatment   Keep skin well hydrated and moist, use mild/unscented soap and apply lotion without alcohol/fragrance 2-3 times per day. Use an electric razor instead of blades  If  you have blisters or peeling skin, it is important to inform your nurse or physician  Over the counter topical steroid cream or anti-histamines can be used to relieve itching/rash as directed by nurse or physician  Drink plenty of water, keep your room cool to avoid sweating  Wear sunscreen (SPF 30 or higher) and reapply often, wear protective clothing    Mood Changes (Anxiety/Depression)  Diagnosis and treatment for cancer can cause changes physically and emotionally  Support groups and counseling services are available to assist you and your caregivers  Alternative therapies, including massage, yoga, reiki, acupuncture, and meditation can help alleviate stress   Talk with your friends, family, and care team for support  Exercise and maintain good sleep habits, avoid alcohol   Please talk to your nurse or doctor if you are experiencing sadness, worry, fear, or concern that interferes with usual your activities or ability to carry out roles at home, work or community  Resources are available to support you during and after treatment     Sexual and Reproductive Health  Chemotherapy can cause reproductive changes and affect your ability to conceive a child, talk to your nurse or doctor about options for fertility care/sperm banking prior to starting treatment if you plan to have children   Sexual desire may decrease. Be open with your partner about these changes and allow your partner to express their concerns.  Treatment may cause changes in erection and/or ejaculation  Some medication may lead to changes in hormone levels that can cause menopausal symptoms and potentially induce menopause  Signs of hormone changes include irregular or no menstrual period, hot flashes, vaginal dryness or itching   Vaginal water-based lubricant to relieve dryness/itching (e.g. - [place] Jelly)  Dress in layers to easily remove during a hot flash  Please talk to your nurse or doctor about safe forms of birth control and safe sex practices  during your treatments, exposure of chemotherapy  and other medications may cause birth defects   Specialist referrals are available for sexual or reproductive health changes or concerns                        INSTRUCTIONS FOR ANTIEMETICS        WHAT I RECEIVED TODAY FOR NAUSEA: 3/19/2025    Aloxi              Decadron      Emend  A variety of these meds may be ordered based on your treatment.  These medications may cause: headache, dizziness and constipation, flushing and drowsiness.      2. HOW TO TAKE MY ANTI-NAUSEA MEDS:    You have received two prescriptions for nausea to take at home:  Compazine (Prochlorperazine)  Zofran (Ondansetron )  If you received Aloxi:   Compazine  10 mg every 6 hrs as needed is the #1 choice for the first two days after chemo   Zofran 8mg every 8 hrs as needed can be used on 3/21/25 (the second day after chemo)                                  These two drugs can be be taken in combination with one another. If one drug does not relieve nausea within a half hour, you may take the other nausea med         CALL IF YOU HAVE VOMITING OR NAUSEA that is not relieved by your meds:    Mon - Fri  (8 - 4:30)                   [phone]  Evenings and weekends          [phone]

## 2025-03-19 NOTE — PROGRESS NOTES
Pt arrives for D1C1. Pt cleared for tx by Dr. Soria. Pts initial dose of carboplatin reduced to 20mg/m2. Port accessed with +BR. Iron labs collected and sent for pts sob.    Pt premedicated with aloxi, emend and decadron. Pt tolerated cisplatin without complication. Pt given 500 mL NS over 1 hour after Cisplatin. RN reached out to scheduling to help schedule pt for future hydration appointments.    RN educated pt on side effects of medications and how to take antiemetics. RN provided pt with the office number to follow up with.

## 2025-03-20 ENCOUNTER — RAD ONC OTV (OUTPATIENT)
Dept: RADIATION ONCOLOGY | Facility: HOSPITAL | Age: 75
End: 2025-03-20
Payer: MEDICARE

## 2025-03-20 ENCOUNTER — TELEPHONE (OUTPATIENT)
Dept: HEMATOLOGY/ONCOLOGY | Facility: CLINIC | Age: 75
End: 2025-03-20
Payer: MEDICARE

## 2025-03-20 ENCOUNTER — HOSPITAL ENCOUNTER (OUTPATIENT)
Dept: RADIATION ONCOLOGY | Facility: HOSPITAL | Age: 75
Setting detail: RADIATION/ONCOLOGY SERIES
Discharge: HOME | End: 2025-03-20
Attending: RADIOLOGY
Payer: MEDICARE

## 2025-03-20 DIAGNOSIS — D50.0 IRON DEFICIENCY ANEMIA DUE TO CHRONIC BLOOD LOSS: ICD-10-CM

## 2025-03-20 DIAGNOSIS — C67.9 MALIGNANT NEOPLASM OF URINARY BLADDER, UNSPECIFIED SITE (CMS/HCC): Primary | ICD-10-CM

## 2025-03-20 DIAGNOSIS — C67.8 MALIGNANT NEOPLASM OF OVERLAPPING SITES OF BLADDER (CMS/HCC): ICD-10-CM

## 2025-03-20 LAB
ARIA ZRC COURSE ID: NORMAL
ARIA ZRC COURSE START DATE: NORMAL
ARIA ZRC TREATMENT ELAPSED DAYS: NORMAL
ARIA ZRP FRACTIONS TREATED TO DATE: NORMAL
ARIA ZRP PLAN ID: NORMAL
ARIA ZRP PLAN NAME: NORMAL
ARIA ZRP PRESCRIBED DOSE CGY: 1600
ARIA ZRP PRESCRIBED DOSE PER FRACTION: 2
ARIA ZRR DOSAGE GIVEN TO DATE: 6
ARIA ZRR DOSAGE GIVEN TO DATE: 6
ARIA ZRR DOSAGE GIVEN TO DATE: 6.03
ARIA ZRR REFERENCE POINT ID: NORMAL
ARIA ZRR SESSION DOSAGE GIVEN: 2
ARIA ZRR SESSION DOSAGE GIVEN: 2
ARIA ZRR SESSION DOSAGE GIVEN: 2.01

## 2025-03-20 PROCEDURE — 77386 HC IMRT DELIVERY COMPLEX: CPT | Performed by: RADIOLOGY

## 2025-03-20 PROCEDURE — 77336 RADIATION PHYSICS CONSULT: CPT | Performed by: RADIOLOGY

## 2025-03-20 RX ORDER — FAMOTIDINE 10 MG/ML
20 INJECTION, SOLUTION INTRAVENOUS ONCE AS NEEDED
Status: CANCELLED | OUTPATIENT
Start: 2025-03-21

## 2025-03-20 RX ORDER — DIPHENHYDRAMINE HCL 50 MG/ML
25 VIAL (ML) INJECTION ONCE AS NEEDED
Status: CANCELLED | OUTPATIENT
Start: 2025-03-21

## 2025-03-20 RX ORDER — EPINEPHRINE 1 MG/ML
0.5 INJECTION, SOLUTION INTRAMUSCULAR; SUBCUTANEOUS ONCE AS NEEDED
Status: CANCELLED | OUTPATIENT
Start: 2025-03-21

## 2025-03-21 ENCOUNTER — HOSPITAL ENCOUNTER (OUTPATIENT)
Dept: RADIATION ONCOLOGY | Facility: HOSPITAL | Age: 75
Setting detail: RADIATION/ONCOLOGY SERIES
Discharge: HOME | End: 2025-03-21
Attending: RADIOLOGY
Payer: MEDICARE

## 2025-03-21 ENCOUNTER — RAD ONC OTV (OUTPATIENT)
Dept: RADIATION ONCOLOGY | Facility: HOSPITAL | Age: 75
End: 2025-03-21
Payer: MEDICARE

## 2025-03-21 VITALS — DIASTOLIC BLOOD PRESSURE: 67 MMHG | SYSTOLIC BLOOD PRESSURE: 153 MMHG | OXYGEN SATURATION: 100 % | HEART RATE: 76 BPM

## 2025-03-21 DIAGNOSIS — C67.8 MALIGNANT NEOPLASM OF OVERLAPPING SITES OF BLADDER (CMS/HCC): Primary | ICD-10-CM

## 2025-03-21 LAB
ARIA ZRC COURSE ID: NORMAL
ARIA ZRC COURSE START DATE: NORMAL
ARIA ZRC TREATMENT ELAPSED DAYS: NORMAL
ARIA ZRP FRACTIONS TREATED TO DATE: NORMAL
ARIA ZRP PLAN ID: NORMAL
ARIA ZRP PLAN NAME: NORMAL
ARIA ZRP PRESCRIBED DOSE CGY: 1600
ARIA ZRP PRESCRIBED DOSE PER FRACTION: 2
ARIA ZRR DOSAGE GIVEN TO DATE: 8
ARIA ZRR DOSAGE GIVEN TO DATE: 8
ARIA ZRR DOSAGE GIVEN TO DATE: 8.04
ARIA ZRR REFERENCE POINT ID: NORMAL
ARIA ZRR SESSION DOSAGE GIVEN: 2
ARIA ZRR SESSION DOSAGE GIVEN: 2
ARIA ZRR SESSION DOSAGE GIVEN: 2.01
CASE RPRT: NORMAL
CLINICAL INFO: NORMAL
PATH REPORT.ADDENDUM SPEC: NORMAL
PATH REPORT.FINAL DX SPEC: NORMAL
PATH REPORT.GROSS SPEC: NORMAL

## 2025-03-21 PROCEDURE — 77386 HC IMRT DELIVERY COMPLEX: CPT | Performed by: RADIOLOGY

## 2025-03-21 ASSESSMENT — ENCOUNTER SYMPTOMS
OCCASIONAL FEELINGS OF UNSTEADINESS: 0
DEPRESSION: 0
LOSS OF SENSATION IN FEET: 0

## 2025-03-21 ASSESSMENT — PAIN SCALES - GENERAL: PAINLEVEL_OUTOF10: 0-NO PAIN

## 2025-03-21 NOTE — RADIATION TREATMENT MANAGEMENT
Patient ID:   Gladys Woo  1950  [id number]   Diagnosis Plan   1. Malignant neoplasm of overlapping sites of bladder (CMS/HCC)           Referring Physician:   No referring provider defined for this encounter.  Primary Care Provider:  Zoe Sanders MD     Problem List:  Patient Active Problem List    Diagnosis Date Noted    Iron deficiency anemia due to chronic blood loss 03/20/2025    Chronic kidney disease 01/31/2025    Constipation 01/27/2025    UTI (urinary tract infection) 01/26/2025    Anemia 01/26/2025    Bladder cancer (CMS/[facility]) 01/25/2025    Bladder mass 01/25/2025    FIDELIA (acute kidney injury) (CMS/HCC) 01/25/2025    Abnormal finding of diagnostic imaging 01/24/2025    Hemorrhoids 01/24/2025    Recurrent major depressive disorder in partial remission (CMS/HCC) 01/24/2025    SVT (supraventricular tachycardia) (CMS/HCC) 01/24/2025    [place] hematuria 12/10/2024    Obstructive sleep apnea 11/22/2024    Arrhythmia 10/01/2024    Generalized anxiety disorder with panic attacks 10/01/2024    Hyperlipemia 10/01/2024    Major depressive disorder 10/01/2024    Pacemaker 10/24/2019       Cancer Staging   Bladder cancer (CMS/HCC)  Staging form: Urinary Bladder, AJCC 8th Edition  - Clinical: Stage IIIA (cT3, cN0, cM0) - Signed by Ochsner, Gregory J, MD on 2/25/2025      TREATMENT PLAN SUMMARY:  Radiation Treatments       Active   Plans   PS_a-partial bladder_GTV [1a_PartBldr]   Most recent treatment: Dose planned: 200 cGy (fraction 3 of 8 on 3/20/2025)   Total: Dose planned: 1,600 cGy   Elapsed Days: 2 days as of 2025-03-20 @ 08:0145      Reference Points   1a_Part Bladder   Most recent treatment: Dose given: 200 cGy (on 3/20/2025)   Total: Dose given: 600 cGy   Elapsed Days: 2 days as of 2025-03-20 @ 08:0145      1a_calc   Most recent treatment: Dose given: 201 cGy (on 3/20/2025)   Total: Dose given: 603 cGy   Elapsed Days: 2 days as of 2025-03-20 @ 08:0145      1c_Trgt total   Most recent treatment:  "Dose given: 200 cGy (on 3/20/2025)   Total: Dose given: 600 cGy   Elapsed Days: 2 days as of 2025-03-20 @ 08:0145           Historical   Plans   PS1   Most recent treatment: Dose planned: 200 cGy (fraction 0 of 25 on 3/11/2025)   Total: Dose planned: 5,000 cGy   Elapsed Days: : @ --      PS2   Most recent treatment: Dose planned: 200 cGy (fraction 0 of 25 on 3/11/2025)   Total: Dose planned: 5,000 cGy   Elapsed Days: : @ --      PS_GTV   Most recent treatment: Dose planned: 200 cGy (fraction 0 of 8 on 3/11/2025)   Total: Dose planned: 1,600 cGy   Elapsed Days: : @ --      Reference Points   1a_Part Bladder   Most recent treatment: Course completed on 3/11/2025   Total: Dose given: 0 cGy   Elapsed Days: : @ --      1a_calc   Most recent treatment: Course completed on 3/11/2025   Total: Dose given: 0 cGy   Elapsed Days: : @ --      1b_Pelvis   Most recent treatment: Course completed on 3/11/2025   Total: Dose given: 0 cGy   Elapsed Days: : @ --      1b_calc   Most recent treatment: Course completed on 3/11/2025   Total: Dose given: 0 cGy   Elapsed Days: : @ --                     Subjective   pt  with out complaints    Vital Signs for this encounter:  BP: 153/67  Heart Rate: 76  SpO2: 100 % (03/21 0740)    PAIN ASSESSMENT:  Score Zero    Location    Pain Intervention      TOXICITY ASSESSMENT:  Pain Assessment  Pain Score: 0-No pain          Objective      Physical Exam:  Physical Exam  unremarkable.  KS 90    LABS:  CMP   Lab Results   Component Value Date    ALBUMIN 3.3 (L) 03/18/2025    CO2 24 03/18/2025    BUN 13 03/18/2025    CREATININE 1.2 03/18/2025    GLUCOSE 124 (H) 03/18/2025    AST 9 (L) 03/18/2025    ALT 7 03/18/2025    EGFR 47.6 (L) 03/18/2025     CBC   Lab Results   Component Value Date    WBC 24.58 (H) 03/18/2025    HGB 8.1 (L) 03/18/2025    HCT 25.6 (L) 03/18/2025    MCV 90.5 03/18/2025     (H) 03/18/2025     Tumor Marker No results found for: \"\", \"\", \"PSA\", \"\", \"CEA\", " "\"HCGQUANT\"         Assessment/Plan cont. Tx as per plan.    Diagnoses and Orders Associated With This Visit:  There are no diagnoses linked to this encounter.                       "

## 2025-03-24 ENCOUNTER — HOSPITAL ENCOUNTER (INPATIENT)
Facility: HOSPITAL | Age: 75
LOS: 4 days | Discharge: HOME | DRG: 871 | End: 2025-03-28
Attending: EMERGENCY MEDICINE | Admitting: INTERNAL MEDICINE
Payer: MEDICARE

## 2025-03-24 ENCOUNTER — APPOINTMENT (EMERGENCY)
Dept: RADIOLOGY | Facility: HOSPITAL | Age: 75
DRG: 871 | End: 2025-03-24
Payer: MEDICARE

## 2025-03-24 ENCOUNTER — NURSE TRIAGE (OUTPATIENT)
Dept: RADIATION ONCOLOGY | Facility: HOSPITAL | Age: 75
End: 2025-03-24
Payer: MEDICARE

## 2025-03-24 ENCOUNTER — NURSE ONLY (OUTPATIENT)
Dept: RADIATION ONCOLOGY | Facility: HOSPITAL | Age: 75
End: 2025-03-24
Payer: MEDICARE

## 2025-03-24 ENCOUNTER — TELEPHONE (OUTPATIENT)
Dept: HEMATOLOGY/ONCOLOGY | Facility: CLINIC | Age: 75
End: 2025-03-24
Payer: MEDICARE

## 2025-03-24 ENCOUNTER — HOSPITAL ENCOUNTER (OUTPATIENT)
Dept: RADIATION ONCOLOGY | Facility: HOSPITAL | Age: 75
Setting detail: RADIATION/ONCOLOGY SERIES
Discharge: HOME | End: 2025-03-24
Attending: RADIOLOGY
Payer: MEDICARE

## 2025-03-24 ENCOUNTER — DOCUMENTATION (OUTPATIENT)
Dept: RADIATION ONCOLOGY | Facility: HOSPITAL | Age: 75
End: 2025-03-24
Payer: MEDICARE

## 2025-03-24 VITALS
SYSTOLIC BLOOD PRESSURE: 99 MMHG | OXYGEN SATURATION: 97 % | TEMPERATURE: 97 F | HEART RATE: 64 BPM | DIASTOLIC BLOOD PRESSURE: 40 MMHG

## 2025-03-24 DIAGNOSIS — D72.829 LEUKOCYTOSIS, UNSPECIFIED TYPE: ICD-10-CM

## 2025-03-24 DIAGNOSIS — R55 SYNCOPE AND COLLAPSE: Primary | ICD-10-CM

## 2025-03-24 DIAGNOSIS — D50.0 IRON DEFICIENCY ANEMIA DUE TO CHRONIC BLOOD LOSS: ICD-10-CM

## 2025-03-24 DIAGNOSIS — C67.8 MALIGNANT NEOPLASM OF OVERLAPPING SITES OF BLADDER (CMS/HCC): ICD-10-CM

## 2025-03-24 DIAGNOSIS — C67.9 MALIGNANT NEOPLASM OF URINARY BLADDER, UNSPECIFIED SITE (CMS/HCC): Primary | ICD-10-CM

## 2025-03-24 DIAGNOSIS — N17.9 AKI (ACUTE KIDNEY INJURY) (CMS/HCC): ICD-10-CM

## 2025-03-24 LAB
ABO + RH BLD: NORMAL
ALBUMIN SERPL-MCNC: 3.2 G/DL (ref 3.5–5.7)
ALP SERPL-CCNC: 64 IU/L (ref 34–125)
ALT SERPL-CCNC: 12 IU/L (ref 7–52)
ANION GAP SERPL CALC-SCNC: 8 MEQ/L (ref 3–15)
ARIA ZRC COURSE ID: NORMAL
ARIA ZRC COURSE START DATE: NORMAL
ARIA ZRC TREATMENT ELAPSED DAYS: NORMAL
ARIA ZRP FRACTIONS TREATED TO DATE: NORMAL
ARIA ZRP PLAN ID: NORMAL
ARIA ZRP PLAN NAME: NORMAL
ARIA ZRP PRESCRIBED DOSE CGY: 1600
ARIA ZRP PRESCRIBED DOSE PER FRACTION: 2
ARIA ZRR DOSAGE GIVEN TO DATE: 10
ARIA ZRR DOSAGE GIVEN TO DATE: 10
ARIA ZRR DOSAGE GIVEN TO DATE: 10.05
ARIA ZRR REFERENCE POINT ID: NORMAL
ARIA ZRR SESSION DOSAGE GIVEN: 2
ARIA ZRR SESSION DOSAGE GIVEN: 2
ARIA ZRR SESSION DOSAGE GIVEN: 2.01
AST SERPL-CCNC: 10 IU/L (ref 13–39)
ATRIAL RATE: 90
BACTERIA URNS QL MICRO: 3 /HPF
BASOPHILS # BLD: 0 K/UL (ref 0.01–0.1)
BASOPHILS NFR BLD: 0 %
BILIRUB SERPL-MCNC: 0.3 MG/DL (ref 0.3–1.2)
BILIRUB UR QL STRIP.AUTO: NEGATIVE MG/DL
BLD GP AB SCN SERPL QL: NEGATIVE
BUN SERPL-MCNC: 31 MG/DL (ref 7–25)
BURR CELLS BLD QL SMEAR: ABNORMAL
CALCIUM SERPL-MCNC: 9 MG/DL (ref 8.6–10.3)
CHLORIDE SERPL-SCNC: 100 MEQ/L (ref 98–107)
CLARITY UR REFRACT.AUTO: ABNORMAL
CO2 SERPL-SCNC: 26 MEQ/L (ref 21–31)
COLOR UR AUTO: ABNORMAL
CREAT SERPL-MCNC: 1.5 MG/DL (ref 0.6–1.2)
D AG BLD QL: POSITIVE
DIFFERENTIAL METHOD BLD: ABNORMAL
EGFRCR SERPLBLD CKD-EPI 2021: 36.4 ML/MIN/1.73M*2
EOSINOPHIL # BLD: 0 K/UL (ref 0.04–0.36)
EOSINOPHIL NFR BLD: 0 %
ERYTHROCYTE [DISTWIDTH] IN BLOOD BY AUTOMATED COUNT: 14 % (ref 11.7–14.4)
FLUAV RNA SPEC QL NAA+PROBE: NEGATIVE
FLUBV RNA SPEC QL NAA+PROBE: NEGATIVE
GLUCOSE SERPL-MCNC: 100 MG/DL (ref 70–99)
GLUCOSE UR STRIP.AUTO-MCNC: NEGATIVE MG/DL
HCT VFR BLD AUTO: 28.4 % (ref 35–45)
HGB BLD-MCNC: 9.2 G/DL (ref 11.8–15.7)
HGB UR QL STRIP.AUTO: 2
HYALINE CASTS #/AREA URNS LPF: ABNORMAL /LPF
KETONES UR STRIP.AUTO-MCNC: NEGATIVE MG/DL
LABORATORY COMMENT REPORT: NORMAL
LACTATE SERPL-SCNC: 1.9 MMOL/L (ref 0.4–2)
LEUKOCYTE ESTERASE UR QL STRIP.AUTO: 3
LYMPHOCYTES # BLD: 1.11 K/UL (ref 1.2–3.5)
LYMPHOCYTES NFR BLD: 2 %
MAGNESIUM SERPL-MCNC: 1.7 MG/DL (ref 1.8–2.5)
MCH RBC QN AUTO: 28.5 PG (ref 28–33.2)
MCHC RBC AUTO-ENTMCNC: 32.4 G/DL (ref 32.2–35.5)
MCV RBC AUTO: 87.9 FL (ref 83–98)
MONOCYTES # BLD: 2.77 K/UL (ref 0.28–0.8)
MONOCYTES NFR BLD: 5 %
MUCOUS THREADS URNS QL MICRO: ABNORMAL /LPF
NEUTS BAND # BLD: 1.11 K/UL (ref 0–0.53)
NEUTS BAND # BLD: 50.49 K/UL (ref 1.7–7)
NEUTS BAND NFR BLD: 2 %
NEUTS SEG NFR BLD: 91 %
NEUTS VAC BLD QL SMEAR: ABNORMAL
NITRITE UR QL STRIP.AUTO: POSITIVE
OVALOCYTES BLD QL SMEAR: ABNORMAL
P AXIS: 27
PATH REV BLD -IMP: NORMAL
PH UR STRIP.AUTO: 7 [PH]
PLAT MORPH BLD: NORMAL
PLATELET # BLD AUTO: 358 K/UL (ref 150–369)
PLATELET # BLD EST: ABNORMAL 10*3/UL
PMV BLD AUTO: 9.4 FL (ref 9.4–12.3)
POTASSIUM SERPL-SCNC: 3.6 MEQ/L (ref 3.5–5.1)
PR INTERVAL: 138
PROT SERPL-MCNC: 6.1 G/DL (ref 6–8.2)
PROT UR QL STRIP.AUTO: 2
QRS DURATION: 154
QT INTERVAL: 412
QTC CALCULATION(BAZETT): 504
R AXIS: -54
RBC # BLD AUTO: 3.23 M/UL (ref 3.93–5.22)
RBC #/AREA URNS HPF: ABNORMAL /HPF
RSV RNA SPEC QL NAA+PROBE: NEGATIVE
SARS-COV-2 RNA RESP QL NAA+PROBE: NEGATIVE
SODIUM SERPL-SCNC: 134 MEQ/L (ref 136–145)
SP GR UR REFRACT.AUTO: 1.01
SPECIMEN EXP DATE BLD: NORMAL
SQUAMOUS URNS QL MICRO: 1 /HPF
T WAVE AXIS: 96
TOXIC GRANULES BLD QL SMEAR: SLIGHT
TRANS CELLS URNS QL MICRO: 1 /HPF
TROPONIN I SERPL HS-MCNC: 10.1 PG/ML
TROPONIN I SERPL HS-MCNC: 9.9 PG/ML
UROBILINOGEN UR STRIP-ACNC: 0.2 EU/DL
VENTRICULAR RATE: 90
WBC # BLD AUTO: 55.48 K/UL (ref 3.8–10.5)
WBC #/AREA URNS HPF: ABNORMAL /HPF

## 2025-03-24 PROCEDURE — 83735 ASSAY OF MAGNESIUM: CPT

## 2025-03-24 PROCEDURE — 81001 URINALYSIS AUTO W/SCOPE: CPT

## 2025-03-24 PROCEDURE — 25800000 HC PHARMACY IV SOLUTIONS

## 2025-03-24 PROCEDURE — 84484 ASSAY OF TROPONIN QUANT: CPT | Mod: 91 | Performed by: EMERGENCY MEDICINE

## 2025-03-24 PROCEDURE — 93005 ELECTROCARDIOGRAM TRACING: CPT | Performed by: EMERGENCY MEDICINE

## 2025-03-24 PROCEDURE — 36415 COLL VENOUS BLD VENIPUNCTURE: CPT

## 2025-03-24 PROCEDURE — 25800000 HC PHARMACY IV SOLUTIONS: Performed by: PHYSICIAN ASSISTANT

## 2025-03-24 PROCEDURE — 63700000 HC SELF-ADMINISTRABLE DRUG: Performed by: PHYSICIAN ASSISTANT

## 2025-03-24 PROCEDURE — 93010 ELECTROCARDIOGRAM REPORT: CPT | Performed by: INTERNAL MEDICINE

## 2025-03-24 PROCEDURE — 86900 BLOOD TYPING SEROLOGIC ABO: CPT

## 2025-03-24 PROCEDURE — 96360 HYDRATION IV INFUSION INIT: CPT

## 2025-03-24 PROCEDURE — 99285 EMERGENCY DEPT VISIT HI MDM: CPT | Mod: 25

## 2025-03-24 PROCEDURE — 77386 HC IMRT DELIVERY COMPLEX: CPT | Performed by: RADIOLOGY

## 2025-03-24 PROCEDURE — 63600000 HC DRUGS/DETAIL CODE: Mod: JZ

## 2025-03-24 PROCEDURE — 71045 X-RAY EXAM CHEST 1 VIEW: CPT

## 2025-03-24 PROCEDURE — 86923 COMPATIBILITY TEST ELECTRIC: CPT

## 2025-03-24 PROCEDURE — 85025 COMPLETE CBC W/AUTO DIFF WBC: CPT | Performed by: EMERGENCY MEDICINE

## 2025-03-24 PROCEDURE — 12000000 HC ROOM AND CARE MED/SURG

## 2025-03-24 PROCEDURE — 87040 BLOOD CULTURE FOR BACTERIA: CPT

## 2025-03-24 PROCEDURE — 84484 ASSAY OF TROPONIN QUANT: CPT | Performed by: EMERGENCY MEDICINE

## 2025-03-24 PROCEDURE — 63700000 HC SELF-ADMINISTRABLE DRUG

## 2025-03-24 PROCEDURE — 80053 COMPREHEN METABOLIC PANEL: CPT | Performed by: EMERGENCY MEDICINE

## 2025-03-24 PROCEDURE — 87637 SARSCOV2&INF A&B&RSV AMP PRB: CPT

## 2025-03-24 PROCEDURE — 96361 HYDRATE IV INFUSION ADD-ON: CPT

## 2025-03-24 PROCEDURE — 99223 1ST HOSP IP/OBS HIGH 75: CPT | Performed by: INTERNAL MEDICINE

## 2025-03-24 PROCEDURE — 87086 URINE CULTURE/COLONY COUNT: CPT

## 2025-03-24 PROCEDURE — 83605 ASSAY OF LACTIC ACID: CPT

## 2025-03-24 PROCEDURE — 93005 ELECTROCARDIOGRAM TRACING: CPT

## 2025-03-24 RX ORDER — ROSUVASTATIN CALCIUM 40 MG/1
40 TABLET, COATED ORAL DAILY
Status: DISCONTINUED | OUTPATIENT
Start: 2025-03-25 | End: 2025-03-28 | Stop reason: HOSPADM

## 2025-03-24 RX ORDER — SODIUM CHLORIDE 9 MG/ML
INJECTION, SOLUTION INTRAVENOUS CONTINUOUS
Status: ACTIVE | OUTPATIENT
Start: 2025-03-24 | End: 2025-03-27

## 2025-03-24 RX ORDER — ACETAMINOPHEN 325 MG/1
650 TABLET ORAL EVERY 4 HOURS PRN
Status: DISCONTINUED | OUTPATIENT
Start: 2025-03-24 | End: 2025-03-28 | Stop reason: HOSPADM

## 2025-03-24 RX ORDER — SODIUM CHLORIDE 9 MG/ML
1000 INJECTION, SOLUTION INTRAVENOUS CONTINUOUS
Status: DISCONTINUED | OUTPATIENT
Start: 2025-03-24 | End: 2025-03-24

## 2025-03-24 RX ORDER — BUSPIRONE HYDROCHLORIDE 5 MG/1
5 TABLET ORAL 2 TIMES DAILY
Status: DISCONTINUED | OUTPATIENT
Start: 2025-03-24 | End: 2025-03-26

## 2025-03-24 RX ORDER — HEPARIN SODIUM 5000 [USP'U]/ML
5000 INJECTION, SOLUTION INTRAVENOUS; SUBCUTANEOUS EVERY 8 HOURS
Status: DISCONTINUED | OUTPATIENT
Start: 2025-03-24 | End: 2025-03-24

## 2025-03-24 RX ORDER — LANOLIN ALCOHOL/MO/W.PET/CERES
400 CREAM (GRAM) TOPICAL ONCE
Status: COMPLETED | OUTPATIENT
Start: 2025-03-24 | End: 2025-03-24

## 2025-03-24 RX ORDER — IBUPROFEN 200 MG
16-32 TABLET ORAL AS NEEDED
Status: DISCONTINUED | OUTPATIENT
Start: 2025-03-24 | End: 2025-03-28 | Stop reason: HOSPADM

## 2025-03-24 RX ORDER — SODIUM CHLORIDE 9 MG/ML
500 INJECTION, SOLUTION INTRAVENOUS CONTINUOUS
Status: DISCONTINUED | OUTPATIENT
Start: 2025-03-24 | End: 2025-03-24

## 2025-03-24 RX ORDER — DEXTROSE 40 %
15-30 GEL (GRAM) ORAL AS NEEDED
Status: DISCONTINUED | OUTPATIENT
Start: 2025-03-24 | End: 2025-03-28 | Stop reason: HOSPADM

## 2025-03-24 RX ORDER — LANOLIN ALCOHOL/MO/W.PET/CERES
400 CREAM (GRAM) TOPICAL 2 TIMES DAILY PRN
Status: DISCONTINUED | OUTPATIENT
Start: 2025-03-24 | End: 2025-03-28 | Stop reason: HOSPADM

## 2025-03-24 RX ORDER — DEXTROSE 50 % IN WATER (D50W) INTRAVENOUS SYRINGE
25 AS NEEDED
Status: DISCONTINUED | OUTPATIENT
Start: 2025-03-24 | End: 2025-03-28 | Stop reason: HOSPADM

## 2025-03-24 RX ADMIN — CEFTRIAXONE SODIUM 1 G: 1 INJECTION, POWDER, FOR SOLUTION INTRAMUSCULAR; INTRAVENOUS at 16:27

## 2025-03-24 RX ADMIN — SODIUM CHLORIDE 1000 ML: 9 INJECTION, SOLUTION INTRAVENOUS at 17:02

## 2025-03-24 RX ADMIN — BUSPIRONE HYDROCHLORIDE 5 MG: 5 TABLET ORAL at 20:09

## 2025-03-24 RX ADMIN — Medication 400 MG: at 12:00

## 2025-03-24 RX ADMIN — ACETAMINOPHEN 650 MG: 325 TABLET, FILM COATED ORAL at 23:10

## 2025-03-24 RX ADMIN — SODIUM CHLORIDE: 9 INJECTION, SOLUTION INTRAVENOUS at 19:43

## 2025-03-24 RX ADMIN — SODIUM CHLORIDE 1000 ML: 9 INJECTION, SOLUTION INTRAVENOUS at 11:47

## 2025-03-24 ASSESSMENT — ENCOUNTER SYMPTOMS
VOMITING: 0
DIARRHEA: 0
TREMORS: 1
ARTHRALGIAS: 0
ABDOMINAL PAIN: 0
LIGHT-HEADEDNESS: 0
FREQUENCY: 1
NUMBNESS: 0
SYNCOPE: 1
NAUSEA: 0
ACTIVITY CHANGE: 0
ABDOMINAL DISTENTION: 0
WEAKNESS: 0
SHORTNESS OF BREATH: 0
COUGH: 0
BLOOD IN STOOL: 0
DIZZINESS: 1
HEADACHES: 0
DIFFICULTY URINATING: 0
APPETITE CHANGE: 0

## 2025-03-24 ASSESSMENT — PAIN SCALES - GENERAL: PAINLEVEL_OUTOF10: 0-NO PAIN

## 2025-03-24 NOTE — PROGRESS NOTES
Pt came to radiation in a wheelchair (she drove herself).  Gladys states weakness, chills and dizziness.  Recently had chemo 3/19/25 - Cisplatin with Dr. Soria.  Hgb = 8.1 - will have an iron infusion tomorrow.  Pt is afebrile, denies sob or pain.  Orthostatic b/p - pulse 64 regular (pt has a pacemaker.  Called Dr. Soria's triage line and spoke to [facility] Wilson regarding need for IVF's. Await return call.  Pt will have her radiation treatment today.  Dr. Solares is aware.

## 2025-03-24 NOTE — H&P
"    Hospital Medicine Service -  History & Physical        CHIEF COMPLAINT     Chief Complaint   Patient presents with    Syncope        HISTORY OF PRESENT ILLNESS      75 y.o. female with a past medical history of CAD, cardiac arrhythmia s/p PPM, HLD, TREVOR, bladder cancer s/p cysto and resection currently on chemoradiation who presents for syncope.  Patient reports she felt \"woozy\" this morning before radiation.  She reports that she had \"a vagal response\" while she was on the radiation table.  She reports she lost consciousness for several minutes.  She did not sustain any injury.  She reports a foul smell to her urine x 2 days.  She reports chronic dyspnea due to anemia, which is improving.  She denies fever, nausea, vomiting, chest pain, dyspnea, abdominal pain, dysuria, urinary frequency, diarrhea, LE pain or edema.  Of note, she reports she was recently on 3-day course of steroids for cycle 2-4 of chemotherapy, last dose 2 days ago.       ED workup:   Hypotensive, other vitals normal. Labs significant for mild FIDELIA on CKD, hypomagnesemia, leukocytosis, chronic anemia. Remainder of CMP and CBC within normal limits. Lactate within normal limits. UA + UTI. CXR No acute cardiopulmonary process. Interval insertion of a right venous port is noted since the 1/24/2025 chest studies. Received IVF bolus x 2 and IV rocephin in the ED. Admitted for further workup and management of sepsis due to UTI.   PAST MEDICAL AND SURGICAL HISTORY      Past Medical History:   Diagnosis Date    Bladder cancer (CMS/HCC)     Coronary artery disease     KERRI (generalized anxiety disorder)     TREVOR (obstructive sleep apnea)        Past Surgical History   Procedure Laterality Date    A-v cardiac pacemaker insertion      Colonoscopy      CYSTO, TURBT TRANS-URETHRAL RESECTION BLADDER TUMOR N/A 1/30/2025    Performed by Eugenio Jimenez MD at  [facility]    Insert / replace / remove pacemaker      Knee arthroscopy w/ meniscal repair      Tonsillectomy "         MEDICATIONS      Prior to Admission medications    Medication Sig Start Date End Date Taking? Authorizing Provider   acetaminophen (TYLENOL) 500 mg tablet Take 500 mg by mouth every 6 (six) hours as needed for pain.   Yes Isabela Gregg MD   busPIRone (BUSPAR) 5 mg tablet Take 10 mg by mouth daily. 2 tablets 8/25/24  Yes ProviderIsabela MD   cranberry conc-C-bacillus coag (AZO CRANBERRY + PROBIOTIC) 250-30-15 mg tablet Take 1 tablet by mouth nightly.   Yes Isabela Gregg MD   ondansetron (ZOFRAN) 8 mg tablet Take 1 tablet (8 mg total) by mouth every 8 (eight) hours as needed for nausea or vomiting. 3/10/25  Yes Wenceslao Soria MD   prochlorperazine (COMPAZINE) 10 mg tablet Take 1 tablet (10 mg total) by mouth every 6 (six) hours as needed for nausea or vomiting. 3/10/25   Wenceslao Soria MD   rosuvastatin (CRESTOR) 40 mg tablet Take 40 mg by mouth daily.    ProviderIsabela MD   dexAMETHasone (DECADRON) 4 mg tablet Take 2 tablets (8 mg total) by mouth See admin instr. Take 2 tabs once on day 2 of the cycle, then take 2 tabs orally twice daily on days 3-4 of the cycle. 3/10/25 3/24/25  Wenceslao Soria MD       ALLERGIES      Amoxicillin    FAMILY HISTORY      Family History   Problem Relation Name Age of Onset    Heart disease Biological Mother      Diabetes Biological Mother      Heart disease Biological Father      Coronary artery disease Biological Father      Liver cancer Biological Brother      Prostate cancer Biological Brother         SOCIAL HISTORY      Social History     Socioeconomic History    Marital status:      Spouse name: None    Number of children: 1    Years of education: None    Highest education level: None   Occupational History    Occupation: Retired  at [employer]   Tobacco Use    Smoking status: Former     Types: Cigarettes    Smokeless tobacco: Never   Substance and Sexual Activity    Alcohol use: Yes     Alcohol/week: 2.0  standard drinks of alcohol     Types: 2 Glasses of wine per week     Comment: socially    Drug use: Never    Sexual activity: Defer   Social History Narrative    Pt denies any distress at this time.  Denies need for SW.     Social Drivers of Health     Food Insecurity: No Food Insecurity (3/24/2025)    Hunger Vital Sign     Worried About Running Out of Food in the Last Year: Never true     Ran Out of Food in the Last Year: Never true   Transportation Needs: No Transportation Needs (1/27/2025)    PRAPARE - Transportation     Lack of Transportation (Medical): No     Lack of Transportation (Non-Medical): No   Housing Stability: Unknown (1/27/2025)    Housing Stability Vital Sign     Unable to Pay for Housing in the Last Year: No     Homeless in the Last Year: No       REVIEW OF SYSTEMS        Review of Systems  All others reviewed and negative.          PHYSICAL EXAMINATION      Temp:  [35.9 °C (96.6 °F)-37.2 °C (98.9 °F)] 36.6 °C (97.9 °F)  Heart Rate:  [64-99] 98  Resp:  [16-20] 16  BP: ()/(6-59) 122/6  Body mass index is 27.5 kg/m².    General exam : appears age stated, well nourished, not in distress  Head: atraumatic, normocephalic  Eyes : PERRLA, EOMI, no pallor, no icterus  ENT: no lesions, oropharynx pink, mucous membranes moist   Neck: supple, no Lymph nodes, no Thyromegaly, no JVD   CVS : normal rate, normal rhythm, S1 and S2 heard, no murmurs, rubs or gallops  Resp:normal accessory muscle usage, clear to auscultation Bilaterally  Abdomen : soft, Nt, BS +, no organomegaly   Extremities : no edema, no cyanosis   MSK: no DJD, no joint swellings, no joint tenderness   Skin: intact, warm, no rash  Neuro: AAO x3, CN 2-12 intact,  motor strength in all extremities, sensations, DTRs, coordination intact.  Psych: normal mood.cooperative      LABS / IMAGING / EKG        Labs  CBC Results         03/24/25 03/18/25 03/13/25     0939 0759 1209    WBC 55.48 24.58 21.96    RBC 3.23 2.83 2.98    HGB 9.2 8.1 8.3    HCT  28.4 25.6 26.6    MCV 87.9 90.5 89.3    MCH 28.5 28.6 27.9    MCHC 32.4 31.6 31.2     389 428           Comment for WBC at 0939 on 03/24/25: ALL RESULTS HAVE BEEN CHECKED This result has been called to Miguelangel Macias MD by Jamie on 03/24/2025 10:06:37, and has been read back.           CMP Results         03/24/25 03/18/25 02/01/25     0939 0759 0603     133 139    K 3.6 3.7 4.4    Cl 100 103 109    CO2 26 24 24    Glucose 100 124 100    BUN 31 13 22    Creatinine 1.5 1.2 1.3    Calcium 9.0 9.0 8.5    Anion Gap 8 6 6    AST 10 9 --    ALT 12 7 --    Albumin 3.2 3.3 --    EGFR 36.4 47.6 43.5           Comment for K at 0939 on 03/24/25: Results obtained on plasma. Plasma Potassium values may be up to 0.4 mEQ/L less than serum values. The differences may be greater for patients with high platelet or white cell counts.    Comment for K at 0759 on 03/18/25: Results obtained on plasma. Plasma Potassium values may be up to 0.4 mEQ/L less than serum values. The differences may be greater for patients with high platelet or white cell counts.    Comment for EGFR at 0939 on 03/24/25: Calculation based on the Chronic Kidney Disease Epidemiology Collaboration (CKD-EPI) equation refit without adjustment for race.    Comment for EGFR at 0759 on 03/18/25: Calculation based on the Chronic Kidney Disease Epidemiology Collaboration (CKD-EPI) equation refit without adjustment for race.    Comment for EGFR at 0603 on 02/01/25: Calculation based on the Chronic Kidney Disease Epidemiology Collaboration (CKD-EPI) equation refit without adjustment for race.            Troponin I Results         03/24/25 03/24/25 01/24/25     1155 0939 2346    HS Troponin I 10.1 9.9 9.9          Microbiology Results       Procedure Component Value Units Date/Time    SARS-COV-2 (COVID-19)/ FLU A/B, AND RSV, PCR Nasopharynx [[id number]]  (Normal) Collected: 03/24/25 1155    Specimen: Nasopharyngeal Swab from Nasopharynx Updated: 03/24/25 124      SARS-CoV-2 (COVID-19) Negative     Influenza A Negative     Influenza B Negative     Respiratory Syncytial Virus Negative    Narrative:      Testing performed using real-time PCR for detection of COVID-19. EUA approved validation studies performed on site.           UA Results         03/24/25     1452    Color Orange    Clarity Turbid    Glucose Negative    Bilirubin Negative    Ketones Negative    Sp Grav 1.011    Blood +2    Ph 7.0    Protein +2    Urobilinogen 0.2    Nitrite Positive    Leuk Est +3    WBC Too Numerous To Count    RBC Too Numerous To Count    Bacteria +3           Comment for Blood at 1452 on 03/24/25: The sensitivity of the occult blood test is equivalent to approximately 4 intact RBC/HPF.            Imaging  X-RAY CHEST 1 VIEW   Final Result   IMPRESSION:  No acute cardiopulmonary process.   Interval insertion of a right venous port is noted since the 1/24/2025 chest   studies.      COMPARISON: Two-view chest 1/24/2025.      COMMENT:  Semiupright AP imaging of the chest is completed.   Interval placement of an infusion port on the right, with tip at the level of   the mid SVC. The port is presently accessed. The lungs are normally expanded and   clear. No visible pleural fluid. No consolidation.   Normal cardiac size. Normal pulmonary vascularity.   Dual-lead pacer in place, power generator on the left.   Stable hilar and mediastinal contours.   Unremarkable upper abdomen.   Calcific tendinosis left shoulder            ECG 12 lead   Final Result            ECG/Telemetry  Reviewed by me    ASSESSMENT AND PLAN           Assessment & Plan    # Sepsis with acute organ dysfunction  # UTI   Hypotensive, other vitals normal. Labs significant for mild FIDELIA on CKD, hypomagnesemia, leukocytosis, chronic anemia. Remainder of CMP and CBC within normal limits. Lactate within normal limits. UA + UTI. CXR No acute cardiopulmonary process. Interval insertion of a right venous port is noted since the  1/24/2025 chest studies. Received IVF bolus x 2 and IV rocephin in the ED. Admitted for further workup and management of sepsis due to UTI.   Continuous cardiac monitoring   Close monitoring of hemodynamics   Continued on IVF hydration   Continued on IV antibiotics   Follow urine and blood cultures   Supportive measures   ID and Heme/Onc consulted      # Neoplasm of uncertain behavior of bladder  S/p cysto and tumor resection   Currently undergoing chemoradiation with Dr. Soria and Dr. Amanda  Now with sepsis due to UTI. See section   Heme/Onc consulted      # Syncope and collapse  Occurring in the setting of sepsis due to UTI. Treatment as noted   BP improved with IVF hydration given in ED   Close monitoring of hemodynamics   Check orthostatic vitals      # FIDELIA (acute kidney injury) (CMS/HCC)  Cr 1.5 today compared to baseline range 1.2-1.3  Occurring in the setting of sepsis due to UTI. Treatment as noted   Avoid nephrotoxins   Check Cr level     # Anemia  Hgb stable in comparison to baseline      # Hyperlipidemia  Continued on rosuvastatin      # Hypomagnesemia  Correct Mag level to within normal limits       # Pacemaker   S/p PPM placement for cardiac arrhythmia    VTE Assessment: Padua    VTE Prophylaxis Plan: contraindicated   Code Status: Full Code       PEARL Friedman  3/24/2025

## 2025-03-24 NOTE — PROGRESS NOTES
"Pt came from radiation treatment stating \" I'm going to pass out\".  Gladys was brought to exam room C for further evaluation by this nurse.  Upon getting pt's vital signs pt went unresponsive - with her eyes opened for 3 minutes.  Emergency response was called immediately. Pt was diaphoretic/ pale and  Blood pressure 90/50 sitting in wheelchair, pulse 64 and pulse ox 96% RA.  Concern for dehydration, and need for EKG, as pt has a pacemaker. Pt became responsive and was unaware of her unconsciousness. Report provided to 3 main manager - Michelle who escorted pt to ER.  Information provided to security.  "

## 2025-03-24 NOTE — ED PROVIDER NOTES
Emergency Medicine Note  HPI   HISTORY OF PRESENT ILLNESS     Patient is a 75-year-old female with a past medical history of CAD, (has a pacemaker since 2019), hyperlipidemia, new diagnosis of bladder cancer on chemo and radiation therapy presenting to the emergency room due to having syncopal episode after radiation.  Patient states she went into the center and had dizziness.  Was able to finish her treatment and then syncopized shortly afterwards.  According to nursing note documentations, patient was diaphoretic, pale and SBP's were in the 90s.  Patient was wheeled to the emergency room for further evaluation.  Patient states she feels back to baseline.  Denies any chest pain, shortness of breath.  States she is no longer dizzy.  Denies any fevers, chills, cough or congestion for the past few days.  No changes in p.o. intake.  No nausea or vomiting episodes.  No diarrhea, bloody stools, constipation.  Patient has urinary frequency, at baseline since the new diagnosis.  No dysuria or hematuria.  No history of DVT or PE.  On no blood thinners.    Patient just recently ended being on steroids yesterday.      Syncope  Associated symptoms: dizziness    Associated symptoms: no chest pain, no headaches, no nausea, no shortness of breath, no vomiting and no weakness          Patient History   PAST HISTORY     Reviewed from Nursing Triage:       Past Medical History:   Diagnosis Date    Bladder cancer (CMS/HCC)     Coronary artery disease     KERRI (generalized anxiety disorder)     TREVOR (obstructive sleep apnea)        Past Surgical History   Procedure Laterality Date    A-v cardiac pacemaker insertion      Colonoscopy      CYSTO, TURBT TRANS-URETHRAL RESECTION BLADDER TUMOR N/A 1/30/2025    Performed by Eugenio Jimenez MD at  [facility]    Insert / replace / remove pacemaker      Knee arthroscopy w/ meniscal repair      Tonsillectomy         Family History   Problem Relation Name Age of Onset    Heart disease Biological  Mother      Diabetes Biological Mother      Heart disease Biological Father      Coronary artery disease Biological Father      Liver cancer Biological Brother      Prostate cancer Biological Brother         Social History     Tobacco Use    Smoking status: Former     Types: Cigarettes    Smokeless tobacco: Never   Substance Use Topics    Alcohol use: Yes     Alcohol/week: 2.0 standard drinks of alcohol     Types: 2 Glasses of wine per week     Comment: socially    Drug use: Never         Review of Systems   REVIEW OF SYSTEMS     Review of Systems   Constitutional:  Negative for activity change and appetite change.   HENT:  Negative for congestion.    Respiratory:  Negative for cough and shortness of breath.    Cardiovascular:  Positive for syncope. Negative for chest pain.   Gastrointestinal:  Negative for abdominal distention, abdominal pain, blood in stool, diarrhea, nausea and vomiting.   Genitourinary:  Positive for frequency. Negative for difficulty urinating.   Musculoskeletal:  Negative for arthralgias and gait problem.   Neurological:  Positive for dizziness, tremors and syncope. Negative for weakness, light-headedness, numbness and headaches.         VITALS     ED Vitals      Date/Time Temp Pulse Resp BP SpO2 [facility]   03/24/25 0841 37.2 °C (98.9 °F) 93 16 126/59 97 % LTE                         Physical Exam   PHYSICAL EXAM     Physical Exam  Constitutional:       Appearance: Normal appearance.   HENT:      Head: Atraumatic.      Mouth/Throat:      Mouth: Mucous membranes are moist.   Eyes:      Extraocular Movements: Extraocular movements intact.      Conjunctiva/sclera: Conjunctivae normal.      Pupils: Pupils are equal, round, and reactive to light.   Cardiovascular:      Rate and Rhythm: Normal rate and regular rhythm.      Pulses: Normal pulses.      Heart sounds: Normal heart sounds.   Pulmonary:      Effort: Pulmonary effort is normal.      Breath sounds: Normal breath sounds.   Abdominal:       General: Abdomen is flat. Bowel sounds are normal.      Palpations: Abdomen is soft.   Musculoskeletal:         General: Normal range of motion.   Skin:     General: Skin is warm.      Capillary Refill: Capillary refill takes less than 2 seconds.   Neurological:      General: No focal deficit present.      Mental Status: She is alert and oriented to person, place, and time. Mental status is at baseline.   Psychiatric:         Mood and Affect: Mood normal.           PROCEDURES     Procedures     DATA     Results       Procedure Component Value Units Date/Time    CBC and differential [[id number]]  (Abnormal) Collected: 03/24/25 0939    Specimen: Blood, Venous Updated: 03/24/25 1007     WBC 55.48 K/uL      Comment: ALL RESULTS HAVE BEEN CHECKED This result has been called to Miguelangel Macias MD by Jamie on 03/24/2025 10:06:37, and has been read back.         RBC 3.23 M/uL      Hemoglobin 9.2 g/dL      Hematocrit 28.4 %      MCV 87.9 fL      MCH 28.5 pg      MCHC 32.4 g/dL      RDW 14.0 %      Platelets 358 K/uL      MPV 9.4 fL     Comprehensive metabolic panel [[id number]] Collected: 03/24/25 0939    Specimen: Blood, Venous Updated: 03/24/25 0947    [place] Draw Panel [[id number]] Collected: 03/24/25 0939    Specimen: Blood, Venous Updated: 03/24/25 0947    Narrative:      The following orders were created for panel order [place] Draw Panel.  Procedure                               Abnormality         Status                     ---------                               -----------         ------                     RAINBOW LT BLUE[[id number]]                                  In process                   Please view results for these tests on the individual orders.    RAINBOW LT BLUE [[id number]] Collected: 03/24/25 0939    Specimen: Blood, Venous Updated: 03/24/25 0947    HS Troponin (with 2 hour reflex) [[id number]] Collected: 03/24/25 0939    Specimen: Blood, Venous Updated: 03/24/25 0946            Imaging Results     None         ECG 12 lead          Scoring tools                                  ED Course & MDM   MDM / ED COURSE / CLINICAL IMPRESSION / DISPO   Patient is a 75-year-old female presenting to the emergency room after radiation therapy due to a syncopal episode.  EKG was similar to prior, patient is paced.  Tropes were flat.  Patients lab work was notable for leukocytosis to 55, prior was 24.  Patient states she is on a long dose of steroids.  Patient is not reporting any signs or symptoms of infection.  Sepsis evaluation was done.  Lactate is within normal limits, lower suspicion for hypoperfusion due to sepsis.  Electrolytes were overall within normal range, mag was repleted.  Patient's creatinine is stable and similar to prior.  Blood cultures were collected and are in process.  Viral panel is negative.  Last x-ray did not show any evidence of obvious signs of pneumonia.  Patient is also not in any acute respiratory distress and satting well on room air.  Urine did show possible signs of infection, was recently diagnosed with a UTI in February and was started on Rocephin.  Patient was given a bolus of fluids, mag replacement and ceftriaxone was started.    Patient will need to be admitted for further workup for increasing leukocytosis and recurrent lightheadedness, presyncopal episodes.      Medical Decision Making  Amount and/or Complexity of Data Reviewed  Labs: ordered.  Radiology: ordered.  ECG/medicine tests: ordered.        ED Course as of 03/24/25 1531   Mon Mar 24, 2025   1017 WBC(!!): 55.48  Patient recently just finished a course of steroids.  Patient's white blood cell count was in the 20s 6 days ago.  No report of infectious symptoms.  Do infectious workup due to severe elevation.  Can be also cancer related. [KP]   1022 Creatinine(!): 1.5  Similar to prior.  No acute FIDELIA due to prerenal, obstructive or intrarenal causes. [KP]   1023 EKG resident interpretation: Patient is atrial and ventricularly  paced.  Similar to prior. [KP]   1023 High Sens Troponin I: 9.9 [KP]   1220 Lactate: 1.9 [KP]   1221 Cxr final result:   COMMENT:  Semiupright AP imaging of the chest is completed.  Interval placement of an infusion port on the right, with tip at the level of  the mid SVC. The port is presently accessed. The lungs are normally expanded and  clear. No visible pleural fluid. No consolidation.  Normal cardiac size. Normal pulmonary vascularity.  Dual-lead pacer in place, power generator on the left.  Stable hilar and mediastinal contours.  Unremarkable upper abdomen.  Calcific tendinosis left shoulder      [KP]   1221 Lactate: 1.9  Lower suspicion for any hypoperfusion caused by intermittent episodes of low blood pressure.  Was started on fluids due to having dizziness episode and lower blood pressure.  Patient's blood pressure was fluid responsive. [KP]   1237 Trop is flat  [KP]   1531 Leukocyte Esterase(!): +3 [KP]   1531 Nitrite, Urine(!): Positive [KP]      ED Course User Index  [KP] Miguelangel Macias MD     Clinical Impression      None                 Miguelangel Macias MD  Resident  03/24/25 1536

## 2025-03-24 NOTE — ED ATTESTATION NOTE
I have personally seen and examined Gladys Woo. I reviewed and agree with resident's assessment and plan of care, with any exceptions as documented below.       My focused history, examination, assessment, and plan of care of Gladys Woo is as follows:    Brief History:  HPI  75-year-old female with history of CAD, pacer, new diagnosis of bladder cancer recently started on chemo and radiation last week presents with a syncopal event.  Patient feels well at this time.  Patient denies fevers, cough, congestion.  Patient has had no nausea or vomiting.  Patient denies dysuria or blood in her urine.  Patient reports she feels well at this time.  Patient does not recall getting any medications with her chemo to boost her white blood cells.  Patient reports that she had an elevated white count last month and she was treated for an infection and the white count improved.  Patient has been on steroids recently.  Patient is awake alert in no acute distress.  She is well-appearing.  Patient is afebrile.  Her heart rate is normal.  She has a normal room air oxygen saturation.  Her skin is dry normal in color.      Focused Physical Exam:  Physical Exam    Patient is awake alert in no acute distress.  She is well-appearing.  Patient is afebrile.  Her heart rate is normal.  She has a normal room air oxygen saturation.  Her skin is dry normal in color.    Assessment / Plan / MDM:  Medical Decision Making  Amount and/or Complexity of Data Reviewed  Labs: ordered.  Radiology: ordered.  ECG/medicine tests: ordered.      Lab work was obtained and patient's white blood cell count is very elevated at 55.  It was previously of 24.  Her chest x-ray shows no acute disease.  Her lactate is normal.  Her COVID, flu, SV testing are negative.  Her troponin test is normal.  Urinalysis was obtained.  Patient was started on antibiotics based on her prior antibiotic course.  Patient will be admitted to the hospital.  Critical  care time spent taking care of the patient was 33 minutes.      I was physically present for the key/critical portions of the following procedures:  Procedures       Vivian Prakash MD  03/24/25 8278

## 2025-03-24 NOTE — TELEPHONE ENCOUNTER
Received call on triage from radiation RN Desire that when pt arrived for radiation treatment today, pt was having chills (no fever), dizziness, and hypotension.  See vitals in chart.

## 2025-03-25 ENCOUNTER — APPOINTMENT (OUTPATIENT)
Dept: RADIATION ONCOLOGY | Facility: HOSPITAL | Age: 75
Setting detail: RADIATION/ONCOLOGY SERIES
End: 2025-03-25
Attending: RADIOLOGY
Payer: MEDICARE

## 2025-03-25 PROBLEM — E83.42 HYPOMAGNESEMIA: Status: ACTIVE | Noted: 2025-03-25

## 2025-03-25 PROBLEM — A41.9 SEPSIS (CMS/HCC): Status: ACTIVE | Noted: 2025-03-25

## 2025-03-25 LAB
ALBUMIN SERPL-MCNC: 2.6 G/DL (ref 3.5–5.7)
ALP SERPL-CCNC: 56 IU/L (ref 34–125)
ALT SERPL-CCNC: 9 IU/L (ref 7–52)
ANION GAP SERPL CALC-SCNC: 6 MEQ/L (ref 3–15)
AST SERPL-CCNC: 7 IU/L (ref 13–39)
BACTERIA UR CULT: NORMAL
BACTERIA UR CULT: NORMAL
BASOPHILS # BLD: 0 K/UL (ref 0.01–0.1)
BASOPHILS NFR BLD: 0 %
BILIRUB SERPL-MCNC: 0.3 MG/DL (ref 0.3–1.2)
BUN SERPL-MCNC: 34 MG/DL (ref 7–25)
BURR CELLS BLD QL SMEAR: ABNORMAL
CALCIUM SERPL-MCNC: 8.2 MG/DL (ref 8.6–10.3)
CHLORIDE SERPL-SCNC: 106 MEQ/L (ref 98–107)
CO2 SERPL-SCNC: 24 MEQ/L (ref 21–31)
CREAT SERPL-MCNC: 1.6 MG/DL (ref 0.6–1.2)
DACRYOCYTES BLD QL SMEAR: ABNORMAL
DIFFERENTIAL METHOD BLD: ABNORMAL
EGFRCR SERPLBLD CKD-EPI 2021: 33.7 ML/MIN/1.73M*2
EOSINOPHIL # BLD: 0 K/UL (ref 0.04–0.36)
EOSINOPHIL NFR BLD: 0 %
ERYTHROCYTE [DISTWIDTH] IN BLOOD BY AUTOMATED COUNT: 14.3 % (ref 11.7–14.4)
GLUCOSE SERPL-MCNC: 137 MG/DL (ref 70–99)
HCT VFR BLD AUTO: 23 % (ref 35–45)
HGB BLD-MCNC: 7.5 G/DL (ref 11.8–15.7)
HYPOCHROMIA BLD QL SMEAR: ABNORMAL
LYMPHOCYTES # BLD: 0.74 K/UL (ref 1.2–3.5)
LYMPHOCYTES NFR BLD: 2 %
MCH RBC QN AUTO: 28.6 PG (ref 28–33.2)
MCHC RBC AUTO-ENTMCNC: 32.6 G/DL (ref 32.2–35.5)
MCV RBC AUTO: 87.8 FL (ref 83–98)
MONOCYTES # BLD: 0.37 K/UL (ref 0.28–0.8)
MONOCYTES NFR BLD: 1 %
NEUTS BAND # BLD: 35.98 K/UL (ref 1.7–7)
NEUTS SEG NFR BLD: 97 %
NEUTS VAC BLD QL SMEAR: ABNORMAL
OVALOCYTES BLD QL SMEAR: ABNORMAL
PLAT MORPH BLD: NORMAL
PLATELET # BLD AUTO: 275 K/UL (ref 150–369)
PLATELET # BLD EST: ABNORMAL 10*3/UL
PMV BLD AUTO: 9.4 FL (ref 9.4–12.3)
POTASSIUM SERPL-SCNC: 4.1 MEQ/L (ref 3.5–5.1)
PROT SERPL-MCNC: 5 G/DL (ref 6–8.2)
RBC # BLD AUTO: 2.62 M/UL (ref 3.93–5.22)
SODIUM SERPL-SCNC: 136 MEQ/L (ref 136–145)
TOXIC GRANULES BLD QL SMEAR: SLIGHT
WBC # BLD AUTO: 37.09 K/UL (ref 3.8–10.5)

## 2025-03-25 PROCEDURE — 85025 COMPLETE CBC W/AUTO DIFF WBC: CPT | Performed by: PHYSICIAN ASSISTANT

## 2025-03-25 PROCEDURE — 99233 SBSQ HOSP IP/OBS HIGH 50: CPT | Performed by: STUDENT IN AN ORGANIZED HEALTH CARE EDUCATION/TRAINING PROGRAM

## 2025-03-25 PROCEDURE — 63700000 HC SELF-ADMINISTRABLE DRUG: Performed by: PHYSICIAN ASSISTANT

## 2025-03-25 PROCEDURE — 25800000 HC PHARMACY IV SOLUTIONS: Performed by: STUDENT IN AN ORGANIZED HEALTH CARE EDUCATION/TRAINING PROGRAM

## 2025-03-25 PROCEDURE — 63600000 HC DRUGS/DETAIL CODE: Mod: JZ | Performed by: PHYSICIAN ASSISTANT

## 2025-03-25 PROCEDURE — 25800000 HC PHARMACY IV SOLUTIONS: Performed by: PHYSICIAN ASSISTANT

## 2025-03-25 PROCEDURE — 36415 COLL VENOUS BLD VENIPUNCTURE: CPT | Performed by: PHYSICIAN ASSISTANT

## 2025-03-25 PROCEDURE — 63700000 HC SELF-ADMINISTRABLE DRUG

## 2025-03-25 PROCEDURE — 63600000 HC DRUGS/DETAIL CODE: Mod: JZ | Performed by: STUDENT IN AN ORGANIZED HEALTH CARE EDUCATION/TRAINING PROGRAM

## 2025-03-25 PROCEDURE — 80053 COMPREHEN METABOLIC PANEL: CPT | Performed by: PHYSICIAN ASSISTANT

## 2025-03-25 PROCEDURE — 99222 1ST HOSP IP/OBS MODERATE 55: CPT | Performed by: INTERNAL MEDICINE

## 2025-03-25 PROCEDURE — 21400000 HC ROOM AND CARE CCU/INTERMEDIATE

## 2025-03-25 PROCEDURE — 63700000 HC SELF-ADMINISTRABLE DRUG: Performed by: STUDENT IN AN ORGANIZED HEALTH CARE EDUCATION/TRAINING PROGRAM

## 2025-03-25 RX ORDER — ONDANSETRON 4 MG/1
4 TABLET, ORALLY DISINTEGRATING ORAL EVERY 8 HOURS PRN
Status: DISCONTINUED | OUTPATIENT
Start: 2025-03-25 | End: 2025-03-25

## 2025-03-25 RX ORDER — DEXAMETHASONE 4 MG/1
8 TABLET ORAL EVERY 12 HOURS
Status: DISCONTINUED | OUTPATIENT
Start: 2025-03-25 | End: 2025-03-26

## 2025-03-25 RX ORDER — ONDANSETRON HYDROCHLORIDE 2 MG/ML
4 INJECTION, SOLUTION INTRAVENOUS EVERY 8 HOURS PRN
Status: DISCONTINUED | OUTPATIENT
Start: 2025-03-25 | End: 2025-03-25

## 2025-03-25 RX ORDER — ENOXAPARIN SODIUM 100 MG/ML
40 INJECTION SUBCUTANEOUS
Status: DISCONTINUED | OUTPATIENT
Start: 2025-03-25 | End: 2025-03-28 | Stop reason: HOSPADM

## 2025-03-25 RX ORDER — SENNOSIDES 8.6 MG/1
2 TABLET ORAL NIGHTLY PRN
Status: DISCONTINUED | OUTPATIENT
Start: 2025-03-25 | End: 2025-03-27

## 2025-03-25 RX ORDER — PROCHLORPERAZINE MALEATE 10 MG
5 TABLET ORAL EVERY 6 HOURS
Status: DISCONTINUED | OUTPATIENT
Start: 2025-03-25 | End: 2025-03-28 | Stop reason: HOSPADM

## 2025-03-25 RX ADMIN — ENOXAPARIN SODIUM 40 MG: 40 INJECTION SUBCUTANEOUS at 20:07

## 2025-03-25 RX ADMIN — BUSPIRONE HYDROCHLORIDE 5 MG: 5 TABLET ORAL at 10:11

## 2025-03-25 RX ADMIN — SENNOSIDES 2 TABLET: 8.6 TABLET, FILM COATED ORAL at 20:08

## 2025-03-25 RX ADMIN — CEFTRIAXONE SODIUM 1 G: 1 INJECTION, POWDER, FOR SOLUTION INTRAMUSCULAR; INTRAVENOUS at 17:41

## 2025-03-25 RX ADMIN — ROSUVASTATIN 40 MG: 40 TABLET, FILM COATED ORAL at 10:11

## 2025-03-25 RX ADMIN — SODIUM CHLORIDE: 9 INJECTION, SOLUTION INTRAVENOUS at 22:50

## 2025-03-25 RX ADMIN — BUSPIRONE HYDROCHLORIDE 5 MG: 5 TABLET ORAL at 20:08

## 2025-03-25 RX ADMIN — PROCHLORPERAZINE MALEATE 5 MG: 10 TABLET ORAL at 20:07

## 2025-03-25 NOTE — ASSESSMENT & PLAN NOTE
Patient with recent diagnosis of locally advanced bladder cancer.  She opted for treatment with concurrent radiation and chemotherapy.  Received her first dose of low-dose weekly cisplatin on March 19.  Now admitted for syncopal episode.  Chemotherapy will be on hold in the setting of acute illness and hospitalization.  She is being evaluated for possible underlying infection contributing to her syncope.  Will continue to follow patient while hospitalized and she will follow-up with Dr. Soria after discharge for ongoing management.

## 2025-03-25 NOTE — ASSESSMENT & PLAN NOTE
Patient with acute on chronic anemia.  Suspect acute drop in hemoglobin is dilutional related to IV fluids patient being hemoconcentrated at admission.  Will follow CBC closely and transfuse if hemoglobin less than 7 or less than 8 with increased symptoms.

## 2025-03-25 NOTE — PROGRESS NOTES
Hospital Medicine     Daily Progress Note       SUBJECTIVE   Patient seen and examined at bedside.  Vital signs and lab work reviewed.   Feels better overall   Denies any acute complaint    Updated patient's daughter-in-law at bedside.  Questions answered.  She is worried about her recurrent UTI and wants her to be on prophylaxis treatment     OBJECTIVE   Vital signs in last 24 hours:  Temp:  [36.3 °C (97.3 °F)-37.2 °C (98.9 °F)] 37.2 °C (98.9 °F)  Heart Rate:  [] 104  Resp:  [16-22] 17  BP: (116-143)/(56-62) 132/60    Intake/Output Summary (Last 24 hours) at 3/25/2025 1825  Last data filed at 3/24/2025 1906  Gross per 24 hour   Intake 1000 ml   Output --   Net 1000 ml       Physical Exam    General exam : appears age stated,  not in distress  Head: atraumatic, normocephalic  Eyes : PERRLA, EOMI, no pallor, no icterus  ENT: no lesions, oropharynx pink, mucous membranes moist   Neck: supple, no Lymph nodes, no Thyromegaly, no JVD   CVS : normal rate, normal rhythm, S1 and S2 heard, no murmurs, rubs or gallops  Resp:normal accessory muscle usage, clear to auscultation Bilaterally  Abdomen : soft, Nt, BS +, no organomegaly   Extremities : no edema, no cyanosis   MSK: no DJD, no joint swellings, no joint tenderness   Skin: intact, warm, no rash  Neuro: AAO x3, CN 2-12 intact,  motor strength in all extremities, sensations, DTRs, coordination intact.  Psych: normal mood.cooperative      LINES, DRAINS, AIRWAYS, WOUNDS   Single Lumen Implantable Port 03/24/25 Right Chest (Active)   Number of days: 1        LABS, IMAGING, TELEMETRY   CHEMISTRIES   Results from last 7 days   Lab Units 03/25/25  0608 03/24/25  0939   SODIUM mEQ/L 136 134*   POTASSIUM mEQ/L 4.1 3.6   CHLORIDE mEQ/L 106 100   CO2 mEQ/L 24 26   BUN mg/dL 34* 31*   CREATININE mg/dL 1.6* 1.5*   GLUCOSE mg/dL 137* 100*   CALCIUM mg/dL 8.2* 9.0   EGFR mL/min/1.73m*2 33.7* 36.4*   ANION GAP mEQ/L 6 8   MAGNESIUM mg/dL  --  1.7*     HEPATIC  FUNCTION TESTS   Results from last 7 days   Lab Units 03/25/25  0608 03/24/25  0939   AST IU/L 7* 10*   ALT IU/L 9 12   ALK PHOS IU/L 56 64   ALBUMIN g/dL 2.6* 3.2*   PROTEIN TOTAL g/dL 5.0* 6.1   BILIRUBIN TOTAL mg/dL 0.3 0.3     CBC RESULTS   Results from last 7 days   Lab Units 03/25/25  0608 03/24/25  0939   WBC K/uL 37.09* 55.48*   HEMOGLOBIN g/dL 7.5* 9.2*   HEMATOCRIT % 23.0* 28.4*   PLATELETS K/uL 275 358       Radiology Reports in last 24hNo results found.    Telemetry personally reviewed. No significant events overnight.  Continue cardiac monitoring on Telemetry level of care.   ASSESSMENT AND PLAN   # Sepsis with acute organ dysfunction  # UTI (recurrent)  Continuous cardiac monitoring  Close monitoring of hemodynamics  Continued on IVF hydration  Follow urine and blood cultures  Supportive measures  ID and Heme/Onc consulted  - currently on: ceftriaxone 1 g iv q24h (03/24/25 - current)     # Neoplasm of uncertain behavior of bladder  S/p cysto and tumor resection  Currently undergoing chemoradiation with Dr. Soria and Dr. Amanda  Now with sepsis due to UTI. See section  Heme/Onc on board     # Syncope and collapse  Occurring in the setting of sepsis due to UTI. Treatment as noted  BP improved with IVF hydration given in ED  Close monitoring of hemodynamics  Check orthostatic vitals     # FIDELIA (acute kidney injury) (CMS/[facility])  - creatinine: 1.6 (3/25/25) up from 1.5 (3/24/25)  baseline range 1.2-1.3  Occurring in the setting of sepsis due to UTI. Treatment as noted  Avoid nephrotoxins  IV fluids  Check Cr level     # Anemia  Hgb stable in comparison to baseline  - hemoglobin: 7.5 (3/25/25) down from 9.2 (3/24/25)  - total iron: 10, ferritin: 313, total iron binding capacity: 231 (3/19/25), transferrin saturation: 9 % (1/25/25)  likely dilutional   continue to monitor CBC      # Hyperlipidemia  Continued on rosuvastatin     # Hypomagnesemia  Correct Mag level to within normal limits  - Magnesium: 1.7 mg/dL  (3/24/25)  supplement as needed     # Pacemaker  S/p PPM placement for cardiac arrhythmia     # Depression and Anxiety: recurrent  - continue home: buspirone 5 mg po BID         VTE Prophylaxis:  Current anticoagulants:  enoxaparin (LOVENOX) syringe 40 mg, subcutaneous, Daily (6p)        Code Status: Full Code        Estimated Discharge Date: 3/26/2025   Disposition Planning: Pending improvement, IV fluids, IV antibiotics, monitor CBC       Michelle Suresh MD  3/25/2025

## 2025-03-25 NOTE — PROGRESS NOTES
Recent events noted.  Will hold treatment again today and resume treatment tomorrow for definitive radiation therapy for clinical stage IIIa bladder cancer.

## 2025-03-25 NOTE — CONSULTS
Reason for Consult: sepsis uti, on chemo    History of Present Illness:      Gladys Woo is a 75 y.o. female with    TREVOR  Pacer  BladderCA  TURB 1/30   Cisplatin 5d ago c dex 8 QD     Few days ago c fatigue  No f, c, sweats, sore throat, emesis, abd pain, diarrhea  No dysuria, urg, freq  No CP, palp, sob, cough  Has cloudy and bloody urine, as has been    Yest drove self to XRT  Then c LOC at xrt  Now feels better    Allergies:   Amoxicillin    Current Facility-Administered Medications   Medication Dose Route Frequency Provider Last Rate Last Admin    acetaminophen (TYLENOL) tablet 650 mg  650 mg oral q4h PRN Angelic Vines PA C   650 mg at 03/24/25 2310    busPIRone (BUSPAR) tablet 5 mg  5 mg oral BID Angelic Vines PA C   5 mg at 03/24/25 2009    cefTRIAXone (ROCEPHIN) IVPB 1 g in 100 mL NSS vial in bag  1 g intravenous q24h INT Angelic Vines PA C        glucose chewable tablet 16-32 g of dextrose  16-32 g of dextrose oral PRN Angelic Vines PA C        Or    dextrose 40 % oral gel 15-30 g of dextrose  15-30 g of dextrose oral PRN Angelic Vines PA C        Or    glucagon (GLUCAGEN) injection 1 mg  1 mg intramuscular PRN Angelic Vines PA C        Or    dextrose 50 % in water (D50) injection 12.5 g  25 mL intravenous PRN Angelic Vines PA C        magnesium oxide (MAG-OX) tablet 400 mg  400 mg oral 2x daily PRN Angelic Vines PA C        rosuvastatin (CRESTOR) tablet 40 mg  40 mg oral Daily Angelic Vines PA C        sodium chloride 0.9 % infusion   intravenous Continuous Angelic Vines PA C 80 mL/hr at 03/24/25 1943 New Bag at 03/24/25 1943     Current Outpatient Medications   Medication Sig Dispense Refill    acetaminophen (TYLENOL) 500 mg tablet Take 500 mg by mouth every 6 (six) hours as needed for pain.      busPIRone (BUSPAR) 5 mg tablet Take 10 mg by mouth daily. 2 tablets      cranberry conc-C-bacillus coag (AZO CRANBERRY +  PROBIOTIC) 250-30-15 mg tablet Take 1 tablet by mouth nightly.      ondansetron (ZOFRAN) 8 mg tablet Take 1 tablet (8 mg total) by mouth every 8 (eight) hours as needed for nausea or vomiting. 30 tablet 3    prochlorperazine (COMPAZINE) 10 mg tablet Take 1 tablet (10 mg total) by mouth every 6 (six) hours as needed for nausea or vomiting. 30 tablet 3    rosuvastatin (CRESTOR) 40 mg tablet Take 40 mg by mouth daily.        Social History:   Former  for [employer]  Former tob, +EtOH    Lives alone, no pets, travel    Family History: [place]    Review of Systems  Negative x as stated above    Temp:  [35.9 °C (96.6 °F)-37.2 °C (98.9 °F)] 36.3 °C (97.3 °F)  Heart Rate:  [] 88  Resp:  [16-22] 19  BP: ()/(6-61) 116/56  SpO2 Readings from Last 3 Encounters:   03/25/25 97%   03/24/25 97%   03/21/25 100%     Physical Exam  WD WN NAD    Head: Atraumatic  Mouth: Clear  Skin: No rash  Sclera: Anicteric  Neck: Supple  LN:  No significant enlargement  Lungs: rales bases  CV: Nl S1 and S2, soft syst m, no embolic changes  Abd: Soft, NT, no HSM  Extr: No jt effusions, no edema  Neuro: Alert, lucid    Labs  I have reviewed the patient's pertinent labs.     Imaging:     X-RAY CHEST 1 VIEW  Result Date: 3/24/2025  IMPRESSION:  No acute cardiopulmonary process.     Impression    High WBC  On dex, may be from this  No focus by hx, exam  Rapidly falling here    UA is abnl, but this will always be given CA  Ceftriaxone ok for now    PATIENCE Escalante MD

## 2025-03-25 NOTE — CONSULTS
Consult Note    Subjective     Gladys Woo is a 75 y.o. female who was admitted for Syncope and collapse [R55]. Patient was referred by Dr. Booth for management recommendations. Patient is a 75-year-old woman with recent diagnosis of locally advanced bladder cancer known to my partner, Dr. Soria.  Patient recently started radiation with low-dose weekly cisplatin as definitive treatment.  Yesterday when at radiation she had a syncopal event and was brought to the emergency room.  She reports some chronic dyspnea.  She denies fevers or chills.  She was on a recent course of Decadron on to prevent chemotherapy associated nausea.  Labs in the emergency room were significant for leukocytosis, elevated creatinine and anemia.  Today patient reports she is feeling somewhat better.  She has been receiving IV fluids.  She denies nausea, vomiting or diarrhea.  She is not having fevers or chills.    Pertinent radiology results reviewed. Pertinent lab results reviewed.    Past Medical History:   Diagnosis Date    Bladder cancer (CMS/HCC)     Coronary artery disease     KERRI (generalized anxiety disorder)     TREVOR (obstructive sleep apnea)        Past Surgical History   Procedure Laterality Date    A-v cardiac pacemaker insertion      Colonoscopy      CYSTO, TURBT TRANS-URETHRAL RESECTION BLADDER TUMOR N/A 1/30/2025    Performed by Eugenio Jimenez MD at  OR    Insert / replace / remove pacemaker      Knee arthroscopy w/ meniscal repair      Tonsillectomy         Social History     Socioeconomic History    Marital status:      Spouse name: None    Number of children: 1    Years of education: None    Highest education level: None   Occupational History    Occupation: Retired  at [employer]   Tobacco Use    Smoking status: Former     Types: Cigarettes    Smokeless tobacco: Never   Substance and Sexual Activity    Alcohol use: Yes     Alcohol/week: 2.0 standard drinks of alcohol     Types: 2  Glasses of wine per week     Comment: socially    Drug use: Never    Sexual activity: Defer   Social History Narrative    Pt denies any distress at this time.  Denies need for SW.     Social Drivers of Health     Food Insecurity: No Food Insecurity (3/24/2025)    Hunger Vital Sign     Worried About Running Out of Food in the Last Year: Never true     Ran Out of Food in the Last Year: Never true   Transportation Needs: No Transportation Needs (1/27/2025)    PRAPARE - Transportation     Lack of Transportation (Medical): No     Lack of Transportation (Non-Medical): No   Housing Stability: Unknown (1/27/2025)    Housing Stability Vital Sign     Unable to Pay for Housing in the Last Year: No     Homeless in the Last Year: No       Family History   Problem Relation Name Age of Onset    Heart disease Biological Mother      Diabetes Biological Mother      Heart disease Biological Father      Coronary artery disease Biological Father      Liver cancer Biological Brother      Prostate cancer Biological Brother         Amoxicillin    Current Facility-Administered Medications   Medication Dose Route Frequency Provider Last Rate Last Admin    acetaminophen (TYLENOL) tablet 650 mg  650 mg oral q4h PRN Angelic Vines PA C   650 mg at 03/24/25 2310    busPIRone (BUSPAR) tablet 5 mg  5 mg oral BID Angelic Vines PA C   5 mg at 03/25/25 1011    cefTRIAXone (ROCEPHIN) IVPB 1 g in 100 mL NSS vial in bag  1 g intravenous q24h INT Angelic Vines PA C        glucose chewable tablet 16-32 g of dextrose  16-32 g of dextrose oral PRN Angelic Vines PA C        Or    dextrose 40 % oral gel 15-30 g of dextrose  15-30 g of dextrose oral PRN Angelic Vines PA C        Or    glucagon (GLUCAGEN) injection 1 mg  1 mg intramuscular PRN Angelic Vines PA C        Or    dextrose 50 % in water (D50) injection 12.5 g  25 mL intravenous PRN Angelic Vines PA C        magnesium oxide (MAG-OX) tablet 400 mg  400 mg  oral 2x daily PRN Angelic Vines PA C        rosuvastatin (CRESTOR) tablet 40 mg  40 mg oral Daily Angelic Vines PA C   40 mg at 03/25/25 1011    sodium chloride 0.9 % infusion   intravenous Continuous Angelic Vines PA C 80 mL/hr at 03/24/25 1943 New Bag at 03/24/25 1943     Current Outpatient Medications   Medication Sig Dispense Refill    acetaminophen (TYLENOL) 500 mg tablet Take 500 mg by mouth every 6 (six) hours as needed for pain.      busPIRone (BUSPAR) 5 mg tablet Take 10 mg by mouth daily. 2 tablets      cranberry conc-C-bacillus coag (AZO CRANBERRY + PROBIOTIC) 250-30-15 mg tablet Take 1 tablet by mouth nightly.      ondansetron (ZOFRAN) 8 mg tablet Take 1 tablet (8 mg total) by mouth every 8 (eight) hours as needed for nausea or vomiting. 30 tablet 3    prochlorperazine (COMPAZINE) 10 mg tablet Take 1 tablet (10 mg total) by mouth every 6 (six) hours as needed for nausea or vomiting. 30 tablet 3    rosuvastatin (CRESTOR) 40 mg tablet Take 40 mg by mouth daily.         Review of Systems  All other systems reviewed and negative except as noted in the HPI.    Vital signs in last 24 hours:  Temp:  [36.3 °C (97.3 °F)-36.6 °C (97.9 °F)] 36.5 °C (97.7 °F)  Heart Rate:  [] 95  Resp:  [16-22] 18  BP: (104-143)/(6-62) 129/62    Objective     Physical Exam  General appearance: alert, appears stated age, and cooperative  Head: normocephalic, without obvious abnormality, atraumatic  Eyes: conjunctivae clear. PERRL, EOM's intact.  Throat: normal oropharynx  Neck: no adenopathy and supple, symmetrical, trachea midline  Lungs: clear to auscultation bilaterally  Heart: regular rate and rhythm, S1, S2 normal, no murmur, click, rub or gallop  Abdomen: soft, non-tender; bowel sounds normal; no masses, no organomegaly  Extremities: extremities normal, warm and well-perfused; no cyanosis, clubbing, or edema  Skin: Skin color, texture, turgor normal. No rashes or lesions  Lymph nodes: Cervical and  supraclavicular nodes normal.      Labs  Recent Results (from the past 24 hours)   UA Reflex to Culture (Macroscopic)    Collection Time: 03/24/25  2:52 PM    Specimen: Urine, Clean Catch   Result Value Ref Range    Color, Urine Orange (A) Yellow, Colorless    Clarity, Urine Turbid (A) Clear    Specific Gravity, Urine 1.011 1.005 - 1.030    pH, Urine 7.0 4.5 - 8.0    Leukocyte Esterase +3 (A) Negative    Nitrite, Urine Positive (A) Negative    Protein, Urine +2 (A) Negative    Glucose, Urine Negative Negative mg/dL    Ketones, Urine Negative Negative mg/dL    Urobilinogen, Urine 0.2 <2.0 EU/dL EU/dL    Bilirubin, Urine Negative Negative mg/dL    Blood, Urine +2 (A) Negative   UA Microscopic    Collection Time: 03/24/25  2:52 PM   Result Value Ref Range    RBC, Urine Too Numerous To Count (A) 0 TO 4 /HPF    WBC, Urine Too Numerous To Count (A) 0 TO 3 /HPF    Squamous Epithelial +1 (A) None Seen /hpf    Hyaline Cast None Seen None Seen /lpf    Bacteria, Urine +3 (A) None Seen /HPF    Transitional Epithelial +1 (A) None Seen /hpf    Mucus Rare (A) None Seen /LPF   CBC and Differential    Collection Time: 03/25/25  6:08 AM   Result Value Ref Range    WBC 37.09 (HH) 3.80 - 10.50 K/uL    RBC 2.62 (L) 3.93 - 5.22 M/uL    Hemoglobin 7.5 (L) 11.8 - 15.7 g/dL    Hematocrit 23.0 (L) 35.0 - 45.0 %    MCV 87.8 83.0 - 98.0 fL    MCH 28.6 28.0 - 33.2 pg    MCHC 32.6 32.2 - 35.5 g/dL    RDW 14.3 11.7 - 14.4 %    Platelets 275 150 - 369 K/uL    MPV 9.4 9.4 - 12.3 fL    Differential Type Manu     Neutrophils 97 %    Lymphocytes 2 %    Monocytes 1 %    Eosinophils 0 %    Basophils 0 %    Neutrophils, Absolute 35.98 (H) 1.70 - 7.00 K/uL    Lymphocytes, Absolute 0.74 (L) 1.20 - 3.50 K/uL    Monocytes, Absolute 0.37 0.28 - 0.80 K/uL    Eosinophils, Absolute 0.00 (L) 0.04 - 0.36 K/uL    Basophils, Absolute 0.00 (L) 0.01 - 0.10 K/uL    PLT Morphology Normal     Platelet Estimate Adequate (150,000-400,000)     Toxic Granulation Slight      Toxic Vacuolation Occasional     Hypochromia Occasional     Ovalocytes Occasional     Teardrop Cells Occasional     Nicole Cells Occasional    Comprehensive metabolic panel    Collection Time: 03/25/25  6:08 AM   Result Value Ref Range    Sodium 136 136 - 145 mEQ/L    Potassium 4.1 3.5 - 5.1 mEQ/L    Chloride 106 98 - 107 mEQ/L    CO2 24 21 - 31 mEQ/L    BUN 34 (H) 7 - 25 mg/dL    Creatinine 1.6 (H) 0.6 - 1.2 mg/dL    Glucose 137 (H) 70 - 99 mg/dL    Calcium 8.2 (L) 8.6 - 10.3 mg/dL    AST (SGOT) 7 (L) 13 - 39 IU/L    ALT (SGPT) 9 7 - 52 IU/L    Alkaline Phosphatase 56 34 - 125 IU/L    Total Protein 5.0 (L) 6.0 - 8.2 g/dL    Albumin 2.6 (L) 3.5 - 5.7 g/dL    Bilirubin, Total 0.3 0.3 - 1.2 mg/dL    eGFR 33.7 (L) >=60.0 mL/min/1.73m*2    Anion Gap 6 3 - 15 mEQ/L     I have reviewed the patient's pertinent labs.  Significant abnormals are anemia, elevated creatinine.    Imaging  No results found.  I have independently reviewed the pertinent imaging from the last 24 hrs.      Assessment   75 y.o. female being consulted for management recommendations    Assessment & Plan  Syncope and collapse    Bladder cancer (CMS/HCC)  Patient with recent diagnosis of locally advanced bladder cancer.  She opted for treatment with concurrent radiation and chemotherapy.  Received her first dose of low-dose weekly cisplatin on March 19.  Now admitted for syncopal episode.  Chemotherapy will be on hold in the setting of acute illness and hospitalization.  She is being evaluated for possible underlying infection contributing to her syncope.  Will continue to follow patient while hospitalized and she will follow-up with Dr. Soria after discharge for ongoing management.  Anemia  Patient with acute on chronic anemia.  Suspect acute drop in hemoglobin is dilutional related to IV fluids patient being hemoconcentrated at admission.  Will follow CBC closely and transfuse if hemoglobin less than 7 or less than 8 with increased symptoms.               Andreina Whitfield MD

## 2025-03-26 ENCOUNTER — APPOINTMENT (OUTPATIENT)
Dept: RADIATION ONCOLOGY | Facility: HOSPITAL | Age: 75
Setting detail: RADIATION/ONCOLOGY SERIES
End: 2025-03-26
Attending: RADIOLOGY
Payer: MEDICARE

## 2025-03-26 ENCOUNTER — APPOINTMENT (INPATIENT)
Dept: RADIOLOGY | Facility: HOSPITAL | Age: 75
DRG: 871 | End: 2025-03-26
Attending: STUDENT IN AN ORGANIZED HEALTH CARE EDUCATION/TRAINING PROGRAM
Payer: MEDICARE

## 2025-03-26 PROBLEM — N30.00 ACUTE CYSTITIS WITHOUT HEMATURIA: Status: ACTIVE | Noted: 2025-03-26

## 2025-03-26 PROBLEM — N39.0 UTI (URINARY TRACT INFECTION): Status: RESOLVED | Noted: 2025-01-26 | Resolved: 2025-03-26

## 2025-03-26 LAB
ALBUMIN SERPL-MCNC: 2.5 G/DL (ref 3.5–5.7)
ALP SERPL-CCNC: 73 IU/L (ref 34–125)
ALT SERPL-CCNC: 15 IU/L (ref 7–52)
ANION GAP SERPL CALC-SCNC: 7 MEQ/L (ref 3–15)
ANISOCYTOSIS BLD QL SMEAR: ABNORMAL
ARIA ZRC COURSE ID: NORMAL
ARIA ZRC COURSE START DATE: NORMAL
ARIA ZRC TREATMENT ELAPSED DAYS: NORMAL
ARIA ZRP FRACTIONS TREATED TO DATE: NORMAL
ARIA ZRP PLAN ID: NORMAL
ARIA ZRP PLAN NAME: NORMAL
ARIA ZRP PRESCRIBED DOSE CGY: 1600
ARIA ZRP PRESCRIBED DOSE PER FRACTION: 2
ARIA ZRR DOSAGE GIVEN TO DATE: 12
ARIA ZRR DOSAGE GIVEN TO DATE: 12
ARIA ZRR DOSAGE GIVEN TO DATE: 12.06
ARIA ZRR REFERENCE POINT ID: NORMAL
ARIA ZRR SESSION DOSAGE GIVEN: 2
ARIA ZRR SESSION DOSAGE GIVEN: 2
ARIA ZRR SESSION DOSAGE GIVEN: 2.01
AST SERPL-CCNC: 14 IU/L (ref 13–39)
BACTERIA URNS QL MICRO: ABNORMAL /HPF
BASOPHILS # BLD: 0.04 K/UL (ref 0.01–0.1)
BASOPHILS NFR BLD: 0.1 %
BILIRUB SERPL-MCNC: 0.3 MG/DL (ref 0.3–1.2)
BILIRUB UR QL STRIP.AUTO: NEGATIVE MG/DL
BUN SERPL-MCNC: 31 MG/DL (ref 7–25)
BURR CELLS BLD QL SMEAR: ABNORMAL
CALCIUM SERPL-MCNC: 8 MG/DL (ref 8.6–10.3)
CHLORIDE SERPL-SCNC: 107 MEQ/L (ref 98–107)
CHLORIDE UR-SCNC: 65 MEQ/L
CLARITY UR REFRACT.AUTO: ABNORMAL
CO2 SERPL-SCNC: 22 MEQ/L (ref 21–31)
COLOR UR AUTO: YELLOW
CREAT SERPL-MCNC: 1.9 MG/DL (ref 0.6–1.2)
DIFFERENTIAL METHOD BLD: ABNORMAL
EGFRCR SERPLBLD CKD-EPI 2021: 27.4 ML/MIN/1.73M*2
EOSINOPHIL # BLD: 0.53 K/UL (ref 0.04–0.36)
EOSINOPHIL NFR BLD: 1.9 %
ERYTHROCYTE [DISTWIDTH] IN BLOOD BY AUTOMATED COUNT: 14.4 % (ref 11.7–14.4)
GLUCOSE SERPL-MCNC: 144 MG/DL (ref 70–99)
GLUCOSE UR STRIP.AUTO-MCNC: NEGATIVE MG/DL
HCT VFR BLD AUTO: 22.7 % (ref 35–45)
HGB BLD-MCNC: 7 G/DL (ref 11.8–15.7)
HGB UR QL STRIP.AUTO: 2
HYALINE CASTS #/AREA URNS LPF: ABNORMAL /LPF
IMM GRANULOCYTES # BLD AUTO: 0.4 K/UL (ref 0–0.08)
IMM GRANULOCYTES NFR BLD AUTO: 1.5 %
KETONES UR STRIP.AUTO-MCNC: NEGATIVE MG/DL
LEUKOCYTE ESTERASE UR QL STRIP.AUTO: 3
LYMPHOCYTES # BLD: 1.48 K/UL (ref 1.2–3.5)
LYMPHOCYTES NFR BLD: 5.4 %
MAGNESIUM SERPL-MCNC: 1.8 MG/DL (ref 1.8–2.5)
MCH RBC QN AUTO: 28 PG (ref 28–33.2)
MCHC RBC AUTO-ENTMCNC: 30.8 G/DL (ref 32.2–35.5)
MCV RBC AUTO: 90.8 FL (ref 83–98)
MONOCYTES # BLD: 1.07 K/UL (ref 0.28–0.8)
MONOCYTES NFR BLD: 3.9 %
MUCOUS THREADS URNS QL MICRO: ABNORMAL /LPF
NEUTROPHILS # BLD: 23.74 K/UL (ref 1.7–7)
NEUTS SEG NFR BLD: 87.2 %
NITRITE UR QL STRIP.AUTO: NEGATIVE
NRBC BLD-RTO: 0 %
OVALOCYTES BLD QL SMEAR: ABNORMAL
PH UR STRIP.AUTO: 6.5 [PH]
PLATELET # BLD AUTO: 102 K/UL (ref 150–369)
PLATELET # BLD EST: ABNORMAL 10*3/UL
PMV BLD AUTO: 10.1 FL (ref 9.4–12.3)
POTASSIUM SERPL-SCNC: 4.2 MEQ/L (ref 3.5–5.1)
POTASSIUM UR-SCNC: 22.5 MEQ/L
PROT SERPL-MCNC: 4.8 G/DL (ref 6–8.2)
PROT UR QL STRIP.AUTO: 2
RBC # BLD AUTO: 2.5 M/UL (ref 3.93–5.22)
RBC #/AREA URNS HPF: ABNORMAL /HPF
SODIUM SERPL-SCNC: 136 MEQ/L (ref 136–145)
SODIUM UR-SCNC: 64 MEQ/L
SODIUM UR-SCNC: 64 MEQ/L
SP GR UR REFRACT.AUTO: 1.01
SQUAMOUS URNS QL MICRO: ABNORMAL /HPF
TOXIC GRANULES BLD QL SMEAR: SLIGHT
UROBILINOGEN UR STRIP-ACNC: 0.2 EU/DL
WBC # BLD AUTO: 27.26 K/UL (ref 3.8–10.5)
WBC #/AREA URNS HPF: ABNORMAL /HPF

## 2025-03-26 PROCEDURE — 99233 SBSQ HOSP IP/OBS HIGH 50: CPT | Performed by: STUDENT IN AN ORGANIZED HEALTH CARE EDUCATION/TRAINING PROGRAM

## 2025-03-26 PROCEDURE — 84300 ASSAY OF URINE SODIUM: CPT | Performed by: STUDENT IN AN ORGANIZED HEALTH CARE EDUCATION/TRAINING PROGRAM

## 2025-03-26 PROCEDURE — 25800000 HC PHARMACY IV SOLUTIONS: Performed by: STUDENT IN AN ORGANIZED HEALTH CARE EDUCATION/TRAINING PROGRAM

## 2025-03-26 PROCEDURE — 21400000 HC ROOM AND CARE CCU/INTERMEDIATE

## 2025-03-26 PROCEDURE — 63700000 HC SELF-ADMINISTRABLE DRUG: Performed by: INTERNAL MEDICINE

## 2025-03-26 PROCEDURE — 36415 COLL VENOUS BLD VENIPUNCTURE: CPT | Performed by: STUDENT IN AN ORGANIZED HEALTH CARE EDUCATION/TRAINING PROGRAM

## 2025-03-26 PROCEDURE — 77386 HC IMRT DELIVERY COMPLEX: CPT | Performed by: RADIOLOGY

## 2025-03-26 PROCEDURE — 80053 COMPREHEN METABOLIC PANEL: CPT | Performed by: STUDENT IN AN ORGANIZED HEALTH CARE EDUCATION/TRAINING PROGRAM

## 2025-03-26 PROCEDURE — 83735 ASSAY OF MAGNESIUM: CPT | Performed by: STUDENT IN AN ORGANIZED HEALTH CARE EDUCATION/TRAINING PROGRAM

## 2025-03-26 PROCEDURE — 99232 SBSQ HOSP IP/OBS MODERATE 35: CPT | Performed by: HOSPITALIST

## 2025-03-26 PROCEDURE — 76770 US EXAM ABDO BACK WALL COMP: CPT

## 2025-03-26 PROCEDURE — 63600000 HC DRUGS/DETAIL CODE: Mod: JZ | Performed by: STUDENT IN AN ORGANIZED HEALTH CARE EDUCATION/TRAINING PROGRAM

## 2025-03-26 PROCEDURE — 81001 URINALYSIS AUTO W/SCOPE: CPT | Performed by: STUDENT IN AN ORGANIZED HEALTH CARE EDUCATION/TRAINING PROGRAM

## 2025-03-26 PROCEDURE — 85025 COMPLETE CBC W/AUTO DIFF WBC: CPT | Performed by: STUDENT IN AN ORGANIZED HEALTH CARE EDUCATION/TRAINING PROGRAM

## 2025-03-26 PROCEDURE — 63700000 HC SELF-ADMINISTRABLE DRUG: Performed by: STUDENT IN AN ORGANIZED HEALTH CARE EDUCATION/TRAINING PROGRAM

## 2025-03-26 PROCEDURE — P9016 RBC LEUKOCYTES REDUCED: HCPCS

## 2025-03-26 PROCEDURE — 63700000 HC SELF-ADMINISTRABLE DRUG

## 2025-03-26 PROCEDURE — 63700000 HC SELF-ADMINISTRABLE DRUG: Performed by: PHYSICIAN ASSISTANT

## 2025-03-26 RX ORDER — BUSPIRONE HYDROCHLORIDE 5 MG/1
5 TABLET ORAL DAILY
Status: DISCONTINUED | OUTPATIENT
Start: 2025-03-26 | End: 2025-03-28 | Stop reason: HOSPADM

## 2025-03-26 RX ORDER — CEFUROXIME AXETIL 250 MG/1
250 TABLET ORAL EVERY 12 HOURS
Status: DISCONTINUED | OUTPATIENT
Start: 2025-03-26 | End: 2025-03-28 | Stop reason: HOSPADM

## 2025-03-26 RX ORDER — SODIUM CHLORIDE 9 MG/ML
5 INJECTION, SOLUTION INTRAVENOUS AS NEEDED
Status: ACTIVE | OUTPATIENT
Start: 2025-03-26 | End: 2025-03-27

## 2025-03-26 RX ORDER — CEFUROXIME AXETIL 250 MG/1
250 TABLET ORAL EVERY 12 HOURS
Status: DISCONTINUED | OUTPATIENT
Start: 2025-03-26 | End: 2025-03-26

## 2025-03-26 RX ORDER — POLYETHYLENE GLYCOL 3350 17 G/17G
17 POWDER, FOR SOLUTION ORAL NIGHTLY
Status: DISCONTINUED | OUTPATIENT
Start: 2025-03-26 | End: 2025-03-28 | Stop reason: HOSPADM

## 2025-03-26 RX ADMIN — PROCHLORPERAZINE MALEATE 5 MG: 10 TABLET ORAL at 18:01

## 2025-03-26 RX ADMIN — PROCHLORPERAZINE MALEATE 5 MG: 10 TABLET ORAL at 11:07

## 2025-03-26 RX ADMIN — PROCHLORPERAZINE MALEATE 5 MG: 10 TABLET ORAL at 23:48

## 2025-03-26 RX ADMIN — POLYETHYLENE GLYCOL 3350 17 G: 17 POWDER, FOR SOLUTION ORAL at 22:09

## 2025-03-26 RX ADMIN — ENOXAPARIN SODIUM 40 MG: 40 INJECTION SUBCUTANEOUS at 18:01

## 2025-03-26 RX ADMIN — ROSUVASTATIN 40 MG: 40 TABLET, FILM COATED ORAL at 08:43

## 2025-03-26 RX ADMIN — CEFUROXIME AXETIL 250 MG: 250 TABLET, FILM COATED ORAL at 22:09

## 2025-03-26 RX ADMIN — SODIUM CHLORIDE: 9 INJECTION, SOLUTION INTRAVENOUS at 18:14

## 2025-03-26 RX ADMIN — SENNOSIDES 2 TABLET: 8.6 TABLET, FILM COATED ORAL at 22:09

## 2025-03-26 RX ADMIN — BUSPIRONE HYDROCHLORIDE 5 MG: 5 TABLET ORAL at 10:25

## 2025-03-26 ASSESSMENT — COGNITIVE AND FUNCTIONAL STATUS - GENERAL
STANDING UP FROM CHAIR USING ARMS: 4 - NONE
WALKING IN HOSPITAL ROOM: 4 - NONE
MOVING TO AND FROM BED TO CHAIR: 4 - NONE
CLIMB 3 TO 5 STEPS WITH RAILING: 3 - A LITTLE

## 2025-03-26 NOTE — PLAN OF CARE
Plan of Care Review  Plan of Care Reviewed With: patient  Progress: no change  Outcome Evaluation: Patient alert and oriented, pleasant and cooperative with care. Receiving IVF. Denies dizziness and lightheadedness. Denies pain or discomfort. Standby assist to bathroom, continent bowel and bladder. Hgb 7.0, [facility] nocturnist notified. Call bell within reach, bed alarm activated.

## 2025-03-26 NOTE — PROGRESS NOTES
Major Events:  none    Subjective:  Feels better    Temp:  [36.8 °C (98.2 °F)-37.6 °C (99.7 °F)] 36.8 °C (98.2 °F)  Heart Rate:  [] 93  Resp:  [17-20] 18  BP: (127-169)/(52-69) 161/60     SpO2 Readings from Last 3 Encounters:   03/26/25 100%   03/24/25 97%   03/21/25 100%     Anti-infectives (From admission, onward)      Start     Dose/Rate Route Frequency Ordered Stop    03/25/25 1630  cefTRIAXone (ROCEPHIN) IVPB 1 g in 100 mL NSS vial in bag         1 g  200 mL/hr over 30 Minutes intravenous Every 24 hours interval 03/24/25 1600            Physical Exam:  Skin: No rash  Sclera: Anicteric  Lungs: clr  CV: S1, S2 nl, no embolic changes  Abd: Soft, nt  Extr: No jt eff, no edema  Neuro: Alert, lucid    Lab Results   Component Value Date    WBC 27.26 (H) 03/26/2025    HGB 7.0 (LL) 03/26/2025    HCT 22.7 (L) 03/26/2025    MCV 90.8 03/26/2025     (L) 03/26/2025     Lab Results   Component Value Date    GLUCOSE 144 (H) 03/26/2025    CALCIUM 8.0 (L) 03/26/2025     03/26/2025    K 4.2 03/26/2025    CO2 22 03/26/2025     03/26/2025    BUN 31 (H) 03/26/2025    CREATININE 1.9 (H) 03/26/2025     Lab Results   Component Value Date    ALBUMIN 2.5 (L) 03/26/2025    BILITOT 0.3 03/26/2025    ALKPHOS 73 03/26/2025    AST 14 03/26/2025    ALT 15 03/26/2025    PROTEIN 4.8 (L) 03/26/2025     Microbiology Results (last 3 days)       Procedure Component Value - Date/Time    Urine culture Urine, Clean Catch [[id number]] Collected: 03/24/25 1452    Lab Status: Final result Specimen: Urine, Clean Catch Updated: 03/25/25 1613     Urine Culture Probable contaminants, suggest recollection      50,000-99,000 cfu/mL Mixed Growth    SARS-COV-2 (COVID-19)/ FLU A/B, AND RSV, PCR Nasopharynx [[id number]]  (Normal) Collected: 03/24/25 1155    Lab Status: Final result Specimen: Nasopharyngeal Swab from Nasopharynx Updated: 03/24/25 1242     SARS-CoV-2 (COVID-19) Negative     Influenza A Negative     Influenza B Negative      Respiratory Syncytial Virus Negative    Narrative:      Testing performed using real-time PCR for detection of COVID-19. EUA approved validation studies performed on site.     Blood Culture Blood, Venous [[id number]]  (Normal) Collected: 03/24/25 1155    Lab Status: Preliminary result Specimen: Blood, Venous Updated: 03/25/25 1701     Culture No growth at 18-24 hours    Blood Culture Blood, Venous [[id number]]  (Normal) Collected: 03/24/25 1155    Lab Status: Preliminary result Specimen: Blood, Venous Updated: 03/25/25 1701     Culture No growth at 18-24 hours            Imaging:     ULTRASOUND KIDNEYS / BLADDER  Result Date: 3/26/2025  IMPRESSION: Right-sided hydronephrosis and hydroureter suspected to the level of the urinary bladder. No left-sided hydronephrosis. Moderate to large post void residual.    Impression    Hi WBC  declining  No fever  urCx contam    Bladder CA  Appears - may be causing obstruction    Will give cefurox 250 BID    PATIENCE Escalante MD

## 2025-03-26 NOTE — ASSESSMENT & PLAN NOTE
Patient with recent diagnosis of locally advanced bladder cancer.  She opted for treatment with concurrent radiation and chemotherapy.  Received her first dose of low-dose weekly cisplatin on March 19.  Now admitted for syncopal episode.  Chemotherapy will be on hold in the setting of acute illness and hospitalization.  She is being evaluated for possible underlying infection contributing to her syncope.  Will continue to follow patient while hospitalized and she will follow-up with Dr. Soria after discharge for ongoing management.    3/26/2025:   Due to her anemia and syncope, there will be no chemotherapy for this week.  Because of her anemia and FIDELIA, which is likely due to cisplatin use I may offer her gemcitabine twice a week instead of cisplatin.  She is okay with the plan.  I will continue follow along.

## 2025-03-26 NOTE — PROGRESS NOTES
Hematology/Oncology -  Daily Progress Note       SUBJECTIVE   Interval History: Patient is seen alone.  She is feeling well.  She denies any bleeding.  She was seen by nephrology this morning.     OBJECTIVE      Vital signs in last 24 hours:  Temp:  [36.8 °C (98.2 °F)-37.6 °C (99.7 °F)] 36.8 °C (98.2 °F)  Heart Rate:  [] 93  Resp:  [17-20] 18  BP: (127-169)/(52-69) 161/60  No intake or output data in the 24 hours ending 03/26/25 1220    PHYSICAL EXAMINATION          Physical Examination:  Physical Exam  Vitals reviewed.   Constitutional:       Appearance: She is not ill-appearing.   Cardiovascular:      Rate and Rhythm: Normal rate.   Pulmonary:      Effort: Pulmonary effort is normal.   Abdominal:      Palpations: Abdomen is soft.   Skin:     General: Skin is warm.      Coloration: Skin is pale. Skin is not jaundiced.   Neurological:      General: No focal deficit present.      Mental Status: She is alert.   Psychiatric:         Mood and Affect: Mood normal.         No data recorded   busPIRone  5 mg oral Daily    cefTRIAXone  1 g intravenous q24h INT    enoxaparin  40 mg subcutaneous Daily (6p)    prochlorperazine  5 mg oral q6h ELYSSA    rosuvastatin  40 mg oral Daily        LABS / IMAGING / TELE      Labs  Recent Results (from the past 24 hours)   Magnesium    Collection Time: 03/26/25  4:09 AM   Result Value Ref Range    Magnesium 1.8 1.8 - 2.5 mg/dL   CBC and Differential    Collection Time: 03/26/25  4:09 AM   Result Value Ref Range    WBC 27.26 (H) 3.80 - 10.50 K/uL    RBC 2.50 (L) 3.93 - 5.22 M/uL    Hemoglobin 7.0 (LL) 11.8 - 15.7 g/dL    Hematocrit 22.7 (L) 35.0 - 45.0 %    MCV 90.8 83.0 - 98.0 fL    MCH 28.0 28.0 - 33.2 pg    MCHC 30.8 (L) 32.2 - 35.5 g/dL    RDW 14.4 11.7 - 14.4 %    Platelets 102 (L) 150 - 369 K/uL    MPV 10.1 9.4 - 12.3 fL    Differential Type Auto     nRBC 0.0 <=0.0 %    Immature Granulocytes 1.5 %    Neutrophils 87.2 %    Lymphocytes 5.4 %    Monocytes 3.9 %    Eosinophils 1.9  %    Basophils 0.1 %    Immature Granulocytes, Absolute 0.40 (H) 0.00 - 0.08 K/uL    Neutrophils, Absolute 23.74 (H) 1.70 - 7.00 K/uL    Lymphocytes, Absolute 1.48 1.20 - 3.50 K/uL    Monocytes, Absolute 1.07 (H) 0.28 - 0.80 K/uL    Eosinophils, Absolute 0.53 (H) 0.04 - 0.36 K/uL    Basophils, Absolute 0.04 0.01 - 0.10 K/uL    Platelet Estimate Decreased (51,000-149,000)     Toxic Granulation Slight     Anisocytosis 1+     Ovalocytes Occasional     [place] Cells Occasional    Comprehensive metabolic panel    Collection Time: 03/26/25  4:09 AM   Result Value Ref Range    Sodium 136 136 - 145 mEQ/L    Potassium 4.2 3.5 - 5.1 mEQ/L    Chloride 107 98 - 107 mEQ/L    CO2 22 21 - 31 mEQ/L    BUN 31 (H) 7 - 25 mg/dL    Creatinine 1.9 (H) 0.6 - 1.2 mg/dL    Glucose 144 (H) 70 - 99 mg/dL    Calcium 8.0 (L) 8.6 - 10.3 mg/dL    AST (SGOT) 14 13 - 39 IU/L    ALT (SGPT) 15 7 - 52 IU/L    Alkaline Phosphatase 73 34 - 125 IU/L    Total Protein 4.8 (L) 6.0 - 8.2 g/dL    Albumin 2.5 (L) 3.5 - 5.7 g/dL    Bilirubin, Total 0.3 0.3 - 1.2 mg/dL    eGFR 27.4 (L) >=60.0 mL/min/1.73m*2    Anion Gap 7 3 - 15 mEQ/L   UA Macroscopic    Collection Time: 03/26/25 12:04 PM   Result Value Ref Range    Color, Urine Yellow Yellow, Colorless    Clarity, Urine Cloudy (A) Clear    Specific Gravity, Urine 1.011 1.005 - 1.030    pH, Urine 6.5 4.5 - 8.0    Leukocyte Esterase +3 (A) Negative    Nitrite, Urine Negative Negative    Protein, Urine +2 (A) Negative    Glucose, Urine Negative Negative mg/dL    Ketones, Urine Negative Negative mg/dL    Urobilinogen, Urine 0.2 <2.0 EU/dL EU/dL    Bilirubin, Urine Negative Negative mg/dL    Blood, Urine +2 (A) Negative   ]    Imaging  ULTRASOUND KIDNEYS / BLADDER  Result Date: 3/26/2025  CLINICAL HISTORY: Acute renal insufficiency. COMPARISON: CT dated January 25, 2025. COMMENT: Ultrasound examination of the kidneys demonstrates the kidneys to be normal in size and contour. The right measures 10.1 x 5.3 x 5.0 cm. The  left measures 11.0 x 5.9 x 5.4 cm. The cortex is normal in thickness and echotexture. There is redemonstration of moderate right-sided hydronephrosis. The proximal ureter is also dilated as well as the distal ureter at the level of the urinary bladder.. No left-sided hydronephrosis. 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 No renal calculi. The urinary bladder prevoid volume is 233 mL with post void residual of 104 mL. No definite bladder mass appreciated on examination.     IMPRESSION: Right-sided hydronephrosis and hydroureter suspected to the level of the urinary bladder. No left-sided hydronephrosis. Moderate to large post void residual.        ASSESSMENT AND PLAN      Assessment & Plan    Gladys Woo is a very pleasant 75 y.o. patient with PMHx of anxiety and dyslipidemia and bladder cancer, stage III, T3N0M0.   She is admitted for syncope and collapse.    Bladder cancer (CMS/HCC)  Patient with recent diagnosis of locally advanced bladder cancer.  She opted for treatment with concurrent radiation and chemotherapy.  Received her first dose of low-dose weekly cisplatin on March 19.  Now admitted for syncopal episode.  Chemotherapy will be on hold in the setting of acute illness and hospitalization.  She is being evaluated for possible underlying infection contributing to her syncope.  Will continue to follow patient while hospitalized and she will follow-up with Dr. Soria after discharge for ongoing management.    3/26/2025:   Due to her anemia and syncope, there will be no chemotherapy for this week.  Because of her anemia and FIDELIA, which is likely due to cisplatin use I may offer her gemcitabine twice a week instead of cisplatin.  She is okay with the plan.  I will continue follow along.    Anemia  Patient with acute on chronic anemia.  Suspect acute drop in hemoglobin is dilutional related to IV fluids patient being hemoconcentrated at  admission.  Will follow CBC closely and transfuse if hemoglobin less than 7 or less than 8 with increased symptoms.    Will transfuse 1 unit PRBC today. Will give IV iron pending ID input.     Her anemia may contribute to her syncope.      FIDELIA (acute kidney injury) (CMS/[facility])  Appreciate nephrology input    Sepsis (CMS/[facility])/leukocytosis  Appreciate ID input.      Hypomagnesemia       VTE Assessment: Padua    Code Status: Full Code  Estimated discharge date: 3/26/2025     Wenceslao Soria MD, PhD  3/26/2025  12:20 PM

## 2025-03-26 NOTE — PROGRESS NOTES
Hospital Medicine     Daily Progress Note       SUBJECTIVE   Patient seen and examined at bedside.  Vital signs and lab work reviewed.   Feels better overall   Denies any acute complaint  Poor appetite.    Peeing well per patient  Cr worsening thought to be ATN, nephrology consulted     Blood consent obtained, discussed risks and benefits of blood transfusion.    OBJECTIVE   Vital signs in last 24 hours:  Temp:  [36.8 °C (98.2 °F)-37.6 °C (99.7 °F)] 36.8 °C (98.2 °F)  Heart Rate:  [] 93  Resp:  [17-20] 18  BP: (127-169)/(52-69) 161/60  No intake or output data in the 24 hours ending 03/26/25 1303      Physical Exam    General exam : appears age stated,  not in distress  Head: atraumatic, normocephalic  Eyes : PERRLA, EOMI, no pallor, no icterus  ENT: no lesions, oropharynx pink, mucous membranes moist   Neck: supple, no Lymph nodes, no Thyromegaly, no JVD   CVS : normal rate, normal rhythm, S1 and S2 heard, no murmurs, rubs or gallops  Resp:normal accessory muscle usage, clear to auscultation Bilaterally  Abdomen : soft, Nt, BS +, no organomegaly   Extremities : no edema, no cyanosis   MSK: no DJD, no joint swellings, no joint tenderness   Skin: intact, warm, no rash  Neuro: AAO x3, CN 2-12 intact,  motor strength in all extremities, sensations, DTRs, coordination intact.  Psych: normal mood.cooperative      LINES, DRAINS, AIRWAYS, WOUNDS   Single Lumen Implantable Port 03/24/25 Right Chest (Active)   Number of days: 1        LABS, IMAGING, TELEMETRY   CHEMISTRIES   Results from last 7 days   Lab Units 03/26/25  0409 03/25/25  0608 03/24/25  0939   SODIUM mEQ/L 136 136 134*   POTASSIUM mEQ/L 4.2 4.1 3.6   CHLORIDE mEQ/L 107 106 100   CO2 mEQ/L 22 24 26   BUN mg/dL 31* 34* 31*   CREATININE mg/dL 1.9* 1.6* 1.5*   GLUCOSE mg/dL 144* 137* 100*   CALCIUM mg/dL 8.0* 8.2* 9.0   EGFR mL/min/1.73m*2 27.4* 33.7* 36.4*   ANION GAP mEQ/L 7 6 8   MAGNESIUM mg/dL 1.8  --  1.7*     HEPATIC FUNCTION TESTS    Results from last 7 days   Lab Units 03/26/25  0409 03/25/25  0608 03/24/25  0939   AST IU/L 14 7* 10*   ALT IU/L 15 9 12   ALK PHOS IU/L 73 56 64   ALBUMIN g/dL 2.5* 2.6* 3.2*   PROTEIN TOTAL g/dL 4.8* 5.0* 6.1   BILIRUBIN TOTAL mg/dL 0.3 0.3 0.3     CBC RESULTS   Results from last 7 days   Lab Units 03/26/25  0409 03/25/25  0608 03/24/25  0939   WBC K/uL 27.26* 37.09* 55.48*   HEMOGLOBIN g/dL 7.0* 7.5* 9.2*   HEMATOCRIT % 22.7* 23.0* 28.4*   PLATELETS K/uL 102* 275 358       Radiology Reports in last 24hULTRASOUND KIDNEYS / BLADDER  Result Date: 3/26/2025  CLINICAL HISTORY: Acute renal insufficiency. COMPARISON: CT dated January 25, 2025. COMMENT: Ultrasound examination of the kidneys demonstrates the kidneys to be normal in size and contour. The right measures 10.1 x 5.3 x 5.0 cm. The left measures 11.0 x 5.9 x 5.4 cm. The cortex is normal in thickness and echotexture. There is redemonstration of moderate right-sided hydronephrosis. The proximal ureter is also dilated as well as the distal ureter at the level of the urinary bladder.. No left-sided hydronephrosis. 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 No renal calculi. The urinary bladder prevoid volume is 233 mL with post void residual of 104 mL. No definite bladder mass appreciated on examination.     IMPRESSION: Right-sided hydronephrosis and hydroureter suspected to the level of the urinary bladder. No left-sided hydronephrosis. Moderate to large post void residual.      Telemetry personally reviewed. No significant events overnight.  Continue cardiac monitoring on Telemetry level of care.   ASSESSMENT AND PLAN   # Sepsis with acute organ dysfunction  # UTI (recurrent)  Continuous cardiac monitoring  Close monitoring of hemodynamics  Continued on IVF hydration  Follow urine and blood cultures  Supportive measures  ID and Heme/Onc consulted     # Bladder cancer   S/p cysto and tumor  resection  Currently undergoing chemoradiation with Dr. Soria and Dr. Amanda  Heme/Onc on board     # Syncope and collapse  Occurring in the setting of sepsis due to UTI. Treatment as noted  BP improved with IVF hydration given in ED  Close monitoring of hemodynamics  Check orthostatic vitals    # Anemia likely due to malignancy & iron deficiency  Per patient's oncologist, goal Hb 8 and above  1 unit PRBC 3/26/25  Planning for IV iron once infection better      # FIDELIA (acute kidney injury) (CMS/HCC)  - creatinine: 1.9 (3/26/25) up from 1.6 (3/25/25)  baseline range 1.2-1.3  Thought to be ATN  Occurring in the setting of sepsis due to UTI.   USG kidneys & bladder order to rule out obstruction thiago with h/o bladder cancer   Avoid nephrotoxins  IV fluids     # Anemia  - Hgb stable in comparison to baseline  - hemoglobin: 7 (3/26/25) down from 7.5 (3/25/25)  - total iron: 10, ferritin: 313, total iron binding capacity: 231 (3/19/25), transferrin saturation: 9 % (1/25/25)  likely dilutional  continue to monitor CBC     # Hyperlipidemia  Continued on rosuvastatin     # Hypomagnesemia  Correct Mag level to within normal limits  - Magnesium: 1.8 mg/dL (3/26/25), low 1.7 mg/dL (3/24/25)  supplement as needed     # Pacemaker  S/p PPM placement for cardiac arrhythmia     # Depression and Anxiety: recurrent  - decreased: buspirone 5 mg po daily, based on renal clearance     # Thrombocytopenia  - platelets: 102 (3/26/25) down from 275 (3/25/25)  Likely related to chemo and infection     Discussed case with oncologist (Dr. Soria) & nephrologist (Dr Deloris Gilmore)     VTE Prophylaxis:  Current anticoagulants:  enoxaparin (LOVENOX) syringe 40 mg, subcutaneous, Daily (6p)        Code Status: Full Code        Estimated Discharge Date: 3/26/2025     Disposition Planning: Pending improvement, IV fluids, IV antibiotics, 1 PRBC,   monitor CBC       Michelle Suresh MD  3/26/2025

## 2025-03-26 NOTE — PLAN OF CARE
Problem: Adult Inpatient Plan of Care  Goal: Plan of Care Review  Outcome: Progressing  Flowsheets (Taken 3/26/2025 7455)  Progress: no change  Outcome Evaluation: Pt A&Ox4. NSR on tele. VSS on RA. Independent to BR. Blood transfusion completed today. Kidney and bladder ultrasound completed. Pt has no c/o pain. Continuous Normal Saline @ 80mL/hr. Pt will be going for radiation at 0745 tomorrow morning.  Plan of Care Reviewed With: patient  Goal: Patient-Specific Goal (Individualized)  Outcome: Progressing  Goal: Absence of Hospital-Acquired Illness or Injury  Outcome: Progressing  Goal: Optimal Comfort and Wellbeing  Outcome: Progressing  Goal: Readiness for Transition of Care  Outcome: Progressing     Problem: Fall Injury Risk  Goal: Absence of Fall and Fall-Related Injury  Outcome: Progressing

## 2025-03-26 NOTE — CONSULTS
Nephrology Consult Note    Subjective     Gladys Woo is a 75 y.o. female who was admitted for Syncope and collapse [R55]  Leukocytosis, unspecified type [D72.829]. Patient was referred by Dr. Suresh for patient co-management. Patient is  a 75 y.o. woman with a past medical history of CKD with baseline creatinine  0.8 - 1.3 with FIDELIA, CAD, cardiac arrhythmia s/p PPM, HLD, TREVOR, bladder cancer s/p cysto and resection currently on chemoradiation who presents for syncope in the radiation therapy clinic. On this admission, her creatinine is increasing from 1.5 on 3/24 >> 1.6 on 3/25 > 1.9 on 3/26. Her previous creatinine on 3/18/25 was 1.2.  Her blood pressure on 3/24 was low with 77/31 at one point. Her UA showed TNTC RBC/WBC with bacteria.  She lost 20 lbs of weight since November 2024. She noticed cloudy urine this week without fever, chills, nausea, or diarrhea.  Currently, her blood pressure is much improved with SBP at 120's 130's.     Outside records reviewed. Pertinent radiology results reviewed. Pertinent lab results reviewed.    Medical History:   Past Medical History:   Diagnosis Date    Bladder cancer (CMS/HCC)     Coronary artery disease     KERRI (generalized anxiety disorder)     TREVOR (obstructive sleep apnea)        Surgical History:   Past Surgical History   Procedure Laterality Date    A-v cardiac pacemaker insertion      Colonoscopy      CYSTO, TURBT TRANS-URETHRAL RESECTION BLADDER TUMOR N/A 1/30/2025    Performed by Eugenio Jimenez MD at  OR    Insert / replace / remove pacemaker      Knee arthroscopy w/ meniscal repair      Tonsillectomy         Social History:   Social History     Socioeconomic History    Marital status:      Spouse name: None    Number of children: 1    Years of education: None    Highest education level: None   Occupational History    Occupation: Retired  at [employer]   Tobacco Use    Smoking status: Former     Types: Cigarettes    Smokeless  tobacco: Never   Substance and Sexual Activity    Alcohol use: Yes     Alcohol/week: 2.0 standard drinks of alcohol     Types: 2 Glasses of wine per week     Comment: socially    Drug use: Never    Sexual activity: Defer   Social History Narrative    Pt denies any distress at this time.  Denies need for SW.     Social Drivers of Health     Food Insecurity: No Food Insecurity (3/24/2025)    Hunger Vital Sign     Worried About Running Out of Food in the Last Year: Never true     Ran Out of Food in the Last Year: Never true   Transportation Needs: No Transportation Needs (1/27/2025)    PRAPARE - Transportation     Lack of Transportation (Medical): No     Lack of Transportation (Non-Medical): No   Housing Stability: Unknown (1/27/2025)    Housing Stability Vital Sign     Unable to Pay for Housing in the Last Year: No     Homeless in the Last Year: No       Family History:   Family History   Problem Relation Name Age of Onset    Heart disease Biological Mother      Diabetes Biological Mother      Heart disease Biological Father      Coronary artery disease Biological Father      Liver cancer Biological Brother      Prostate cancer Biological Brother         Allergies: Amoxicillin    Current Medications[1]    Review of Systems  A complete review of systems was performed and aside from as mentioned above, was otherwise negative.    Objective   Labs   Recent Results (from the past 24 hours)   Magnesium    Collection Time: 03/26/25  4:09 AM   Result Value Ref Range    Magnesium 1.8 1.8 - 2.5 mg/dL   CBC and Differential    Collection Time: 03/26/25  4:09 AM   Result Value Ref Range    WBC 27.26 (H) 3.80 - 10.50 K/uL    RBC 2.50 (L) 3.93 - 5.22 M/uL    Hemoglobin 7.0 (LL) 11.8 - 15.7 g/dL    Hematocrit 22.7 (L) 35.0 - 45.0 %    MCV 90.8 83.0 - 98.0 fL    MCH 28.0 28.0 - 33.2 pg    MCHC 30.8 (L) 32.2 - 35.5 g/dL    RDW 14.4 11.7 - 14.4 %    Platelets 102 (L) 150 - 369 K/uL    MPV 10.1 9.4 - 12.3 fL    Differential Type Auto      nRBC 0.0 <=0.0 %    Immature Granulocytes 1.5 %    Neutrophils 87.2 %    Lymphocytes 5.4 %    Monocytes 3.9 %    Eosinophils 1.9 %    Basophils 0.1 %    Immature Granulocytes, Absolute 0.40 (H) 0.00 - 0.08 K/uL    Neutrophils, Absolute 23.74 (H) 1.70 - 7.00 K/uL    Lymphocytes, Absolute 1.48 1.20 - 3.50 K/uL    Monocytes, Absolute 1.07 (H) 0.28 - 0.80 K/uL    Eosinophils, Absolute 0.53 (H) 0.04 - 0.36 K/uL    Basophils, Absolute 0.04 0.01 - 0.10 K/uL    Platelet Estimate Decreased (51,000-149,000)     Toxic Granulation Slight     Anisocytosis 1+     Ovalocytes Occasional     [place] Cells Occasional    Comprehensive metabolic panel    Collection Time: 03/26/25  4:09 AM   Result Value Ref Range    Sodium 136 136 - 145 mEQ/L    Potassium 4.2 3.5 - 5.1 mEQ/L    Chloride 107 98 - 107 mEQ/L    CO2 22 21 - 31 mEQ/L    BUN 31 (H) 7 - 25 mg/dL    Creatinine 1.9 (H) 0.6 - 1.2 mg/dL    Glucose 144 (H) 70 - 99 mg/dL    Calcium 8.0 (L) 8.6 - 10.3 mg/dL    AST (SGOT) 14 13 - 39 IU/L    ALT (SGPT) 15 7 - 52 IU/L    Alkaline Phosphatase 73 34 - 125 IU/L    Total Protein 4.8 (L) 6.0 - 8.2 g/dL    Albumin 2.5 (L) 3.5 - 5.7 g/dL    Bilirubin, Total 0.3 0.3 - 1.2 mg/dL    eGFR 27.4 (L) >=60.0 mL/min/1.73m*2    Anion Gap 7 3 - 15 mEQ/L     I have reviewed the pertinent patient's labs.    Imaging  I have reviewed the pertinent patient's imaging results for this admission.     Physical Exam  NAD AA RRR S1 S2 no edema CTA no wheezing soft +BS distended NT no C/C    Assessment   75 y.o. female being consulted for patient co-management       Plan     Syncope and collapse  Clinically improving    Bladder cancer (CMS/HCC)  S/p cystoscopy in January 2025 with resection  Per Onc    FIDELIA (acute kidney injury) (CMS/HCC)  Noted fluctuating creatinine as outpatient  Pt is not aware of CKD  No NSAIDs use as outpt.   Most likely due to ATN from urosepsis  Follow urine output and BMP on NSS.   Pending result of US and UA  Discussed with   Kerwin    Anemia  Per Onc    Sepsis (CMS/HCC)  Urosepsis   BP is improved   Continue IVF  Follow renal function to adjust med dose  ABX per ID    Hypomagnesemia  PRN mag IV to keep mag >=2.0    Thank you for allowing me to participate in her care.     Expected Discharge Date:  3/26/2025         [1]   Current Inpatient Medications   Medication Dose Route Frequency Provider Last Rate Last Admin    acetaminophen (TYLENOL) tablet 650 mg  650 mg oral q4h PRN Angelic Vines PA C   650 mg at 03/24/25 2310    busPIRone (BUSPAR) tablet 5 mg  5 mg oral Daily Michelle Suresh MD        cefTRIAXone (ROCEPHIN) IVPB 1 g in 100 mL NSS vial in bag  1 g intravenous q24h INT Angelic Vines PA C   Stopped at 03/25/25 1942    glucose chewable tablet 16-32 g of dextrose  16-32 g of dextrose oral PRN Angelic Vines PA C        Or    dextrose 40 % oral gel 15-30 g of dextrose  15-30 g of dextrose oral PRN Angelic Vines PA C        Or    glucagon (GLUCAGEN) injection 1 mg  1 mg intramuscular PRN Angelic Vines PA C        Or    dextrose 50 % in water (D50) injection 12.5 g  25 mL intravenous PRN Angelic Vines PA C        enoxaparin (LOVENOX) syringe 40 mg  40 mg subcutaneous Daily (6p) Michelle Suresh MD   40 mg at 03/25/25 2007    magnesium oxide (MAG-OX) tablet 400 mg  400 mg oral 2x daily PRN Angelic Vines PA C        prochlorperazine (COMPAZINE) tablet 5 mg  5 mg oral q6h ELYSSA Michelle Suresh MD   5 mg at 03/25/25 2007    rosuvastatin (CRESTOR) tablet 40 mg  40 mg oral Daily Angelic Vines PA C   40 mg at 03/26/25 0843    senna (SENOKOT) tablet 2 tablet  2 tablet oral Nightly PRN Sun Chang CRNP   2 tablet at 03/25/25 2008    sodium chloride 0.9 % infusion   intravenous Continuous Michelle Suresh MD 80 mL/hr at 03/25/25 2250 New Bag at 03/25/25 2051

## 2025-03-26 NOTE — PLAN OF CARE
Care Coordination Admission Assessment Note    General Information:  Readmission Within the last 30 days:    Does patient have a :    Patient-Specific Goals (include timeframe): Goal; return home after XRT tomorrow    Living Arrangements:  Arrived From: home  Current Living Arrangements: home  People in Home: alone  Home Accessibility:    Living Arrangement Comments: Pt lives alone in a ranch house, [address].    Housing Stability and Utility Access (SDOH):  In the last 12 months, was there a time when you were not able to pay the mortgage or rent on time?: No  In the past 12 months, how many times have you moved?:    At any time in the past 12 months, were you homeless or living in a shelter (including now)?: No  In the past 12 months has the electric, gas, oil, or water company threatened to shut off services in your home?: No    Functional Status Prior to Admission:   Assistive Device/Animal Currently Used at Home: none  Functional Status Comments: Ind  IADL Comments: IADL's     Supports and Services:  Current Outpatient/Agency/Support Group:    Type of Current Home Care Services:    History of home care episode or rehab stay: [facility] , denied rehab stay    Discharge Needs Assessment:   Concerns to be Addressed: discharge planning, care coordination/care conferences  Current Discharge Risk:    Anticipated Changes Related to Illness: none    Patient/Family Anticipated Discharge Plan:  Patient/Family Anticipates Transition To: home  Patient/Family Anticipated Services at Transition: home health care    Connection to Community  Not applicable    Patient Choice:   Offered/Gave Vendor List: no       Anticipated Discharge Plan:  Met with patient. Provided education and contact information for Care Coordination services.: yes  Anticipated Discharge Disposition: home with home health, home with assistance  Type of Home Care Services: nursing    Transportation Needs (SDOH):  Transportation Concerns:  none  Transportation Anticipated: family or friend will provide  Is Out of Hospital DNR needed at discharge?: no    In the past 12 months, has lack of transportation kept you from medical appointments or from getting medications?: No  In the past 12 months, has lack of transportation kept you from meetings, work, or from getting things needed for daily living?: No    Concerns - comments: Pt known to me from previous admission. Pt had [facility] follow after last d/c.. Pt may be open to home care again, will f/u tomorrow after she returns from XRT. TERESA-3/27

## 2025-03-26 NOTE — ASSESSMENT & PLAN NOTE
Patient with acute on chronic anemia.  Suspect acute drop in hemoglobin is dilutional related to IV fluids patient being hemoconcentrated at admission.  Will follow CBC closely and transfuse if hemoglobin less than 7 or less than 8 with increased symptoms.    3/26/2025  Patient has baseline anemia due to iron deficiency.  There was plan to give iron infusion which has not been happening yet

## 2025-03-26 NOTE — ASSESSMENT & PLAN NOTE
Noted fluctuating creatinine as outpatient  Pt is not aware of CKD  No NSAIDs use as outpt.   Most likely due to ATN from urosepsis  Follow urine output and BMP on NSS.   noted result of US and UA  Per Urology for hydronephrosis

## 2025-03-27 ENCOUNTER — APPOINTMENT (OUTPATIENT)
Dept: RADIATION ONCOLOGY | Facility: HOSPITAL | Age: 75
Setting detail: RADIATION/ONCOLOGY SERIES
End: 2025-03-27
Attending: RADIOLOGY
Payer: MEDICARE

## 2025-03-27 LAB
ABO + RH BLD: NORMAL
ANION GAP SERPL CALC-SCNC: 8 MEQ/L (ref 3–15)
ARIA ZRC COURSE ID: NORMAL
ARIA ZRC COURSE START DATE: NORMAL
ARIA ZRC TREATMENT ELAPSED DAYS: NORMAL
ARIA ZRP FRACTIONS TREATED TO DATE: NORMAL
ARIA ZRP PLAN ID: NORMAL
ARIA ZRP PLAN NAME: NORMAL
ARIA ZRP PRESCRIBED DOSE CGY: 1600
ARIA ZRP PRESCRIBED DOSE PER FRACTION: 2
ARIA ZRR DOSAGE GIVEN TO DATE: 14
ARIA ZRR DOSAGE GIVEN TO DATE: 14
ARIA ZRR DOSAGE GIVEN TO DATE: 14.07
ARIA ZRR REFERENCE POINT ID: NORMAL
ARIA ZRR SESSION DOSAGE GIVEN: 2
ARIA ZRR SESSION DOSAGE GIVEN: 2
ARIA ZRR SESSION DOSAGE GIVEN: 2.01
BASOPHILS # BLD: 0.04 K/UL (ref 0.01–0.1)
BASOPHILS NFR BLD: 0.2 %
BLD GP AB SCN SERPL QL: NEGATIVE
BUN SERPL-MCNC: 28 MG/DL (ref 7–25)
CALCIUM SERPL-MCNC: 8.1 MG/DL (ref 8.6–10.3)
CHLORIDE SERPL-SCNC: 106 MEQ/L (ref 98–107)
CO2 SERPL-SCNC: 21 MEQ/L (ref 21–31)
CREAT SERPL-MCNC: 1.9 MG/DL (ref 0.6–1.2)
D AG BLD QL: POSITIVE
DIFFERENTIAL METHOD BLD: ABNORMAL
EGFRCR SERPLBLD CKD-EPI 2021: 27.4 ML/MIN/1.73M*2
EOSINOPHIL # BLD: 0.83 K/UL (ref 0.04–0.36)
EOSINOPHIL NFR BLD: 3.3 %
ERYTHROCYTE [DISTWIDTH] IN BLOOD BY AUTOMATED COUNT: 14.4 % (ref 11.7–14.4)
GLUCOSE SERPL-MCNC: 112 MG/DL (ref 70–99)
HCT VFR BLD AUTO: 25.3 % (ref 35–45)
HGB BLD-MCNC: 8.3 G/DL (ref 11.8–15.7)
IMM GRANULOCYTES # BLD AUTO: 0.33 K/UL (ref 0–0.08)
IMM GRANULOCYTES NFR BLD AUTO: 1.3 %
LABORATORY COMMENT REPORT: NORMAL
LYMPHOCYTES # BLD: 1.38 K/UL (ref 1.2–3.5)
LYMPHOCYTES NFR BLD: 5.4 %
MAGNESIUM SERPL-MCNC: 1.8 MG/DL (ref 1.8–2.5)
MCH RBC QN AUTO: 29.3 PG (ref 28–33.2)
MCHC RBC AUTO-ENTMCNC: 32.8 G/DL (ref 32.2–35.5)
MCV RBC AUTO: 89.4 FL (ref 83–98)
MONOCYTES # BLD: 1.71 K/UL (ref 0.28–0.8)
MONOCYTES NFR BLD: 6.7 %
NEUTROPHILS # BLD: 21.14 K/UL (ref 1.7–7)
NEUTS SEG NFR BLD: 83.1 %
NRBC BLD-RTO: 0 %
PLATELET # BLD AUTO: 251 K/UL (ref 150–369)
PMV BLD AUTO: 9.8 FL (ref 9.4–12.3)
POTASSIUM SERPL-SCNC: 4.2 MEQ/L (ref 3.5–5.1)
RBC # BLD AUTO: 2.83 M/UL (ref 3.93–5.22)
SODIUM SERPL-SCNC: 135 MEQ/L (ref 136–145)
SPECIMEN EXP DATE BLD: NORMAL
WBC # BLD AUTO: 25.43 K/UL (ref 3.8–10.5)

## 2025-03-27 PROCEDURE — 25800000 HC PHARMACY IV SOLUTIONS: Performed by: STUDENT IN AN ORGANIZED HEALTH CARE EDUCATION/TRAINING PROGRAM

## 2025-03-27 PROCEDURE — 63700000 HC SELF-ADMINISTRABLE DRUG: Performed by: PHYSICIAN ASSISTANT

## 2025-03-27 PROCEDURE — 77386 HC IMRT DELIVERY COMPLEX: CPT | Performed by: RADIOLOGY

## 2025-03-27 PROCEDURE — 63700000 HC SELF-ADMINISTRABLE DRUG: Performed by: STUDENT IN AN ORGANIZED HEALTH CARE EDUCATION/TRAINING PROGRAM

## 2025-03-27 PROCEDURE — 99232 SBSQ HOSP IP/OBS MODERATE 35: CPT | Performed by: HOSPITALIST

## 2025-03-27 PROCEDURE — 63700000 HC SELF-ADMINISTRABLE DRUG: Performed by: INTERNAL MEDICINE

## 2025-03-27 PROCEDURE — 36415 COLL VENOUS BLD VENIPUNCTURE: CPT | Performed by: STUDENT IN AN ORGANIZED HEALTH CARE EDUCATION/TRAINING PROGRAM

## 2025-03-27 PROCEDURE — 63600000 HC DRUGS/DETAIL CODE: Mod: JZ | Performed by: STUDENT IN AN ORGANIZED HEALTH CARE EDUCATION/TRAINING PROGRAM

## 2025-03-27 PROCEDURE — 21400000 HC ROOM AND CARE CCU/INTERMEDIATE

## 2025-03-27 PROCEDURE — 80048 BASIC METABOLIC PNL TOTAL CA: CPT | Performed by: STUDENT IN AN ORGANIZED HEALTH CARE EDUCATION/TRAINING PROGRAM

## 2025-03-27 PROCEDURE — 99233 SBSQ HOSP IP/OBS HIGH 50: CPT | Performed by: STUDENT IN AN ORGANIZED HEALTH CARE EDUCATION/TRAINING PROGRAM

## 2025-03-27 PROCEDURE — 85025 COMPLETE CBC W/AUTO DIFF WBC: CPT | Performed by: STUDENT IN AN ORGANIZED HEALTH CARE EDUCATION/TRAINING PROGRAM

## 2025-03-27 PROCEDURE — 86850 RBC ANTIBODY SCREEN: CPT

## 2025-03-27 PROCEDURE — 83735 ASSAY OF MAGNESIUM: CPT | Performed by: STUDENT IN AN ORGANIZED HEALTH CARE EDUCATION/TRAINING PROGRAM

## 2025-03-27 RX ORDER — SENNOSIDES 8.6 MG/1
2 TABLET ORAL NIGHTLY
Status: DISCONTINUED | OUTPATIENT
Start: 2025-03-27 | End: 2025-03-28 | Stop reason: HOSPADM

## 2025-03-27 RX ORDER — SODIUM CHLORIDE 9 MG/ML
INJECTION, SOLUTION INTRAVENOUS CONTINUOUS
Status: DISCONTINUED | OUTPATIENT
Start: 2025-03-27 | End: 2025-03-28 | Stop reason: HOSPADM

## 2025-03-27 RX ORDER — LANOLIN ALCOHOL/MO/W.PET/CERES
400 CREAM (GRAM) TOPICAL DAILY
Status: DISCONTINUED | OUTPATIENT
Start: 2025-03-27 | End: 2025-03-28 | Stop reason: HOSPADM

## 2025-03-27 RX ORDER — BISACODYL 10 MG/1
10 SUPPOSITORY RECTAL ONCE
Status: ACTIVE | OUTPATIENT
Start: 2025-03-27 | End: 2025-03-28

## 2025-03-27 RX ADMIN — Medication 400 MG: at 17:29

## 2025-03-27 RX ADMIN — ENOXAPARIN SODIUM 40 MG: 40 INJECTION SUBCUTANEOUS at 17:29

## 2025-03-27 RX ADMIN — ACETAMINOPHEN 650 MG: 325 TABLET, FILM COATED ORAL at 18:18

## 2025-03-27 RX ADMIN — CEFUROXIME AXETIL 250 MG: 250 TABLET, FILM COATED ORAL at 22:05

## 2025-03-27 RX ADMIN — BUSPIRONE HYDROCHLORIDE 5 MG: 5 TABLET ORAL at 09:59

## 2025-03-27 RX ADMIN — CEFUROXIME AXETIL 250 MG: 250 TABLET, FILM COATED ORAL at 09:59

## 2025-03-27 RX ADMIN — ROSUVASTATIN 40 MG: 40 TABLET, FILM COATED ORAL at 09:59

## 2025-03-27 RX ADMIN — SODIUM CHLORIDE: 9 INJECTION, SOLUTION INTRAVENOUS at 21:30

## 2025-03-27 RX ADMIN — SODIUM CHLORIDE: 9 INJECTION, SOLUTION INTRAVENOUS at 05:36

## 2025-03-27 RX ADMIN — SODIUM CHLORIDE: 9 INJECTION, SOLUTION INTRAVENOUS at 18:21

## 2025-03-27 RX ADMIN — SENNOSIDES 2 TABLET: 8.6 TABLET, FILM COATED ORAL at 22:05

## 2025-03-27 RX ADMIN — PROCHLORPERAZINE MALEATE 5 MG: 10 TABLET ORAL at 12:29

## 2025-03-27 RX ADMIN — PROCHLORPERAZINE MALEATE 5 MG: 10 TABLET ORAL at 17:29

## 2025-03-27 RX ADMIN — PROCHLORPERAZINE MALEATE 5 MG: 10 TABLET ORAL at 05:24

## 2025-03-27 ASSESSMENT — COGNITIVE AND FUNCTIONAL STATUS - GENERAL
MOVING TO AND FROM BED TO CHAIR: 4 - NONE
WALKING IN HOSPITAL ROOM: 4 - NONE
STANDING UP FROM CHAIR USING ARMS: 4 - NONE
CLIMB 3 TO 5 STEPS WITH RAILING: 3 - A LITTLE
CLIMB 3 TO 5 STEPS WITH RAILING: 3 - A LITTLE
MOVING TO AND FROM BED TO CHAIR: 4 - NONE
WALKING IN HOSPITAL ROOM: 4 - NONE
STANDING UP FROM CHAIR USING ARMS: 4 - NONE

## 2025-03-27 NOTE — PROGRESS NOTES
Renal Daily Progress Note    Subjective/Objective:  Subjective    Interval History: complains of constipation for 4 days. No shortness of breath, fever, or abdominal pain.     Objective    Vital signs in last 24 hours:  Temp:  [36.8 °C (98.2 °F)-37.7 °C (99.9 °F)] 37.3 °C (99.2 °F)  Heart Rate:  [] 75  Resp:  [18] 18  BP: (126-161)/(58-67) 161/58         Intake/Output Summary (Last 24 hours) at 3/27/2025 1120  Last data filed at 3/26/2025 1803  Gross per 24 hour   Intake 354.75 ml   Output --   Net 354.75 ml       Labs  BUN   Date Value Ref Range Status   03/27/2025 28 (H) 7 - 25 mg/dL Final     Creatinine   Date Value Ref Range Status   03/27/2025 1.9 (H) 0.6 - 1.2 mg/dL Final     Potassium   Date Value Ref Range Status   03/27/2025 4.2 3.5 - 5.1 mEQ/L Final     Hemoglobin   Date Value Ref Range Status   03/27/2025 8.3 (L) 11.8 - 15.7 g/dL Final     Sodium   Date Value Ref Range Status   03/27/2025 135 (L) 136 - 145 mEQ/L Final     Albumin   Date Value Ref Range Status   03/26/2025 2.5 (L) 3.5 - 5.7 g/dL Final     I have reviewed the pertinent patient's labs.    Imaging  I have reviewed the pertinent patient's imaging results.     Physical Exam  NAD AA RRR S1 S2 no edema CTA no wheezing walter +BS Nt distended no C/C                Assessment & Plan  Syncope and collapse    Bladder cancer (CMS/HCC)  S/p cystoscopy in January 2025 with resection  Per Onc  FIDELIA (acute kidney injury) (CMS/Formerly [facility])  Noted fluctuating creatinine as outpatient  Pt is not aware of CKD  No NSAIDs use as outpt.   Most likely due to ATN from urosepsis  Follow urine output and BMP on NSS.   noted result of US and UA  Per Urology for hydronephrosis    Anemia    Constipation  Pt is willing to use suppository.   Will order Ducolax suppository  Senna daily and on Miralax  Would consider Lactulose if not resolved.   Sepsis (CMS/HCC)  Urosepsis   BP is improved   Continue IVF  Follow renal function to adjust med dose  ABX per ID  Hypomagnesemia  PRN  mag IV to keep mag >=2.0  Acute cystitis without hematuria      Expected Discharge Date:  3/26/2025

## 2025-03-27 NOTE — ASSESSMENT & PLAN NOTE
Pt is willing to use suppository.   Will order Ducolax suppository  Senna daily and on Miralax  Would consider Lactulose if not resolved.

## 2025-03-27 NOTE — PROGRESS NOTES
Hospital Medicine     Daily Progress Note       SUBJECTIVE   Patient denies any abd pain, nausea/vomiting. Tolerating diet   OBJECTIVE   Vital signs in last 24 hours:  Temp:  [37.1 °C (98.8 °F)-37.7 °C (99.9 °F)] 37.2 °C (99 °F)  Heart Rate:  [] 59  Resp:  [18] 18  BP: (126-161)/(58-68) 141/68    Intake/Output Summary (Last 24 hours) at 3/27/2025 1501  Last data filed at 3/27/2025 1230  Gross per 24 hour   Intake 894.75 ml   Output --   Net 894.75 ml         Gen: Appears stated age  Head: atraumatic, normocephalic  Eyes: PERRLA, EOMI, no pallor  Neck: supple, no Lymph nodes, no Thyromegaly, no JVD  CVS: RRR, No M/G, no JVD  Pulm: CTA b/l, no wheezes or rhonchi, no rales  Abd: Soft, NT, ND, normal bowel sounds  Extremities:no edema, no cyanosis   MSK: no DJD, no joint swellings, no joint tenderness   Neuro: AAO x3      LINES, DRAINS, AIRWAYS, WOUNDS   Lines, Drains, and Airways:  Wounds (agree with documentation and present on admission):  Single Lumen Implantable Port 03/13/24 Right Chest (Active)   Number of days: 379         Comments:      LABS, IMAGING, TELEMETRY   Labs  Results from last 7 days   Lab Units 03/27/25  0535   WBC K/uL 25.43*   HEMOGLOBIN g/dL 8.3*   HEMATOCRIT % 25.3*   PLATELETS K/uL 251     Results from last 7 days   Lab Units 03/27/25  0535   SODIUM mEQ/L 135*   POTASSIUM mEQ/L 4.2   CHLORIDE mEQ/L 106   CO2 mEQ/L 21   BUN mg/dL 28*   CREATININE mg/dL 1.9*   GLUCOSE mg/dL 112*   CALCIUM mg/dL 8.1*     Microbiology Results       Procedure Component Value Units Date/Time    Urine culture Urine, Clean Catch [[id number]] Collected: 03/24/25 1452    Specimen: Urine, Clean Catch Updated: 03/25/25 1613     Urine Culture Probable contaminants, suggest recollection      50,000-99,000 cfu/mL Mixed Growth    SARS-COV-2 (COVID-19)/ FLU A/B, AND RSV, PCR Nasopharynx [[id number]]  (Normal) Collected: 03/24/25 1157    Specimen: Nasopharyngeal Swab from Nasopharynx Updated: 03/24/25 1246      SARS-CoV-2 (COVID-19) Negative     Influenza A Negative     Influenza B Negative     Respiratory Syncytial Virus Negative    Narrative:      Testing performed using real-time PCR for detection of COVID-19. EUA approved validation studies performed on site.     Blood Culture Blood, Venous [[id number]]  (Normal) Collected: 03/24/25 1155    Specimen: Blood, Venous Updated: 03/26/25 1701     Culture No growth at 48 hours    Blood Culture Blood, Venous [[id number]]  (Normal) Collected: 03/24/25 1155    Specimen: Blood, Venous Updated: 03/26/25 1701     Culture No growth at 48 hours                Imaging  ULTRASOUND KIDNEYS / BLADDER  Result Date: 3/26/2025  Narrative: CLINICAL HISTORY: Acute renal insufficiency. COMPARISON: CT dated January 25, 2025. COMMENT: Ultrasound examination of the kidneys demonstrates the kidneys to be normal in size and contour. The right measures 10.1 x 5.3 x 5.0 cm. The left measures 11.0 x 5.9 x 5.4 cm. The cortex is normal in thickness and echotexture. There is redemonstration of moderate right-sided hydronephrosis. The proximal ureter is also dilated as well as the distal ureter at the level of the urinary bladder.. No left-sided hydronephrosis. 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 No renal calculi. The urinary bladder prevoid volume is 233 mL with post void residual of 104 mL. No definite bladder mass appreciated on examination.     Impression: IMPRESSION: Right-sided hydronephrosis and hydroureter suspected to the level of the urinary bladder. No left-sided hydronephrosis. Moderate to large post void residual.    X-RAY CHEST 1 VIEW  Result Date: 3/24/2025  Narrative: CLINICAL HISTORY:  Syncope, simple, normal neuro exam     Impression: IMPRESSION:  No acute cardiopulmonary process. Interval insertion of a right venous port is noted since the 1/24/2025 chest studies. COMPARISON: Two-view chest 1/24/2025. COMMENT:   Semiupright AP imaging of the chest is completed. Interval placement of an infusion port on the right, with tip at the level of the mid SVC. The port is presently accessed. The lungs are normally expanded and clear. No visible pleural fluid. No consolidation. Normal cardiac size. Normal pulmonary vascularity. Dual-lead pacer in place, power generator on the left. Stable hilar and mediastinal contours. Unremarkable upper abdomen. Calcific tendinosis left shoulder     IR PORT PLACEMENT  IR PORT PLACEMENT, ULTRASOUND GUIDED VASCULAR ACCESS  Result Date: 3/13/2025  Narrative: CLINICAL HISTORY:    Bladder cancer     Impression: IMPRESSION: Successful placement of a right IJ CT Injectable chest port with ultrasound and fluoroscopic guidance, which is ready for use. Device: Prompt.ly PowerPort COMMENT: PROCEDURE:  Right IJ chest port placement, under ultrasound and fluoroscopic guidance. RADIOLOGIST:  Quang Torres MD ANESTHESIA : Lidocaine 1% CONTRAST:  None. MEDICATIONS: Vancomycin 1.25 gm IV REFERENCE AIR KERMA: 14 mGy Versed 1 mg IV and Fentanyl 100 mcg IV were administered intravenously for moderate sedation, under the supervision of the physician.  Pulse oximetry, heart rate and blood pressure were continuously monitored by the trained nursing staff present.  The physician spent a total of 30 minutes of face to face sedation time with the patient. All the procedural conscious sedation guidelines were followed and documented including the Mallampati airway assessment, confirmation of NPO status, anesthesia history and  ASA classification.. DESCRIPTION OF PROCEDURE:  Written informed consent was obtained following explanation of risks and benefits of the procedure. The right neck and chest were draped and prepped in the usual sterile manner. The right internal jugular vein was noted to be compressible and patent on gray scale ultrasound. Under real-time grayscale ultrasound, the internal jugular vein was cannulated with a  21-gauge micropuncture needle following local anesthesia and a skin nick. An ultrasound-guided access image was saved to PACS. An 0.018 wire was advanced. The needle was exchanged for a 5 Azeri micropuncture catheter. The inner catheter and wire were removed, and an 0.035 Amplatz wire was advanced under fluoroscopic guidance into the IVC and secured with a flow-switch. An appropriate port site was selected in the upper chest and the area was anesthetized with Lidocaine. A 3 centimeter horizontal incision was created and blunt dissection was performed superiorly to create a port pocket. Hemostasis was achieved. A subcutaneous tunnel was then created to the IJ entry site following local anesthesia, with blunt dissection. An 8F Bard PowerPort catheter was advanced through the tunnel. The port was tested demonstrating no leakage. A peel-away sheath was advanced over the Amplatz wire, and the catheter was advanced through the peel-away sheath and positioned within the SVC/right atrial junction. The peel-away sheath was removed. The port catheter was appropriately sized and connected to the port. The port was successfully positioned within the pocket with the distal catheter tip terminating at the cavoatrial junction. The port was tested demonstrating appropriate flush and aspiration. The port was then flushed with Heparin following good blood return and was subsequently deaccessed. . Tissues superficial to the port were closed with interrupted deep 2-0 Vicryl suture and running subcutaneous 4-0 Vicryl suture. Skin closure at the internal jugular vein access site was achieved with Dermabond adhesive. Dermabond was applied to the pocket incision site. The patient was discharged in good condition. Performance Met:  PQRS MEASURE 76, CPT II 6030F:  All elements of maximal sterile technique were utilized, including cap, mask, sterile gown, sterile gloves, and sterile drape.  Additionally, sterile ultrasound techniques were  followed, including use of sterile probe cover and sterile ultrasound gel. Ultrasound-guided vascular access - Grayscale ultrasound evaluation demonstrated the right  internal jugular vein to be patent. The right internal jugular vein was accessed under real-time ultrasound access. An access image was saved/archived to PACS. The number of guidewires used, and their integrity, was confirmed at the end of the procedure.        ECG/Telemetry  EKG:    ASSESSMENT AND PLAN   # Sepsis with acute organ dysfunction  # UTI (recurrent)  -Urine cultures contamination  -ID recommends cefuroxime for 14 day treatment     # FIDELIA (acute kidney injury) (CMS/HCC)  - creatinine: 1.9 (3/27/25) unchanged from 1.9 (3/26/25)  -baseline range 1.2-1.3  -US kidneys/bladder- right sided hydronephrosis and hydroureter  -Cont IV fluids  -Urology recommends possible stent placement if creatinine does not improve  -Nephrology on board     # Bladder cancer  -S/p cysto and tumor resection  -Currently undergoing chemoradiation with Dr. Soria and Dr. Amanda  -Heme/Onc on board     # Syncope and collapse  -Occurring in the setting of sepsis due to UTI. Treatment as noted  -BP improved with IVF hydration given in ED  -Close monitoring of hemodynamics     # Anemia likely due to malignancy & iron deficiency  -Per patient's oncologist, goal Hb 8 and above  -1 unit PRBC 3/26/25- hemoglobin 8.3 today     # Hyperlipidemia  Continued on rosuvastatin     # Hypomagnesemia  Correct Mag level to within normal limits  - Magnesium: 1.8 mg/dL (3/27/25), low 1.7 mg/dL (3/24/25)  supplement as needed     # Pacemaker  S/p PPM placement for cardiac arrhythmia     # Depression and Anxiety: recurrent  - decreased: buspirone 5 mg po daily, based on renal clearance     # Thrombocytopenia  - platelets: 251 (3/27/25) up from 102 (3/26/25)  Likely related to chemo and infection       VTE Prophylaxis:  Current anticoagulants:  enoxaparin (LOVENOX) syringe 40 mg, subcutaneous,  Daily (6p)      Code Status: Full Code        Estimated Discharge Date: 3/28/2025   Disposition Planning: pending improvement        Kevan Guy MD  3/27/2025

## 2025-03-27 NOTE — PLAN OF CARE
Problem: Adult Inpatient Plan of Care  Goal: Plan of Care Review  Outcome: Progressing  Flowsheets (Taken 3/27/2025 7723)  Progress: improving  Outcome Evaluation: Pt OOB to chair for most of day. NSR. Independent in room. Had one small BM.  urinating yelllow urine. PVR negative. IVF continue.  Plan of Care Reviewed With: patient  Goal: Patient-Specific Goal (Individualized)  Outcome: Progressing  Goal: Absence of Hospital-Acquired Illness or Injury  Outcome: Progressing  Goal: Optimal Comfort and Wellbeing  Outcome: Progressing  Goal: Readiness for Transition of Care  Outcome: Progressing

## 2025-03-27 NOTE — TELEPHONE ENCOUNTER
Care Coordination, Dr. Soria would like to see the pt to discuss treatment plan next week.  RN met with the pt at the bedside, pt verbalized understanding of the plan.

## 2025-03-27 NOTE — PLAN OF CARE
Problem: Adult Inpatient Plan of Care  Goal: Plan of Care Review  Outcome: Progressing  Flowsheets (Taken 3/27/2025 7411)  Progress: improving  Outcome Evaluation: Pt OOB to chair for most of day. NSR. Independent in room. Had one small BM.  urinating yellow urine. PVR negative. IVF continue.  Plan of Care Reviewed With: patient

## 2025-03-27 NOTE — PLAN OF CARE
Plan of Care Review  Plan of Care Reviewed With: patient  Progress: no change  Outcome Evaluation: Patient alert and oriented, pleasant and cooperative care. Minor placement deferred until patient is seen by urology, discussed with night shift provider Bernardo BARONE. Patient denies all bladder discomfort and pain, PVR negative at 17mL. Patient is continent bowel and bladder. Receiving IVF. Plan for radiation at 7:45am. Transitioned to PO abx. Refusing bed alarm, fall education provided and reinforced. Patient independent in room. Call bell within reach.

## 2025-03-27 NOTE — SUBJECTIVE & OBJECTIVE
Subjective     Interval History: complains of constipation for 4 days. No shortness of breath, fever, or abdominal pain.     Objective     Vital signs in last 24 hours:  Temp:  [36.8 °C (98.2 °F)-37.7 °C (99.9 °F)] 37.3 °C (99.2 °F)  Heart Rate:  [] 75  Resp:  [18] 18  BP: (126-161)/(58-67) 161/58         Intake/Output Summary (Last 24 hours) at 3/27/2025 1120  Last data filed at 3/26/2025 1803  Gross per 24 hour   Intake 354.75 ml   Output --   Net 354.75 ml       Labs  BUN   Date Value Ref Range Status   03/27/2025 28 (H) 7 - 25 mg/dL Final     Creatinine   Date Value Ref Range Status   03/27/2025 1.9 (H) 0.6 - 1.2 mg/dL Final     Potassium   Date Value Ref Range Status   03/27/2025 4.2 3.5 - 5.1 mEQ/L Final     Hemoglobin   Date Value Ref Range Status   03/27/2025 8.3 (L) 11.8 - 15.7 g/dL Final     Sodium   Date Value Ref Range Status   03/27/2025 135 (L) 136 - 145 mEQ/L Final     Albumin   Date Value Ref Range Status   03/26/2025 2.5 (L) 3.5 - 5.7 g/dL Final     I have reviewed the pertinent patient's labs.    Imaging  I have reviewed the pertinent patient's imaging results.     Physical Exam  NAD AA RRR S1 S2 no edema CTA no wheezing walter +BS Nt distended no C/C

## 2025-03-27 NOTE — PROGRESS NOTES
Hematology/Oncology -  Daily Progress Note       SUBJECTIVE   Interval History: Patient is seen with RN. She is sad because her creatinine remains elevated.  She did feel better after blood transfusion.  Ultrasound showed a right sided hydronephrosis and hydroureter suspected to the level of the urinary bladder.  Moderate to large postvoid residue.     OBJECTIVE      Vital signs in last 24 hours:  Temp:  [37.2 °C (99 °F)-37.7 °C (99.9 °F)] 37.6 °C (99.6 °F)  Heart Rate:  [] 85  Resp:  [18] 18  BP: (129-161)/(58-68) 149/64    Intake/Output Summary (Last 24 hours) at 3/27/2025 1919  Last data filed at 3/27/2025 1845  Gross per 24 hour   Intake 6216 ml   Output --   Net 6216 ml       PHYSICAL EXAMINATION          Physical Examination:  Physical Exam  Vitals reviewed.   Constitutional:       Appearance: She is not ill-appearing.   Cardiovascular:      Rate and Rhythm: Normal rate.   Pulmonary:      Effort: Pulmonary effort is normal.   Abdominal:      Palpations: Abdomen is soft.   Skin:     General: Skin is warm.      Coloration: Skin is pale. Skin is not jaundiced.   Neurological:      General: No focal deficit present.      Mental Status: She is alert.   Psychiatric:         Mood and Affect: Mood normal.         No data recorded   bisacodyL  10 mg rectal Once    busPIRone  5 mg oral Daily    cefUROXime  250 mg oral q12h ELYSSA    enoxaparin  40 mg subcutaneous Daily (6p)    magnesium oxide  400 mg oral Daily    polyethylene glycol  17 g oral Nightly    prochlorperazine  5 mg oral q6h ELYSSA    rosuvastatin  40 mg oral Daily    senna  2 tablet oral Nightly        LABS / IMAGING / TELE      Labs  Recent Results (from the past 24 hours)   Type and screen    Collection Time: 03/27/25  5:35 AM   Result Value Ref Range    Specimen Expiration 03/30/2025     Antibody Screen Negative     ABO B     Rh Factor Positive     History Check Previous type on file    CBC and Differential    Collection Time: 03/27/25  5:35 AM    Result Value Ref Range    WBC 25.43 (H) 3.80 - 10.50 K/uL    RBC 2.83 (L) 3.93 - 5.22 M/uL    Hemoglobin 8.3 (L) 11.8 - 15.7 g/dL    Hematocrit 25.3 (L) 35.0 - 45.0 %    MCV 89.4 83.0 - 98.0 fL    MCH 29.3 28.0 - 33.2 pg    MCHC 32.8 32.2 - 35.5 g/dL    RDW 14.4 11.7 - 14.4 %    Platelets 251 150 - 369 K/uL    MPV 9.8 9.4 - 12.3 fL    Differential Type Auto     nRBC 0.0 <=0.0 %    Immature Granulocytes 1.3 %    Neutrophils 83.1 %    Lymphocytes 5.4 %    Monocytes 6.7 %    Eosinophils 3.3 %    Basophils 0.2 %    Immature Granulocytes, Absolute 0.33 (H) 0.00 - 0.08 K/uL    Neutrophils, Absolute 21.14 (H) 1.70 - 7.00 K/uL    Lymphocytes, Absolute 1.38 1.20 - 3.50 K/uL    Monocytes, Absolute 1.71 (H) 0.28 - 0.80 K/uL    Eosinophils, Absolute 0.83 (H) 0.04 - 0.36 K/uL    Basophils, Absolute 0.04 0.01 - 0.10 K/uL   Basic metabolic panel    Collection Time: 03/27/25  5:35 AM   Result Value Ref Range    Sodium 135 (L) 136 - 145 mEQ/L    Potassium 4.2 3.5 - 5.1 mEQ/L    Chloride 106 98 - 107 mEQ/L    CO2 21 21 - 31 mEQ/L    BUN 28 (H) 7 - 25 mg/dL    Creatinine 1.9 (H) 0.6 - 1.2 mg/dL    Glucose 112 (H) 70 - 99 mg/dL    Calcium 8.1 (L) 8.6 - 10.3 mg/dL    eGFR 27.4 (L) >=60.0 mL/min/1.73m*2    Anion Gap 8 3 - 15 mEQ/L   Magnesium    Collection Time: 03/27/25  5:35 AM   Result Value Ref Range    Magnesium 1.8 1.8 - 2.5 mg/dL   Rad Onc Aria Session Summary    Collection Time: 03/27/25  8:43 AM   Result Value Ref Range    Course ID C1     Course Start Date 3/6/2025  1:46 PM     Treatment Elapsed Days 9 days as of 2025-03-27 @ 08:4203     Reference Point ID [email or web address]     Dosage Given To Date Gy [id number]     Session Dosage Given Gy [id number]     Reference Point ID 1a_Part Bladder     Dosage Given To Date Gy 14     Session Dosage Given Gy 2     Reference Point ID [email or web address] total     Dosage Given To Date Gy 14     Session Dosage Given Gy 2     PLAN ID [id number]     Plan Name PS_a-partial bladder_GTV     Fractions Treated To  Date 7 of 8     Prescribed Dose Per Fraction Gy 2     Prescribed Dose cGy 1,600    ]    Imaging  No results found.        ASSESSMENT AND PLAN      Assessment & Plan    Gladys Woo is a very pleasant 75 y.o. patient with PMHx of anxiety and dyslipidemia and bladder cancer, stage III, T3N0M0.   She is admitted for syncope and collapse.    Bladder cancer (CMS/HCC)  Patient with recent diagnosis of locally advanced bladder cancer.  She opted for treatment with concurrent radiation and chemotherapy.  Received her first dose of low-dose weekly cisplatin on March 19.  Now admitted for syncopal episode.  Chemotherapy will be on hold in the setting of acute illness and hospitalization.  She is being evaluated for possible underlying infection contributing to her syncope.  Will continue to follow patient while hospitalized and she will follow-up with Dr. Soria after discharge for ongoing management.    3/26/2025:   Due to her anemia and syncope, there will be no chemotherapy for this week.  Because of her anemia and FIDELIA, which is likely due to cisplatin use I may offer her gemcitabine twice a week instead of cisplatin.  She is okay with the plan.  I will continue follow along.    3/27/2025  Today we discussed that since her kidney function is not getting better I will change her chemo to gemcitabine twice a week along with radiation.  This is communicated with the Dr. [facility]. Will plan to do treatment next week.  Depending on my schedule I will see her once a week.  She gave the consent.  We discussed the side effect of gemcitabine in details. Citation: Bladder Preservation With Twice-a-Day Radiation Plus Fluorouracil/Cisplatin or Once Daily Radiation Plus Gemcitabine for Muscle-Invasive Bladder Cancer: NRG/RTOG 0712-A Randomized Phase II Trial. JCO 2018.     Anemia  Patient with acute on chronic anemia.  Suspect acute drop in hemoglobin is dilutional related to IV fluids patient being hemoconcentrated at admission.   Will follow CBC closely and transfuse if hemoglobin less than 7 or less than 8 with increased symptoms.    Will transfuse 1 unit PRBC today. Will give IV iron pending ID input.     Her anemia may contribute to her syncope.    3/27/25:   Will hold on IV iron while she is on IV antibiotics.    FIDELIA (acute kidney injury) (CMS/Formerly [facility])  Appreciate nephrology input  Now urology is on board with the plan of right nephrostomy tube placement if FIDELIA persists.    Sepsis (CMS/[facility]ly [facility])/leukocytosis  Appreciate ID input.      Hypomagnesemia       VTE Assessment: Padua    Code Status: Full Code  Estimated discharge date: 3/28/2025     Wenceslao Soria MD, PhD  3/27/2025  7:19 PM

## 2025-03-27 NOTE — PROGRESS NOTES
Major Events:  none    Subjective:  Feels better    Temp:  [37.1 °C (98.7 °F)-37.7 °C (99.9 °F)] 37.2 °C (99 °F)  Heart Rate:  [] 59  Resp:  [18] 18  BP: (126-161)/(58-68) 141/68     SpO2 Readings from Last 3 Encounters:   03/27/25 97%   03/24/25 97%   03/21/25 100%     Anti-infectives (From admission, onward)      Start     Dose/Rate Route Frequency Ordered Stop    03/26/25 2100  cefUROXime (CEFTIN) tablet 250 mg         250 mg oral Every 12 hours 03/26/25 1223 03/30/25 4759          Physical Exam:  Skin: No rash  Sclera: Anicteric  Lungs: clr  CV: S1, S2 nl, no embolic changes  Abd: Soft, nt  Extr: No jt eff, no edema  Neuro: Alert, lucid    Lab Results   Component Value Date    WBC 25.43 (H) 03/27/2025    HGB 8.3 (L) 03/27/2025    HCT 25.3 (L) 03/27/2025    MCV 89.4 03/27/2025     03/27/2025     Lab Results   Component Value Date    GLUCOSE 112 (H) 03/27/2025    CALCIUM 8.1 (L) 03/27/2025     (L) 03/27/2025    K 4.2 03/27/2025    CO2 21 03/27/2025     03/27/2025    BUN 28 (H) 03/27/2025    CREATININE 1.9 (H) 03/27/2025     Lab Results   Component Value Date    ALBUMIN 2.5 (L) 03/26/2025    BILITOT 0.3 03/26/2025    ALKPHOS 73 03/26/2025    AST 14 03/26/2025    ALT 15 03/26/2025    PROTEIN 4.8 (L) 03/26/2025     Microbiology Results (last 3 days)       Procedure Component Value - Date/Time    Urine culture Urine, Clean Catch [[id number]] Collected: 03/24/25 1452    Lab Status: Final result Specimen: Urine, Clean Catch Updated: 03/25/25 1613     Urine Culture Probable contaminants, suggest recollection      50,000-99,000 cfu/mL Mixed Growth            Imaging:     ULTRASOUND KIDNEYS / BLADDER  Result Date: 3/26/2025  IMPRESSION: Right-sided hydronephrosis and hydroureter suspected to the level of the urinary bladder. No left-sided hydronephrosis. Moderate to large post void residual.    Impression    Hi WBC  declining  No fever  urCx contam    Bladder/ur CA  Appears - may be causing  obstruction    On cefurox 250 BID day 3/14    PATIENCE Escalante MD

## 2025-03-27 NOTE — PROGRESS NOTES
Presented to evaluate patient this morning, though she with that her radiation therapy.  To review, patient is a 75-year-old female with a history of muscle invasive bladder cancer undergoing chemoradiation.  Patient presented to the emergency room after syncopal episode.  In the ED, patient noted to have a leukocytosis to 55 as well as an FIDELIA.  Urine culture ultimately showed contaminant.  She underwent a renal ultrasound demonstrating persistent, moderate right hydroureteronephrosis down to the level of the bladder likely related to malignant obstruction.  Creatinine today remains elevated at 1.9.  Patient has been emptying her bladder and adequate fashion.    -Would defer Minor catheter placement  -If FIDELIA persists despite fluid resuscitation, would proceed with right nephrostomy tube placement.  -Urology will continue to follow

## 2025-03-28 ENCOUNTER — APPOINTMENT (OUTPATIENT)
Dept: RADIATION ONCOLOGY | Facility: HOSPITAL | Age: 75
Setting detail: RADIATION/ONCOLOGY SERIES
End: 2025-03-28
Attending: RADIOLOGY
Payer: MEDICARE

## 2025-03-28 VITALS
TEMPERATURE: 98.1 F | HEIGHT: 67 IN | WEIGHT: 173 LBS | HEART RATE: 59 BPM | RESPIRATION RATE: 18 BRPM | OXYGEN SATURATION: 96 % | SYSTOLIC BLOOD PRESSURE: 136 MMHG | BODY MASS INDEX: 27.15 KG/M2 | DIASTOLIC BLOOD PRESSURE: 65 MMHG

## 2025-03-28 PROBLEM — N13.9 ACUTE UNILATERAL OBSTRUCTIVE UROPATHY: Status: ACTIVE | Noted: 2025-03-28

## 2025-03-28 LAB
25(OH)D3 SERPL-MCNC: 17 NG/ML (ref 30–100)
ANION GAP SERPL CALC-SCNC: 8 MEQ/L (ref 3–15)
ARIA ZRC COURSE ID: NORMAL
ARIA ZRC COURSE START DATE: NORMAL
ARIA ZRC TREATMENT ELAPSED DAYS: NORMAL
ARIA ZRP FRACTIONS TREATED TO DATE: NORMAL
ARIA ZRP PLAN ID: NORMAL
ARIA ZRP PLAN NAME: NORMAL
ARIA ZRP PRESCRIBED DOSE CGY: 1600
ARIA ZRP PRESCRIBED DOSE PER FRACTION: 2
ARIA ZRR DOSAGE GIVEN TO DATE: 16
ARIA ZRR DOSAGE GIVEN TO DATE: 16
ARIA ZRR DOSAGE GIVEN TO DATE: 16.08
ARIA ZRR REFERENCE POINT ID: NORMAL
ARIA ZRR SESSION DOSAGE GIVEN: 2
ARIA ZRR SESSION DOSAGE GIVEN: 2
ARIA ZRR SESSION DOSAGE GIVEN: 2.01
BACTERIA BLD CULT: NORMAL
BACTERIA BLD CULT: NORMAL
BUN SERPL-MCNC: 28 MG/DL (ref 7–25)
CALCIUM SERPL-MCNC: 8.2 MG/DL (ref 8.6–10.3)
CHLORIDE SERPL-SCNC: 108 MEQ/L (ref 98–107)
CO2 SERPL-SCNC: 20 MEQ/L (ref 21–31)
CREAT SERPL-MCNC: 1.8 MG/DL (ref 0.6–1.2)
CROSSMATCH: NORMAL
EGFRCR SERPLBLD CKD-EPI 2021: 29.3 ML/MIN/1.73M*2
GLUCOSE SERPL-MCNC: 112 MG/DL (ref 70–99)
ISBT CODE: 7300
POTASSIUM SERPL-SCNC: 4.3 MEQ/L (ref 3.5–5.1)
PRODUCT CODE: NORMAL
PRODUCT STATUS: NORMAL
SODIUM SERPL-SCNC: 136 MEQ/L (ref 136–145)
SPECIMEN EXP DATE BLD: NORMAL
UNIT ABO: NORMAL
UNIT ID: NORMAL
UNIT RH: POSITIVE

## 2025-03-28 PROCEDURE — 77386 HC IMRT DELIVERY COMPLEX: CPT | Performed by: RADIOLOGY

## 2025-03-28 PROCEDURE — 63700000 HC SELF-ADMINISTRABLE DRUG: Performed by: STUDENT IN AN ORGANIZED HEALTH CARE EDUCATION/TRAINING PROGRAM

## 2025-03-28 PROCEDURE — 80048 BASIC METABOLIC PNL TOTAL CA: CPT | Performed by: STUDENT IN AN ORGANIZED HEALTH CARE EDUCATION/TRAINING PROGRAM

## 2025-03-28 PROCEDURE — 77336 RADIATION PHYSICS CONSULT: CPT | Performed by: RADIOLOGY

## 2025-03-28 PROCEDURE — 63700000 HC SELF-ADMINISTRABLE DRUG: Performed by: PHYSICIAN ASSISTANT

## 2025-03-28 PROCEDURE — 99239 HOSP IP/OBS DSCHRG MGMT >30: CPT | Performed by: STUDENT IN AN ORGANIZED HEALTH CARE EDUCATION/TRAINING PROGRAM

## 2025-03-28 PROCEDURE — 36415 COLL VENOUS BLD VENIPUNCTURE: CPT | Performed by: STUDENT IN AN ORGANIZED HEALTH CARE EDUCATION/TRAINING PROGRAM

## 2025-03-28 PROCEDURE — 99232 SBSQ HOSP IP/OBS MODERATE 35: CPT | Performed by: HOSPITALIST

## 2025-03-28 PROCEDURE — 63700000 HC SELF-ADMINISTRABLE DRUG: Performed by: INTERNAL MEDICINE

## 2025-03-28 PROCEDURE — 82306 VITAMIN D 25 HYDROXY: CPT | Performed by: INTERNAL MEDICINE

## 2025-03-28 RX ORDER — HEPARIN 100 UNIT/ML
500 SYRINGE INTRAVENOUS ONCE
Status: COMPLETED | OUTPATIENT
Start: 2025-03-28 | End: 2025-03-28

## 2025-03-28 RX ORDER — CEFUROXIME AXETIL 250 MG/1
250 TABLET ORAL EVERY 12 HOURS
Qty: 20 TABLET | Refills: 0 | Status: SHIPPED | OUTPATIENT
Start: 2025-03-28 | End: 2025-04-07

## 2025-03-28 RX ADMIN — ROSUVASTATIN 40 MG: 40 TABLET, FILM COATED ORAL at 09:56

## 2025-03-28 RX ADMIN — HEPARIN 500 UNITS: 100 SYRINGE at 14:06

## 2025-03-28 RX ADMIN — PROCHLORPERAZINE MALEATE 5 MG: 10 TABLET ORAL at 06:09

## 2025-03-28 RX ADMIN — PROCHLORPERAZINE MALEATE 5 MG: 10 TABLET ORAL at 00:17

## 2025-03-28 RX ADMIN — CEFUROXIME AXETIL 250 MG: 250 TABLET, FILM COATED ORAL at 09:56

## 2025-03-28 RX ADMIN — BUSPIRONE HYDROCHLORIDE 5 MG: 5 TABLET ORAL at 09:56

## 2025-03-28 RX ADMIN — Medication 400 MG: at 09:55

## 2025-03-28 ASSESSMENT — COGNITIVE AND FUNCTIONAL STATUS - GENERAL
MOVING TO AND FROM BED TO CHAIR: 4 - NONE
STANDING UP FROM CHAIR USING ARMS: 4 - NONE
WALKING IN HOSPITAL ROOM: 4 - NONE
CLIMB 3 TO 5 STEPS WITH RAILING: 3 - A LITTLE

## 2025-03-28 NOTE — SUBJECTIVE & OBJECTIVE
Subjective     Interval History: has no complaint of back pain, nausea or shortness of breath. .     Objective     Vital signs in last 24 hours:  Temp:  [36.7 °C (98 °F)-37.7 °C (99.8 °F)] 36.7 °C (98.1 °F)  Heart Rate:  [59-91] 59  Resp:  [18] 18  BP: (132-180)/(58-83) 136/65         Intake/Output Summary (Last 24 hours) at 3/28/2025 1345  Last data filed at 3/27/2025 1845  Gross per 24 hour   Intake 5676 ml   Output --   Net 5676 ml       Labs  BUN   Date Value Ref Range Status   03/28/2025 28 (H) 7 - 25 mg/dL Final     Creatinine   Date Value Ref Range Status   03/28/2025 1.8 (H) 0.6 - 1.2 mg/dL Final     Potassium   Date Value Ref Range Status   03/28/2025 4.3 3.5 - 5.1 mEQ/L Final     Hemoglobin   Date Value Ref Range Status   03/27/2025 8.3 (L) 11.8 - 15.7 g/dL Final     Sodium   Date Value Ref Range Status   03/28/2025 136 136 - 145 mEQ/L Final     Albumin   Date Value Ref Range Status   03/26/2025 2.5 (L) 3.5 - 5.7 g/dL Final     I have reviewed the pertinent patient's labs.    Imaging  No new imaging results.      Physical Exam  NAD AA RRR S1 S2 no edema CTA no wheezing soft +BS Nt ND no C/C

## 2025-03-28 NOTE — DISCHARGE INSTRUCTIONS
Follow up repeat Basic metabolic panel on Monday to monitor creatinine level.  Follow up with Nephrology and Oncology as outpatient for further management.

## 2025-03-28 NOTE — TELEPHONE ENCOUNTER
Dr. Soria placed a new treatment plan for Gemcitabine.  She would like the pt to receive Day 1 & Day 4 next week.  We do not need labs for Day 1 as she was just d/c from the hospital.  Pt will need labs for Day 4.    The pt will need to see Dr. Soria once weekly, later in the week is fine per Dr. Soria.      An email was sent to [facility] to alert the staff to this new start.

## 2025-03-28 NOTE — PLAN OF CARE
Care Coordination Discharge Plan Note     Discharge Needs Assessment  Concerns to be Addressed: discharge planning, care coordination/care conferences  Current Discharge Risk:      Anticipated Discharge Plan  Anticipated Discharge Disposition: home with home health, home with assistance  Type of Home Care Services: nursing      Patient Choice  Offered/Gave Vendor List: no       ---------------------------------------------------------------------------------------------------------------------    Interdisciplinary Discharge Plan Review:  Participants:     Concerns Comments: Pt is medically stable for d/c, Order verified. Pt will d/c home with no needs. Pt's friend will drive her home. IMM reviewed and signed.    Discharge Plan:   Disposition/Destination:   / Home   Discharge Facility:  Home  Community Resources:      Discharge Transportation:  Is Out of Hospital DNR needed at Discharge: no  Does patient need discharge transport? no

## 2025-03-28 NOTE — DISCHARGE SUMMARY
Hospital Medicine Service -  Inpatient Discharge Summary        BRIEF OVERVIEW   Admitting Provider: Collins Booth MD  Attending Provider: No att. providers found Attending phys phone: N/A    PCP: Zoe Sanders [phone]    Admission Date: 3/24/2025  Discharge Date: 3/28/2025     DISCHARGE DIAGNOSES      Primary Discharge Diagnosis  Syncope and collapse    Secondary Discharge Diagnoses  Active Hospital Problems    Diagnosis Date Noted    Acute unilateral obstructive uropathy 03/28/2025    Acute cystitis without hematuria 03/26/2025    Sepsis (CMS/HCC) 03/25/2025    Hypomagnesemia 03/25/2025    Syncope and collapse 03/24/2025    Constipation 01/27/2025    Anemia 01/26/2025    Bladder cancer (CMS/[facility]) 01/25/2025    FIDELIA (acute kidney injury) (CMS/[facility]) 01/25/2025      Resolved Hospital Problems   No resolved problems to display.       Problem List on Day of Discharge  Assessment & Plan  Bladder cancer (CMS/[facility])    FIDELIA (acute kidney injury) (CMS/HCC)    Anemia    Constipation    Sepsis (CMS/HCC)    Hypomagnesemia    Acute cystitis without hematuria    Acute unilateral obstructive uropathy    SUMMARY OF HOSPITALIZATION      Presenting Problem/History of Present Illness  This is a 75 y.o. year-old female admitted on 3/24/2025 with Syncope and collapse [R55]  Leukocytosis, unspecified type [D72.829].      Hospital Course  75 y.o. female with a past medical history of CAD, cardiac arrhythmia s/p PPM, HLD, TREVOR, bladder cancer s/p cysto and resection currently on chemoradiation who presents for syncope. She was found to have UTI, FIDELIA. She was started on abx with improvement in her symptoms. FIDELIA slightly improved. Her syncope was deemed secondary to infection. She had US kidneys/bladder- right sided hydronephrosis and hydroureter. Urology recommended monitoring of creatinine and intervention if creatinine worsened. She is recommended repeat labwork as outpatient on Monday 3/31/25 to monitor creatinine level, and f/u with  her PCP, oncology and urology for further management.     Exam on Day of Discharge  PE:   Gen- in no acute distress,  CV: RRR, No M/G, no JVD  Pulm: CTA B/l, no wheezes or rhonchi, no rales  Abd: Soft, NT, ND, normal bowel sounds  Extremities: normal range of motion      Consults During Admission  IP CONSULT TO INFECTIOUS DISEASE  IP CONSULT TO HEMATOLOGY/ONCOLOGY  IP CONSULT TO NEPHROLOGY  IP CONSULT TO UROLOGY    DISCHARGE MEDICATIONS               Medication List        START taking these medications      cefUROXime 250 mg tablet  Commonly known as: CEFTIN  Take 1 tablet (250 mg total) by mouth every 12 (twelve) hours for 10 days Indications: urinary tract infection.  Dose: 250 mg            CONTINUE taking these medications      acetaminophen 500 mg tablet  Commonly known as: TYLENOL  Take 500 mg by mouth every 6 (six) hours as needed for pain.  Dose: 500 mg     busPIRone 5 mg tablet  Commonly known as: BUSPAR  Take 10 mg by mouth daily. 2 tablets  Dose: 10 mg     cranberry conc-C-bacillus coag 250-30-15 mg tablet  Commonly known as: AZO CRANBERRY + PROBIOTIC  Take 1 tablet by mouth nightly.  Dose: 1 tablet     ondansetron 8 mg tablet  Commonly known as: ZOFRAN  Take 1 tablet (8 mg total) by mouth every 8 (eight) hours as needed for nausea or vomiting.  Dose: 8 mg     prochlorperazine 10 mg tablet  Commonly known as: COMPAZINE  Take 1 tablet (10 mg total) by mouth every 6 (six) hours as needed for nausea or vomiting.  Dose: 10 mg     rosuvastatin 40 mg tablet  Commonly known as: CRESTOR  Take 40 mg by mouth daily.  Dose: 40 mg                  PROCEDURES / LABS / IMAGING          Pertinent Labs,   Pertinent Imaging  Results from last 7 days   Lab Units 03/27/25  0535   WBC K/uL 25.43*   HEMOGLOBIN g/dL 8.3*   HEMATOCRIT % 25.3*   PLATELETS K/uL 251     Results from last 7 days   Lab Units 03/28/25  0603   SODIUM mEQ/L 136   POTASSIUM mEQ/L 4.3   CHLORIDE mEQ/L 108*   CO2 mEQ/L 20*   BUN mg/dL 28*   CREATININE  mg/dL 1.8*   GLUCOSE mg/dL 112*   CALCIUM mg/dL 8.2*     Microbiology Results       Procedure Component Value Units Date/Time    Urine culture Urine, Clean Catch [[id number]] Collected: 03/24/25 1452    Specimen: Urine, Clean Catch Updated: 03/25/25 1613     Urine Culture Probable contaminants, suggest recollection      50,000-99,000 cfu/mL Mixed Growth    SARS-COV-2 (COVID-19)/ FLU A/B, AND RSV, PCR Nasopharynx [[id number]]  (Normal) Collected: 03/24/25 1155    Specimen: Nasopharyngeal Swab from Nasopharynx Updated: 03/24/25 1242     SARS-CoV-2 (COVID-19) Negative     Influenza A Negative     Influenza B Negative     Respiratory Syncytial Virus Negative    Narrative:      Testing performed using real-time PCR for detection of COVID-19. EUA approved validation studies performed on site.     Blood Culture Blood, Venous [[id number]]  (Normal) Collected: 03/24/25 1155    Specimen: Blood, Venous Updated: 03/27/25 1701     Culture No growth at 72 hours    Blood Culture Blood, Venous [[id number]]  (Normal) Collected: 03/24/25 1155    Specimen: Blood, Venous Updated: 03/27/25 1701     Culture No growth at 72 hours              ULTRASOUND KIDNEYS / BLADDER  Result Date: 3/26/2025  IMPRESSION: Right-sided hydronephrosis and hydroureter suspected to the level of the urinary bladder. No left-sided hydronephrosis. Moderate to large post void residual.    X-RAY CHEST 1 VIEW  Result Date: 3/24/2025  IMPRESSION:  No acute cardiopulmonary process. Interval insertion of a right venous port is noted since the 1/24/2025 chest studies. COMPARISON: Two-view chest 1/24/2025. COMMENT:  Semiupright AP imaging of the chest is completed. Interval placement of an infusion port on the right, with tip at the level of the mid SVC. The port is presently accessed. The lungs are normally expanded and clear. No visible pleural fluid. No consolidation. Normal cardiac size. Normal pulmonary vascularity. Dual-lead pacer in place, power generator on the  left. Stable hilar and mediastinal contours. Unremarkable upper abdomen. Calcific tendinosis left shoulder     IR PORT PLACEMENT  Result Date: 3/13/2025  IMPRESSION: Successful placement of a right IJ CT Injectable chest port with ultrasound and fluoroscopic guidance, which is ready for use. Device: [employer] PowerPort COMMENT: PROCEDURE:  Right IJ chest port placement, under ultrasound and fluoroscopic guidance. RADIOLOGIST:  Quang Torres MD ANESTHESIA : Lidocaine 1% CONTRAST:  None. MEDICATIONS: Vancomycin 1.25 gm IV REFERENCE AIR KERMA: 14 mGy Versed 1 mg IV and Fentanyl 100 mcg IV were administered intravenously for moderate sedation, under the supervision of the physician.  Pulse oximetry, heart rate and blood pressure were continuously monitored by the trained nursing staff present.  The physician spent a total of 30 minutes of face to face sedation time with the patient. All the procedural conscious sedation guidelines were followed and documented including the Mallampati airway assessment, confirmation of NPO status, anesthesia history and  ASA classification.. DESCRIPTION OF PROCEDURE:  Written informed consent was obtained following explanation of risks and benefits of the procedure. The right neck and chest were draped and prepped in the usual sterile manner. The right internal jugular vein was noted to be compressible and patent on gray scale ultrasound. Under real-time grayscale ultrasound, the internal jugular vein was cannulated with a 21-gauge micropuncture needle following local anesthesia and a skin nick. An ultrasound-guided access image was saved to PACS. An 0.018 wire was advanced. The needle was exchanged for a 5 Slovak micropuncture catheter. The inner catheter and wire were removed, and an 0.035 Amplatz wire was advanced under fluoroscopic guidance into the IVC and secured with a flow-switch. An appropriate port site was selected in the upper chest and the area was anesthetized with Lidocaine.  A 3 centimeter horizontal incision was created and blunt dissection was performed superiorly to create a port pocket. Hemostasis was achieved. A subcutaneous tunnel was then created to the IJ entry site following local anesthesia, with blunt dissection. An 8F Bard PowerPort catheter was advanced through the tunnel. The port was tested demonstrating no leakage. A peel-away sheath was advanced over the Amplatz wire, and the catheter was advanced through the peel-away sheath and positioned within the SVC/right atrial junction. The peel-away sheath was removed. The port catheter was appropriately sized and connected to the port. The port was successfully positioned within the pocket with the distal catheter tip terminating at the cavoatrial junction. The port was tested demonstrating appropriate flush and aspiration. The port was then flushed with Heparin following good blood return and was subsequently deaccessed. . Tissues superficial to the port were closed with interrupted deep 2-0 Vicryl suture and running subcutaneous 4-0 Vicryl suture. Skin closure at the internal jugular vein access site was achieved with Dermabond adhesive. Dermabond was applied to the pocket incision site. The patient was discharged in good condition. Performance Met:  PQRS MEASURE 76, CPT II 6030F:  All elements of maximal sterile technique were utilized, including cap, mask, sterile gown, sterile gloves, and sterile drape.  Additionally, sterile ultrasound techniques were followed, including use of sterile probe cover and sterile ultrasound gel. Ultrasound-guided vascular access - Grayscale ultrasound evaluation demonstrated the right  internal jugular vein to be patent. The right internal jugular vein was accessed under real-time ultrasound access. An access image was saved/archived to PACS. The number of guidewires used, and their integrity, was confirmed at the end of the procedure.    ULTRASOUND GUIDED VASCULAR ACCESS  Result Date:  3/13/2025  IMPRESSION: Successful placement of a right IJ CT Injectable chest port with ultrasound and fluoroscopic guidance, which is ready for use. Device: [employer] COMMENT: PROCEDURE:  Right IJ chest port placement, under ultrasound and fluoroscopic guidance. RADIOLOGIST:  Quang Torres MD ANESTHESIA : Lidocaine 1% CONTRAST:  None. MEDICATIONS: Vancomycin 1.25 gm IV REFERENCE AIR KERMA: 14 mGy Versed 1 mg IV and Fentanyl 100 mcg IV were administered intravenously for moderate sedation, under the supervision of the physician.  Pulse oximetry, heart rate and blood pressure were continuously monitored by the trained nursing staff present.  The physician spent a total of 30 minutes of face to face sedation time with the patient. All the procedural conscious sedation guidelines were followed and documented including the Mallampati airway assessment, confirmation of NPO status, anesthesia history and  ASA classification.. DESCRIPTION OF PROCEDURE:  Written informed consent was obtained following explanation of risks and benefits of the procedure. The right neck and chest were draped and prepped in the usual sterile manner. The right internal jugular vein was noted to be compressible and patent on gray scale ultrasound. Under real-time grayscale ultrasound, the internal jugular vein was cannulated with a 21-gauge micropuncture needle following local anesthesia and a skin nick. An ultrasound-guided access image was saved to PACS. An 0.018 wire was advanced. The needle was exchanged for a 5 Hungarian micropuncture catheter. The inner catheter and wire were removed, and an 0.035 Amplatz wire was advanced under fluoroscopic guidance into the IVC and secured with a flow-switch. An appropriate port site was selected in the upper chest and the area was anesthetized with Lidocaine. A 3 centimeter horizontal incision was created and blunt dissection was performed superiorly to create a port pocket. Hemostasis was achieved.  A subcutaneous tunnel was then created to the IJ entry site following local anesthesia, with blunt dissection. An 8F Bard PowerPort catheter was advanced through the tunnel. The port was tested demonstrating no leakage. A peel-away sheath was advanced over the Amplatz wire, and the catheter was advanced through the peel-away sheath and positioned within the SVC/right atrial junction. The peel-away sheath was removed. The port catheter was appropriately sized and connected to the port. The port was successfully positioned within the pocket with the distal catheter tip terminating at the cavoatrial junction. The port was tested demonstrating appropriate flush and aspiration. The port was then flushed with Heparin following good blood return and was subsequently deaccessed. . Tissues superficial to the port were closed with interrupted deep 2-0 Vicryl suture and running subcutaneous 4-0 Vicryl suture. Skin closure at the internal jugular vein access site was achieved with Dermabond adhesive. Dermabond was applied to the pocket incision site. The patient was discharged in good condition. Performance Met:  PQRS MEASURE 76, CPT II 6030F:  All elements of maximal sterile technique were utilized, including cap, mask, sterile gown, sterile gloves, and sterile drape.  Additionally, sterile ultrasound techniques were followed, including use of sterile probe cover and sterile ultrasound gel. Ultrasound-guided vascular access - Grayscale ultrasound evaluation demonstrated the right  internal jugular vein to be patent. The right internal jugular vein was accessed under real-time ultrasound access. An access image was saved/archived to PACS. The number of guidewires used, and their integrity, was confirmed at the end of the procedure.      OUTPATIENT  FOLLOW-UP / REFERRALS / PENDING TESTS        Outpatient Follow-Up Appointments            In 3 days Fawn Solares MD; Radiation [facility] Radiation Therapy    In 4 days  Radiation [facility] Radiation Therapy    In 4 days Chair 8; NURSE [facility] [place]    In 4 days ABISAI Herrera [facility]    In 5 days Chair 10; NURSE [facility] [place]    In 5 days Radiation [facility] Radiation Therapy    In 6 days Radiation [facility] Radiation Therapy    In 6 days Wenceslao Soria MD [facility] [place]    In 6 days PH CC CHAIR 1; NURSE [facility] [place]    In 1 week Radiation [facility] Radiation Therapy    In 1 week Radiation [facility] Radiation [facility]    In 1 week Chair 10; NURSE [facility] [place]    In 1 week Radiation [facility] Radiation Therapy    In 1 week PH CC CHAIR 3; NURSE [facility] [place]    In 1 week Radiation [facility] Radiation Therapy    In 1 week Bed 1; NURSE [facility] [place]    In 1 week Radiation [facility] Radiation Therapy    In 2 weeks Radiation [facility] Radiation Therapy    In 2 weeks Radiation [facility] Radiation Therapy    In 2 weeks Radiation [facility] Radiation Therapy    In 2 weeks PH CC CHAIR 5; NURSE [facility] [place]    In 2 weeks Chair 10; NURSE [facility] [place]    In 2 weeks Radiation [facility] Radiation Therapy    In 2 weeks Radiation [facility] Radiation Therapy    In 2 weeks Wenceslao Soria MD [facility] [place]    In 2 weeks Bed 1; NURSE [facility] [place]    In 3 weeks Radiation [facility]    In 3 weeks Radiation [facility]iation [facility]    In 3 weeks Chair 8; NURSE [facility] [place]    In 3 weeks Chair 10; NURSE [facility]    In 3 weeks Radiation [facility]iation [facility]    In 3 weeks Radiation [facility] Radiation Therapy    In 3 weeks Timbo Soria MD [facility]    In 3 weeks  CC CHAIR 3; NURSE [facility]fusion [facility]    In 3 weeks Radiation [facility] Radiation Therapy    In 4 weeks Radiation [place] [facility] Radiation Therapy    In 1 month Radiation [place] [facility]iation [facility]    In 1 month Radiation [place] [facility] Radiation Therapy    In 1 month Chair 10; NURSE [facility]    In 1 month Radiation [facility]iation [facility]    In 1 month Radiation TX [facility] Radiation Therapy    In 1 month Radiation [place] [facility] Radiation Therapy    In 1 month Chair 10; NURSE [facility]fusion [facility]            Referrals  No orders of the defined types were placed in this encounter.      Test Results Pending at Discharge  Unresulted Labs (From admission, onward)       Start     Ordered    03/28/25 0000  Basic metabolic panel        Question Answer Comment   Which Provider would you like to Cc? DESTINY SPENCER    Which Provider would you like to Cc? TIMBO SORIA    Which Provider would you like to Cc? ERIKA SANDERS    Release to patient Immediate        03/28/25 1326    03/24/25 0844  [facility] Draw Panel  STAT        Question Answer Comment   Red Top No Labels    Gold Top No Labels    Light Blue 1 Label    Light Green Top No Labels    Lavender Top No Labels    Pink Top No Labels    Yellow - Urine Tall No Labels    Urine Culture Tube No Labels    Release to patient Immediate        03/24/25 0843                    Important Issues to Address in Follow-Up  F/u with PCP, Nephrology, Oncology, Urology     DISCHARGE DISPOSITION AND DESTINATION      Disposition: Home   Destination: Home                            Code Status At Discharge: Full  Code    Physician Order for Life-Sustaining Treatment Document Status        No documents found

## 2025-03-28 NOTE — ASSESSMENT & PLAN NOTE
Noted fluctuating creatinine as outpatient  Pt is not aware of CKD  No NSAIDs use as outpt.   Most likely due to ATN from urosepsis  Follow urine output and BMP on NSS.   noted result of US and UA  Per Urology for hydronephrosis  Discussed with Dr. Guy about discharge plan  Will arrange nephrology follow up.

## 2025-03-28 NOTE — PROGRESS NOTES
Patient feeling better denies urinary or bowel symptoms.  Specifically denies any gross hematuria.  Patient's was started on oral antibiotics for urinary tract infection doing well.  Patient completed 8 treatments of radiation therapy of planned 33 treatments.  Patient to continue treatment on Monday hopefully as an outpatient.  Continue treatment for urinary tract infection.  Ultrasound shows right-sided hydronephrosis from bladder tumor creatinine stable 1.8.  Continue definitive radiation therapy with chemotherapy for locally advanced stage IIIa bladder cancer.  Certainly patient is at risk of urinary tract infection given bladder cancer and may continue antibiotics throughout the course of radiation therapy.

## 2025-03-28 NOTE — PROGRESS NOTES
"Urology Progress Note    Subjective    Interval History:  Pt at radiation.     Objective    Vital signs in last 24 hours:  Temp:  [36.7 °C (98 °F)-37.7 °C (99.8 °F)] 36.7 °C (98 °F)  Heart Rate:  [59-91] 80  Resp:  [18] 18  BP: (132-180)/(58-83) 180/77      Intake/Output Summary (Last 24 hours) at 3/28/2025 0839  Last data filed at 3/27/2025 1845  Gross per 24 hour   Intake 6216 ml   Output --   Net 6216 ml        Physical Exam:  Visit Vitals  BP (!) 180/77 (BP Location: Right upper arm, Patient Position: Sitting)   Pulse 80   Temp 36.7 °C (98 °F) (Oral)   Resp 18   Ht 1.689 m (5' 6.5\")   Wt 78.5 kg (173 lb)   SpO2 96%   BMI 27.50 kg/m²         Labs  Lab Results   Component Value Date    WBC 25.43 (H) 03/27/2025    HGB 8.3 (L) 03/27/2025    HCT 25.3 (L) 03/27/2025     03/27/2025    ALT 15 03/26/2025    AST 14 03/26/2025     03/28/2025    K 4.3 03/28/2025     (H) 03/28/2025    CREATININE 1.8 (H) 03/28/2025    BUN 28 (H) 03/28/2025    CO2 20 (L) 03/28/2025    TSH 4.70 01/24/2025    INR 1.1 03/13/2025         Imaging  Not applicable       Assessment & Plan     Patient Active Problem List   Diagnosis    Abnormal finding of diagnostic imaging    Arrhythmia    Suhail hematuria    Generalized anxiety disorder with panic attacks    Hemorrhoids    Hyperlipemia    Major depressive disorder    Obstructive sleep apnea    Pacemaker    Recurrent major depressive disorder in partial remission (CMS/HCC)    SVT (supraventricular tachycardia) (CMS/HCC)    Bladder cancer (CMS/HCC)    Bladder mass    FIDELIA (acute kidney injury) (CMS/HCC)    Anemia    Constipation    Chronic kidney disease    Iron deficiency anemia due to chronic blood loss    Syncope and collapse    Sepsis (CMS/HCC)    Hypomagnesemia    Acute cystitis without hematuria     A/P  75 F, muscle invasive bladder cancer, right hydro with elevated but stable creatinine    Rec:  Continue serial creatinine  R PCN placement if renal function worsens  Expected " Discharge Date:  3/28/2025  No discharge date for patient encounter.  Quincy Cortez MD  3/28/2025

## 2025-03-28 NOTE — PROGRESS NOTES
Renal Daily Progress Note    Subjective/Objective:  Subjective    Interval History: has no complaint of back pain, nausea or shortness of breath. .     Objective    Vital signs in last 24 hours:  Temp:  [36.7 °C (98 °F)-37.7 °C (99.8 °F)] 36.7 °C (98.1 °F)  Heart Rate:  [59-91] 59  Resp:  [18] 18  BP: (132-180)/(58-83) 136/65         Intake/Output Summary (Last 24 hours) at 3/28/2025 1345  Last data filed at 3/27/2025 1845  Gross per 24 hour   Intake 5676 ml   Output --   Net 5676 ml       Labs  BUN   Date Value Ref Range Status   03/28/2025 28 (H) 7 - 25 mg/dL Final     Creatinine   Date Value Ref Range Status   03/28/2025 1.8 (H) 0.6 - 1.2 mg/dL Final     Potassium   Date Value Ref Range Status   03/28/2025 4.3 3.5 - 5.1 mEQ/L Final     Hemoglobin   Date Value Ref Range Status   03/27/2025 8.3 (L) 11.8 - 15.7 g/dL Final     Sodium   Date Value Ref Range Status   03/28/2025 136 136 - 145 mEQ/L Final     Albumin   Date Value Ref Range Status   03/26/2025 2.5 (L) 3.5 - 5.7 g/dL Final     I have reviewed the pertinent patient's labs.    Imaging  No new imaging results.      Physical Exam  NAD AA RRR S1 S2 no edema CTA no wheezing soft +BS Nt ND no C/C                Assessment & Plan  Syncope and collapse    Bladder cancer (CMS/[facility])  S/p cystoscopy in January 2025 with resection  Per Onc  FIDELIA (acute kidney injury) (CMS/[facility])  Noted fluctuating creatinine as outpatient  Pt is not aware of CKD  No NSAIDs use as outpt.   Most likely due to ATN from urosepsis  Follow urine output and BMP on NSS.   noted result of US and UA  Per Urology for hydronephrosis  Discussed with Dr. Guy about discharge plan  Will arrange nephrology follow up.     Anemia    Constipation  Pt is willing to use suppository.   Will order Ducolax suppository  Senna daily and on Miralax  Would consider Lactulose if not resolved.   Sepsis (CMS/HCC)  Urosepsis   BP is improved   Continue IVF  Follow renal function to adjust med dose  ABX per  ID  Hypomagnesemia  PRN mag IV to keep mag >=2.0  Acute cystitis without hematuria    Acute unilateral obstructive uropathy  Outpatient radiation therapy pending.   Per Urology    Expected Discharge Date:  3/28/2025

## 2025-03-28 NOTE — NURSING NOTE
Port deaccessed. Telemetry monitor removed. Went over AVS with pt and answered all questions. All belongings returned.

## 2025-03-28 NOTE — PROGRESS NOTES
03/28/25 1413   Hospital to Home   Patient Enrolled in [facility]? Yes   [facility] Coordinator Comment Spoke w/ pt. to discuss Hospital to Home program. She is agreeable to a video visit. Pluralsight video appt. with [facility] provider scheduled for Tuesday, April 1, 2025 at 2pm. She declined assistance making other follow-up appt's.

## 2025-03-28 NOTE — PROGRESS NOTES
Hematology/Oncology -  Daily Progress Note       SUBJECTIVE   Interval History: Patient is seen alone.  She is going home.  She is feeling very well.  She has great support from her son and her friends.     OBJECTIVE      Vital signs in last 24 hours:  Temp:  [36.7 °C (98 °F)-37.7 °C (99.8 °F)] 36.7 °C (98.1 °F)  Heart Rate:  [59-91] 59  Resp:  [18] 18  BP: (132-180)/(58-83) 136/65    Intake/Output Summary (Last 24 hours) at 3/28/2025 1307  Last data filed at 3/27/2025 1845  Gross per 24 hour   Intake 5676 ml   Output --   Net 5676 ml       PHYSICAL EXAMINATION          Physical Examination:  Physical Exam  Vitals reviewed.   Constitutional:       Appearance: She is not ill-appearing.   Cardiovascular:      Rate and Rhythm: Normal rate.      Heart sounds: Murmur heard.   Pulmonary:      Effort: Pulmonary effort is normal.   Abdominal:      Palpations: Abdomen is soft.   Skin:     General: Skin is warm.      Coloration: Skin is pale. Skin is not jaundiced.   Neurological:      General: No focal deficit present.      Mental Status: She is alert.   Psychiatric:         Mood and Affect: Mood normal.         No data recorded   busPIRone  5 mg oral Daily    cefUROXime  250 mg oral q12h ELYSSA    enoxaparin  40 mg subcutaneous Daily (6p)    magnesium oxide  400 mg oral Daily    polyethylene glycol  17 g oral Nightly    prochlorperazine  5 mg oral q6h ELYSSA    rosuvastatin  40 mg oral Daily    senna  2 tablet oral Nightly        LABS / IMAGING / TELE      Labs  Recent Results (from the past 24 hours)   Basic metabolic panel    Collection Time: 03/28/25  6:03 AM   Result Value Ref Range    Sodium 136 136 - 145 mEQ/L    Potassium 4.3 3.5 - 5.1 mEQ/L    Chloride 108 (H) 98 - 107 mEQ/L    CO2 20 (L) 21 - 31 mEQ/L    BUN 28 (H) 7 - 25 mg/dL    Creatinine 1.8 (H) 0.6 - 1.2 mg/dL    Glucose 112 (H) 70 - 99 mg/dL    Calcium 8.2 (L) 8.6 - 10.3 mg/dL    eGFR 29.3 (L) >=60.0 mL/min/1.73m*2    Anion Gap 8 3 - 15 mEQ/L   Vitamin D 25  hydroxy    Collection Time: 03/28/25  6:03 AM   Result Value Ref Range    Vit D, 25-Hydroxy 17 (L) 30 - 100 ng/mL   Rad Onc Aria Session Summary    Collection Time: 03/28/25  8:34 AM   Result Value Ref Range    Course ID C1     Course Start Date 3/6/2025  1:46 PM     Treatment Elapsed Days 10 days as of 2025-03-28 @ 08:3308     Reference Point ID [email or web address]     Dosage Given To Date Gy [id number]     Session Dosage Given Gy 2.62047396     Reference Point ID 1a_Part Bladder     Dosage Given To Date Gy 16     Session Dosage Given Gy 2     Reference Point ID 1c_Trgt total     Dosage Given To Date Gy 16     Session Dosage Given Gy 2     PLAN ID [id number]     Plan Name PS_a-partial bladder_GTV     Fractions Treated To Date 8 of 8     Prescribed Dose Per Fraction Gy 2     Prescribed Dose cGy 1,600    ]    Imaging  No results found.        ASSESSMENT AND PLAN      Assessment & Plan    Gladys Woo is a very pleasant 75 y.o. patient with PMHx of anxiety and dyslipidemia and bladder cancer, stage III, T3N0M0.   She is admitted for syncope and collapse.    Bladder cancer (CMS/HCC)  Patient with recent diagnosis of locally advanced bladder cancer.  She opted for treatment with concurrent radiation and chemotherapy.  Received her first dose of low-dose weekly cisplatin on March 19.  Now admitted for syncopal episode.  Chemotherapy will be on hold in the setting of acute illness and hospitalization.  She is being evaluated for possible underlying infection contributing to her syncope.  Will continue to follow patient while hospitalized and she will follow-up with Dr. Soria after discharge for ongoing management.    3/26/2025:   Due to her anemia and syncope, there will be no chemotherapy for this week.  Because of her anemia and FIDELIA, which is likely due to cisplatin use I may offer her gemcitabine twice a week instead of cisplatin.  She is okay with the plan.  I will continue follow along.    3/27/2025  Today we discussed  that since her kidney function is not getting better I will change her chemo to gemcitabine twice a week along with radiation.  This is communicated with the Dr. [facility]. Will plan to do treatment next week.  Depending on my schedule I will see her once a week.  She gave the consent.  We discussed the side effect of gemcitabine in details. Citation: Bladder Preservation With Twice-a-Day Radiation Plus Fluorouracil/Cisplatin or Once Daily Radiation Plus Gemcitabine for Muscle-Invasive Bladder Cancer: NRG/RTOG 0712-A Randomized Phase II Trial. JCO 2018.     3/28/2025:  Since that she is doing well we will planning to get twice weekly gemcitabine 27 mg/m².  Will arrange outpatient treatment.    Anemia  Patient with acute on chronic anemia.  Suspect acute drop in hemoglobin is dilutional related to IV fluids patient being hemoconcentrated at admission.  Will follow CBC closely and transfuse if hemoglobin less than 7 or less than 8 with increased symptoms.    Will transfuse 1 unit PRBC today. Will give IV iron pending ID input.     Her anemia may contribute to her syncope.    3/27/25:   Will hold on IV iron while she is on IV antibiotics.    3/28/2025: Will give outpatient Monoferric.    FIDELIA (acute kidney injury) (CMS/HCC)  Appreciate nephrology input  Now urology is on board with the plan of right nephrostomy tube placement if FIDELIA persists.    Sepsis (CMS/HCC)/leukocytosis  Appreciate ID input.      Hypomagnesemia       VTE Assessment: Padua    Code Status: Full Code  Estimated discharge date: 3/28/2025     Wenceslao Soria MD, PhD  3/28/2025  1:07 PM

## 2025-03-31 ENCOUNTER — PATIENT OUTREACH (OUTPATIENT)
Dept: CASE MANAGEMENT | Facility: CLINIC | Age: 75
End: 2025-03-31
Payer: MEDICARE

## 2025-03-31 ENCOUNTER — HOSPITAL ENCOUNTER (OUTPATIENT)
Dept: RADIATION ONCOLOGY | Facility: HOSPITAL | Age: 75
Setting detail: RADIATION/ONCOLOGY SERIES
Discharge: HOME | End: 2025-03-31
Attending: RADIOLOGY
Payer: MEDICARE

## 2025-03-31 LAB
ARIA ZRC COURSE ID: NORMAL
ARIA ZRC COURSE START DATE: NORMAL
ARIA ZRC TREATMENT ELAPSED DAYS: NORMAL
ARIA ZRP FRACTIONS TREATED TO DATE: NORMAL
ARIA ZRP PLAN ID: NORMAL
ARIA ZRP PRESCRIBED DOSE CGY: 5000
ARIA ZRP PRESCRIBED DOSE PER FRACTION: 2
ARIA ZRR DOSAGE GIVEN TO DATE: 18
ARIA ZRR DOSAGE GIVEN TO DATE: 2
ARIA ZRR DOSAGE GIVEN TO DATE: 2.06
ARIA ZRR REFERENCE POINT ID: NORMAL
ARIA ZRR SESSION DOSAGE GIVEN: 2
ARIA ZRR SESSION DOSAGE GIVEN: 2
ARIA ZRR SESSION DOSAGE GIVEN: 2.06

## 2025-03-31 PROCEDURE — 77386 HC IMRT DELIVERY COMPLEX: CPT | Performed by: RADIOLOGY

## 2025-03-31 NOTE — TELEPHONE ENCOUNTER
RN talked with the pt today and discussed plan for treatment this week.  Pt is aware that she will receive chemo tomorrow and Friday and that she needs to go to [facility] after radiation on Friday.  Pt also notified regarding lab appt on Wednesday and appt w/ Dr. Soria on Thursday.  RN will meet with the pt on Wednesday morning to review the treatment and schedule.     RN reminded the pt to remain well hydrated and to eat small frequent meals including breakfast in prep for chemo.

## 2025-03-31 NOTE — PROGRESS NOTES
NAME: Gladys Woo    MRN: [id number]    YOB: 1950    Event Review:    Initial TCM Patient Outreach Date: 03/31/25  Assessment completed with: Patient  Patient stated reason for hospitalization: passed out at cancer center  Discharge Diagnosis: Syncope and collapse  Patient readmitted in the last 30 days: No  Discharging Facility: [facility]  Date of Last Admission: 03/24/25  Date of Last Discharge: 03/28/25  Patient's perception of their health status since discharge: Improving    Appointment Scheduling:    PCP appointment scheduled: Yes  TCM Appointment Type: Hospital Follow-Up  Appointment Date: 05/08/25  Appointment Time: 1000  Appointment Provider: Dr. Zoe Sanders        HPI:  Spoke with pt today, pt presented to [facility] emergency room on 3/24/2025 after passing out on radiation table at cancer center.  Pt with history of bladder cancer.  Pt found to be dehydrated and has a urinary tract infection.  Pt treated with intravenous fluids and antibiotics.  Pt improved and was discharged to home on 3/28/2025.    Spoke with pt today, doing well.  Pt lives alone in ranch style home with basement.  Pt is self care.  Pt has family-son that assists her if needed.    Pt with no complaints of chest pain, shortness of breath, dizziness, nausea, vomiting or diarrhea.  Pt states she had some pedal.ankle edema in hospital but it has resolved.    Medication review completed with pt.  Discharge instructions reviewed with pt.  Pt scheduled for Radiation, chemotherapy and immunotherapy over the next month.  Pt declines follow-up with PCP at this time.    Pt scheduled with Oncology on 4/3/2025.  Pt will schedule with Nephrology in near future.    Hospital to Home program reviewed with pt.  Pt reminded of upcoming appointment with nurse practitioner on 4/3/2025 at 11 am.  CM to follow-up with pt weekly.  Pt given CM contact information.        Review of Systems   All other systems reviewed and are  negative.      Medication Review:    Medication Review: Yes     Reported by: Patient  Any new medications prescribed at discharge?: Yes  Is the patient having any side effects they believe may be caused by any medication additions or changes?: No  New prescriptions filled?: Yes     Do you have enough of your regularly prescribed medications?: Yes       Medication adherence problem?: No  Was a medication discrepancy indentified?: No       Nursing Interventions: No intervention needed  Reconciled the current and discharge medications: Yes  Reviewed AVS (Discharge Instructions)?: Yes       Acute Pain:    Acute pain: No   Chronic Pain:    Chronic pain: No   Diet/Nutrition:    Type of Diet: Regular  Diet Adherence: Adherent with diet   Home Care Services:  Home Care Interventions: No intervention required     Post-Discharge Durable Medical Equipment::    Durable Medical Equipment: None  Does patient's condition require a scale?: No     Oxygen Use: No   Home Management:    Living Arrangement: Alone  Support System:: Family, Friends  Type of Residence: 1 story house (basement with laundry)     Any patient reported falls in the last 3 months?: No  Orthodox or spiritual beliefs that impact treatment?: No    Follow-Ups:     Relevant Labs & Tests: BMP  Relevant Specialist Follow-ups: Dr. Patel   4/3/2025   Nephrology   To be scheduled    Interventions/ Care Coordination:    Interventions/ Care Coordination: Encouraged patient to call PCP/Specialist  General Education: Respiratory precautions (flu, colds, pneumonia, RSV, COVID-19), Falls and home safety precautions     Initiated Care Plan: ROSS      Reviewed signs/symptoms of worsening condition or complication that necessitate a call to the Physician's office.  Educated patient on access to care.  RN phone number given for future care management.    Kellie Jiménez RN BSN   [phone]

## 2025-03-31 NOTE — PROGRESS NOTES
Patient noted to be in a current episode with [facility]. Called patient who agreed to resume RN visits.  Patient has already been discharged so GUALBERTO referral placed.

## 2025-04-01 ENCOUNTER — HOSPITAL ENCOUNTER (OUTPATIENT)
Dept: HEMATOLOGY/ONCOLOGY | Facility: HOSPITAL | Age: 75
Setting detail: INFUSION SERIES
Discharge: HOME | End: 2025-04-01
Attending: HOSPITALIST
Payer: MEDICARE

## 2025-04-01 ENCOUNTER — HOSPITAL ENCOUNTER (OUTPATIENT)
Dept: RADIATION ONCOLOGY | Facility: HOSPITAL | Age: 75
Setting detail: RADIATION/ONCOLOGY SERIES
Discharge: HOME | End: 2025-04-01
Attending: RADIOLOGY
Payer: MEDICARE

## 2025-04-01 VITALS
HEART RATE: 80 BPM | WEIGHT: 175 LBS | BODY MASS INDEX: 27.47 KG/M2 | TEMPERATURE: 97 F | HEIGHT: 67 IN | SYSTOLIC BLOOD PRESSURE: 141 MMHG | DIASTOLIC BLOOD PRESSURE: 63 MMHG | RESPIRATION RATE: 16 BRPM | OXYGEN SATURATION: 95 %

## 2025-04-01 DIAGNOSIS — D50.0 IRON DEFICIENCY ANEMIA DUE TO CHRONIC BLOOD LOSS: ICD-10-CM

## 2025-04-01 DIAGNOSIS — C67.8 MALIGNANT NEOPLASM OF OVERLAPPING SITES OF BLADDER (CMS/HCC): ICD-10-CM

## 2025-04-01 DIAGNOSIS — C67.9 MALIGNANT NEOPLASM OF URINARY BLADDER, UNSPECIFIED SITE (CMS/HCC): Primary | ICD-10-CM

## 2025-04-01 LAB
ARIA ZRC COURSE ID: NORMAL
ARIA ZRC COURSE START DATE: NORMAL
ARIA ZRC TREATMENT ELAPSED DAYS: NORMAL
ARIA ZRP FRACTIONS TREATED TO DATE: NORMAL
ARIA ZRP PLAN ID: NORMAL
ARIA ZRP PRESCRIBED DOSE CGY: 5000
ARIA ZRP PRESCRIBED DOSE PER FRACTION: 2
ARIA ZRR DOSAGE GIVEN TO DATE: 20
ARIA ZRR DOSAGE GIVEN TO DATE: 4
ARIA ZRR DOSAGE GIVEN TO DATE: 4.12
ARIA ZRR REFERENCE POINT ID: NORMAL
ARIA ZRR SESSION DOSAGE GIVEN: 2
ARIA ZRR SESSION DOSAGE GIVEN: 2
ARIA ZRR SESSION DOSAGE GIVEN: 2.06

## 2025-04-01 PROCEDURE — 96367 TX/PROPH/DG ADDL SEQ IV INF: CPT

## 2025-04-01 PROCEDURE — 96413 CHEMO IV INFUSION 1 HR: CPT

## 2025-04-01 PROCEDURE — 96374 THER/PROPH/DIAG INJ IV PUSH: CPT

## 2025-04-01 PROCEDURE — 63600000 HC DRUGS/DETAIL CODE: Performed by: HOSPITALIST

## 2025-04-01 PROCEDURE — 25800000 HC PHARMACY IV SOLUTIONS: Performed by: HOSPITALIST

## 2025-04-01 PROCEDURE — 77386 HC IMRT DELIVERY COMPLEX: CPT | Performed by: RADIOLOGY

## 2025-04-01 RX ORDER — EPINEPHRINE 1 MG/ML
0.5 INJECTION, SOLUTION INTRAMUSCULAR; SUBCUTANEOUS ONCE AS NEEDED
OUTPATIENT
Start: 2025-04-01

## 2025-04-01 RX ORDER — HEPARIN 100 UNIT/ML
500 SYRINGE INTRAVENOUS AS NEEDED
Status: CANCELLED | OUTPATIENT
Start: 2025-04-01

## 2025-04-01 RX ORDER — FAMOTIDINE 10 MG/ML
20 INJECTION, SOLUTION INTRAVENOUS ONCE AS NEEDED
OUTPATIENT
Start: 2025-04-01

## 2025-04-01 RX ORDER — DIPHENHYDRAMINE HCL 50 MG/ML
25 VIAL (ML) INJECTION ONCE AS NEEDED
OUTPATIENT
Start: 2025-04-01

## 2025-04-01 RX ORDER — HEPARIN 100 UNIT/ML
500 SYRINGE INTRAVENOUS AS NEEDED
Status: DISCONTINUED | OUTPATIENT
Start: 2025-04-01 | End: 2025-04-02 | Stop reason: HOSPADM

## 2025-04-01 RX ADMIN — FERRIC DERISOMALTOSE 1000 MG: 1000 SOLUTION INTRAVENOUS at 11:22

## 2025-04-01 RX ADMIN — HEPARIN 500 UNITS: 100 SYRINGE at 11:44

## 2025-04-01 RX ADMIN — GEMCITABINE 52 MG: 38 INJECTION, SOLUTION INTRAVENOUS at 10:41

## 2025-04-01 RX ADMIN — DEXAMETHASONE SODIUM PHOSPHATE: 4 INJECTION, SOLUTION INTRA-ARTICULAR; INTRALESIONAL; INTRAMUSCULAR; INTRAVENOUS; SOFT TISSUE at 10:13

## 2025-04-01 RX ADMIN — ALTEPLASE 2 MG: 2.2 INJECTION, POWDER, LYOPHILIZED, FOR SOLUTION INTRAVENOUS at 09:31

## 2025-04-01 ASSESSMENT — ENCOUNTER SYMPTOMS
OCCASIONAL FEELINGS OF UNSTEADINESS: 0
LOSS OF SENSATION IN FEET: 0
DEPRESSION: 0

## 2025-04-01 ASSESSMENT — PAIN SCALES - GENERAL: PAINLEVEL_OUTOF10: 0-NO PAIN

## 2025-04-01 NOTE — PATIENT INSTRUCTIONS
Symptom Management Guide: Chemotherapy    This is a guideline for symptom management during chemotherapy.  Not all chemotherapy or treatments have the same side effects and everyone may have different symptoms. Your on-going communication is vital to help manage your concerns.      Your Doctor's office is available to answer your questions and to assist you throughout your treatment    Oncologist:       Office Number:      Common Side Effects That May Occur  Feeling Tired (Fatigue)  Use energy conservation techniques, like clustering tasks in between rest periods   Mild exercise in short periods for 10-15 minutes can improve fatigue symptoms. Please talk to your nurse or physician about more strenuous exercise  Keep a consistent sleeping pattern, wake up the same time every morning and go to sleep at the same time every night  Limit naps to no longer than 45 minutes during the day  Ask for help with activities such as cleaning/shopping when you know your energy is low. Do not be afraid to tell your family and friends that you need your rest period    Flu-Like Symptoms/Fever  Low-grade fever, chills, sweats, body aches, and/or headache can occur after treatment  Temperature greater than 100.4 could mean infection, please call your physician day or night  Use a heating pad or take a warm shower to relieve muscle aches  To manage flu-like symptoms, your nurse or physician may recommend taking acetaminophen (Tylenol) or ibuprofen (Advil) prior to your treatment or as needed at home  Get plenty of rest and drink at least two to three quarts of caffeine free fluid (e.g. - juices, broth, popsicles, water, gatorade) every 24 hours, unless instructed otherwise by your physician     Hair Loss (Alopecia)  Not all treatments cause hair loss  Hair loss usually starts around 10-14 days after your first chemotherapy   Your scalp, eye brows, and other areas with hair may feel sore or itchy as hair falls out  It is temporary,   hair usually grows back 2-3 months after treatment ends and may grow back in a different color/texture  Avoid using hair dryer/heat tools as it starts to grow back as hair may be fragile  To match your current color/style, selecting a wig before starting chemotherapy is best         Changes in Blood Cell Counts  Many chemotherapy treatments work to stop quickly dividing cells, like cancer cells. This can cause other normal cells in your body to also stop dividing and producing - like our blood cells  Red blood cells (carry oxygen), white blood cells (fight infection) and platelets (stop bleeding), during treatment with chemotherapy we will monitor your blood cell counts as there is less production of these cells.  Anemia (low red blood cell count)  Symptoms to report: Fatigue, dizziness, weakness, shortness of breath and racing heat beat  Your physician may recommend a blood transfusion or an injection to boost red blood cells if your count is low and you have symptoms   To help your body: Get plenty of rest, eat well, limit activities, and perform light exercises  Neutropenia (low white blood cell count)  Symptoms to report: Chills/sweats, burning with urination, sore throat/new cough, temperature greater than 100.4  Your physician may prescribe an injection to boost white blood cells  To help prevent infections: Wash your hands frequently, carry hand , wash fruits/vegetables before eating, do not eat raw or undercooked meats/fish, avoid crowds and people who have cold/flu symptoms  Thrombocytopenia (low platelet count)  Symptoms to report: unusual bleeding or bruising, small red dots under your skin, red or pink urine, black or bloody bowel movements  To help limit bleeding: Use a soft toothbrush and an electric razor, review any herbs/supplements or over the counter medications with your physician prior to use  Please call prior to your next scheduled treatment if you experience any new or worsening  symptoms that are causing concern    Nausea and Vomiting  At the first sign of stomach ache/upset, take your at home anti-nausea medications as prescribed   Stay hydrated with at least two to three quarts of caffeine free fluid (e.g. - broth, jell-o, popsicles, water, gatorade) every 24 hours, unless instructed otherwise by your physician   Ease your upset stomach with ice cubes, mints, homero or hard candy  Try drinking your liquids 30-60 mins prior to and after eating your meals  Instead of 3 large meals, try eating 5-6 smaller meals or snacks per day  Limit spicy/hot, fried/greasy, or sour foods  Try bland or BRAT diet foods (bananas, rice, applesauce and toast)  Try to avoid lying down flat for 2 hours after eating  If nausea and vomiting is uncontrolled by your at home anti-nausea medication, or you are unable to eat or drink, please call your physician     Taste/Appetite Changes and Dry Mouth   Chemotherapy can cause food to taste differently, try adding more herbs (e.g. - basil/rosemary) or sauces to your food  If you notice a metallic taste, try using plastic utensils, cookware, and containers   Avoid concentrated sweet, spicy, or sour foods  If you notice certain beverages are too sweet, dilute them with some water  Keep your mouth clean and rinse your mouth out before meals or use a mouth moisturizing spray  Carry some hard candy or mints with you when for your mouth is dry  If taste/appetite changes are limiting your food choices, please talk to your nurse or physician for referral to our nutritionist     Mouth Sores (Mucositis)  To help treat/prevent mouth sores, rinse your mouth after meals and at bedtime with 1 teaspoon of baking soda or salt mixed with 8 ounces of water - swish for 1-2 minutes trying not to swallow  Try using a straw to prevent more irritation and eat softer foods (e.g. - eggs/yogurt/soups/shakes)  Avoid mouth washes containing alcohol, use a soft toothbrush, and keep your mouth/lips  moist  Your nurse may have you suck on ice during your chemotherapy administration to help prevent mouth sores  If mouth sores are limiting your ability to eat/drink, you notice bleeding and/or white patches, or visible mouth sores, please call your physician                                                   Constipation  Constipation  or less frequent bowel  movements can occur after certain treatments  Increase caffeine-free fluid intake. (e.g. - jell-o, popsicles, water, gatorade)   Instead of 3 large meals, try eating 5-6 smaller meals or snacks per day  Eat  foods high in fiber (e.g. - apples, vegetables, bran, prunes, grains, raisins)  Try warm liquids (e.g. - tea, water with lemon, broth) before or after meals   Try being active, go on a walk for at least 15-30 minutes a day  Over the counter medication can help to treat and prevent constipation  Start with a stool softener with laxative (Senokot-S) and take 1 tablet two times daily, increase by 1 tablet per day if no bowel movement the day before with a maximum total of 8 tablets per day  You can trial one scoop of Miralax once nightly if no bowel movement that day  If you are taking pain medication, it is important to be on a bowel regimen to prevent constipation   If you have not had a bowel movement in 3 days, you have nausea/vomiting, stomach pain and/or bloating, are unable to pass gas, or have a temperature greater than 100.4, please call your physician day or night    Diarrhea  Diarrhea or more bowel movements than usual can occur after certain treatments  Increase caffeine-free fluid intake. (e.g. - broth, jell-o, popsicles, water, gatorade)  Instead of 3 big meals, try eating 5-6 smaller meals per day  Eat low fiber or BRAT diet foods (bananas, rice, applesauce and toast)  Try to avoid greasy, fatty, or fried foods   If you have more than 4-5 episodes of diarrhea per day, you can take an anti-diarrheal medication (lomotil, imodium) as  instructed by  your nurse or physician   If diarrhea continues for longer than 24 hours, you feel weak/dizzy, have severe abdominal pain, stools that are black/bloody, or have a temperature greater than 100.4, please call your physician day or night    Numbness and Tingling (Peripheral Neuropathy)  Some chemotherapy may cause numbness/tingling in your hands or feet. Peripheral neuropathy is usually temporary and can come and go after each treatment.   Keep track of any numbness, tingling, weakness, or loss of balance including the location, severity, and duration to report to your nurse or physician  To protect your nerves, use potholders when cooking, gloves/hat/scarves in cold temperatures, and avoid scalding hot water when cleaning or bathing  To ensure safety, use a bath mat in the shower/tub, wear well-fitting soled shoes, hold onto handrails when available, and be alert for objects   If you notice numbness or changes, inspect your skin for cuts, bruises, and burns to the area daily  Your physician may prescribe medication if the peripheral neuropathy symptoms do not subside  If you are receiving OXALIPLATIN, numbness/tingling/cramping can occur after each treatment. Cold temperature/liquid/food can also cause the feeling of difficulty swallowing or shortness of breath. Limiting your exposure to cold or cooler weather/items can help   Avoid cold temperatures and cold/frozen objects including food/drink; choose room temperature or hot beverages or food.  Wear gloves when placing your hands in the freezer and in cooler/colder weather  Wear a scarf around your mouth/nose to breathe in warm air  Wear a hat, gloves, and socks to prevent or lessen cold sensitivity    Skin Changes/Sun Sensitivity  Skin changes, like rash, itching, and dryness, can occur with treatment   Keep skin well hydrated and moist, use mild/unscented soap and apply lotion without alcohol/fragrance 2-3 times per day. Use an electric razor instead of blades  If  you have blisters or peeling skin, it is important to inform your nurse or physician  Over the counter topical steroid cream or anti-histamines can be used to relieve itching/rash as directed by nurse or physician  Drink plenty of water, keep your room cool to avoid sweating  Wear sunscreen (SPF 30 or higher) and reapply often, wear protective clothing    Mood Changes (Anxiety/Depression)  Diagnosis and treatment for cancer can cause changes physically and emotionally  Support groups and counseling services are available to assist you and your caregivers  Alternative therapies, including massage, yoga, reiki, acupuncture, and meditation can help alleviate stress   Talk with your friends, family, and care team for support  Exercise and maintain good sleep habits, avoid alcohol   Please talk to your nurse or doctor if you are experiencing sadness, worry, fear, or concern that interferes with usual your activities or ability to carry out roles at home, work or community  Resources are available to support you during and after treatment     Sexual and Reproductive Health  Chemotherapy can cause reproductive changes and affect your ability to conceive a child, talk to your nurse or doctor about options for fertility care/sperm banking prior to starting treatment if you plan to have children   Sexual desire may decrease. Be open with your partner about these changes and allow your partner to express their concerns.  Treatment may cause changes in erection and/or ejaculation  Some medication may lead to changes in hormone levels that can cause menopausal symptoms and potentially induce menopause  Signs of hormone changes include irregular or no menstrual period, hot flashes, vaginal dryness or itching   Vaginal water-based lubricant to relieve dryness/itching (e.g. - [place] Jelly)  Dress in layers to easily remove during a hot flash  Please talk to your nurse or doctor about safe forms of birth control and safe sex practices  during your treatments, exposure of chemotherapy  and other medications may cause birth defects   Specialist referrals are available for sexual or reproductive health changes or concerns            INSTRUCTIONS FOR ANTIEMETICS    ______Gemzar twice a week with daily Radiation__     (Chemo Regimen)    WHAT I RECEIVED TODAY FOR NAUSEA:     ____4/1/25                                                                                          (Date)  Cinvanti       Yes___         No___    Aloxi            Yes___         No___  Zofran         Yes_x__         No___  Decadron    Yes_x__         No___  Ativan          Yes___         No___  A variety of these meds may be ordered based on your treatment.  These medications may cause: headache, dizziness and constipation, flushing and drowsiness.      2. HOW TO TAKE MY ANTI-NAUSEA MEDS:    You have received two prescriptions for nausea to take at home:  Compazine (Prochlorperazine)  Zofran (Ondansetron )  If you received Aloxi:     Zofran 8 mg every 8 hrs as needed is the #1 choice for the first two days after chemo   Compazine 10mg every 6 hrs as needed is the #2 choice for the first two days after chemo  These two drugs can be be taken in combination with one another. If one drug does not relieve nausea within a half hour, you may take the other nausea med       CALL IF YOU HAVE VOMITING OR NAUSEA that is not relieved by your meds:    Mon - Fri  (8 - 4:30)                   [phone]  Evenings and weekends          [phone]

## 2025-04-01 NOTE — PROGRESS NOTES
Pt arrived for treatment today. Ok to use labs from inpt 3/27/25-3/28/25 per Dr. Soria. Repeat labs will be drawn 4/2/25. RN reviewed notes from Dr. Soria. Pt is C1D1 daily radiation with Gemcitabine.     RCW port accessed no blood return. Cathflo placed at 9:32 rechecked at 9:47 good blood return. Pt tolerated infusion of Gemzar without difficulty followed by a dose of monoferric. Good blood return from port post infusions. Port flushed with saline and heparin then de-accessed. Pt was monitored post monoferric. VSS.     RN reviewed this week and next weeks schedule with pt. Appt changed from [place] to [place] on Friday 4/4/25 for treatment. AVS provided to pt which contained information on Gemzar and monoferric along with symptom management information and antiemetics. Pt verbalized understanding of having labs and radiation tomorrow.

## 2025-04-02 ENCOUNTER — TELEMEDICINE (OUTPATIENT)
Dept: HOSPITALIST | Facility: CLINIC | Age: 75
End: 2025-04-02
Payer: MEDICARE

## 2025-04-02 ENCOUNTER — HOSPITAL ENCOUNTER (OUTPATIENT)
Dept: HEMATOLOGY/ONCOLOGY | Facility: HOSPITAL | Age: 75
Setting detail: INFUSION SERIES
Discharge: HOME | End: 2025-04-02
Attending: HOSPITALIST
Payer: MEDICARE

## 2025-04-02 ENCOUNTER — HOSPITAL ENCOUNTER (OUTPATIENT)
Dept: RADIATION ONCOLOGY | Facility: HOSPITAL | Age: 75
Setting detail: RADIATION/ONCOLOGY SERIES
Discharge: HOME | End: 2025-04-02
Attending: RADIOLOGY
Payer: MEDICARE

## 2025-04-02 DIAGNOSIS — N13.30 HYDRONEPHROSIS OF RIGHT KIDNEY: ICD-10-CM

## 2025-04-02 DIAGNOSIS — N13.30: ICD-10-CM

## 2025-04-02 DIAGNOSIS — N17.9 AKI (ACUTE KIDNEY INJURY) (CMS/HCC): ICD-10-CM

## 2025-04-02 DIAGNOSIS — R55 SYNCOPE AND COLLAPSE: Primary | ICD-10-CM

## 2025-04-02 DIAGNOSIS — C67.9 MALIGNANT NEOPLASM OF URINARY BLADDER, UNSPECIFIED SITE (CMS/HCC): Primary | ICD-10-CM

## 2025-04-02 DIAGNOSIS — C67.9: ICD-10-CM

## 2025-04-02 DIAGNOSIS — C67.8 MALIGNANT NEOPLASM OF OVERLAPPING SITES OF BLADDER (CMS/HCC): ICD-10-CM

## 2025-04-02 DIAGNOSIS — D50.0 IRON DEFICIENCY ANEMIA DUE TO CHRONIC BLOOD LOSS: ICD-10-CM

## 2025-04-02 LAB
ALBUMIN SERPL-MCNC: 3 G/DL (ref 3.5–5.7)
ALP SERPL-CCNC: 78 IU/L (ref 34–125)
ALT SERPL-CCNC: 34 IU/L (ref 7–52)
ANION GAP SERPL CALC-SCNC: 8 MEQ/L (ref 3–15)
ARIA ZRC COURSE ID: NORMAL
ARIA ZRC COURSE START DATE: NORMAL
ARIA ZRC TREATMENT ELAPSED DAYS: NORMAL
ARIA ZRP FRACTIONS TREATED TO DATE: NORMAL
ARIA ZRP PLAN ID: NORMAL
ARIA ZRP PRESCRIBED DOSE CGY: 5000
ARIA ZRP PRESCRIBED DOSE PER FRACTION: 2
ARIA ZRR DOSAGE GIVEN TO DATE: 22
ARIA ZRR DOSAGE GIVEN TO DATE: 6
ARIA ZRR DOSAGE GIVEN TO DATE: 6.19
ARIA ZRR REFERENCE POINT ID: NORMAL
ARIA ZRR SESSION DOSAGE GIVEN: 2
ARIA ZRR SESSION DOSAGE GIVEN: 2
ARIA ZRR SESSION DOSAGE GIVEN: 2.06
AST SERPL-CCNC: 11 IU/L (ref 13–39)
BASOPHILS # BLD: 0.03 K/UL (ref 0.01–0.1)
BASOPHILS NFR BLD: 0.2 %
BILIRUB SERPL-MCNC: 0.2 MG/DL (ref 0.3–1.2)
BUN SERPL-MCNC: 27 MG/DL (ref 7–25)
CALCIUM SERPL-MCNC: 9 MG/DL (ref 8.6–10.3)
CHLORIDE SERPL-SCNC: 105 MEQ/L (ref 98–107)
CO2 SERPL-SCNC: 23 MEQ/L (ref 21–31)
CREAT SERPL-MCNC: 1.6 MG/DL (ref 0.6–1.2)
DIFFERENTIAL METHOD BLD: ABNORMAL
EGFRCR SERPLBLD CKD-EPI 2021: 33.7 ML/MIN/1.73M*2
EOSINOPHIL # BLD: 0 K/UL (ref 0.04–0.36)
EOSINOPHIL NFR BLD: 0 %
ERYTHROCYTE [DISTWIDTH] IN BLOOD BY AUTOMATED COUNT: 14.2 % (ref 11.7–14.4)
GLUCOSE SERPL-MCNC: 103 MG/DL (ref 70–99)
HCT VFR BLD AUTO: 24.6 % (ref 35–45)
HGB BLD-MCNC: 7.9 G/DL (ref 11.8–15.7)
IMM GRANULOCYTES # BLD AUTO: 0.11 K/UL (ref 0–0.08)
IMM GRANULOCYTES NFR BLD AUTO: 0.8 %
LYMPHOCYTES # BLD: 0.74 K/UL (ref 1.2–3.5)
LYMPHOCYTES NFR BLD: 5.5 %
MCH RBC QN AUTO: 28.7 PG (ref 28–33.2)
MCHC RBC AUTO-ENTMCNC: 32.1 G/DL (ref 32.2–35.5)
MCV RBC AUTO: 89.5 FL (ref 83–98)
MONOCYTES # BLD: 0.63 K/UL (ref 0.28–0.8)
MONOCYTES NFR BLD: 4.7 %
NEUTROPHILS # BLD: 11.88 K/UL (ref 1.7–7)
NEUTROPHILS # BLD: 11.88 K/UL (ref 1.7–7)
NEUTS SEG NFR BLD: 88.8 %
NRBC BLD-RTO: 0 %
PLATELET # BLD AUTO: 310 K/UL (ref 150–369)
PMV BLD AUTO: 9.4 FL (ref 9.4–12.3)
POTASSIUM SERPL-SCNC: 3.9 MEQ/L (ref 3.5–5.1)
PROT SERPL-MCNC: 6 G/DL (ref 6–8.2)
RBC # BLD AUTO: 2.75 M/UL (ref 3.93–5.22)
SODIUM SERPL-SCNC: 136 MEQ/L (ref 136–145)
WBC # BLD AUTO: 13.39 K/UL (ref 3.8–10.5)

## 2025-04-02 PROCEDURE — 36591 DRAW BLOOD OFF VENOUS DEVICE: CPT

## 2025-04-02 PROCEDURE — 77386 HC IMRT DELIVERY COMPLEX: CPT | Performed by: RADIOLOGY

## 2025-04-02 PROCEDURE — 85025 COMPLETE CBC W/AUTO DIFF WBC: CPT | Performed by: HOSPITALIST

## 2025-04-02 PROCEDURE — 80053 COMPREHEN METABOLIC PANEL: CPT

## 2025-04-02 RX ORDER — HEPARIN 100 UNIT/ML
500 SYRINGE INTRAVENOUS AS NEEDED
Status: DISCONTINUED | OUTPATIENT
Start: 2025-04-02 | End: 2025-04-03 | Stop reason: HOSPADM

## 2025-04-02 RX ORDER — HEPARIN 100 UNIT/ML
500 SYRINGE INTRAVENOUS AS NEEDED
Status: CANCELLED | OUTPATIENT
Start: 2025-04-02

## 2025-04-02 RX ADMIN — HEPARIN 500 UNITS: 100 SYRINGE at 09:02

## 2025-04-02 NOTE — LETTER
[facility]           [place] [facility]       Hospital to Home Program Visit:  Audio and Video Encounter     DISCHARGE INFORMATION   Assessment completed with: Patient  Discharge Diagnosis: Syncope and collapse, sepsis due to UTI, R hydronephrosis 2/2 malignant obstruction, FIDELIA  Discharging Facility: [facility]  Date of Last Discharge: 03/28/25  Discharge Disposition: Home  Patient's perception of their health status since discharge: Improving (Denies LHD, dizziness, N/V, urinary symptoms (burning, urgency frequency).)    PCP: Zoe Sanders MD       HPI / OVERVIEW OF VISIT      Gladys Woo is a 75 y.o. female with a past medical history of CAD, cardiac arrhthymias s/p PPM, HLD, TREVOR, bladder ca s/p cystoscopy and resection (on chemo/radiation), depression, and anxiety who was admitted to [facility] 3/24-3/28/25 with syncope and a UTI. She presented with a syncopal episode on the radiation table and also reported 2 days of foul smell urine. She was started on IV antibiotics for sepsis due to UTI with R hydronephrosis, likely 2/2 malignant obstruction, and was transitioned to oral ceftin to complete a 14 day course on discharge. She had an FIDELIA with a peak creatinine of 1.9, which improved to 1.8 on discharge. Her syncope was felt 2/2 infection and her kidney function stabilized or considerations would have been made for a R PCN tube placement.    Of note, she was recently admitted in 1/2025 a required a TURBT due to bladder compression from a new dx bladder tumor.    I met with her today for an [facility] tele-med video visit for sepsis and an FIDELIA. She reports that she's improving since leaving the hospital and denies any dizziness, LHD, N/V, or urinary symptoms (burning, frequency, urgency). She had chemo yesterday and will have another dose this Friday. She has not yet scheduled with her urologist or nephrology, but has an appointment scheduled with her PCP early next  "month. She had repeat labs drawn this morning.    We reviewed her medications, which she is taking as prescribed, and I updated her medication list with her current buspar dose. We discussed the importance of hydration and when to return to the hospital versus contact her doctors with a concern or change. She offers no further questions or concerns today regarding her care.    Urologist - Dr. Eugenio Jimenez  Oncologist - Dr. Wenceslao Soria  Radiation oncologist - Dr. [facility]    What would cause you to go back to the hospital?   Denies.     Who would you call if you had a problem?  \"Dr. Sanders.\"     Overall, how was your care that was provided and do you have any suggestions for improvement?  Above Average      PROBLEMS      Patient Active Problem List   Diagnosis    Abnormal finding of diagnostic imaging    Arrhythmia    Suhail hematuria    Generalized anxiety disorder with panic attacks    Hemorrhoids    Hyperlipemia    Major depressive disorder    Obstructive sleep apnea    Pacemaker    Recurrent major depressive disorder in partial remission (CMS/HCC)    SVT (supraventricular tachycardia) (CMS/[facility])    Bladder cancer (CMS/[facility])    Bladder mass    FIDELIA (acute kidney injury) (CMS/[facility])    Anemia    Constipation    Chronic kidney disease    Iron deficiency anemia due to chronic blood loss    Syncope and collapse    Sepsis (CMS/[facility])    Hypomagnesemia    Acute cystitis without hematuria    Acute unilateral obstructive uropathy         MEDICATIONS      Reconciled the current and discharge medications: Yes      Current Outpatient Medications:     acetaminophen (TYLENOL) 500 mg tablet, Take 500 mg by mouth every 6 (six) hours as needed for pain., Disp: , Rfl:     busPIRone (BUSPAR) 5 mg tablet, Take 5 mg by mouth daily., Disp: , Rfl:     cefUROXime (CEFTIN) 250 mg tablet, Take 1 tablet (250 mg total) by mouth every 12 (twelve) hours for 10 days Indications: urinary tract infection., Disp: 20 tablet, Rfl: 0    cranberry " conc-C-bacillus coag (AZO CRANBERRY + PROBIOTIC) 250-30-15 mg tablet, Take 1 tablet by mouth nightly., Disp: , Rfl:     ondansetron (ZOFRAN) 8 mg tablet, Take 1 tablet (8 mg total) by mouth every 8 (eight) hours as needed for nausea or vomiting., Disp: 30 tablet, Rfl: 3    prochlorperazine (COMPAZINE) 10 mg tablet, Take 1 tablet (10 mg total) by mouth every 6 (six) hours as needed for nausea or vomiting., Disp: 30 tablet, Rfl: 3    rosuvastatin (CRESTOR) 40 mg tablet, Take 40 mg by mouth daily., Disp: , Rfl:   No current facility-administered medications for this visit.    Facility-Administered Medications Ordered in Other Visits:     heparin, porcine (PF) flush 500 Units, 500 Units, intra-catheter, PRN, Estella, MD Wenceslao, 500 Units at 04/02/25 0902    I am having Gladys Woo maintain her busPIRone, rosuvastatin, acetaminophen, ondansetron, prochlorperazine, cranberry conc-C-bacillus coag, and cefUROXime.    REVIEW OF SYSTEMS      All other systems reviewed and negative except as noted in HPI.      POST DISCHARGE MEDICAL EQUIPMENT      DME Interventions: No intervention required  Oxygen Use: No     Has all Durable Medical Equipment (DME) been delivered?: Other (N/A)    FOLLOW-UP APPOINTMENTS      Appointment Provider: Dr. Zoe Sanders  Appointment Date: 05/08/25 (declines to see sooner)    - Your oncologistDr. Soria: 4/4/25  - Your urologistDr. Jimenez: not yet scheduled   - NephrologyDr. Puentes: not yet scheduled     INTERVENTIONS   Interventions needed: updated medication listing            PATIENT INSTRUCTIONS      Patient Instructions   Follow up with these providers:    - Your PCP, Dr. Sanders: 5/8/25 - you are recommended to be seen sooner given your recent hospitalization   - Your oncologistDr. Soria: 4/4/25   - Your urologistDr. Jimenez: not yet scheduled    - NephrologyDr. Puentes: not yet scheduled    - Obtain repeat labs (BMP) approx 1 week after discharge (on/around 4/4/25) and follow up with your  doctors to review the results.  Continue to remain hydrated as your kidney function was elevated in the hospital and this can be related to dehydration. Avoid caffeine as this is a diuretic.     Monitor for signs and symptoms of possible infection: fever, change in mental status, chills, rigors, and return to the hospital or contact your doctor if you experience these.    Return to the hospital for new/persistent/severe shortness of breath or chest pain.    It was a pleasure participating in your care today!         ABISAI Paniagua  4/2/2025

## 2025-04-02 NOTE — PATIENT INSTRUCTIONS
Follow up with these providers:    - Your PCP, Dr. Sanders: 5/8/25 - you are recommended to be seen sooner given your recent hospitalization   - Your oncologist, Dr. Soria: 4/4/25   - Your urologist, Dr. Jimenez: not yet scheduled    - Nephrology, Dr. Puentes: not yet scheduled    - Obtain repeat labs (BMP) approx 1 week after discharge (on/around 4/4/25) and follow up with your doctors to review the results.  Continue to remain hydrated as your kidney function was elevated in the hospital and this can be related to dehydration. Avoid caffeine as this is a diuretic.     Monitor for signs and symptoms of possible infection: fever, change in mental status, chills, rigors, and return to the hospital or contact your doctor if you experience these.    Return to the hospital for new/persistent/severe shortness of breath or chest pain.    It was a pleasure participating in your care today!

## 2025-04-02 NOTE — Clinical Note
I met with her today for an [facility] tele-med video visit for sepsis and an FIDELIA. She had chemo yesterday and will have another dose this Friday. She has not yet scheduled with her urologist or nephrology, but has an appointment scheduled with her PCP early next month. She had repeat labs drawn this morning.  Please let me know if I can be of any further assistance in this patient's care.   Sincerely yours, ABISAI Paniagua

## 2025-04-02 NOTE — PROGRESS NOTES
Hospital to Home Program Visit       Hospital to Home Program Visit:  Audio and Video Encounter     DISCHARGE INFORMATION   Assessment completed with: Patient  Discharge Diagnosis: Syncope and collapse, sepsis due to UTI, R hydronephrosis 2/2 malignant obstruction, FIDELIA  Discharging Facility: [facility]  Date of Last Discharge: 03/28/25  Discharge Disposition: Home  Patient's perception of their health status since discharge: Improving (Denies LHD, dizziness, N/V, urinary symptoms (burning, urgency frequency).)    PCP: Zoe Sanders MD       HPI / OVERVIEW OF VISIT      Gladys Woo is a 75 y.o. female with a past medical history of CAD, cardiac arrhthymias s/p PPM, HLD, TREVOR, bladder ca s/p cystoscopy and resection (on chemo/radiation), depression, and anxiety who was admitted to [facility] 3/24-3/28/25 with syncope and a UTI. She presented with a syncopal episode on the radiation table and also reported 2 days of foul smell urine. She was started on IV antibiotics for sepsis due to UTI with R hydronephrosis, likely 2/2 malignant obstruction, and was transitioned to oral ceftin to complete a 14 day course on discharge. She had an FIDELIA with a peak creatinine of 1.9, which improved to 1.8 on discharge. Her syncope was felt 2/2 infection and her kidney function stabilized or considerations would have been made for a R PCN tube placement.    Of note, she was recently admitted in 1/2025 a required a TURBT due to bladder compression from a new dx bladder tumor.    I met with her today for an [facility] tele-med video visit for sepsis and an FIDELIA. She reports that she's improving since leaving the hospital and denies any dizziness, LHD, N/V, or urinary symptoms (burning, frequency, urgency). She had chemo yesterday and will have another dose this Friday. She has not yet scheduled with her urologist or nephrology, but has an appointment scheduled with her PCP early next month. She had repeat labs drawn  "this morning.    We reviewed her medications, which she is taking as prescribed, and I updated her medication list with her current buspar dose. We discussed the importance of hydration and when to return to the hospital versus contact her doctors with a concern or change. She offers no further questions or concerns today regarding her care.    Urologist - Dr. Eugenio Jimenez  Oncologist - Dr. Wenceslao Soria  Radiation oncologist - Dr. [facility]    What would cause you to go back to the hospital?   Denies.     Who would you call if you had a problem?  \"Dr. Sanders.\"     Overall, how was your care that was provided and do you have any suggestions for improvement?  Above Average      PROBLEMS      Patient Active Problem List   Diagnosis    Abnormal finding of diagnostic imaging    Arrhythmia    Suhail hematuria    Generalized anxiety disorder with panic attacks    Hemorrhoids    Hyperlipemia    Major depressive disorder    Obstructive sleep apnea    Pacemaker    Recurrent major depressive disorder in partial remission (CMS/HCC)    SVT (supraventricular tachycardia) (CMS/HCC)    Bladder cancer (CMS/HCC)    Bladder mass    FIDELIA (acute kidney injury) (CMS/[facility])    Anemia    Constipation    Chronic kidney disease    Iron deficiency anemia due to chronic blood loss    Syncope and collapse    Sepsis (CMS/HCC)    Hypomagnesemia    Acute cystitis without hematuria    Acute unilateral obstructive uropathy         MEDICATIONS      Reconciled the current and discharge medications: Yes      Current Outpatient Medications:     acetaminophen (TYLENOL) 500 mg tablet, Take 500 mg by mouth every 6 (six) hours as needed for pain., Disp: , Rfl:     busPIRone (BUSPAR) 5 mg tablet, Take 5 mg by mouth daily., Disp: , Rfl:     cefUROXime (CEFTIN) 250 mg tablet, Take 1 tablet (250 mg total) by mouth every 12 (twelve) hours for 10 days Indications: urinary tract infection., Disp: 20 tablet, Rfl: 0    cranberry conc-C-bacillus coag (AZO CRANBERRY + " PROBIOTIC) 250-30-15 mg tablet, Take 1 tablet by mouth nightly., Disp: , Rfl:     ondansetron (ZOFRAN) 8 mg tablet, Take 1 tablet (8 mg total) by mouth every 8 (eight) hours as needed for nausea or vomiting., Disp: 30 tablet, Rfl: 3    prochlorperazine (COMPAZINE) 10 mg tablet, Take 1 tablet (10 mg total) by mouth every 6 (six) hours as needed for nausea or vomiting., Disp: 30 tablet, Rfl: 3    rosuvastatin (CRESTOR) 40 mg tablet, Take 40 mg by mouth daily., Disp: , Rfl:   No current facility-administered medications for this visit.    Facility-Administered Medications Ordered in Other Visits:     heparin, porcine (PF) flush 500 Units, 500 Units, intra-catheter, PRN, Estella, MD Wenceslao, 500 Units at 04/02/25 0902    I am having Gladys Woo maintain her busPIRone, rosuvastatin, acetaminophen, ondansetron, prochlorperazine, cranberry conc-C-bacillus coag, and cefUROXime.    REVIEW OF SYSTEMS      All other systems reviewed and negative except as noted in HPI.      POST DISCHARGE MEDICAL EQUIPMENT      DME Interventions: No intervention required  Oxygen Use: No     Has all Durable Medical Equipment (DME) been delivered?: Other (N/A)    FOLLOW-UP APPOINTMENTS      Appointment Provider: Dr. Zoe Sanders  Appointment Date: 05/08/25 (declines to see sooner)    - Your oncologistDr. Soria: 4/4/25  - Your urologistDr. Jimenez: not yet scheduled   - NephrologyDr. Puentes: not yet scheduled     INTERVENTIONS   Interventions needed: updated medication listing            PATIENT INSTRUCTIONS      Patient Instructions   Follow up with these providers:    - Your PCPDr. Sanders: 5/8/25 - you are recommended to be seen sooner given your recent hospitalization   - Your oncologistDr. Soria: 4/4/25   - Your urologistDr. Jimenez: not yet scheduled    - NephrologyDr. Puentes: not yet scheduled    - Obtain repeat labs (BMP) approx 1 week after discharge (on/around 4/4/25) and follow up with your doctors to review the results.  Continue  to remain hydrated as your kidney function was elevated in the hospital and this can be related to dehydration. Avoid caffeine as this is a diuretic.     Monitor for signs and symptoms of possible infection: fever, change in mental status, chills, rigors, and return to the hospital or contact your doctor if you experience these.    Return to the hospital for new/persistent/severe shortness of breath or chest pain.    It was a pleasure participating in your care today!         Summer ABISAI Horta  4/2/2025

## 2025-04-03 ENCOUNTER — OFFICE VISIT (OUTPATIENT)
Dept: HEMATOLOGY/ONCOLOGY | Facility: CLINIC | Age: 75
End: 2025-04-03
Attending: HOSPITALIST
Payer: MEDICARE

## 2025-04-03 ENCOUNTER — TELEPHONE (OUTPATIENT)
Dept: HEMATOLOGY/ONCOLOGY | Facility: CLINIC | Age: 75
End: 2025-04-03

## 2025-04-03 ENCOUNTER — HOSPITAL ENCOUNTER (OUTPATIENT)
Dept: RADIATION ONCOLOGY | Facility: HOSPITAL | Age: 75
Setting detail: RADIATION/ONCOLOGY SERIES
Discharge: HOME | End: 2025-04-03
Attending: RADIOLOGY
Payer: MEDICARE

## 2025-04-03 ENCOUNTER — RAD ONC OTV (OUTPATIENT)
Dept: RADIATION ONCOLOGY | Facility: HOSPITAL | Age: 75
End: 2025-04-03
Payer: MEDICARE

## 2025-04-03 VITALS
BODY MASS INDEX: 27.41 KG/M2 | WEIGHT: 175 LBS | OXYGEN SATURATION: 99 % | HEART RATE: 80 BPM | SYSTOLIC BLOOD PRESSURE: 139 MMHG | DIASTOLIC BLOOD PRESSURE: 56 MMHG

## 2025-04-03 VITALS
OXYGEN SATURATION: 97 % | DIASTOLIC BLOOD PRESSURE: 68 MMHG | BODY MASS INDEX: 27.47 KG/M2 | WEIGHT: 175 LBS | HEIGHT: 67 IN | SYSTOLIC BLOOD PRESSURE: 144 MMHG | TEMPERATURE: 97.8 F | HEART RATE: 74 BPM

## 2025-04-03 DIAGNOSIS — C67.8 MALIGNANT NEOPLASM OF OVERLAPPING SITES OF BLADDER (CMS/HCC): ICD-10-CM

## 2025-04-03 DIAGNOSIS — D50.0 IRON DEFICIENCY ANEMIA DUE TO CHRONIC BLOOD LOSS: ICD-10-CM

## 2025-04-03 DIAGNOSIS — C67.8 MALIGNANT NEOPLASM OF OVERLAPPING SITES OF BLADDER (CMS/HCC): Primary | ICD-10-CM

## 2025-04-03 DIAGNOSIS — C67.9 MALIGNANT NEOPLASM OF URINARY BLADDER, UNSPECIFIED SITE (CMS/HCC): Primary | ICD-10-CM

## 2025-04-03 LAB
ARIA ZRC COURSE ID: NORMAL
ARIA ZRC COURSE START DATE: NORMAL
ARIA ZRC TREATMENT ELAPSED DAYS: NORMAL
ARIA ZRP FRACTIONS TREATED TO DATE: NORMAL
ARIA ZRP PLAN ID: NORMAL
ARIA ZRP PRESCRIBED DOSE CGY: 5000
ARIA ZRP PRESCRIBED DOSE PER FRACTION: 2
ARIA ZRR DOSAGE GIVEN TO DATE: 24
ARIA ZRR DOSAGE GIVEN TO DATE: 8
ARIA ZRR DOSAGE GIVEN TO DATE: 8.25
ARIA ZRR REFERENCE POINT ID: NORMAL
ARIA ZRR SESSION DOSAGE GIVEN: 2
ARIA ZRR SESSION DOSAGE GIVEN: 2
ARIA ZRR SESSION DOSAGE GIVEN: 2.06

## 2025-04-03 PROCEDURE — 77336 RADIATION PHYSICS CONSULT: CPT | Performed by: RADIOLOGY

## 2025-04-03 PROCEDURE — 99214 OFFICE O/P EST MOD 30 MIN: CPT | Performed by: HOSPITALIST

## 2025-04-03 PROCEDURE — 77386 HC IMRT DELIVERY COMPLEX: CPT | Performed by: RADIOLOGY

## 2025-04-03 RX ORDER — SODIUM CHLORIDE 9 MG/ML
5 INJECTION, SOLUTION INTRAVENOUS AS NEEDED
Status: CANCELLED | OUTPATIENT
Start: 2025-04-03 | End: 2025-04-04

## 2025-04-03 RX ORDER — DIPHENHYDRAMINE HCL 50 MG/ML
12.5 VIAL (ML) INJECTION ONCE AS NEEDED
Status: CANCELLED | OUTPATIENT
Start: 2025-04-03

## 2025-04-03 RX ORDER — FAMOTIDINE 10 MG/ML
20 INJECTION, SOLUTION INTRAVENOUS ONCE AS NEEDED
Status: CANCELLED | OUTPATIENT
Start: 2025-04-03

## 2025-04-03 ASSESSMENT — ENCOUNTER SYMPTOMS: SHORTNESS OF BREATH: 1

## 2025-04-03 ASSESSMENT — PAIN SCALES - GENERAL
PAINLEVEL_OUTOF10: 0-NO PAIN
PAINLEVEL_OUTOF10: 0-NO PAIN

## 2025-04-03 NOTE — RADIATION TREATMENT MANAGEMENT
Patient ID:   Gladys Woo  1950  [id number]   Diagnosis Plan   1. Malignant neoplasm of overlapping sites of bladder (CMS/[facility])           Primary Care Provider:  Zoe Sanders MD     Problem List:  Patient Active Problem List    Diagnosis Date Noted    Acute unilateral obstructive uropathy 03/28/2025    Acute cystitis without hematuria 03/26/2025    Sepsis (CMS/[facility]) 03/25/2025    Hypomagnesemia 03/25/2025    Syncope and collapse 03/24/2025    Iron deficiency anemia due to chronic blood loss 03/20/2025    Chronic kidney disease 01/31/2025    Constipation 01/27/2025    Anemia 01/26/2025    Bladder cancer (CMS/HCC) 01/25/2025    Bladder mass 01/25/2025    FIDELIA (acute kidney injury) (CMS/HCC) 01/25/2025    Abnormal finding of diagnostic imaging 01/24/2025    Hemorrhoids 01/24/2025    Recurrent major depressive disorder in partial remission (CMS/HCC) 01/24/2025    SVT (supraventricular tachycardia) (CMS/HCC) 01/24/2025    Suhail hematuria 12/10/2024    Obstructive sleep apnea 11/22/2024    Arrhythmia 10/01/2024    Generalized anxiety disorder with panic attacks 10/01/2024    Hyperlipemia 10/01/2024    Major depressive disorder 10/01/2024    Pacemaker 10/24/2019        Cancer Staging   Bladder cancer (CMS/HCC)  Staging form: Urinary Bladder, AJCC 8th Edition  - Clinical: Stage IIIA (cT3, cN0, cM0) - Signed by [facility], Gregory J, MD on 2/25/2025      TREATMENT PLAN SUMMARY:  Radiation Treatments       Active   Plans   PS_a-partial bladder_GTV [1a_PartBldr]   Most recent treatment: Dose planned: 200 cGy (fraction 8 of 8 on 3/28/2025)   Total: Dose planned: 1,600 cGy   Elapsed Days: 10 days as of 2025-03-28 @ 08:3308      1b_Pelvis   Most recent treatment: Dose planned: 200 cGy (fraction 4 of 25 on 4/3/2025)   Total: Dose planned: 5,000 cGy   Elapsed Days: 16 days as of 2025-04-03 @ 08:3259      Reference Points   1a_Part Bladder   Most recent treatment: Dose given: 200 cGy (on 3/28/2025)   Total: Dose given:  1,600 cGy   Elapsed Days: 10 days as of 2025-03-28 @ 08:3308      1a_calc   Most recent treatment: Dose given: 201 cGy (on 3/28/2025)   Total: Dose given: 1,608 cGy   Elapsed Days: 10 days as of 2025-03-28 @ 08:3308      1b_Pelvis   Most recent treatment: Dose given: 200 cGy (on 4/3/2025)   Total: Dose given: 800 cGy   Elapsed Days: 16 days as of 2025-04-03 @ 08:3259      1b_calc   Most recent treatment: Dose given: 206 cGy (on 4/3/2025)   Total: Dose given: 825 cGy   Elapsed Days: 16 days as of 2025-04-03 @ 08:3259      1c_Trgt total   Most recent treatment: Dose given: 200 cGy (on 4/3/2025)   Total: Dose given: 2,400 cGy   Elapsed Days: 16 days as of 2025-04-03 @ 08:3259           Historical   Plans   PS1   Most recent treatment: Dose planned: 200 cGy (fraction 0 of 25 on 3/11/2025)   Total: Dose planned: 5,000 cGy   Elapsed Days: : @ --      PS2   Most recent treatment: Dose planned: 200 cGy (fraction 0 of 25 on 3/11/2025)   Total: Dose planned: 5,000 cGy   Elapsed Days: : @ --      PS_GTV   Most recent treatment: Dose planned: 200 cGy (fraction 0 of 8 on 3/11/2025)   Total: Dose planned: 1,600 cGy   Elapsed Days: : @ --      Reference Points   1a_Part Bladder   Most recent treatment: Course completed on 3/11/2025   Total: Dose given: 0 cGy   Elapsed Days: : @ --      1a_calc   Most recent treatment: Course completed on 3/11/2025   Total: Dose given: 0 cGy   Elapsed Days: : @ --      1b_Pelvis   Most recent treatment: Course completed on 3/11/2025   Total: Dose given: 0 cGy   Elapsed Days: : @ --      1b_calc   Most recent treatment: Course completed on 3/11/2025   Total: Dose given: 0 cGy   Elapsed Days: : @ --                     Subjective      Vital Signs for this encounter:  BP: 139/56  Heart Rate: 80  SpO2: 99 %  Weight: 79.4 kg (175 lb) (04/03 0827)    PAIN ASSESSMENT:  Score Zero    Location    Pain Intervention      TOXICITY ASSESSMENT:  General  Fatigue: Fatigue relieved by rest  Anxiety: Mild symptoms  "OR intervention not indicated  Gastrointestinal  Nausea: Loss of appetite without alteration in eating habits  Renal and Urinary  Hematuria: Absent or within normal limits  Urinary Frequency: Absent or within normal limits  Urinary Incontinence: Absent or within normal limits  Urinary Urgency: Absent or within normal limits  Skin and Subcutaneous Tissue  Skin Assessment: Grade 0  Pain Assessment  Pain Score: 0-No pain          Objective      Physical Exam:  Physical Exam patient recently released from hospital continues to antibiotics.  Denies urinary symptoms takes Azo cranberry for prevention purposes.  Denies gross hematuria or other urinary symptoms including frequency urgency.  Denies dysuria.  Denies GI symptoms.  Performance status 90.    LABS:  CMP   Lab Results   Component Value Date    ALBUMIN 3.0 (L) 04/02/2025    CO2 23 04/02/2025    BUN 27 (H) 04/02/2025    CREATININE 1.6 (H) 04/02/2025    GLUCOSE 103 (H) 04/02/2025    AST 11 (L) 04/02/2025    ALT 34 04/02/2025    EGFR 33.7 (L) 04/02/2025     CBC   Lab Results   Component Value Date    WBC 13.39 (H) 04/02/2025    HGB 7.9 (L) 04/02/2025    HCT 24.6 (L) 04/02/2025    MCV 89.5 04/02/2025     04/02/2025     Tumor Marker No results found for: \"\", \"\", \"PSA\", \"\", \"CEA\", \"HCGQUANT\"         Assessment/Plan continue definitive radiation therapy with concurrent chemotherapy for clinical stage IIIa bladder cancer.  Patient recently hospitalized for urinary tract infection is at higher risk discussed with Dr. Escalante infectious disease plan Macrobid 100 mg daily for prevention once she completes Ceftin next week.  Continue over-the-counter Azo for prevention.                         "

## 2025-04-03 NOTE — PROGRESS NOTES
Hematology/Oncology -  [facility]  Follow up Progress Note       Gladys Woo is a 75 y.o. female,   :  1950  REFERRING PHYSICIAN:   Wenceslao Soria MD  [address]  [address]  [place] [address]  PRIMARY CARE PROVIDER:  Zoe Sanders MD    Encounter Diagnoses   Name Primary?    Malignant neoplasm of urinary bladder, unspecified site (CMS/HCC) Yes    Malignant neoplasm of overlapping sites of bladder (CMS/HCC)     Iron deficiency anemia due to chronic blood loss        Care Team    Surgical:   Radiation: [facility]  Med Onc: [facility]    Staging       Cancer Staging   Bladder cancer (CMS/HCC)  Staging form: Urinary Bladder, AJCC 8th Edition  - Clinical: Stage IIIA (cT3, cN0, cM0) - Signed by [facility], Gregory J, MD on 2025        Treatment Plan     Treatment Plans       Name Type Plan Dates Plan Provider         Active    CISplatin with Concurrent Radiation, 7 Day Cycles - bladder cancer ONCOLOGY TREATMENT 3/5/2025 - Present Wenceslao Soria MD                        Oncology History     Oncology History Overview Note   Gladys Woo is a very pleasant 75 y.o. patient with PMHx anxiety and hyperlipidemia.  She noticed some blood in her underwear since 2024.  She was initially treated for vagina dystrophy with no success. She was examined and no evidence of GYN tumor and eventually noted to have bleeding from urethra.  Patient seen by Dr. Jimenez who ordered a CT urogram which revealed a 8.6 cm irregular urinary bladder mass centered along the right posterior lateral bladder wall.  There is stranding in the right extraperitoneal space and right retroperitoneum along the course of the distalmost right ureter which may be from obstructive physiology without extension of neoplasm beyond the urinary bladder is theoretically possible.  Moderate right hydronephrosis to the level of the right UVJ's secondary to mass.  In addition an intermediate left adrenal nodule was  noted for which MRI was suggested.  Scattered subcentimeter sclerotic foci nonspecific but likely bony islands.  Bone scan suggested to rule out metastatic disease.  Patient presented last month to [facility] emergency department and CAT scan of the abdomen and pelvis revealed a large bladder mass with moderate right hydronephrosis and hydroureter.  CT angiogram chest abdomen pelvis showed no evidence of metastatic disease.  Creatinine at the time was elevated 1.7.  On 1/30/2025 Dr. Jimenez performed TURBT.  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  Suspicious for lymphovascular invasion.  Muscle invasion present.  Patient has not noted further bleeding per urethra. Patient met with Dr. Corona medical oncology to discuss neoadjuvant chemotherapy prior to cystectomy.  Patient wants to have bladder preserving strategy.    She went to [facility] and saw Dr. Patel with recommendation of neoadjuvant chemotherapy, followed up by surgery.    She is aware of her options but still want to try chemoradiation at this point.     Bladder cancer (CMS/[facility])   2/25/2025 Cancer Staged    Staging form: Urinary Bladder, AJCC 8th Edition  - Clinical: Stage IIIA (cT3, cN0, cM0)     3/6/2025 -  Chemotherapy    GENERAL INFUSION ORDERS FOR HYDRATION  Plan Provider: Wenceslao Soria MD     3/18/2025 -  Chemotherapy    MEDIPORT FLUSH (SINGLE LUMEN) - PATIENT ON TREATMENT  Plan Provider: Wenceslao Soria MD     3/19/2025 - 3/19/2025 Chemotherapy    CISplatin with Concurrent Radiation, 7 Day Cycles - bladder cancer  Plan Provider: Wenceslao Soria MD  Treatment goal: Curative  Line of treatment: First Line     4/1/2025 -  Chemotherapy    Gemcitabine, twice a week with radiation therapy - Bladder  Plan Provider: Wecneslao Soria MD  Treatment goal: Curative  Line of treatment: First Line           History Of Present Illness     Gladys Woo is a very pleasant 75 y.o. patient  "with PMHx of  Bladder cancer, stage III, T3N0M0, who is here for concurrent chemoradiation therapy.    Keegan is on the phone.  She is overall feeling well.    She states that her tumor is shrinking.  She feels great after IV iron infusion.    Since last time I saw her she was admitted to the hospital for syncope and need blood transfusion and UTI treatment.      Gladys Woo is seen today for follow up.       ROS     Review of Systems   Respiratory:  Positive for shortness of breath.    All other systems reviewed and are negative.    Review of Systems:  Nursing assessment reviewed. Pertinent positive and negative symptoms noted in HPI, all others negative.    PHYSICAL EXAMINATION     Temp:  [36.6 °C (97.8 °F)] 36.6 °C (97.8 °F)  Heart Rate:  [74-80] 74  BP: (139-144)/(56-68) 144/68  Visit Vitals  BP (!) 144/68 (BP Location: Right upper arm, Patient Position: Sitting)   Pulse 74   Temp 36.6 °C (97.8 °F) (Temporal)   Ht 1.702 m (5' 7\")   Wt 79.4 kg (175 lb) Comment: patient reported   SpO2 97%   BMI 27.41 kg/m²     Physical Exam  Vitals reviewed.   Constitutional:       Appearance: She is not ill-appearing.   Cardiovascular:      Rate and Rhythm: Normal rate and regular rhythm.      Heart sounds: No murmur heard.  Pulmonary:      Effort: Pulmonary effort is normal. No respiratory distress.      Breath sounds: Normal breath sounds. No wheezing.   Abdominal:      Palpations: Abdomen is soft.   Musculoskeletal:         General: No swelling.   Skin:     Coloration: Skin is not jaundiced or pale.   Neurological:      General: No focal deficit present.      Mental Status: She is alert.   Psychiatric:         Mood and Affect: Mood normal.           Past Medical History     Past Medical History[1]    Past Surgical History     Past Surgical History   Procedure Laterality Date    A-v cardiac pacemaker insertion      Colonoscopy      CYSTO, TURBT TRANS-URETHRAL RESECTION BLADDER TUMOR N/A 1/30/2025    Performed by Barbara" Eugenio Burnett MD at  OR    Insert / replace / remove pacemaker      Knee arthroscopy w/ meniscal repair      Tonsillectomy         OB/GYN History     OB History    Para Term  AB Living   3 1   2    SAB IAB Ectopic Multiple Live Births   2          # Outcome Date GA Lbr Franck/2nd Weight Sex Type Anes PTL Lv   3 SAB            2 SAB            1 Para              Gynecologic History:  Age at menarche: No age on file  Age at first live birth: No age on file   Age at menopause: No age on file    Social History     Social History     Tobacco Use    Smoking status: Former     Types: Cigarettes    Smokeless tobacco: Never   Substance Use Topics    Alcohol use: Yes     Alcohol/week: 2.0 standard drinks of alcohol     Types: 2 Glasses of wine per week     Comment: socially    Drug use: Never       Family History     Family History   Problem Relation Name Age of Onset    Heart disease Biological Mother      Diabetes Biological Mother      Heart disease Biological Father      Coronary artery disease Biological Father      Liver cancer Biological Brother      Prostate cancer Biological Brother         Allergies     Amoxicillin    Medications     Current Outpatient Medications   Medication Sig Dispense Refill    acetaminophen (TYLENOL) 500 mg tablet Take 500 mg by mouth every 6 (six) hours as needed for pain.      busPIRone (BUSPAR) 5 mg tablet Take 5 mg by mouth daily.      cefUROXime (CEFTIN) 250 mg tablet Take 1 tablet (250 mg total) by mouth every 12 (twelve) hours for 10 days Indications: urinary tract infection. 20 tablet 0    cranberry conc-C-bacillus coag (AZO CRANBERRY + PROBIOTIC) 250-30-15 mg tablet Take 1 tablet by mouth nightly.      ondansetron (ZOFRAN) 8 mg tablet Take 1 tablet (8 mg total) by mouth every 8 (eight) hours as needed for nausea or vomiting. 30 tablet 3    prochlorperazine (COMPAZINE) 10 mg tablet Take 1 tablet (10 mg total) by mouth every 6 (six) hours as needed for nausea or vomiting. 30  "tablet 3    rosuvastatin (CRESTOR) 40 mg tablet Take 40 mg by mouth daily.       No current facility-administered medications for this visit.          Labs     Lab Results   Component Value Date    WBC 13.39 (H) 04/02/2025    HGB 7.9 (L) 04/02/2025    HCT 24.6 (L) 04/02/2025    MCV 89.5 04/02/2025     04/02/2025       Lab Results   Component Value Date    GLUCOSE 103 (H) 04/02/2025    CALCIUM 9.0 04/02/2025     04/02/2025    K 3.9 04/02/2025    CO2 23 04/02/2025     04/02/2025    BUN 27 (H) 04/02/2025    CREATININE 1.6 (H) 04/02/2025       Lab Results   Component Value Date    ALT 34 04/02/2025    AST 11 (L) 04/02/2025    ALKPHOS 78 04/02/2025    BILITOT 0.2 (L) 04/02/2025       Lab Results   Component Value Date    IRON <10 (L) 03/19/2025    TIBC 231 (L) 03/19/2025    FERRITIN 313 (H) 03/19/2025       No results found for: \"SPEP\", \"UPEP\"    No results found for: \"PTT\"    Lab Results   Component Value Date    INR 1.1 03/13/2025       No results found for: \"VVTSZNNS32\"    No results found for: \"FOLATE\"    No results found for: \"RETICCTPCT\"    Lab Results   Component Value Date    DDIMER 1.16 (H) 01/24/2025         Pathology     Pathology Results       ** No results found for the last 720 hours. **            Radiology     No new Radiology.  IR PORT PLACEMENT  IR PORT PLACEMENT, ULTRASOUND GUIDED VASCULAR ACCESS  Result Date: 3/13/2025  Narrative: CLINICAL HISTORY:    Bladder cancer     Impression: IMPRESSION: Successful placement of a right IJ CT Injectable chest port with ultrasound and fluoroscopic guidance, which is ready for use. Device: [employer] PowerPort COMMENT: PROCEDURE:  Right IJ chest port placement, under ultrasound and fluoroscopic guidance. RADIOLOGIST:  Quang Torres MD ANESTHESIA : Lidocaine 1% CONTRAST:  None. MEDICATIONS: Vancomycin 1.25 gm IV REFERENCE AIR KERMA: 14 mGy Versed 1 mg IV and Fentanyl 100 mcg IV were administered intravenously for moderate sedation, under the " supervision of the physician.  Pulse oximetry, heart rate and blood pressure were continuously monitored by the trained nursing staff present.  The physician spent a total of 30 minutes of face to face sedation time with the patient. All the procedural conscious sedation guidelines were followed and documented including the Mallampati airway assessment, confirmation of NPO status, anesthesia history and  ASA classification.. DESCRIPTION OF PROCEDURE:  Written informed consent was obtained following explanation of risks and benefits of the procedure. The right neck and chest were draped and prepped in the usual sterile manner. The right internal jugular vein was noted to be compressible and patent on gray scale ultrasound. Under real-time grayscale ultrasound, the internal jugular vein was cannulated with a 21-gauge micropuncture needle following local anesthesia and a skin nick. An ultrasound-guided access image was saved to PACS. An 0.018 wire was advanced. The needle was exchanged for a 5 [place] micropuncture catheter. The inner catheter and wire were removed, and an 0.035 Amplatz wire was advanced under fluoroscopic guidance into the IVC and secured with a flow-switch. An appropriate port site was selected in the upper chest and the area was anesthetized with Lidocaine. A 3 centimeter horizontal incision was created and blunt dissection was performed superiorly to create a port pocket. Hemostasis was achieved. A subcutaneous tunnel was then created to the IJ entry site following local anesthesia, with blunt dissection. An 8F [employer] PowerPort catheter was advanced through the tunnel. The port was tested demonstrating no leakage. A peel-away sheath was advanced over the Amplatz wire, and the catheter was advanced through the peel-away sheath and positioned within the SVC/right atrial junction. The peel-away sheath was removed. The port catheter was appropriately sized and connected to the port. The port was  successfully positioned within the pocket with the distal catheter tip terminating at the cavoatrial junction. The port was tested demonstrating appropriate flush and aspiration. The port was then flushed with Heparin following good blood return and was subsequently deaccessed. . Tissues superficial to the port were closed with interrupted deep 2-0 Vicryl suture and running subcutaneous 4-0 Vicryl suture. Skin closure at the internal jugular vein access site was achieved with Dermabond adhesive. Dermabond was applied to the pocket incision site. The patient was discharged in good condition. Performance Met:  PQRS MEASURE 76, CPT II 6030F:  All elements of maximal sterile technique were utilized, including cap, mask, sterile gown, sterile gloves, and sterile drape.  Additionally, sterile ultrasound techniques were followed, including use of sterile probe cover and sterile ultrasound gel. Ultrasound-guided vascular access - Grayscale ultrasound evaluation demonstrated the right  internal jugular vein to be patent. The right internal jugular vein was accessed under real-time ultrasound access. An access image was saved/archived to PACS. The number of guidewires used, and their integrity, was confirmed at the end of the procedure.      Assessment and Plan     In summary, Gladys Woo is a very pleasant 75 y.o. patient with PMHx of anxiety and dyslipidemia.     # Bladder cancer, stage III, T3N0M0. 1/30/3025 she had Transurethral resection of bladder tumor (8cm). OP note:  Cystoscopic evaluation demonstrated a large and invasive appearing tumor encompassing the entirety of the trigone and left lateral wall measuring at least 8 cm.  A monopolar working element was inserted and the tumor was then  systematically resected down to the level of the normal bladder wall.  There did appear to be a large volume of bladder wall invasion visually.  The bladder tumor specimens were then irrigated from the bladder and repeat  cystoscopy demonstrated no evidence of hannah perforation.  - need to complete workup with MRI to better evaluate the left adrenal gland lesion.  Because she has no bone pain we can skip nuclear medicine bone scan at this point.  - per current NCCN guideline, optimal candidates for bladder preservation with chemoradiotherapy include patients with tumors that present without moderate/severe hydronephrosis, are without concurrent extensive or multifocal Tis, and are <6 cm. Ideally, tumors should allow a visually complete or maximally debulking TURBT. Apparently she has no other lesions based on operating note.  Her creatinine was improving.  Based on operation note her cancer is 8 cm, bigger than the 6 cm recommendation.  So I did have a discussion of neoadjuvant chemotherapy followed by radical cystectomy.  She is not interested in radical cystectomy.  Indeed Dr. Corona and Dr. Patel talked to her about this too.      - Data supporting the use of chemoradiation come from a pooled analysis of Radiation Therapy Oncology Group (RTOG) studies [Toño RH, JCO 2014], a large [facility] randomized trial comparing chemoradiation versus radiation therapy (RT) without chemotherapy [Chuck BRAGG, NEJ 2012], and several single-institution series. In RTOG pooled analysis -- A pooled analysis of six NRG/RTOG studies demonstrated the efficacy of chemoradiation as part of a bladder-preserving trimodality therapy (TMT) approach, inclusing 468 patients with clinical T2 to T4a tumors; patients were excluded if they had evidence of biopsy-proven pearl or metastatic disease. At median follow-up of over four years among all patients, the main results included the following:  ?The complete response rate following chemoradiation was 69 percent.  ?The 5- and 10-year disease-specific survival (DSS) rates were 71 and 65 percent, respectively. The 5- and 10-year rates of muscle-invasive local failure were 13 and 14 percent, respectively; the 5- and  10-year rates of non-muscle-invasive recurrence were 31 and 36 percent, respectively; and the 5- and 10-year rates of distant metastases were 31 and 35 percent, respectively.  ?The 5- and 10-year overall survival (OS) rates were 57 and 36 percent, respectively. However, most deaths were not attributable to bladder cancer, which was the cause of death in only 24 percent of patients who had  by year 5.  ?Of the 205 patients alive at five years, 80 percent had an intact bladder.  - The Bladder Cancer  (YP7197) study was a multicenter randomized phase III trial that enrolled 360 patients with muscle-invasive bladder cancer. All patients underwent TURBT, followed by randomization to either RT alone or RT with concurrent chemotherapy using FU and mitomycin. At a median follow-up of 10 years, the addition of concurrent chemotherapy to RT resulted improved locoregional disease control (five-year locoregional recurrence-free rates of 63 versus 49 percent; hazard ratio [HR] 0.61, 95% CI 0.43-0.86); A non-statistically significant trend towards higher DFS (five-year DFS 45 versus 34 percent, HR 0.78, 95% CI 0.60-1.02) and OS (five-year OS 49 versus 37 percent, HR 0.88, 95% CI 0.69-1.13); Reduced rate of cystectomy at five years (14 versus 22 percent, HR 0.54, 95% CI 0.31-0.95). Overall, 81 percent of cystectomies were performed for disease recurrence; bu higher grade 3 or 4 toxicity rates during treatment (36 versus 28 percent) but with lower grade 3 or 4 late toxicity rates in extended follow-up (9 versus 17 percent).  - For patients receiving concurrent chemoradiation, the optimal radiosensitizing chemotherapy regimen is not established, as many of these regimens have not been directly compared in randomized trials. Selection of chemotherapy is based upon patient performance status and comorbidities, kidney function, and provider experience. With her overall fit status and Cr 1.2, patient would be a candidate with  Cisplatin based chemoradiation therapy. Other chemo regimen include biweekly Gemcitabine and FU plus mitomycin and paclitaxel. Cisplatin is best studied. Low-dose biweekly gemcitabine (27 mg/m2 twice per week with daily RT) is an effective regimen with less toxicity relative to combination cisplatin-based therapy.  -I went to through the side effect of cisplatin 20mg (50% dose reduction from 40mg weekly due to CrCl 40-50) with the patient in detail.  She gave the consent. We also discussed the possibility of using gemcitabine.  We went through all the side effects and focused on nausea and vomiting, bone marrow suppression, FIDELIA and neuropathy.   - on 3/27/2025 when she was admitted, we discussed that since her kidney function is not getting better I will change her chemo to gemcitabine twice a week along with radiation. She gave the consent.  We discussed the side effect of gemcitabine in details. Citation: Bladder Preservation With Twice-a-Day Radiation Plus Fluorouracil/Cisplatin or Once Daily Radiation Plus Gemcitabine for Muscle-Invasive Bladder Cancer: NRG/RTOG 0712-A Randomized Phase II Trial. JCO 2018.     04/03/25  -Since last time I saw her she was admitted to the hospital for syncope and need blood transfusion and UTI treatment. With worsening GFR, we decide to use Gemcitabine.  - ok for Gemcitabine    #Normocytic anemia.  - s/p IV Monoferric  - will give 1 unit pRBC to keep her HGB>8    # UTI:   [facility]. [facility] will give her Abx during chemorad.   - Keegan asked for repeat urine culture at [facility]. This can be done as needed.       No problem-specific Assessment & Plan notes found for this encounter.      Wenceslao Soria MD/PhD  Hematology/Oncology         [1]   Past Medical History:  Diagnosis Date    Bladder cancer (CMS/HCC)     Coronary artery disease     KERRI (generalized anxiety disorder)     TREVOR (obstructive sleep apnea)

## 2025-04-03 NOTE — TELEPHONE ENCOUNTER
Hgb 7.9.  RN called patient. Patient reports fatigue.  Dr. Soria placed orders for 1 unit PRBC's.  Patient verbalized understanding treatment will be changed to [facility] at 9 am tomorrow after her Radiation.

## 2025-04-04 ENCOUNTER — HOSPITAL ENCOUNTER (OUTPATIENT)
Dept: HEMATOLOGY/ONCOLOGY | Facility: HOSPITAL | Age: 75
Setting detail: INFUSION SERIES
Discharge: HOME | End: 2025-04-04
Attending: HOSPITALIST
Payer: MEDICARE

## 2025-04-04 ENCOUNTER — HOSPITAL ENCOUNTER (OUTPATIENT)
Dept: RADIATION ONCOLOGY | Facility: HOSPITAL | Age: 75
Setting detail: RADIATION/ONCOLOGY SERIES
Discharge: HOME | End: 2025-04-04
Attending: RADIOLOGY
Payer: MEDICARE

## 2025-04-04 VITALS
HEART RATE: 82 BPM | OXYGEN SATURATION: 96 % | DIASTOLIC BLOOD PRESSURE: 64 MMHG | BODY MASS INDEX: 27.41 KG/M2 | SYSTOLIC BLOOD PRESSURE: 140 MMHG | RESPIRATION RATE: 16 BRPM | TEMPERATURE: 97 F | WEIGHT: 175 LBS

## 2025-04-04 DIAGNOSIS — C67.9 MALIGNANT NEOPLASM OF URINARY BLADDER, UNSPECIFIED SITE (CMS/HCC): Primary | ICD-10-CM

## 2025-04-04 DIAGNOSIS — D50.0 IRON DEFICIENCY ANEMIA DUE TO CHRONIC BLOOD LOSS: ICD-10-CM

## 2025-04-04 DIAGNOSIS — C67.8 MALIGNANT NEOPLASM OF OVERLAPPING SITES OF BLADDER (CMS/HCC): ICD-10-CM

## 2025-04-04 LAB
ABO + RH BLD: NORMAL
ARIA ZRC COURSE ID: NORMAL
ARIA ZRC COURSE START DATE: NORMAL
ARIA ZRC TREATMENT ELAPSED DAYS: NORMAL
ARIA ZRP FRACTIONS TREATED TO DATE: NORMAL
ARIA ZRP PLAN ID: NORMAL
ARIA ZRP PRESCRIBED DOSE CGY: 5000
ARIA ZRP PRESCRIBED DOSE PER FRACTION: 2
ARIA ZRR DOSAGE GIVEN TO DATE: 10
ARIA ZRR DOSAGE GIVEN TO DATE: 10.31
ARIA ZRR DOSAGE GIVEN TO DATE: 26
ARIA ZRR REFERENCE POINT ID: NORMAL
ARIA ZRR SESSION DOSAGE GIVEN: 2
ARIA ZRR SESSION DOSAGE GIVEN: 2
ARIA ZRR SESSION DOSAGE GIVEN: 2.06
BLD GP AB SCN SERPL QL: NEGATIVE
D AG BLD QL: POSITIVE
LABORATORY COMMENT REPORT: NORMAL
SPECIMEN EXP DATE BLD: NORMAL

## 2025-04-04 PROCEDURE — P9016 RBC LEUKOCYTES REDUCED: HCPCS

## 2025-04-04 PROCEDURE — 86901 BLOOD TYPING SEROLOGIC RH(D): CPT

## 2025-04-04 PROCEDURE — 63600000 HC DRUGS/DETAIL CODE: Performed by: HOSPITALIST

## 2025-04-04 PROCEDURE — 96413 CHEMO IV INFUSION 1 HR: CPT

## 2025-04-04 PROCEDURE — 25800000 HC PHARMACY IV SOLUTIONS: Performed by: HOSPITALIST

## 2025-04-04 PROCEDURE — 96367 TX/PROPH/DG ADDL SEQ IV INF: CPT

## 2025-04-04 PROCEDURE — 36430 TRANSFUSION BLD/BLD COMPNT: CPT

## 2025-04-04 PROCEDURE — 36415 COLL VENOUS BLD VENIPUNCTURE: CPT | Performed by: HOSPITALIST

## 2025-04-04 PROCEDURE — 77386 HC IMRT DELIVERY COMPLEX: CPT | Performed by: RADIOLOGY

## 2025-04-04 RX ORDER — HEPARIN 100 UNIT/ML
500 SYRINGE INTRAVENOUS AS NEEDED
Status: CANCELLED | OUTPATIENT
Start: 2025-04-04

## 2025-04-04 RX ORDER — HEPARIN 100 UNIT/ML
500 SYRINGE INTRAVENOUS AS NEEDED
Status: DISCONTINUED | OUTPATIENT
Start: 2025-04-04 | End: 2025-04-05 | Stop reason: HOSPADM

## 2025-04-04 RX ADMIN — DEXAMETHASONE SODIUM PHOSPHATE: 4 INJECTION, SOLUTION INTRA-ARTICULAR; INTRALESIONAL; INTRAMUSCULAR; INTRAVENOUS; SOFT TISSUE at 09:55

## 2025-04-04 RX ADMIN — GEMCITABINE 52 MG: 38 INJECTION, SOLUTION INTRAVENOUS at 10:27

## 2025-04-04 RX ADMIN — HEPARIN 500 UNITS: 100 SYRINGE at 13:00

## 2025-04-04 ASSESSMENT — ENCOUNTER SYMPTOMS
LOSS OF SENSATION IN FEET: 0
OCCASIONAL FEELINGS OF UNSTEADINESS: 1
DEPRESSION: 0

## 2025-04-04 NOTE — PROGRESS NOTES
Pt arrives for D4C1. Pt cleared by Dr. Soria (OV 4/3). Consent for blood tx obtained and scanned into epic. Port accessed with +BR. Pt premedicated with zofran/dex. Pt tolerated Gemzar and 1 unit of PRBC, VSS. Pt developed 1 hive on neck that was itchy and red with 10 mins left of blood transfusion.     1027 Gemzar started  1117 PRBC started, ended 1250... hives started 1240    Dr. Soria made aware and recommended pt take benadryl at home every 6 hours as needed. Pt states she feels comfortable dc home and will call if symptoms worsen.

## 2025-04-04 NOTE — PATIENT INSTRUCTIONS
INSTRUCTIONS FOR ANTIEMETICS        WHAT I RECEIVED TODAY FOR NAUSEA:     4/4/25  Zofran         Yes___         No___  Decadron    Yes___         No___    A variety of these meds may be ordered based on your treatment.  These medications may cause: headache, dizziness and constipation, flushing and drowsiness.      2. HOW TO TAKE MY ANTI-NAUSEA MEDS:    You have received two prescriptions for nausea to take at home:  Compazine (Prochlorperazine)  Zofran (Ondansetron )  If you did NOT receive Aloxi:  Zofran 8 mg every 8 hrs as needed is the #1 choice for the first two days after chemo   Compazine 10mg every 6 hrs as needed is the #2 choice for the first two days after chemo  These two drugs can be be taken in combination with one another. If one drug does not relieve nausea within a half hour, you may take the other nausea med       CALL IF YOU HAVE VOMITING OR NAUSEA that is not relieved by your meds:    Mon - Fri  (8 - 4:30)                   [phone]  Evenings and weekends          [phone]

## 2025-04-05 LAB
CROSSMATCH: NORMAL
ISBT CODE: 7300
PRODUCT CODE: NORMAL
PRODUCT STATUS: NORMAL
SPECIMEN EXP DATE BLD: NORMAL
UNIT ABO: NORMAL
UNIT ID: NORMAL
UNIT RH: POSITIVE

## 2025-04-07 ENCOUNTER — HOSPITAL ENCOUNTER (OUTPATIENT)
Dept: HEMATOLOGY/ONCOLOGY | Facility: HOSPITAL | Age: 75
Setting detail: INFUSION SERIES
Discharge: HOME | End: 2025-04-07
Attending: HOSPITALIST
Payer: MEDICARE

## 2025-04-07 ENCOUNTER — HOSPITAL ENCOUNTER (OUTPATIENT)
Dept: RADIATION ONCOLOGY | Facility: HOSPITAL | Age: 75
Setting detail: RADIATION/ONCOLOGY SERIES
Discharge: HOME | End: 2025-04-07
Attending: RADIOLOGY
Payer: MEDICARE

## 2025-04-07 DIAGNOSIS — C67.8 MALIGNANT NEOPLASM OF OVERLAPPING SITES OF BLADDER (CMS/HCC): ICD-10-CM

## 2025-04-07 DIAGNOSIS — C67.9 MALIGNANT NEOPLASM OF URINARY BLADDER, UNSPECIFIED SITE (CMS/HCC): Primary | ICD-10-CM

## 2025-04-07 DIAGNOSIS — D50.0 IRON DEFICIENCY ANEMIA DUE TO CHRONIC BLOOD LOSS: ICD-10-CM

## 2025-04-07 LAB
ANION GAP SERPL CALC-SCNC: 6 MEQ/L (ref 3–15)
ARIA ZRC COURSE ID: NORMAL
ARIA ZRC COURSE START DATE: NORMAL
ARIA ZRC TREATMENT ELAPSED DAYS: NORMAL
ARIA ZRP FRACTIONS TREATED TO DATE: NORMAL
ARIA ZRP PLAN ID: NORMAL
ARIA ZRP PRESCRIBED DOSE CGY: 5000
ARIA ZRP PRESCRIBED DOSE PER FRACTION: 2
ARIA ZRR DOSAGE GIVEN TO DATE: 12
ARIA ZRR DOSAGE GIVEN TO DATE: 12.37
ARIA ZRR DOSAGE GIVEN TO DATE: 28
ARIA ZRR REFERENCE POINT ID: NORMAL
ARIA ZRR SESSION DOSAGE GIVEN: 2
ARIA ZRR SESSION DOSAGE GIVEN: 2
ARIA ZRR SESSION DOSAGE GIVEN: 2.06
BASOPHILS # BLD: 0.03 K/UL (ref 0.01–0.1)
BASOPHILS NFR BLD: 0.4 %
BUN SERPL-MCNC: 26 MG/DL (ref 7–25)
CALCIUM SERPL-MCNC: 8.8 MG/DL (ref 8.6–10.3)
CHLORIDE SERPL-SCNC: 105 MEQ/L (ref 98–107)
CO2 SERPL-SCNC: 25 MEQ/L (ref 21–31)
CREAT SERPL-MCNC: 1.6 MG/DL (ref 0.6–1.2)
DIFFERENTIAL METHOD BLD: ABNORMAL
EGFRCR SERPLBLD CKD-EPI 2021: 33.7 ML/MIN/1.73M*2
EOSINOPHIL # BLD: 0.28 K/UL (ref 0.04–0.36)
EOSINOPHIL NFR BLD: 3.8 %
ERYTHROCYTE [DISTWIDTH] IN BLOOD BY AUTOMATED COUNT: 13.9 % (ref 11.7–14.4)
GLUCOSE SERPL-MCNC: 130 MG/DL (ref 70–99)
HCT VFR BLD AUTO: 29.5 % (ref 35–45)
HGB BLD-MCNC: 9.3 G/DL (ref 11.8–15.7)
IMM GRANULOCYTES # BLD AUTO: 0.04 K/UL (ref 0–0.08)
IMM GRANULOCYTES NFR BLD AUTO: 0.5 %
LYMPHOCYTES # BLD: 0.65 K/UL (ref 1.2–3.5)
LYMPHOCYTES NFR BLD: 8.9 %
MCH RBC QN AUTO: 28.5 PG (ref 28–33.2)
MCHC RBC AUTO-ENTMCNC: 31.5 G/DL (ref 32.2–35.5)
MCV RBC AUTO: 90.5 FL (ref 83–98)
MONOCYTES # BLD: 0.08 K/UL (ref 0.28–0.8)
MONOCYTES NFR BLD: 1.1 %
NEUTROPHILS # BLD: 6.22 K/UL (ref 1.7–7)
NEUTROPHILS # BLD: 6.22 K/UL (ref 1.7–7)
NEUTS SEG NFR BLD: 85.3 %
NRBC BLD-RTO: 0 %
PLATELET # BLD AUTO: 240 K/UL (ref 150–369)
PMV BLD AUTO: 9.2 FL (ref 9.4–12.3)
POTASSIUM SERPL-SCNC: 4 MEQ/L (ref 3.5–5.1)
RBC # BLD AUTO: 3.26 M/UL (ref 3.93–5.22)
SODIUM SERPL-SCNC: 136 MEQ/L (ref 136–145)
WBC # BLD AUTO: 7.3 K/UL (ref 3.8–10.5)

## 2025-04-07 PROCEDURE — 77386 HC IMRT DELIVERY COMPLEX: CPT | Performed by: RADIOLOGY

## 2025-04-07 PROCEDURE — 85025 COMPLETE CBC W/AUTO DIFF WBC: CPT | Performed by: HOSPITALIST

## 2025-04-07 PROCEDURE — 36591 DRAW BLOOD OFF VENOUS DEVICE: CPT

## 2025-04-07 PROCEDURE — 80048 BASIC METABOLIC PNL TOTAL CA: CPT | Performed by: HOSPITALIST

## 2025-04-07 PROCEDURE — 36415 COLL VENOUS BLD VENIPUNCTURE: CPT | Performed by: HOSPITALIST

## 2025-04-07 RX ORDER — HEPARIN 100 UNIT/ML
500 SYRINGE INTRAVENOUS AS NEEDED
Status: DISCONTINUED | OUTPATIENT
Start: 2025-04-07 | End: 2025-04-08 | Stop reason: HOSPADM

## 2025-04-07 RX ORDER — HEPARIN 100 UNIT/ML
500 SYRINGE INTRAVENOUS AS NEEDED
Status: CANCELLED | OUTPATIENT
Start: 2025-04-07

## 2025-04-07 RX ADMIN — HEPARIN 500 UNITS: 100 SYRINGE at 08:52

## 2025-04-08 ENCOUNTER — HOSPITAL ENCOUNTER (OUTPATIENT)
Dept: RADIATION ONCOLOGY | Facility: HOSPITAL | Age: 75
Setting detail: RADIATION/ONCOLOGY SERIES
Discharge: HOME | End: 2025-04-08
Attending: RADIOLOGY
Payer: MEDICARE

## 2025-04-08 ENCOUNTER — RAD ONC OTV (OUTPATIENT)
Dept: RADIATION ONCOLOGY | Facility: HOSPITAL | Age: 75
End: 2025-04-08
Payer: MEDICARE

## 2025-04-08 ENCOUNTER — HOSPITAL ENCOUNTER (OUTPATIENT)
Dept: HEMATOLOGY/ONCOLOGY | Facility: HOSPITAL | Age: 75
Setting detail: INFUSION SERIES
Discharge: HOME | End: 2025-04-08
Attending: HOSPITALIST
Payer: MEDICARE

## 2025-04-08 VITALS
TEMPERATURE: 96.9 F | OXYGEN SATURATION: 99 % | RESPIRATION RATE: 18 BRPM | HEART RATE: 83 BPM | DIASTOLIC BLOOD PRESSURE: 63 MMHG | SYSTOLIC BLOOD PRESSURE: 143 MMHG

## 2025-04-08 VITALS
DIASTOLIC BLOOD PRESSURE: 67 MMHG | WEIGHT: 175 LBS | BODY MASS INDEX: 27.41 KG/M2 | SYSTOLIC BLOOD PRESSURE: 159 MMHG | OXYGEN SATURATION: 98 % | HEART RATE: 71 BPM

## 2025-04-08 DIAGNOSIS — D50.0 IRON DEFICIENCY ANEMIA DUE TO CHRONIC BLOOD LOSS: ICD-10-CM

## 2025-04-08 DIAGNOSIS — C67.8 MALIGNANT NEOPLASM OF OVERLAPPING SITES OF BLADDER (CMS/HCC): ICD-10-CM

## 2025-04-08 DIAGNOSIS — C67.9 MALIGNANT NEOPLASM OF URINARY BLADDER, UNSPECIFIED SITE (CMS/HCC): Primary | ICD-10-CM

## 2025-04-08 DIAGNOSIS — C67.8 MALIGNANT NEOPLASM OF OVERLAPPING SITES OF BLADDER (CMS/HCC): Primary | ICD-10-CM

## 2025-04-08 LAB
ARIA ZRC COURSE ID: NORMAL
ARIA ZRC COURSE START DATE: NORMAL
ARIA ZRC TREATMENT ELAPSED DAYS: NORMAL
ARIA ZRP FRACTIONS TREATED TO DATE: NORMAL
ARIA ZRP PLAN ID: NORMAL
ARIA ZRP PRESCRIBED DOSE CGY: 5000
ARIA ZRP PRESCRIBED DOSE PER FRACTION: 2
ARIA ZRR DOSAGE GIVEN TO DATE: 14
ARIA ZRR DOSAGE GIVEN TO DATE: 14.43
ARIA ZRR DOSAGE GIVEN TO DATE: 30
ARIA ZRR REFERENCE POINT ID: NORMAL
ARIA ZRR SESSION DOSAGE GIVEN: 2
ARIA ZRR SESSION DOSAGE GIVEN: 2
ARIA ZRR SESSION DOSAGE GIVEN: 2.06

## 2025-04-08 PROCEDURE — 96413 CHEMO IV INFUSION 1 HR: CPT

## 2025-04-08 PROCEDURE — 77386 HC IMRT DELIVERY COMPLEX: CPT | Performed by: RADIOLOGY

## 2025-04-08 PROCEDURE — 96367 TX/PROPH/DG ADDL SEQ IV INF: CPT

## 2025-04-08 PROCEDURE — 25800000 HC PHARMACY IV SOLUTIONS: Performed by: HOSPITALIST

## 2025-04-08 PROCEDURE — 63600000 HC DRUGS/DETAIL CODE: Mod: JZ | Performed by: HOSPITALIST

## 2025-04-08 RX ORDER — NITROFURANTOIN 25; 75 MG/1; MG/1
100 CAPSULE ORAL DAILY
Qty: 30 CAPSULE | Refills: 0 | Status: SHIPPED | OUTPATIENT
Start: 2025-04-08 | End: 2025-05-08

## 2025-04-08 RX ORDER — HEPARIN 100 UNIT/ML
500 SYRINGE INTRAVENOUS AS NEEDED
Status: CANCELLED | OUTPATIENT
Start: 2025-04-08

## 2025-04-08 RX ORDER — HEPARIN 100 UNIT/ML
500 SYRINGE INTRAVENOUS AS NEEDED
Status: DISCONTINUED | OUTPATIENT
Start: 2025-04-08 | End: 2025-04-09 | Stop reason: HOSPADM

## 2025-04-08 RX ADMIN — HEPARIN 500 UNITS: 100 SYRINGE at 10:36

## 2025-04-08 RX ADMIN — GEMCITABINE 52 MG: 38 INJECTION, SOLUTION INTRAVENOUS at 10:04

## 2025-04-08 RX ADMIN — DEXAMETHASONE SODIUM PHOSPHATE: 4 INJECTION, SOLUTION INTRA-ARTICULAR; INTRALESIONAL; INTRAMUSCULAR; INTRAVENOUS; SOFT TISSUE at 09:30

## 2025-04-08 ASSESSMENT — ENCOUNTER SYMPTOMS
LOSS OF SENSATION IN FEET: 0
OCCASIONAL FEELINGS OF UNSTEADINESS: 0
DEPRESSION: 0

## 2025-04-08 ASSESSMENT — PAIN SCALES - GENERAL: PAINLEVEL_OUTOF10: 0-NO PAIN

## 2025-04-08 NOTE — PROGRESS NOTES
Pt here for c1d8. Labs and consent reviewed.  Port accessed with +BR. Pt premedicated with zofran/dex. Pt tolerated Gemzar. Tolerated well. Port de-accessed. Left unit.

## 2025-04-08 NOTE — RADIATION TREATMENT MANAGEMENT
Patient ID:   Gladys Woo  1950  [id number]   Diagnosis Plan   1. Malignant neoplasm of overlapping sites of bladder (CMS/[facility])           Referring Physician:   No referring provider defined for this encounter.  Primary Care Provider:  Zoe Sanders MD     Problem List:  Patient Active Problem List    Diagnosis Date Noted    Acute unilateral obstructive uropathy 03/28/2025    Acute cystitis without hematuria 03/26/2025    Sepsis (CMS/[facility]) 03/25/2025    Hypomagnesemia 03/25/2025    Syncope and collapse 03/24/2025    Iron deficiency anemia due to chronic blood loss 03/20/2025    Chronic kidney disease 01/31/2025    Constipation 01/27/2025    Anemia 01/26/2025    Bladder cancer (CMS/HCC) 01/25/2025    Bladder mass 01/25/2025    FIDELIA (acute kidney injury) (CMS/HCC) 01/25/2025    Abnormal finding of diagnostic imaging 01/24/2025    Hemorrhoids 01/24/2025    Recurrent major depressive disorder in partial remission (CMS/HCC) 01/24/2025    SVT (supraventricular tachycardia) (CMS/HCC) 01/24/2025    Suhail hematuria 12/10/2024    Obstructive sleep apnea 11/22/2024    Arrhythmia 10/01/2024    Generalized anxiety disorder with panic attacks 10/01/2024    Hyperlipemia 10/01/2024    Major depressive disorder 10/01/2024    Pacemaker 10/24/2019       Cancer Staging   Bladder cancer (CMS/HCC)  Staging form: Urinary Bladder, AJCC 8th Edition  - Clinical: Stage IIIA (cT3, cN0, cM0) - Signed by [facility], Gregory J, MD on 2/25/2025      TREATMENT PLAN SUMMARY:  Radiation Treatments       Active   Plans   PS_a-partial bladder_GTV [1a_PartBldr]   Most recent treatment: Dose planned: 200 cGy (fraction 8 of 8 on 3/28/2025)   Total: Dose planned: 1,600 cGy   Elapsed Days: 10 days as of 2025-03-28 @ 08:3308      1b_Pelvis   Most recent treatment: Dose planned: 200 cGy (fraction 7 of 25 on 4/8/2025)   Total: Dose planned: 5,000 cGy   Elapsed Days: 21 days as of 2025-04-08 @ 08:3304      Reference Points   1a_Part Bladder   Most  recent treatment: Dose given: 200 cGy (on 3/28/2025)   Total: Dose given: 1,600 cGy   Elapsed Days: 10 days as of 2025-03-28 @ 08:3308      1a_calc   Most recent treatment: Dose given: 201 cGy (on 3/28/2025)   Total: Dose given: 1,608 cGy   Elapsed Days: 10 days as of 2025-03-28 @ 08:3308      1b_Pelvis   Most recent treatment: Dose given: 200 cGy (on 4/8/2025)   Total: Dose given: 1,400 cGy   Elapsed Days: 21 days as of 2025-04-08 @ 08:3304      1b_calc   Most recent treatment: Dose given: 206 cGy (on 4/8/2025)   Total: Dose given: 1,443 cGy   Elapsed Days: 21 days as of 2025-04-08 @ 08:3304      1c_Trgt total   Most recent treatment: Dose given: 200 cGy (on 4/8/2025)   Total: Dose given: 3,000 cGy   Elapsed Days: 21 days as of 2025-04-08 @ 08:3304           Historical   Plans   PS1   Most recent treatment: Dose planned: 200 cGy (fraction 0 of 25 on 3/11/2025)   Total: Dose planned: 5,000 cGy   Elapsed Days: : @ --      PS2   Most recent treatment: Dose planned: 200 cGy (fraction 0 of 25 on 3/11/2025)   Total: Dose planned: 5,000 cGy   Elapsed Days: : @ --      PS_GTV   Most recent treatment: Dose planned: 200 cGy (fraction 0 of 8 on 3/11/2025)   Total: Dose planned: 1,600 cGy   Elapsed Days: : @ --      Reference Points   1a_Part Bladder   Most recent treatment: Course completed on 3/11/2025   Total: Dose given: 0 cGy   Elapsed Days: : @ --      1a_calc   Most recent treatment: Course completed on 3/11/2025   Total: Dose given: 0 cGy   Elapsed Days: : @ --      1b_Pelvis   Most recent treatment: Course completed on 3/11/2025   Total: Dose given: 0 cGy   Elapsed Days: : @ --      1b_calc   Most recent treatment: Course completed on 3/11/2025   Total: Dose given: 0 cGy   Elapsed Days: : @ --                     Subjective    Interval Notes:  Treatment 15 today.  Endorses mild fatigue, napping during the day.  Completes Ceftin today for previous UTI.  Continues AZO cranberry. Baseline urinary frequency unchanged.  No  "urinary pain, burning, bleeding or incontinence.  Bowels without issue.    Vital Signs for this encounter:  BP: 159/67  Heart Rate: 71  SpO2: 98 %  Weight: 79.4 kg (175 lb) (04/08 0837)  Performance Status:  90, Able to carry on normal activity; minor signs or symptoms of disease.  PAIN ASSESSMENT:  Score Zero    Location    Pain Intervention      TOXICITY ASSESSMENT:  General  Fatigue: Fatigue relieved by rest  Gastrointestinal  Gastrointestinal Assessment: Grade 0  Renal and Urinary  Hematuria: Absent or within normal limits  Urinary Frequency: Present  Urinary Incontinence: Absent or within normal limits  Urinary Tract Pain: Absent or within normal limits  Urinary Urgency: Absent or within normal limits  Skin and Subcutaneous Tissue  Skin Assessment: Grade 0  Pain Assessment  Pain Score: 0-No pain          Objective      Physical Exam:  Physical Exam  abd soft nt.  Ks 90    LABS:  CMP   Lab Results   Component Value Date    ALBUMIN 3.0 (L) 04/02/2025    CO2 25 04/07/2025    BUN 26 (H) 04/07/2025    CREATININE 1.6 (H) 04/07/2025    GLUCOSE 130 (H) 04/07/2025    AST 11 (L) 04/02/2025    ALT 34 04/02/2025    EGFR 33.7 (L) 04/07/2025     CBC   Lab Results   Component Value Date    WBC 7.30 04/07/2025    HGB 9.3 (L) 04/07/2025    HCT 29.5 (L) 04/07/2025    MCV 90.5 04/07/2025     04/07/2025     Tumor Marker No results found for: \"\", \"\", \"PSA\", \"\", \"CEA\", \"HCGQUANT\"         Assessment/Plan start macrobid 100mg daily porph to prevent UTI as rec by Dr Escalante ID.  Cont tx as per plan.    Diagnoses and Orders Associated With This Visit:  There are no diagnoses linked to this encounter.                       "

## 2025-04-09 ENCOUNTER — HOSPITAL ENCOUNTER (OUTPATIENT)
Dept: RADIATION ONCOLOGY | Facility: HOSPITAL | Age: 75
Setting detail: RADIATION/ONCOLOGY SERIES
Discharge: HOME | End: 2025-04-09
Attending: RADIOLOGY
Payer: MEDICARE

## 2025-04-09 ENCOUNTER — PATIENT OUTREACH (OUTPATIENT)
Dept: CASE MANAGEMENT | Facility: CLINIC | Age: 75
End: 2025-04-09
Payer: MEDICARE

## 2025-04-09 LAB
ARIA ZRC COURSE ID: NORMAL
ARIA ZRC COURSE START DATE: NORMAL
ARIA ZRC TREATMENT ELAPSED DAYS: NORMAL
ARIA ZRP FRACTIONS TREATED TO DATE: NORMAL
ARIA ZRP PLAN ID: NORMAL
ARIA ZRP PRESCRIBED DOSE CGY: 5000
ARIA ZRP PRESCRIBED DOSE PER FRACTION: 2
ARIA ZRR DOSAGE GIVEN TO DATE: 16
ARIA ZRR DOSAGE GIVEN TO DATE: 16.49
ARIA ZRR DOSAGE GIVEN TO DATE: 32
ARIA ZRR REFERENCE POINT ID: NORMAL
ARIA ZRR SESSION DOSAGE GIVEN: 2
ARIA ZRR SESSION DOSAGE GIVEN: 2
ARIA ZRR SESSION DOSAGE GIVEN: 2.06

## 2025-04-09 PROCEDURE — 77386 HC IMRT DELIVERY COMPLEX: CPT | Performed by: RADIOLOGY

## 2025-04-09 PROCEDURE — 77336 RADIATION PHYSICS CONSULT: CPT | Performed by: RADIOLOGY

## 2025-04-09 NOTE — PROGRESS NOTES
H2H follow-up call.     Spoke with pt briefly today, pt doing okay.  Pt has very busy schedule with radiation and chemotherapy.  Pt has home care nurse visiting at present time.    CM will continue to follow.    Kellie Jiménez RN BSN   [phone]

## 2025-04-10 ENCOUNTER — HOSPITAL ENCOUNTER (OUTPATIENT)
Dept: HEMATOLOGY/ONCOLOGY | Facility: HOSPITAL | Age: 75
Setting detail: INFUSION SERIES
Discharge: HOME | End: 2025-04-10
Attending: HOSPITALIST
Payer: MEDICARE

## 2025-04-10 ENCOUNTER — HOSPITAL ENCOUNTER (OUTPATIENT)
Dept: RADIATION ONCOLOGY | Facility: HOSPITAL | Age: 75
Setting detail: RADIATION/ONCOLOGY SERIES
Discharge: HOME | End: 2025-04-10
Attending: RADIOLOGY
Payer: MEDICARE

## 2025-04-10 DIAGNOSIS — C67.9 MALIGNANT NEOPLASM OF URINARY BLADDER, UNSPECIFIED SITE (CMS/HCC): Primary | ICD-10-CM

## 2025-04-10 DIAGNOSIS — D50.0 IRON DEFICIENCY ANEMIA DUE TO CHRONIC BLOOD LOSS: ICD-10-CM

## 2025-04-10 DIAGNOSIS — C67.8 MALIGNANT NEOPLASM OF OVERLAPPING SITES OF BLADDER (CMS/HCC): ICD-10-CM

## 2025-04-10 LAB
ANION GAP SERPL CALC-SCNC: 5 MEQ/L (ref 3–15)
ARIA ZRC COURSE ID: NORMAL
ARIA ZRC COURSE START DATE: NORMAL
ARIA ZRC TREATMENT ELAPSED DAYS: NORMAL
ARIA ZRP FRACTIONS TREATED TO DATE: NORMAL
ARIA ZRP PLAN ID: NORMAL
ARIA ZRP PRESCRIBED DOSE CGY: 5000
ARIA ZRP PRESCRIBED DOSE PER FRACTION: 2
ARIA ZRR DOSAGE GIVEN TO DATE: 18
ARIA ZRR DOSAGE GIVEN TO DATE: 18.56
ARIA ZRR DOSAGE GIVEN TO DATE: 34
ARIA ZRR REFERENCE POINT ID: NORMAL
ARIA ZRR SESSION DOSAGE GIVEN: 2
ARIA ZRR SESSION DOSAGE GIVEN: 2
ARIA ZRR SESSION DOSAGE GIVEN: 2.06
BASOPHILS # BLD: 0.01 K/UL (ref 0.01–0.1)
BASOPHILS NFR BLD: 0.2 %
BUN SERPL-MCNC: 28 MG/DL (ref 7–25)
CALCIUM SERPL-MCNC: 9.1 MG/DL (ref 8.6–10.3)
CHLORIDE SERPL-SCNC: 107 MEQ/L (ref 98–107)
CO2 SERPL-SCNC: 25 MEQ/L (ref 21–31)
CREAT SERPL-MCNC: 1.5 MG/DL (ref 0.6–1.2)
DIFFERENTIAL METHOD BLD: ABNORMAL
EGFRCR SERPLBLD CKD-EPI 2021: 36.4 ML/MIN/1.73M*2
EOSINOPHIL # BLD: 0.15 K/UL (ref 0.04–0.36)
EOSINOPHIL NFR BLD: 2.8 %
ERYTHROCYTE [DISTWIDTH] IN BLOOD BY AUTOMATED COUNT: 13.5 % (ref 11.7–14.4)
GLUCOSE SERPL-MCNC: 85 MG/DL (ref 70–99)
HCT VFR BLD AUTO: 28 % (ref 35–45)
HGB BLD-MCNC: 9.1 G/DL (ref 11.8–15.7)
IMM GRANULOCYTES # BLD AUTO: 0.03 K/UL (ref 0–0.08)
IMM GRANULOCYTES NFR BLD AUTO: 0.6 %
LYMPHOCYTES # BLD: 0.76 K/UL (ref 1.2–3.5)
LYMPHOCYTES NFR BLD: 14 %
MCH RBC QN AUTO: 29.2 PG (ref 28–33.2)
MCHC RBC AUTO-ENTMCNC: 32.5 G/DL (ref 32.2–35.5)
MCV RBC AUTO: 89.7 FL (ref 83–98)
MONOCYTES # BLD: 0.09 K/UL (ref 0.28–0.8)
MONOCYTES NFR BLD: 1.7 %
NEUTROPHILS # BLD: 4.37 K/UL (ref 1.7–7)
NEUTROPHILS # BLD: 4.37 K/UL (ref 1.7–7)
NEUTS SEG NFR BLD: 80.7 %
NRBC BLD-RTO: 0 %
PLATELET # BLD AUTO: 182 K/UL (ref 150–369)
PMV BLD AUTO: 8.5 FL (ref 9.4–12.3)
POTASSIUM SERPL-SCNC: 4.4 MEQ/L (ref 3.5–5.1)
RBC # BLD AUTO: 3.12 M/UL (ref 3.93–5.22)
SODIUM SERPL-SCNC: 137 MEQ/L (ref 136–145)
WBC # BLD AUTO: 5.41 K/UL (ref 3.8–10.5)

## 2025-04-10 PROCEDURE — 85025 COMPLETE CBC W/AUTO DIFF WBC: CPT | Performed by: HOSPITALIST

## 2025-04-10 PROCEDURE — 36415 COLL VENOUS BLD VENIPUNCTURE: CPT | Performed by: HOSPITALIST

## 2025-04-10 PROCEDURE — 80048 BASIC METABOLIC PNL TOTAL CA: CPT | Performed by: HOSPITALIST

## 2025-04-10 PROCEDURE — 77386 HC IMRT DELIVERY COMPLEX: CPT | Performed by: RADIOLOGY

## 2025-04-10 PROCEDURE — 36591 DRAW BLOOD OFF VENOUS DEVICE: CPT

## 2025-04-10 RX ORDER — HEPARIN 100 UNIT/ML
500 SYRINGE INTRAVENOUS AS NEEDED
Status: DISCONTINUED | OUTPATIENT
Start: 2025-04-10 | End: 2025-04-11 | Stop reason: HOSPADM

## 2025-04-10 RX ORDER — HEPARIN 100 UNIT/ML
500 SYRINGE INTRAVENOUS AS NEEDED
Status: CANCELLED | OUTPATIENT
Start: 2025-04-10

## 2025-04-10 RX ADMIN — HEPARIN 500 UNITS: 100 SYRINGE at 09:13

## 2025-04-11 ENCOUNTER — HOSPITAL ENCOUNTER (OUTPATIENT)
Dept: HEMATOLOGY/ONCOLOGY | Facility: HOSPITAL | Age: 75
Setting detail: INFUSION SERIES
Discharge: HOME | End: 2025-04-11
Attending: HOSPITALIST
Payer: MEDICARE

## 2025-04-11 ENCOUNTER — HOSPITAL ENCOUNTER (OUTPATIENT)
Dept: RADIATION ONCOLOGY | Facility: HOSPITAL | Age: 75
Setting detail: RADIATION/ONCOLOGY SERIES
Discharge: HOME | End: 2025-04-11
Attending: RADIOLOGY
Payer: MEDICARE

## 2025-04-11 ENCOUNTER — OFFICE VISIT (OUTPATIENT)
Dept: HEMATOLOGY/ONCOLOGY | Facility: CLINIC | Age: 75
End: 2025-04-11
Attending: HOSPITALIST
Payer: MEDICARE

## 2025-04-11 VITALS
TEMPERATURE: 96.7 F | DIASTOLIC BLOOD PRESSURE: 60 MMHG | HEIGHT: 67 IN | OXYGEN SATURATION: 100 % | WEIGHT: 175 LBS | HEART RATE: 80 BPM | BODY MASS INDEX: 27.47 KG/M2 | SYSTOLIC BLOOD PRESSURE: 133 MMHG

## 2025-04-11 DIAGNOSIS — C67.9 MALIGNANT NEOPLASM OF URINARY BLADDER, UNSPECIFIED SITE (CMS/HCC): Primary | ICD-10-CM

## 2025-04-11 DIAGNOSIS — C67.8 MALIGNANT NEOPLASM OF OVERLAPPING SITES OF BLADDER (CMS/HCC): ICD-10-CM

## 2025-04-11 DIAGNOSIS — D50.0 IRON DEFICIENCY ANEMIA DUE TO CHRONIC BLOOD LOSS: ICD-10-CM

## 2025-04-11 DIAGNOSIS — N17.9 AKI (ACUTE KIDNEY INJURY) (CMS/HCC): ICD-10-CM

## 2025-04-11 PROBLEM — R53.0 NEOPLASTIC MALIGNANT RELATED FATIGUE: Status: ACTIVE | Noted: 2025-04-11

## 2025-04-11 LAB
ARIA ZRC COURSE ID: NORMAL
ARIA ZRC COURSE START DATE: NORMAL
ARIA ZRC TREATMENT ELAPSED DAYS: NORMAL
ARIA ZRP FRACTIONS TREATED TO DATE: NORMAL
ARIA ZRP PLAN ID: NORMAL
ARIA ZRP PRESCRIBED DOSE CGY: 5000
ARIA ZRP PRESCRIBED DOSE PER FRACTION: 2
ARIA ZRR DOSAGE GIVEN TO DATE: 20
ARIA ZRR DOSAGE GIVEN TO DATE: 20.62
ARIA ZRR DOSAGE GIVEN TO DATE: 36
ARIA ZRR REFERENCE POINT ID: NORMAL
ARIA ZRR SESSION DOSAGE GIVEN: 2
ARIA ZRR SESSION DOSAGE GIVEN: 2
ARIA ZRR SESSION DOSAGE GIVEN: 2.06

## 2025-04-11 PROCEDURE — 77386 HC IMRT DELIVERY COMPLEX: CPT | Performed by: RADIOLOGY

## 2025-04-11 PROCEDURE — 25800000 HC PHARMACY IV SOLUTIONS: Performed by: HOSPITALIST

## 2025-04-11 PROCEDURE — 96367 TX/PROPH/DG ADDL SEQ IV INF: CPT

## 2025-04-11 PROCEDURE — 99214 OFFICE O/P EST MOD 30 MIN: CPT | Performed by: HOSPITALIST

## 2025-04-11 PROCEDURE — 63600000 HC DRUGS/DETAIL CODE: Mod: JZ | Performed by: HOSPITALIST

## 2025-04-11 PROCEDURE — 96413 CHEMO IV INFUSION 1 HR: CPT

## 2025-04-11 RX ORDER — HEPARIN 100 UNIT/ML
500 SYRINGE INTRAVENOUS AS NEEDED
Status: CANCELLED | OUTPATIENT
Start: 2025-04-11

## 2025-04-11 RX ORDER — HEPARIN 100 UNIT/ML
500 SYRINGE INTRAVENOUS AS NEEDED
Status: DISCONTINUED | OUTPATIENT
Start: 2025-04-11 | End: 2025-04-12 | Stop reason: HOSPADM

## 2025-04-11 RX ADMIN — HEPARIN 500 UNITS: 100 SYRINGE at 12:25

## 2025-04-11 RX ADMIN — GEMCITABINE HYDROCHLORIDE 52 MG: 38 INJECTION, SOLUTION INTRAVENOUS at 11:48

## 2025-04-11 RX ADMIN — DEXAMETHASONE SODIUM PHOSPHATE: 4 INJECTION, SOLUTION INTRA-ARTICULAR; INTRALESIONAL; INTRAMUSCULAR; INTRAVENOUS; SOFT TISSUE at 10:37

## 2025-04-11 ASSESSMENT — ENCOUNTER SYMPTOMS
DEPRESSION: 0
SHORTNESS OF BREATH: 1
FATIGUE: 1
OCCASIONAL FEELINGS OF UNSTEADINESS: 0
LOSS OF SENSATION IN FEET: 0

## 2025-04-11 ASSESSMENT — PAIN SCALES - GENERAL: PAINLEVEL_OUTOF10: 0-NO PAIN

## 2025-04-11 NOTE — PROGRESS NOTES
Hematology/Oncology -  [facility]  Follow up Progress Note       Gladys Woo is a 75 y.o. female,   :  1950  REFERRING PHYSICIAN:   Wenceslao Soria MD  [address]  [address]  [place] [address]  PRIMARY CARE PROVIDER:  Zoe Sanders MD    Encounter Diagnoses   Name Primary?    Malignant neoplasm of urinary bladder, unspecified site (CMS/HCC) Yes    Malignant neoplasm of overlapping sites of bladder (CMS/HCC)     Iron deficiency anemia due to chronic blood loss        Care Team    Surgical:   Radiation: [facility]  Med Onc: [facility]    Staging       Cancer Staging   Bladder cancer (CMS/HCC)  Staging form: Urinary Bladder, AJCC 8th Edition  - Clinical: Stage IIIA (cT3, cN0, cM0) - Signed by [facility], Gregory J, MD on 2025        Treatment Plan     Treatment Plans       Name Type Plan Dates Plan Provider         Active    CISplatin with Concurrent Radiation, 7 Day Cycles - bladder cancer ONCOLOGY TREATMENT 3/5/2025 - Present Wenceslao Soria MD                        Oncology History     Oncology History Overview Note   Gladys Woo is a very pleasant 75 y.o. patient with PMHx anxiety and hyperlipidemia.  She noticed some blood in her underwear since 2024.  She was initially treated for vagina dystrophy with no success. She was examined and no evidence of GYN tumor and eventually noted to have bleeding from urethra.  Patient seen by Dr. Jimenez who ordered a CT urogram which revealed a 8.6 cm irregular urinary bladder mass centered along the right posterior lateral bladder wall.  There is stranding in the right extraperitoneal space and right retroperitoneum along the course of the distalmost right ureter which may be from obstructive physiology without extension of neoplasm beyond the urinary bladder is theoretically possible.  Moderate right hydronephrosis to the level of the right UVJ's secondary to mass.  In addition an intermediate left adrenal nodule was  noted for which MRI was suggested.  Scattered subcentimeter sclerotic foci nonspecific but likely bony islands.  Bone scan suggested to rule out metastatic disease.  Patient presented last month to [facility] emergency department and CAT scan of the abdomen and pelvis revealed a large bladder mass with moderate right hydronephrosis and hydroureter.  CT angiogram chest abdomen pelvis showed no evidence of metastatic disease.  Creatinine at the time was elevated 1.7.  On 1/30/2025 Dr. Jimenez performed TURBT.  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  Suspicious for lymphovascular invasion.  Muscle invasion present.  Patient has not noted further bleeding per urethra. Patient met with Dr. Corona medical oncology to discuss neoadjuvant chemotherapy prior to cystectomy.  Patient wants to have bladder preserving strategy.    She went to [place] and saw Dr. Patel with recommendation of neoadjuvant chemotherapy, followed up by surgery.    She is aware of her options but still want to try chemoradiation at this point.     Bladder cancer (CMS/[facility])   2/25/2025 Cancer Staged    Staging form: Urinary Bladder, AJCC 8th Edition  - Clinical: Stage IIIA (cT3, cN0, cM0)     3/6/2025 -  Chemotherapy    GENERAL INFUSION ORDERS FOR HYDRATION  Plan Provider: Wenceslao Soria MD     3/18/2025 -  Chemotherapy    MEDIPORT FLUSH (SINGLE LUMEN) - PATIENT ON TREATMENT  Plan Provider: Wenceslao Soria MD     3/19/2025 - 3/19/2025 Chemotherapy    CISplatin with Concurrent Radiation, 7 Day Cycles - bladder cancer  Plan Provider: Wenceslao Soria MD  Treatment goal: Curative  Line of treatment: First Line     4/1/2025 -  Chemotherapy    Gemcitabine, twice a week with radiation therapy - Bladder  Plan Provider: Wenceslao Soria MD  Treatment goal: Curative  Line of treatment: First Line           History Of Present Illness     Gladys Woo is a very pleasant 75 y.o. patient  "with PMHx of  Bladder cancer, stage III, T3N0M0, who is here for concurrent chemoradiation therapy.    She is here alone.  She has fatigue.  Other than that she is feeling well with the current treatment. We reviewed her labs together.  She feels blood transfusion and IV iron are very helpful.      Gladys Woo is seen today for follow up.       ROS     Review of Systems   Constitutional:  Positive for fatigue.   Respiratory:  Positive for shortness of breath.    All other systems reviewed and are negative.    Review of Systems:  Nursing assessment reviewed. Pertinent positive and negative symptoms noted in HPI, all others negative.    PHYSICAL EXAMINATION     Temp:  [35.9 °C (96.7 °F)] 35.9 °C (96.7 °F)  Heart Rate:  [80] 80  BP: (133)/(60) 133/60  Visit Vitals  /60 (BP Location: Right upper arm, Patient Position: Sitting)   Pulse 80   Temp (!) 35.9 °C (96.7 °F) (Temporal)   Ht 1.702 m (5' 7\")   Wt 79.4 kg (175 lb)   SpO2 100%   BMI 27.41 kg/m²     Physical Exam  Vitals reviewed.   Constitutional:       Appearance: She is not ill-appearing.   Cardiovascular:      Rate and Rhythm: Normal rate and regular rhythm.      Heart sounds: No murmur heard.  Pulmonary:      Effort: Pulmonary effort is normal. No respiratory distress.      Breath sounds: Normal breath sounds. No wheezing.   Abdominal:      Palpations: Abdomen is soft.   Musculoskeletal:         General: No swelling.   Skin:     Coloration: Skin is not jaundiced or pale.   Neurological:      General: No focal deficit present.      Mental Status: She is alert.   Psychiatric:         Mood and Affect: Mood normal.           Past Medical History     Past Medical History[1]    Past Surgical History     Past Surgical History   Procedure Laterality Date    A-v cardiac pacemaker insertion      Colonoscopy      CYSTO, TURBT TRANS-URETHRAL RESECTION BLADDER TUMOR N/A 1/30/2025    Performed by Eugenio Jimenez MD at  OR    Insert / replace / remove " pacemaker      Knee arthroscopy w/ meniscal repair      Tonsillectomy         OB/GYN History     OB History    Para Term  AB Living   3 1   2    SAB IAB Ectopic Multiple Live Births   2          # Outcome Date GA Lbr Franck/2nd Weight Sex Type Anes PTL Lv   3 SAB            2 SAB            1 Para              Gynecologic History:  Age at menarche: No age on file  Age at first live birth: No age on file   Age at menopause: No age on file    Social History     Social History     Tobacco Use    Smoking status: Former     Types: Cigarettes    Smokeless tobacco: Never   Substance Use Topics    Alcohol use: Yes     Alcohol/week: 2.0 standard drinks of alcohol     Types: 2 Glasses of wine per week     Comment: socially    Drug use: Never       Family History     Family History   Problem Relation Name Age of Onset    Heart disease Biological Mother      Diabetes Biological Mother      Heart disease Biological Father      Coronary artery disease Biological Father      Liver cancer Biological Brother      Prostate cancer Biological Brother         Allergies     Amoxicillin    Medications     Current Outpatient Medications   Medication Sig Dispense Refill    acetaminophen (TYLENOL) 500 mg tablet Take 500 mg by mouth every 6 (six) hours as needed for pain.      busPIRone (BUSPAR) 5 mg tablet Take 5 mg by mouth daily.      cranberry conc-C-bacillus coag (AZO CRANBERRY + PROBIOTIC) 250-30-15 mg tablet Take 1 tablet by mouth nightly.      nitrofurantoin, macrocrystal-monohydrate, (MACROBID) 100 mg capsule Take 1 capsule (100 mg total) by mouth once daily. 30 capsule 0    ondansetron (ZOFRAN) 8 mg tablet Take 1 tablet (8 mg total) by mouth every 8 (eight) hours as needed for nausea or vomiting. 30 tablet 3    prochlorperazine (COMPAZINE) 10 mg tablet Take 1 tablet (10 mg total) by mouth every 6 (six) hours as needed for nausea or vomiting. 30 tablet 3    rosuvastatin (CRESTOR) 40 mg tablet Take 40 mg by mouth daily.    "    No current facility-administered medications for this visit.          Labs     Lab Results   Component Value Date    WBC 5.41 04/10/2025    HGB 9.1 (L) 04/10/2025    HCT 28.0 (L) 04/10/2025    MCV 89.7 04/10/2025     04/10/2025       Lab Results   Component Value Date    GLUCOSE 85 04/10/2025    CALCIUM 9.1 04/10/2025     04/10/2025    K 4.4 04/10/2025    CO2 25 04/10/2025     04/10/2025    BUN 28 (H) 04/10/2025    CREATININE 1.5 (H) 04/10/2025       Lab Results   Component Value Date    ALT 34 04/02/2025    AST 11 (L) 04/02/2025    ALKPHOS 78 04/02/2025    BILITOT 0.2 (L) 04/02/2025       Lab Results   Component Value Date    IRON <10 (L) 03/19/2025    TIBC 231 (L) 03/19/2025    FERRITIN 313 (H) 03/19/2025       No results found for: \"SPEP\", \"UPEP\"    No results found for: \"PTT\"    Lab Results   Component Value Date    INR 1.1 03/13/2025       No results found for: \"IZUHJHOG83\"    No results found for: \"FOLATE\"    No results found for: \"RETICCTPCT\"    Lab Results   Component Value Date    DDIMER 1.16 (H) 01/24/2025         Pathology     Pathology Results       ** No results found for the last 720 hours. **            Radiology     No new Radiology.  IR PORT PLACEMENT  IR PORT PLACEMENT, ULTRASOUND GUIDED VASCULAR ACCESS  Result Date: 3/13/2025  Narrative: CLINICAL HISTORY:    Bladder cancer     Impression: IMPRESSION: Successful placement of a right IJ CT Injectable chest port with ultrasound and fluoroscopic guidance, which is ready for use. Device: [employer] PowerPort COMMENT: PROCEDURE:  Right IJ chest port placement, under ultrasound and fluoroscopic guidance. RADIOLOGIST:  Quang Torres MD ANESTHESIA : Lidocaine 1% CONTRAST:  None. MEDICATIONS: Vancomycin 1.25 gm IV REFERENCE AIR KERMA: 14 mGy Versed 1 mg IV and Fentanyl 100 mcg IV were administered intravenously for moderate sedation, under the supervision of the physician.  Pulse oximetry, heart rate and blood pressure were continuously " monitored by the trained nursing staff present.  The physician spent a total of 30 minutes of face to face sedation time with the patient. All the procedural conscious sedation guidelines were followed and documented including the Mallampati airway assessment, confirmation of NPO status, anesthesia history and  ASA classification.. DESCRIPTION OF PROCEDURE:  Written informed consent was obtained following explanation of risks and benefits of the procedure. The right neck and chest were draped and prepped in the usual sterile manner. The right internal jugular vein was noted to be compressible and patent on gray scale ultrasound. Under real-time grayscale ultrasound, the internal jugular vein was cannulated with a 21-gauge micropuncture needle following local anesthesia and a skin nick. An ultrasound-guided access image was saved to PACS. An 0.018 wire was advanced. The needle was exchanged for a 5 Romansh micropuncture catheter. The inner catheter and wire were removed, and an 0.035 Amplatz wire was advanced under fluoroscopic guidance into the IVC and secured with a flow-switch. An appropriate port site was selected in the upper chest and the area was anesthetized with Lidocaine. A 3 centimeter horizontal incision was created and blunt dissection was performed superiorly to create a port pocket. Hemostasis was achieved. A subcutaneous tunnel was then created to the IJ entry site following local anesthesia, with blunt dissection. An 8F [employer] PowerPort catheter was advanced through the tunnel. The port was tested demonstrating no leakage. A peel-away sheath was advanced over the Amplatz wire, and the catheter was advanced through the peel-away sheath and positioned within the SVC/right atrial junction. The peel-away sheath was removed. The port catheter was appropriately sized and connected to the port. The port was successfully positioned within the pocket with the distal catheter tip terminating at the cavoatrial  junction. The port was tested demonstrating appropriate flush and aspiration. The port was then flushed with Heparin following good blood return and was subsequently deaccessed. . Tissues superficial to the port were closed with interrupted deep 2-0 Vicryl suture and running subcutaneous 4-0 Vicryl suture. Skin closure at the internal jugular vein access site was achieved with Dermabond adhesive. Dermabond was applied to the pocket incision site. The patient was discharged in good condition. Performance Met:  PQRS MEASURE 76, CPT II 6030F:  All elements of maximal sterile technique were utilized, including cap, mask, sterile gown, sterile gloves, and sterile drape.  Additionally, sterile ultrasound techniques were followed, including use of sterile probe cover and sterile ultrasound gel. Ultrasound-guided vascular access - Grayscale ultrasound evaluation demonstrated the right  internal jugular vein to be patent. The right internal jugular vein was accessed under real-time ultrasound access. An access image was saved/archived to PACS. The number of guidewires used, and their integrity, was confirmed at the end of the procedure.      Assessment and Plan     In summary, Gladys Woo is a very pleasant 75 y.o. patient with PMHx of anxiety and dyslipidemia.     # Bladder cancer, stage III, T3N0M0. 1/30/3025 she had Transurethral resection of bladder tumor (8cm). OP note:  Cystoscopic evaluation demonstrated a large and invasive appearing tumor encompassing the entirety of the trigone and left lateral wall measuring at least 8 cm.  A monopolar working element was inserted and the tumor was then  systematically resected down to the level of the normal bladder wall.  There did appear to be a large volume of bladder wall invasion visually.  The bladder tumor specimens were then irrigated from the bladder and repeat cystoscopy demonstrated no evidence of hannah perforation.  - need to complete workup with MRI to  better evaluate the left adrenal gland lesion.  Because she has no bone pain we can skip nuclear medicine bone scan at this point.  - per current NCCN guideline, optimal candidates for bladder preservation with chemoradiotherapy include patients with tumors that present without moderate/severe hydronephrosis, are without concurrent extensive or multifocal Tis, and are <6 cm. Ideally, tumors should allow a visually complete or maximally debulking TURBT. Apparently she has no other lesions based on operating note.  Her creatinine was improving.  Based on operation note her cancer is 8 cm, bigger than the 6 cm recommendation.  So I did have a discussion of neoadjuvant chemotherapy followed by radical cystectomy.  She is not interested in radical cystectomy.  Indeed Dr. Corona and Dr. Patel talked to her about this too.    - For patients receiving concurrent chemoradiation, the optimal radiosensitizing chemotherapy regimen is not established, as many of these regimens have not been directly compared in randomized trials. Selection of chemotherapy is based upon patient performance status and comorbidities, kidney function, and provider experience.   - With her overall fit status and Cr 1.2, we started c1 with cisplatin 20mg/m2 but she had FIDELIA and syncope. As such, we changed chemo to low-dose biweekly gemcitabine (27 mg/m2 twice per week with daily RT) and so far she is tolerating it well.   Citation: Bladder Preservation With Twice-a-Day Radiation Plus Fluorouracil/Cisplatin or Once Daily Radiation Plus Gemcitabine for Muscle-Invasive Bladder Cancer: NRG/RTOG 0712-A Randomized Phase II Trial. JCO 2018.     #Normocytic anemia.  - s/p IV Monoferric  - received PRBC to keep HGB >8.  - HGB 9.1 and she is feeling well.     # Fatigue: due to chemo and anemia  - Encourage hydration, exercise as tolerated.    # UTI:   [facility]. [facility] will give her Abx during chemorad.   - Keegan asked for repeat urine culture at [facility]. This can be  done as needed.     04/11/25  - ok for chemo twice a week  - MD visit in 1 week  - LAB: CBC/BMP twice a week          No problem-specific Assessment & Plan notes found for this encounter.      Wenceslao Soria MD/PhD  Hematology/Oncology         [1]   Past Medical History:  Diagnosis Date    Bladder cancer (CMS/HCC)     Coronary artery disease     KERRI (generalized anxiety disorder)     TREVOR (obstructive sleep apnea)

## 2025-04-11 NOTE — PROGRESS NOTES
Pt here for D11C1. Labs and consent reviewed.  Port accessed with +BR/flush, pt good to go per Dr. Soria note. Pt premedicated with zofran/dex. Pt tolerated Gemzar. Tolerated well. Port de-accessed. AVS declined, pt left ambulatory.

## 2025-04-14 ENCOUNTER — TELEPHONE (OUTPATIENT)
Dept: HEMATOLOGY/ONCOLOGY | Facility: CLINIC | Age: 75
End: 2025-04-14
Payer: MEDICARE

## 2025-04-14 ENCOUNTER — HOSPITAL ENCOUNTER (OUTPATIENT)
Dept: HEMATOLOGY/ONCOLOGY | Facility: HOSPITAL | Age: 75
Setting detail: INFUSION SERIES
Discharge: HOME | End: 2025-04-14
Attending: HOSPITALIST
Payer: MEDICARE

## 2025-04-14 ENCOUNTER — HOSPITAL ENCOUNTER (OUTPATIENT)
Dept: RADIATION ONCOLOGY | Facility: HOSPITAL | Age: 75
Setting detail: RADIATION/ONCOLOGY SERIES
Discharge: HOME | End: 2025-04-14
Attending: RADIOLOGY
Payer: MEDICARE

## 2025-04-14 DIAGNOSIS — D50.0 IRON DEFICIENCY ANEMIA DUE TO CHRONIC BLOOD LOSS: ICD-10-CM

## 2025-04-14 DIAGNOSIS — C67.8 MALIGNANT NEOPLASM OF OVERLAPPING SITES OF BLADDER (CMS/HCC): ICD-10-CM

## 2025-04-14 DIAGNOSIS — C67.9 MALIGNANT NEOPLASM OF URINARY BLADDER, UNSPECIFIED SITE (CMS/HCC): Primary | ICD-10-CM

## 2025-04-14 LAB
ANION GAP SERPL CALC-SCNC: 8 MEQ/L (ref 3–15)
ARIA ZRC COURSE ID: NORMAL
ARIA ZRC COURSE START DATE: NORMAL
ARIA ZRC TREATMENT ELAPSED DAYS: NORMAL
ARIA ZRP FRACTIONS TREATED TO DATE: NORMAL
ARIA ZRP PLAN ID: NORMAL
ARIA ZRP PRESCRIBED DOSE CGY: 5000
ARIA ZRP PRESCRIBED DOSE PER FRACTION: 2
ARIA ZRR DOSAGE GIVEN TO DATE: 22
ARIA ZRR DOSAGE GIVEN TO DATE: 22.68
ARIA ZRR DOSAGE GIVEN TO DATE: 38
ARIA ZRR REFERENCE POINT ID: NORMAL
ARIA ZRR SESSION DOSAGE GIVEN: 2
ARIA ZRR SESSION DOSAGE GIVEN: 2
ARIA ZRR SESSION DOSAGE GIVEN: 2.06
BASOPHILS # BLD: 0.01 K/UL (ref 0.01–0.1)
BASOPHILS NFR BLD: 0.2 %
BUN SERPL-MCNC: 23 MG/DL (ref 7–25)
CALCIUM SERPL-MCNC: 9.4 MG/DL (ref 8.6–10.3)
CHLORIDE SERPL-SCNC: 106 MEQ/L (ref 98–107)
CO2 SERPL-SCNC: 23 MEQ/L (ref 21–31)
CREAT SERPL-MCNC: 1.7 MG/DL (ref 0.6–1.2)
DIFFERENTIAL METHOD BLD: ABNORMAL
EGFRCR SERPLBLD CKD-EPI 2021: 31.3 ML/MIN/1.73M*2
EOSINOPHIL # BLD: 0.32 K/UL (ref 0.04–0.36)
EOSINOPHIL NFR BLD: 5.6 %
ERYTHROCYTE [DISTWIDTH] IN BLOOD BY AUTOMATED COUNT: 13.4 % (ref 11.7–14.4)
GLUCOSE SERPL-MCNC: 140 MG/DL (ref 70–99)
HCT VFR BLD AUTO: 29.6 % (ref 35–45)
HGB BLD-MCNC: 9.4 G/DL (ref 11.8–15.7)
IMM GRANULOCYTES # BLD AUTO: 0.03 K/UL (ref 0–0.08)
IMM GRANULOCYTES NFR BLD AUTO: 0.5 %
LYMPHOCYTES # BLD: 0.48 K/UL (ref 1.2–3.5)
LYMPHOCYTES NFR BLD: 8.4 %
MCH RBC QN AUTO: 28.4 PG (ref 28–33.2)
MCHC RBC AUTO-ENTMCNC: 31.8 G/DL (ref 32.2–35.5)
MCV RBC AUTO: 89.4 FL (ref 83–98)
MONOCYTES # BLD: 0.05 K/UL (ref 0.28–0.8)
MONOCYTES NFR BLD: 0.9 %
NEUTROPHILS # BLD: 4.84 K/UL (ref 1.7–7)
NEUTROPHILS # BLD: 4.84 K/UL (ref 1.7–7)
NEUTS SEG NFR BLD: 84.4 %
NRBC BLD-RTO: 0 %
PLATELET # BLD AUTO: 158 K/UL (ref 150–369)
PMV BLD AUTO: 9.1 FL (ref 9.4–12.3)
POTASSIUM SERPL-SCNC: 4 MEQ/L (ref 3.5–5.1)
RBC # BLD AUTO: 3.31 M/UL (ref 3.93–5.22)
SODIUM SERPL-SCNC: 137 MEQ/L (ref 136–145)
WBC # BLD AUTO: 5.73 K/UL (ref 3.8–10.5)

## 2025-04-14 PROCEDURE — 36591 DRAW BLOOD OFF VENOUS DEVICE: CPT

## 2025-04-14 PROCEDURE — 85025 COMPLETE CBC W/AUTO DIFF WBC: CPT | Performed by: HOSPITALIST

## 2025-04-14 PROCEDURE — 36415 COLL VENOUS BLD VENIPUNCTURE: CPT | Performed by: HOSPITALIST

## 2025-04-14 PROCEDURE — 80048 BASIC METABOLIC PNL TOTAL CA: CPT | Performed by: HOSPITALIST

## 2025-04-14 PROCEDURE — 77386 HC IMRT DELIVERY COMPLEX: CPT | Performed by: RADIOLOGY

## 2025-04-14 RX ORDER — HYDROCORTISONE 1 %
1 CREAM (GRAM) TOPICAL 2 TIMES DAILY
Qty: 30 G | Refills: 0 | Status: SHIPPED | OUTPATIENT
Start: 2025-04-14 | End: 2026-04-14

## 2025-04-14 RX ORDER — HEPARIN 100 UNIT/ML
500 SYRINGE INTRAVENOUS AS NEEDED
Status: DISCONTINUED | OUTPATIENT
Start: 2025-04-14 | End: 2025-04-15 | Stop reason: HOSPADM

## 2025-04-14 RX ORDER — HEPARIN 100 UNIT/ML
500 SYRINGE INTRAVENOUS AS NEEDED
Status: CANCELLED | OUTPATIENT
Start: 2025-04-14

## 2025-04-14 RX ADMIN — HEPARIN 500 UNITS: 100 SYRINGE at 08:55

## 2025-04-14 NOTE — TELEPHONE ENCOUNTER
Called pt to update on rec for benadryl in addition to hydrocortisone cream per MyChart message from Dr. Soria, pt verbalized understanding

## 2025-04-14 NOTE — TELEPHONE ENCOUNTER
"Received call from pt with complaints of new rash on L chest and abdomen, per pt looks like a \"speckled hen rash\" with small red bumps that are mildly itchy but not painful.  She does not know how to send a picture on mychart, but pt will be with family later this afternoon who can help her.  Has not had any new foods/meds/detergents.  Is wondering what Dr. Soria recommends for rash, and hoping it does not delay her treatment tomorrow.   "

## 2025-04-14 NOTE — PROGRESS NOTES
Patient arrived for labs. Port accessed. Labs collected. Port de-accessed. Labs sent to lab. Pt with new rash on her chest and abdomen. Made patient aware she needs to call triage and send a photo through my chart if possible. Provided patient with triage number. Patient left unit.

## 2025-04-15 ENCOUNTER — RAD ONC OTV (OUTPATIENT)
Dept: RADIATION ONCOLOGY | Facility: HOSPITAL | Age: 75
End: 2025-04-15
Payer: MEDICARE

## 2025-04-15 ENCOUNTER — HOSPITAL ENCOUNTER (OUTPATIENT)
Dept: RADIATION ONCOLOGY | Facility: HOSPITAL | Age: 75
Setting detail: RADIATION/ONCOLOGY SERIES
Discharge: HOME | End: 2025-04-15
Attending: RADIOLOGY
Payer: MEDICARE

## 2025-04-15 ENCOUNTER — HOSPITAL ENCOUNTER (OUTPATIENT)
Dept: HEMATOLOGY/ONCOLOGY | Facility: HOSPITAL | Age: 75
Setting detail: INFUSION SERIES
Discharge: HOME | End: 2025-04-15
Attending: HOSPITALIST
Payer: MEDICARE

## 2025-04-15 VITALS
BODY MASS INDEX: 26.22 KG/M2 | DIASTOLIC BLOOD PRESSURE: 67 MMHG | WEIGHT: 167.4 LBS | SYSTOLIC BLOOD PRESSURE: 145 MMHG | OXYGEN SATURATION: 99 % | HEART RATE: 88 BPM

## 2025-04-15 DIAGNOSIS — C67.9 MALIGNANT NEOPLASM OF URINARY BLADDER, UNSPECIFIED SITE (CMS/HCC): Primary | ICD-10-CM

## 2025-04-15 DIAGNOSIS — C67.8 MALIGNANT NEOPLASM OF OVERLAPPING SITES OF BLADDER (CMS/HCC): ICD-10-CM

## 2025-04-15 DIAGNOSIS — D50.0 IRON DEFICIENCY ANEMIA DUE TO CHRONIC BLOOD LOSS: ICD-10-CM

## 2025-04-15 DIAGNOSIS — C67.8 MALIGNANT NEOPLASM OF OVERLAPPING SITES OF BLADDER (CMS/HCC): Primary | ICD-10-CM

## 2025-04-15 LAB
ARIA ZRC COURSE ID: NORMAL
ARIA ZRC COURSE START DATE: NORMAL
ARIA ZRC TREATMENT ELAPSED DAYS: NORMAL
ARIA ZRP FRACTIONS TREATED TO DATE: NORMAL
ARIA ZRP PLAN ID: NORMAL
ARIA ZRP PRESCRIBED DOSE CGY: 5000
ARIA ZRP PRESCRIBED DOSE PER FRACTION: 2
ARIA ZRR DOSAGE GIVEN TO DATE: 24
ARIA ZRR DOSAGE GIVEN TO DATE: 24.74
ARIA ZRR DOSAGE GIVEN TO DATE: 40
ARIA ZRR REFERENCE POINT ID: NORMAL
ARIA ZRR SESSION DOSAGE GIVEN: 2
ARIA ZRR SESSION DOSAGE GIVEN: 2
ARIA ZRR SESSION DOSAGE GIVEN: 2.06

## 2025-04-15 PROCEDURE — 25800000 HC PHARMACY IV SOLUTIONS: Performed by: HOSPITALIST

## 2025-04-15 PROCEDURE — 77386 HC IMRT DELIVERY COMPLEX: CPT | Performed by: RADIOLOGY

## 2025-04-15 PROCEDURE — 96367 TX/PROPH/DG ADDL SEQ IV INF: CPT

## 2025-04-15 PROCEDURE — 63600000 HC DRUGS/DETAIL CODE: Performed by: HOSPITALIST

## 2025-04-15 PROCEDURE — 96413 CHEMO IV INFUSION 1 HR: CPT

## 2025-04-15 RX ORDER — DIPHENHYDRAMINE HCL 25 MG
25 CAPSULE ORAL EVERY 6 HOURS PRN
COMMUNITY

## 2025-04-15 RX ORDER — HEPARIN 100 UNIT/ML
500 SYRINGE INTRAVENOUS AS NEEDED
Status: DISCONTINUED | OUTPATIENT
Start: 2025-04-15 | End: 2025-04-16 | Stop reason: HOSPADM

## 2025-04-15 RX ORDER — HEPARIN 100 UNIT/ML
500 SYRINGE INTRAVENOUS AS NEEDED
Status: CANCELLED | OUTPATIENT
Start: 2025-04-15

## 2025-04-15 RX ADMIN — DEXAMETHASONE SODIUM PHOSPHATE: 4 INJECTION, SOLUTION INTRA-ARTICULAR; INTRALESIONAL; INTRAMUSCULAR; INTRAVENOUS; SOFT TISSUE at 10:07

## 2025-04-15 RX ADMIN — HEPARIN 500 UNITS: 100 SYRINGE at 12:17

## 2025-04-15 RX ADMIN — GEMCITABINE 51 MG: 38 INJECTION, SOLUTION INTRAVENOUS at 10:52

## 2025-04-15 ASSESSMENT — ENCOUNTER SYMPTOMS
OCCASIONAL FEELINGS OF UNSTEADINESS: 0
DEPRESSION: 0
LOSS OF SENSATION IN FEET: 0

## 2025-04-15 ASSESSMENT — PAIN SCALES - GENERAL: PAINLEVEL_OUTOF10: 0-NO PAIN

## 2025-04-15 NOTE — RADIATION TREATMENT MANAGEMENT
Patient ID:   Gladys Woo  1950  [id number]   Diagnosis Plan   1. Malignant neoplasm of overlapping sites of bladder (CMS/[facility])             Primary Care Provider:  Zoe Sanders MD     Problem List:  Patient Active Problem List    Diagnosis Date Noted    Neoplastic malignant related fatigue 04/11/2025    Acute unilateral obstructive uropathy 03/28/2025    Acute cystitis without hematuria 03/26/2025    Sepsis (CMS/[facility]) 03/25/2025    Hypomagnesemia 03/25/2025    Syncope and collapse 03/24/2025    Iron deficiency anemia due to chronic blood loss 03/20/2025    Chronic kidney disease 01/31/2025    Constipation 01/27/2025    Anemia 01/26/2025    Bladder cancer (CMS/HCC) 01/25/2025    Bladder mass 01/25/2025    FIDELIA (acute kidney injury) (CMS/HCC) 01/25/2025    Abnormal finding of diagnostic imaging 01/24/2025    Hemorrhoids 01/24/2025    Recurrent major depressive disorder in partial remission (CMS/HCC) 01/24/2025    SVT (supraventricular tachycardia) (CMS/HCC) 01/24/2025    Suhail hematuria 12/10/2024    Obstructive sleep apnea 11/22/2024    Arrhythmia 10/01/2024    Generalized anxiety disorder with panic attacks 10/01/2024    Hyperlipemia 10/01/2024    Major depressive disorder 10/01/2024    Pacemaker 10/24/2019        Cancer Staging   Bladder cancer (CMS/HCC)  Staging form: Urinary Bladder, AJCC 8th Edition  - Clinical: Stage IIIA (cT3, cN0, cM0) - Signed by Ochsner, Gregory J, MD on 2/25/2025      TREATMENT PLAN SUMMARY:  Radiation Treatments       Active   Plans   PS_a-partial bladder_GTV [1a_PartBldr]   Most recent treatment: Dose planned: 200 cGy (fraction 8 of 8 on 3/28/2025)   Total: Dose planned: 1,600 cGy   Elapsed Days: 10 days as of 2025-03-28 @ 08:3308      1b_Pelvis   Most recent treatment: Dose planned: 200 cGy (fraction 12 of 25 on 4/15/2025)   Total: Dose planned: 5,000 cGy   Elapsed Days: 28 days as of 2025-04-15 @ 08:4541      Reference Points   1a_Part Bladder   Most recent treatment:  Dose given: 200 cGy (on 3/28/2025)   Total: Dose given: 1,600 cGy   Elapsed Days: 10 days as of 2025-03-28 @ 08:3308      1a_calc   Most recent treatment: Dose given: 201 cGy (on 3/28/2025)   Total: Dose given: 1,608 cGy   Elapsed Days: 10 days as of 2025-03-28 @ 08:3308      1b_Pelvis   Most recent treatment: Dose given: 200 cGy (on 4/15/2025)   Total: Dose given: 2,400 cGy   Elapsed Days: 28 days as of 2025-04-15 @ 08:4541      1b_calc   Most recent treatment: Dose given: 206 cGy (on 4/15/2025)   Total: Dose given: 2,474 cGy   Elapsed Days: 28 days as of 2025-04-15 @ 08:4541      1c_Trgt total   Most recent treatment: Dose given: 200 cGy (on 4/15/2025)   Total: Dose given: 4,000 cGy   Elapsed Days: 28 days as of 2025-04-15 @ 08:4541           Historical   Plans   PS1   Most recent treatment: Dose planned: 200 cGy (fraction 0 of 25 on 3/11/2025)   Total: Dose planned: 5,000 cGy   Elapsed Days: : @ --      PS2   Most recent treatment: Dose planned: 200 cGy (fraction 0 of 25 on 3/11/2025)   Total: Dose planned: 5,000 cGy   Elapsed Days: : @ --      PS_GTV   Most recent treatment: Dose planned: 200 cGy (fraction 0 of 8 on 3/11/2025)   Total: Dose planned: 1,600 cGy   Elapsed Days: : @ --      Reference Points   1a_Part Bladder   Most recent treatment: Course completed on 3/11/2025   Total: Dose given: 0 cGy   Elapsed Days: : @ --      1a_calc   Most recent treatment: Course completed on 3/11/2025   Total: Dose given: 0 cGy   Elapsed Days: : @ --      1b_Pelvis   Most recent treatment: Course completed on 3/11/2025   Total: Dose given: 0 cGy   Elapsed Days: : @ --      1b_calc   Most recent treatment: Course completed on 3/11/2025   Total: Dose given: 0 cGy   Elapsed Days: : @ --                     Subjective patient presents complaining of rash over chest and trunk.  Apparently rash from gentamicin medical oncologist treating with topical creams steroid and Benadryl.  Patient notes urine frequency and urgency denies  "hematuria.  Takes Macrobid 100 mg a day for prevention and urinary tract infection.    Vital Signs for this encounter:  BP: 145/67  Heart Rate: 88  SpO2: 99 %  Weight: 75.9 kg (167 lb 6.4 oz) (04/15 0848)    PAIN ASSESSMENT:  Score Zero    Location    Pain Intervention      TOXICITY ASSESSMENT:  General  Fatigue: Fatigue relieved by rest  Anorexia: Loss of appetite without alteration in eating habits  Anxiety: Mild symptoms OR intervention not indicated  Gastrointestinal  Gastrointestinal Assessment: Grade 0  Renal and Urinary  Renal and Urinary: Grade 0  Hematuria: Absent or within normal limits  Urinary Frequency: Absent or within normal limits  Urinary Urgency: Absent or within normal limits  Skin and Subcutaneous Tissue  Pruritus: Mild or localized OR topical intervention indicated (from gemcitebine)  Rash Acneiform: Papules and/or pustules covering less than 10% BSA, which may or may not be associated with symptoms of pruritus or tenderness (from gemcitebine)  Pain Assessment  Pain Score: 0-No pain          Objective      Physical Exam:  Physical Exam erythematous maculopapular rash to trunk extremities consistent with allergic reaction to medications.  Exam is otherwise unremarkable.  Abdomen soft nontender.    LABS:  CMP   Lab Results   Component Value Date    ALBUMIN 3.0 (L) 04/02/2025    CO2 23 04/14/2025    BUN 23 04/14/2025    CREATININE 1.7 (H) 04/14/2025    GLUCOSE 140 (H) 04/14/2025    AST 11 (L) 04/02/2025    ALT 34 04/02/2025    EGFR 31.3 (L) 04/14/2025     CBC   Lab Results   Component Value Date    WBC 5.73 04/14/2025    HGB 9.4 (L) 04/14/2025    HCT 29.6 (L) 04/14/2025    MCV 89.4 04/14/2025     04/14/2025     Tumor Marker No results found for: \"\", \"\", \"PSA\", \"\", \"CEA\", \"HCGQUANT\"         Assessment/Plan continue definitive radiation therapy with chemo for clinical stage IIIa bladder cancer.  Patient scheduled for chemo concurrent today and they are aware gentamicin reaction. "  Tolerating treatment well so far.  Urinary symptoms should improve after radiation therapy.

## 2025-04-15 NOTE — PROGRESS NOTES
Pt arrived for D15 C1 of Gemzar. See encounter on 4/15/2025 about rash. Port accessed per protocol. Positive blood return. No issues noted. Pre med given was Zofran/Decadron.   Pt had about 30cc of Gemzar left when RN noted Texium cap dripping. Medication stopped and returned to pharmacy. Discussed with pharmacy and MD options for finishing Gemzar dose. Pharmacy noted 30cc was 10% of remaining dose. Pt made aware and pt opted to leave without receiving remaninig dose.  Port flushed and de accessed. Skin care for rash was reinforced. AVS declined. Scheduled out appropriately .

## 2025-04-16 ENCOUNTER — HOSPITAL ENCOUNTER (OUTPATIENT)
Dept: RADIATION ONCOLOGY | Facility: HOSPITAL | Age: 75
Setting detail: RADIATION/ONCOLOGY SERIES
Discharge: HOME | End: 2025-04-16
Attending: RADIOLOGY
Payer: MEDICARE

## 2025-04-16 LAB
ARIA ZRC COURSE ID: NORMAL
ARIA ZRC COURSE START DATE: NORMAL
ARIA ZRC TREATMENT ELAPSED DAYS: NORMAL
ARIA ZRP FRACTIONS TREATED TO DATE: NORMAL
ARIA ZRP PLAN ID: NORMAL
ARIA ZRP PRESCRIBED DOSE CGY: 5000
ARIA ZRP PRESCRIBED DOSE PER FRACTION: 2
ARIA ZRR DOSAGE GIVEN TO DATE: 26
ARIA ZRR DOSAGE GIVEN TO DATE: 26.8
ARIA ZRR DOSAGE GIVEN TO DATE: 42
ARIA ZRR REFERENCE POINT ID: NORMAL
ARIA ZRR SESSION DOSAGE GIVEN: 2
ARIA ZRR SESSION DOSAGE GIVEN: 2
ARIA ZRR SESSION DOSAGE GIVEN: 2.06

## 2025-04-16 PROCEDURE — 77386 HC IMRT DELIVERY COMPLEX: CPT | Performed by: RADIOLOGY

## 2025-04-17 ENCOUNTER — OFFICE VISIT (OUTPATIENT)
Dept: HEMATOLOGY/ONCOLOGY | Facility: CLINIC | Age: 75
End: 2025-04-17
Attending: HOSPITALIST
Payer: MEDICARE

## 2025-04-17 ENCOUNTER — HOSPITAL ENCOUNTER (OUTPATIENT)
Dept: RADIATION ONCOLOGY | Facility: HOSPITAL | Age: 75
Setting detail: RADIATION/ONCOLOGY SERIES
Discharge: HOME | End: 2025-04-17
Attending: RADIOLOGY
Payer: MEDICARE

## 2025-04-17 ENCOUNTER — HOSPITAL ENCOUNTER (OUTPATIENT)
Dept: HEMATOLOGY/ONCOLOGY | Facility: HOSPITAL | Age: 75
Setting detail: INFUSION SERIES
Discharge: HOME | End: 2025-04-17
Attending: HOSPITALIST
Payer: MEDICARE

## 2025-04-17 VITALS
OXYGEN SATURATION: 98 % | DIASTOLIC BLOOD PRESSURE: 75 MMHG | HEIGHT: 67 IN | HEART RATE: 85 BPM | TEMPERATURE: 96.8 F | SYSTOLIC BLOOD PRESSURE: 136 MMHG | WEIGHT: 170 LBS | BODY MASS INDEX: 26.68 KG/M2

## 2025-04-17 DIAGNOSIS — R53.0 NEOPLASTIC MALIGNANT RELATED FATIGUE: ICD-10-CM

## 2025-04-17 DIAGNOSIS — C67.8 MALIGNANT NEOPLASM OF OVERLAPPING SITES OF BLADDER (CMS/HCC): ICD-10-CM

## 2025-04-17 DIAGNOSIS — C67.9 MALIGNANT NEOPLASM OF URINARY BLADDER, UNSPECIFIED SITE (CMS/HCC): Primary | ICD-10-CM

## 2025-04-17 DIAGNOSIS — D50.0 IRON DEFICIENCY ANEMIA DUE TO CHRONIC BLOOD LOSS: ICD-10-CM

## 2025-04-17 DIAGNOSIS — L27.0 DRUG RASH: ICD-10-CM

## 2025-04-17 LAB
ANION GAP SERPL CALC-SCNC: 9 MEQ/L (ref 3–15)
ARIA ZRC COURSE ID: NORMAL
ARIA ZRC COURSE START DATE: NORMAL
ARIA ZRC TREATMENT ELAPSED DAYS: NORMAL
ARIA ZRP FRACTIONS TREATED TO DATE: NORMAL
ARIA ZRP PLAN ID: NORMAL
ARIA ZRP PRESCRIBED DOSE CGY: 5000
ARIA ZRP PRESCRIBED DOSE PER FRACTION: 2
ARIA ZRR DOSAGE GIVEN TO DATE: 28
ARIA ZRR DOSAGE GIVEN TO DATE: 28.87
ARIA ZRR DOSAGE GIVEN TO DATE: 44
ARIA ZRR REFERENCE POINT ID: NORMAL
ARIA ZRR SESSION DOSAGE GIVEN: 2
ARIA ZRR SESSION DOSAGE GIVEN: 2
ARIA ZRR SESSION DOSAGE GIVEN: 2.06
BASOPHILS # BLD: 0.01 K/UL (ref 0.01–0.1)
BASOPHILS NFR BLD: 0.2 %
BUN SERPL-MCNC: 28 MG/DL (ref 7–25)
CALCIUM SERPL-MCNC: 9.2 MG/DL (ref 8.6–10.3)
CHLORIDE SERPL-SCNC: 106 MEQ/L (ref 98–107)
CO2 SERPL-SCNC: 23 MEQ/L (ref 21–31)
CREAT SERPL-MCNC: 1.5 MG/DL (ref 0.6–1.2)
DIFFERENTIAL METHOD BLD: ABNORMAL
EGFRCR SERPLBLD CKD-EPI 2021: 36.4 ML/MIN/1.73M*2
EOSINOPHIL # BLD: 0.2 K/UL (ref 0.04–0.36)
EOSINOPHIL NFR BLD: 3.3 %
ERYTHROCYTE [DISTWIDTH] IN BLOOD BY AUTOMATED COUNT: 13.2 % (ref 11.7–14.4)
GLUCOSE SERPL-MCNC: 117 MG/DL (ref 70–99)
HCT VFR BLD AUTO: 28.2 % (ref 35–45)
HGB BLD-MCNC: 8.9 G/DL (ref 11.8–15.7)
IMM GRANULOCYTES # BLD AUTO: 0.03 K/UL (ref 0–0.08)
IMM GRANULOCYTES NFR BLD AUTO: 0.5 %
LYMPHOCYTES # BLD: 0.46 K/UL (ref 1.2–3.5)
LYMPHOCYTES NFR BLD: 7.5 %
MCH RBC QN AUTO: 27.9 PG (ref 28–33.2)
MCHC RBC AUTO-ENTMCNC: 31.6 G/DL (ref 32.2–35.5)
MCV RBC AUTO: 88.4 FL (ref 83–98)
MONOCYTES # BLD: 0.06 K/UL (ref 0.28–0.8)
MONOCYTES NFR BLD: 1 %
NEUTROPHILS # BLD: 5.37 K/UL (ref 1.7–7)
NEUTROPHILS # BLD: 5.37 K/UL (ref 1.7–7)
NEUTS SEG NFR BLD: 87.5 %
NRBC BLD-RTO: 0 %
PLATELET # BLD AUTO: 125 K/UL (ref 150–369)
PMV BLD AUTO: 9 FL (ref 9.4–12.3)
POTASSIUM SERPL-SCNC: 3.6 MEQ/L (ref 3.5–5.1)
RBC # BLD AUTO: 3.19 M/UL (ref 3.93–5.22)
SODIUM SERPL-SCNC: 138 MEQ/L (ref 136–145)
WBC # BLD AUTO: 6.13 K/UL (ref 3.8–10.5)

## 2025-04-17 PROCEDURE — 36415 COLL VENOUS BLD VENIPUNCTURE: CPT | Performed by: HOSPITALIST

## 2025-04-17 PROCEDURE — 85025 COMPLETE CBC W/AUTO DIFF WBC: CPT | Performed by: HOSPITALIST

## 2025-04-17 PROCEDURE — 36591 DRAW BLOOD OFF VENOUS DEVICE: CPT

## 2025-04-17 PROCEDURE — 99215 OFFICE O/P EST HI 40 MIN: CPT | Performed by: HOSPITALIST

## 2025-04-17 PROCEDURE — 77386 HC IMRT DELIVERY COMPLEX: CPT | Performed by: RADIOLOGY

## 2025-04-17 PROCEDURE — 80048 BASIC METABOLIC PNL TOTAL CA: CPT | Performed by: HOSPITALIST

## 2025-04-17 PROCEDURE — 77336 RADIATION PHYSICS CONSULT: CPT | Performed by: RADIOLOGY

## 2025-04-17 RX ORDER — HEPARIN 100 UNIT/ML
500 SYRINGE INTRAVENOUS AS NEEDED
Status: CANCELLED | OUTPATIENT
Start: 2025-04-17

## 2025-04-17 RX ORDER — HEPARIN 100 UNIT/ML
500 SYRINGE INTRAVENOUS AS NEEDED
Status: DISCONTINUED | OUTPATIENT
Start: 2025-04-17 | End: 2025-04-18 | Stop reason: HOSPADM

## 2025-04-17 RX ORDER — PREDNISONE 20 MG/1
40 TABLET ORAL DAILY
Qty: 20 TABLET | Refills: 0 | Status: SHIPPED | OUTPATIENT
Start: 2025-04-17 | End: 2025-04-28 | Stop reason: ALTCHOICE

## 2025-04-17 RX ADMIN — HEPARIN 500 UNITS: 100 SYRINGE at 08:59

## 2025-04-17 ASSESSMENT — PAIN SCALES - GENERAL: PAINLEVEL_OUTOF10: 0-NO PAIN

## 2025-04-17 ASSESSMENT — ENCOUNTER SYMPTOMS
SHORTNESS OF BREATH: 1
FATIGUE: 1

## 2025-04-17 NOTE — PROGRESS NOTES
Review of Systems   Skin:  Positive for rash (rash on entire torso, started Sat night, apperas to be worsening and moving down her body and spreading on to her arms.  Very itchy, not painful).

## 2025-04-17 NOTE — PROGRESS NOTES
Hematology/Oncology -  [facility]  Follow up Progress Note       Gladys Woo is a 75 y.o. female,   :  1950  REFERRING PHYSICIAN:   Wenceslao Soria MD  [address]  [address]  [place] [address]  PRIMARY CARE PROVIDER:  Zoe Sanders MD    Encounter Diagnoses   Name Primary?    Malignant neoplasm of urinary bladder, unspecified site (CMS/HCC) Yes    Malignant neoplasm of overlapping sites of bladder (CMS/HCC)     Iron deficiency anemia due to chronic blood loss        Care Team    Surgical:   Radiation: [facility]  Med Onc: [facility]    Staging       Cancer Staging   Bladder cancer (CMS/HCC)  Staging form: Urinary Bladder, AJCC 8th Edition  - Clinical: Stage IIIA (cT3, cN0, cM0) - Signed by [facility], Gregory J, MD on 2025        Treatment Plan     Treatment Plans       Name Type Plan Dates Plan Provider         Active    CISplatin with Concurrent Radiation, 7 Day Cycles - bladder cancer ONCOLOGY TREATMENT 3/5/2025 - Present Wenceslao Soria MD                        Oncology History     Oncology History Overview Note   Gladys Woo is a very pleasant 75 y.o. patient with PMHx anxiety and hyperlipidemia.  She noticed some blood in her underwear since 2024.  She was initially treated for vagina dystrophy with no success. She was examined and no evidence of GYN tumor and eventually noted to have bleeding from urethra.  Patient seen by Dr. Jimenez who ordered a CT urogram which revealed a 8.6 cm irregular urinary bladder mass centered along the right posterior lateral bladder wall.  There is stranding in the right extraperitoneal space and right retroperitoneum along the course of the distalmost right ureter which may be from obstructive physiology without extension of neoplasm beyond the urinary bladder is theoretically possible.  Moderate right hydronephrosis to the level of the right UVJ's secondary to mass.  In addition an intermediate left adrenal nodule was  noted for which MRI was suggested.  Scattered subcentimeter sclerotic foci nonspecific but likely bony islands.  Bone scan suggested to rule out metastatic disease.  Patient presented last month to [facility] emergency department and CAT scan of the abdomen and pelvis revealed a large bladder mass with moderate right hydronephrosis and hydroureter.  CT angiogram chest abdomen pelvis showed no evidence of metastatic disease.  Creatinine at the time was elevated 1.7.  On 1/30/2025 Dr. Jimenez performed TURBT.  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  Suspicious for lymphovascular invasion.  Muscle invasion present.  Patient has not noted further bleeding per urethra. Patient met with Dr. Corona medical oncology to discuss neoadjuvant chemotherapy prior to cystectomy.  Patient wants to have bladder preserving strategy.    She went to [place] and saw Dr. Patel with recommendation of neoadjuvant chemotherapy, followed up by surgery.    She is aware of her options but still want to try chemoradiation at this point.     Bladder cancer (CMS/[facility])   2/25/2025 Cancer Staged    Staging form: Urinary Bladder, AJCC 8th Edition  - Clinical: Stage IIIA (cT3, cN0, cM0)     3/6/2025 -  Chemotherapy    GENERAL INFUSION ORDERS FOR HYDRATION  Plan Provider: Wenceslao Soria MD     3/18/2025 -  Chemotherapy    MEDIPORT FLUSH (SINGLE LUMEN) - PATIENT ON TREATMENT  Plan Provider: Wenceslao Soria MD     3/19/2025 - 3/19/2025 Chemotherapy    CISplatin with Concurrent Radiation, 7 Day Cycles - bladder cancer  Plan Provider: Wenceslao Soria MD  Treatment goal: Curative  Line of treatment: First Line     4/1/2025 -  Chemotherapy    Gemcitabine, twice a week with radiation therapy - Bladder  Plan Provider: Wenceslao Soria MD  Treatment goal: Curative  Line of treatment: First Line           History Of Present Illness     Gladys Woo is a very pleasant 75 y.o. patient  "with PMHx of  Bladder cancer, stage III, T3N0M0, who is here for concurrent chemoradiation therapy.    She is here alone.      She noticed a rash from her chest and the some in her back since Saturday April12.  It is slightly getting worse and now close to her groin area.  She sent some pictures over.  I sent her hydrocortisone cream and asked her to take Benadryl.  She feels the rash is not very itchy.  She is instructed not to scratch.  She denies any fever.  She actually feels this chemotherapy is way better tolerated.  She denies any other symptoms.      Gladys Woo is seen today for follow up.       ROS     Review of Systems   Constitutional:  Positive for fatigue.   Respiratory:  Positive for shortness of breath.    All other systems reviewed and are negative.    Review of Systems:  Nursing assessment reviewed. Pertinent positive and negative symptoms noted in HPI, all others negative.    PHYSICAL EXAMINATION     Temp:  [36 °C (96.8 °F)] 36 °C (96.8 °F)  Heart Rate:  [85] 85  BP: (136)/(75) 136/75  Visit Vitals  /75 (BP Location: Right upper arm, Patient Position: Sitting)   Pulse 85   Temp (!) 36 °C (96.8 °F) (Temporal)   Ht 1.702 m (5' 7\")   Wt 77.1 kg (170 lb)   SpO2 98%   BMI 26.63 kg/m²     Physical Exam  Vitals reviewed.   Constitutional:       Appearance: She is not ill-appearing.   Cardiovascular:      Rate and Rhythm: Normal rate and regular rhythm.      Heart sounds: No murmur heard.  Pulmonary:      Effort: Pulmonary effort is normal. No respiratory distress.      Breath sounds: Normal breath sounds. No wheezing.   Abdominal:      Palpations: Abdomen is soft.   Musculoskeletal:         General: No swelling.   Skin:     Coloration: Skin is not jaundiced or pale.      Findings: Rash present.      Comments: Papular rash around her chest, down to her groin area, and her back no blisters.  No secretion.   Neurological:      General: No focal deficit present.      Mental Status: She is " alert.   Psychiatric:         Mood and Affect: Mood normal.           Past Medical History     Past Medical History[1]    Past Surgical History     Past Surgical History   Procedure Laterality Date    A-v cardiac pacemaker insertion      Colonoscopy      CYSTO, TURBT TRANS-URETHRAL RESECTION BLADDER TUMOR N/A 2025    Performed by Eugenio Jimenez MD at  OR    Insert / replace / remove pacemaker      Knee arthroscopy w/ meniscal repair      Tonsillectomy         OB/GYN History     OB History    Para Term  AB Living   3 1   2    SAB IAB Ectopic Multiple Live Births   2          # Outcome Date GA Lbr Franck/2nd Weight Sex Type Anes PTL Lv   3 SAB            2 SAB            1 Para              Gynecologic History:  Age at menarche: No age on file  Age at first live birth: No age on file   Age at menopause: No age on file    Social History     Social History     Tobacco Use    Smoking status: Former     Types: Cigarettes    Smokeless tobacco: Never   Substance Use Topics    Alcohol use: Yes     Alcohol/week: 2.0 standard drinks of alcohol     Types: 2 Glasses of wine per week     Comment: socially    Drug use: Never       Family History     Family History   Problem Relation Name Age of Onset    Heart disease Biological Mother      Diabetes Biological Mother      Heart disease Biological Father      Coronary artery disease Biological Father      Liver cancer Biological Brother      Prostate cancer Biological Brother         Allergies     Amoxicillin    Medications     Current Outpatient Medications   Medication Sig Dispense Refill    acetaminophen (TYLENOL) 500 mg tablet Take 500 mg by mouth every 6 (six) hours as needed for pain.      busPIRone (BUSPAR) 5 mg tablet Take 5 mg by mouth daily.      cranberry conc-C-bacillus coag (AZO CRANBERRY + PROBIOTIC) 250-30-15 mg tablet Take 1 tablet by mouth nightly.      diphenhydrAMINE (BENADRYL) 25 mg capsule Take 25 mg by mouth every 6 (six) hours as needed  "for itching.      hydrocortisone 1 % cream Apply 1 Application topically 2 (two) times a day. 30 g 0    nitrofurantoin, macrocrystal-monohydrate, (MACROBID) 100 mg capsule Take 1 capsule (100 mg total) by mouth once daily. 30 capsule 0    ondansetron (ZOFRAN) 8 mg tablet Take 1 tablet (8 mg total) by mouth every 8 (eight) hours as needed for nausea or vomiting. 30 tablet 3    predniSONE (DELTASONE) 20 mg tablet Take 2 tablets (40 mg total) by mouth daily for 10 days. 20 tablet 0    prochlorperazine (COMPAZINE) 10 mg tablet Take 1 tablet (10 mg total) by mouth every 6 (six) hours as needed for nausea or vomiting. 30 tablet 3    rosuvastatin (CRESTOR) 40 mg tablet Take 40 mg by mouth daily.       No current facility-administered medications for this visit.     Facility-Administered Medications Ordered in Other Visits   Medication Dose Route Frequency Provider Last Rate Last Admin    heparin, porcine (PF) flush 500 Units  500 Units intra-catheter PRWenceslao Mcdowell MD   500 Units at 04/17/25 0859          Labs     Lab Results   Component Value Date    WBC 6.13 04/17/2025    HGB 8.9 (L) 04/17/2025    HCT 28.2 (L) 04/17/2025    MCV 88.4 04/17/2025     (L) 04/17/2025       Lab Results   Component Value Date    GLUCOSE 117 (H) 04/17/2025    CALCIUM 9.2 04/17/2025     04/17/2025    K 3.6 04/17/2025    CO2 23 04/17/2025     04/17/2025    BUN 28 (H) 04/17/2025    CREATININE 1.5 (H) 04/17/2025       Lab Results   Component Value Date    ALT 34 04/02/2025    AST 11 (L) 04/02/2025    ALKPHOS 78 04/02/2025    BILITOT 0.2 (L) 04/02/2025       Lab Results   Component Value Date    IRON <10 (L) 03/19/2025    TIBC 231 (L) 03/19/2025    FERRITIN 313 (H) 03/19/2025       No results found for: \"SPEP\", \"UPEP\"    No results found for: \"PTT\"    Lab Results   Component Value Date    INR 1.1 03/13/2025       No results found for: \"SEBONDEN30\"    No results found for: \"FOLATE\"    No results found for: \"RETICCTPCT\"    Lab " Results   Component Value Date    DDIMER 1.16 (H) 01/24/2025         Pathology     Pathology Results       ** No results found for the last 720 hours. **            Radiology     No new Radiology.  IR PORT PLACEMENT  IR PORT PLACEMENT, ULTRASOUND GUIDED VASCULAR ACCESS  Result Date: 3/13/2025  Narrative: CLINICAL HISTORY:    Bladder cancer     Impression: IMPRESSION: Successful placement of a right IJ CT Injectable chest port with ultrasound and fluoroscopic guidance, which is ready for use. Device: Bard PowerPort COMMENT: PROCEDURE:  Right IJ chest port placement, under ultrasound and fluoroscopic guidance. RADIOLOGIST:  Quang Torres MD ANESTHESIA : Lidocaine 1% CONTRAST:  None. MEDICATIONS: Vancomycin 1.25 gm IV REFERENCE AIR KERMA: 14 mGy Versed 1 mg IV and Fentanyl 100 mcg IV were administered intravenously for moderate sedation, under the supervision of the physician.  Pulse oximetry, heart rate and blood pressure were continuously monitored by the trained nursing staff present.  The physician spent a total of 30 minutes of face to face sedation time with the patient. All the procedural conscious sedation guidelines were followed and documented including the Mallampati airway assessment, confirmation of NPO status, anesthesia history and  ASA classification.. DESCRIPTION OF PROCEDURE:  Written informed consent was obtained following explanation of risks and benefits of the procedure. The right neck and chest were draped and prepped in the usual sterile manner. The right internal jugular vein was noted to be compressible and patent on gray scale ultrasound. Under real-time grayscale ultrasound, the internal jugular vein was cannulated with a 21-gauge micropuncture needle following local anesthesia and a skin nick. An ultrasound-guided access image was saved to PACS. An 0.018 wire was advanced. The needle was exchanged for a 5 Tongan micropuncture catheter. The inner catheter and wire were removed, and an  0.035 Amplatz wire was advanced under fluoroscopic guidance into the IVC and secured with a flow-switch. An appropriate port site was selected in the upper chest and the area was anesthetized with Lidocaine. A 3 centimeter horizontal incision was created and blunt dissection was performed superiorly to create a port pocket. Hemostasis was achieved. A subcutaneous tunnel was then created to the IJ entry site following local anesthesia, with blunt dissection. An 8F Bard PowerPort catheter was advanced through the tunnel. The port was tested demonstrating no leakage. A peel-away sheath was advanced over the Amplatz wire, and the catheter was advanced through the peel-away sheath and positioned within the SVC/right atrial junction. The peel-away sheath was removed. The port catheter was appropriately sized and connected to the port. The port was successfully positioned within the pocket with the distal catheter tip terminating at the cavoatrial junction. The port was tested demonstrating appropriate flush and aspiration. The port was then flushed with Heparin following good blood return and was subsequently deaccessed. . Tissues superficial to the port were closed with interrupted deep 2-0 Vicryl suture and running subcutaneous 4-0 Vicryl suture. Skin closure at the internal jugular vein access site was achieved with Dermabond adhesive. Dermabond was applied to the pocket incision site. The patient was discharged in good condition. Performance Met:  PQRS MEASURE 76, CPT II 6030F:  All elements of maximal sterile technique were utilized, including cap, mask, sterile gown, sterile gloves, and sterile drape.  Additionally, sterile ultrasound techniques were followed, including use of sterile probe cover and sterile ultrasound gel. Ultrasound-guided vascular access - Grayscale ultrasound evaluation demonstrated the right  internal jugular vein to be patent. The right internal jugular vein was accessed under real-time  ultrasound access. An access image was saved/archived to PACS. The number of guidewires used, and their integrity, was confirmed at the end of the procedure.      Assessment and Plan     In summary, Gladys Woo is a very pleasant 75 y.o. patient with PMHx of anxiety and dyslipidemia.     # Bladder cancer, stage III, T3N0M0. 1/30/3025 she had Transurethral resection of bladder tumor (8cm). OP note:  Cystoscopic evaluation demonstrated a large and invasive appearing tumor encompassing the entirety of the trigone and left lateral wall measuring at least 8 cm.  A monopolar working element was inserted and the tumor was then  systematically resected down to the level of the normal bladder wall.  There did appear to be a large volume of bladder wall invasion visually.  The bladder tumor specimens were then irrigated from the bladder and repeat cystoscopy demonstrated no evidence of hannah perforation.  - need to complete workup with MRI to better evaluate the left adrenal gland lesion.  Because she has no bone pain we can skip nuclear medicine bone scan at this point.  - per current NCCN guideline, optimal candidates for bladder preservation with chemoradiotherapy include patients with tumors that present without moderate/severe hydronephrosis, are without concurrent extensive or multifocal Tis, and are <6 cm. Ideally, tumors should allow a visually complete or maximally debulking TURBT. Apparently she has no other lesions based on operating note.  Her creatinine was improving.  Based on operation note her cancer is 8 cm, bigger than the 6 cm recommendation.  So I did have a discussion of neoadjuvant chemotherapy followed by radical cystectomy.  She is not interested in radical cystectomy.  Indeed Dr. Corona and Dr. Patel talked to her about this too.    - For patients receiving concurrent chemoradiation, the optimal radiosensitizing chemotherapy regimen is not established, as many of these regimens have not  been directly compared in randomized trials. Selection of chemotherapy is based upon patient performance status and comorbidities, kidney function, and provider experience.   - With her overall fit status and Cr 1.2, we started c1 with cisplatin 20mg/m2 but she had FIDELIA and syncope. As such, we changed chemo to low-dose biweekly gemcitabine (27 mg/m2 twice per week with daily RT) and so far she is tolerating it well.   Citation: Bladder Preservation With Twice-a-Day Radiation Plus Fluorouracil/Cisplatin or Once Daily Radiation Plus Gemcitabine for Muscle-Invasive Bladder Cancer: NRG/RTOG 0712-A Randomized Phase II Trial. JCO 2018.     #Drug rash likely due to gemcitabine vs. Macrobid which she started on 4/8.  Patient has no symptomatic complaints.  -Picture has been taking in the media.  - For drug-induced rash in general we do not need symptomatic treatment.  However we do not have lots of treatment options as such I will let her continue hydrocortisone 1% cream, Benadryl 25 mg every 6 hours.  I also added prednisone 40 mg daily for 5 to 10 days.  10 days medication is sent.  I instructed her to take pictures every day.  - If she develops any symptoms like fever, chills, nausea, vomiting we have to stop gemcitabine and explore other options.  -She is instructed to call me for any questions.  - stop macrobid now.     #Normocytic anemia.  - s/p IV Monoferric  - received PRBC to keep HGB >8.  - HGB 8.9 today    # Fatigue: due to chemo and anemia  - Encourage hydration, exercise as tolerated.    # UTI:   -On Macrobid  - Keegan asked for repeat urine culture at [facility]. This can be done as needed.     04/17/25  - ok for chemo twice a week  - MD visit in 1 week or sooner as needed.  - LAB: CBC/BMP twice a week          No problem-specific Assessment & Plan notes found for this encounter.      Wenceslao Soria MD/PhD  Hematology/Oncology         [1]   Past Medical History:  Diagnosis Date    Bladder cancer (CMS/HCC)     Coronary  artery disease     KERRI (generalized anxiety disorder)     TREVOR (obstructive sleep apnea)

## 2025-04-18 ENCOUNTER — HOSPITAL ENCOUNTER (OUTPATIENT)
Dept: RADIATION ONCOLOGY | Facility: HOSPITAL | Age: 75
Setting detail: RADIATION/ONCOLOGY SERIES
Discharge: HOME | End: 2025-04-18
Attending: RADIOLOGY
Payer: MEDICARE

## 2025-04-18 ENCOUNTER — HOSPITAL ENCOUNTER (OUTPATIENT)
Dept: HEMATOLOGY/ONCOLOGY | Facility: HOSPITAL | Age: 75
Setting detail: INFUSION SERIES
Discharge: HOME | End: 2025-04-18
Attending: HOSPITALIST
Payer: MEDICARE

## 2025-04-18 ENCOUNTER — PATIENT OUTREACH (OUTPATIENT)
Dept: CASE MANAGEMENT | Facility: CLINIC | Age: 75
End: 2025-04-18
Payer: MEDICARE

## 2025-04-18 VITALS
TEMPERATURE: 96.6 F | DIASTOLIC BLOOD PRESSURE: 68 MMHG | HEART RATE: 81 BPM | OXYGEN SATURATION: 100 % | SYSTOLIC BLOOD PRESSURE: 156 MMHG

## 2025-04-18 DIAGNOSIS — C67.9 MALIGNANT NEOPLASM OF URINARY BLADDER, UNSPECIFIED SITE (CMS/HCC): Primary | ICD-10-CM

## 2025-04-18 DIAGNOSIS — D50.0 IRON DEFICIENCY ANEMIA DUE TO CHRONIC BLOOD LOSS: ICD-10-CM

## 2025-04-18 DIAGNOSIS — C67.8 MALIGNANT NEOPLASM OF OVERLAPPING SITES OF BLADDER (CMS/HCC): ICD-10-CM

## 2025-04-18 LAB
ARIA ZRC COURSE ID: NORMAL
ARIA ZRC COURSE START DATE: NORMAL
ARIA ZRC TREATMENT ELAPSED DAYS: NORMAL
ARIA ZRP FRACTIONS TREATED TO DATE: NORMAL
ARIA ZRP PLAN ID: NORMAL
ARIA ZRP PRESCRIBED DOSE CGY: 5000
ARIA ZRP PRESCRIBED DOSE PER FRACTION: 2
ARIA ZRR DOSAGE GIVEN TO DATE: 30
ARIA ZRR DOSAGE GIVEN TO DATE: 30.93
ARIA ZRR DOSAGE GIVEN TO DATE: 46
ARIA ZRR REFERENCE POINT ID: NORMAL
ARIA ZRR SESSION DOSAGE GIVEN: 2
ARIA ZRR SESSION DOSAGE GIVEN: 2
ARIA ZRR SESSION DOSAGE GIVEN: 2.06

## 2025-04-18 PROCEDURE — 25800000 HC PHARMACY IV SOLUTIONS: Performed by: HOSPITALIST

## 2025-04-18 PROCEDURE — 63600000 HC DRUGS/DETAIL CODE: Performed by: HOSPITALIST

## 2025-04-18 PROCEDURE — 96413 CHEMO IV INFUSION 1 HR: CPT

## 2025-04-18 PROCEDURE — 77386 HC IMRT DELIVERY COMPLEX: CPT | Performed by: RADIOLOGY

## 2025-04-18 PROCEDURE — 96367 TX/PROPH/DG ADDL SEQ IV INF: CPT

## 2025-04-18 RX ORDER — HEPARIN 100 UNIT/ML
500 SYRINGE INTRAVENOUS AS NEEDED
Status: DISCONTINUED | OUTPATIENT
Start: 2025-04-18 | End: 2025-04-19 | Stop reason: HOSPADM

## 2025-04-18 RX ORDER — HEPARIN 100 UNIT/ML
500 SYRINGE INTRAVENOUS AS NEEDED
Status: CANCELLED | OUTPATIENT
Start: 2025-04-18

## 2025-04-18 RX ADMIN — GEMCITABINE 52 MG: 38 INJECTION, SOLUTION INTRAVENOUS at 10:16

## 2025-04-18 RX ADMIN — DEXAMETHASONE SODIUM PHOSPHATE: 4 INJECTION, SOLUTION INTRA-ARTICULAR; INTRALESIONAL; INTRAMUSCULAR; INTRAVENOUS; SOFT TISSUE at 09:26

## 2025-04-18 RX ADMIN — HEPARIN 500 UNITS: 100 SYRINGE at 10:50

## 2025-04-18 ASSESSMENT — ENCOUNTER SYMPTOMS
OCCASIONAL FEELINGS OF UNSTEADINESS: 0
DEPRESSION: 0
LOSS OF SENSATION IN FEET: 0

## 2025-04-18 NOTE — PROGRESS NOTES
H follow-up call.    Spoke with pt today, pt doing okay.  Pt seen by Nephrology.  Pt states kidney functions are trended down which is good.    Pt scheduled with Urology on 4/21/2025.  Pt drinking plenty of fluids and no complaints of urinary symptoms.    Pt does have a rash from chemotherapy treating with steroids, benadryl and hydrocortisone.  Pt following medical regimen.    CM to continue to follow.    Kellie Jiménez RN BSN   [phone]

## 2025-04-18 NOTE — PROGRESS NOTES
Pt here for D18C1. Labs and consent reviewed.  Port accessed with +BR/flush, pt good to go per Dr. Soria note. Pt premedicated with zofran/dex.        Pt tolerated Gemzar. Tolerated well. Port de-accessed. AVS declined, pt left ambulatory.

## 2025-04-21 ENCOUNTER — HOSPITAL ENCOUNTER (OUTPATIENT)
Dept: RADIATION ONCOLOGY | Facility: HOSPITAL | Age: 75
Setting detail: RADIATION/ONCOLOGY SERIES
Discharge: HOME | End: 2025-04-21
Attending: RADIOLOGY
Payer: MEDICARE

## 2025-04-21 ENCOUNTER — HOSPITAL ENCOUNTER (OUTPATIENT)
Dept: HEMATOLOGY/ONCOLOGY | Facility: HOSPITAL | Age: 75
Setting detail: INFUSION SERIES
Discharge: HOME | End: 2025-04-21
Attending: HOSPITALIST
Payer: MEDICARE

## 2025-04-21 DIAGNOSIS — C67.9 MALIGNANT NEOPLASM OF URINARY BLADDER, UNSPECIFIED SITE (CMS/HCC): Primary | ICD-10-CM

## 2025-04-21 DIAGNOSIS — D50.0 IRON DEFICIENCY ANEMIA DUE TO CHRONIC BLOOD LOSS: ICD-10-CM

## 2025-04-21 DIAGNOSIS — C67.8 MALIGNANT NEOPLASM OF OVERLAPPING SITES OF BLADDER (CMS/HCC): ICD-10-CM

## 2025-04-21 LAB
ANION GAP SERPL CALC-SCNC: 5 MEQ/L (ref 3–15)
ARIA ZRC COURSE ID: NORMAL
ARIA ZRC COURSE START DATE: NORMAL
ARIA ZRC TREATMENT ELAPSED DAYS: NORMAL
ARIA ZRP FRACTIONS TREATED TO DATE: NORMAL
ARIA ZRP PLAN ID: NORMAL
ARIA ZRP PRESCRIBED DOSE CGY: 5000
ARIA ZRP PRESCRIBED DOSE PER FRACTION: 2
ARIA ZRR DOSAGE GIVEN TO DATE: 32
ARIA ZRR DOSAGE GIVEN TO DATE: 32.99
ARIA ZRR DOSAGE GIVEN TO DATE: 48
ARIA ZRR REFERENCE POINT ID: NORMAL
ARIA ZRR SESSION DOSAGE GIVEN: 2
ARIA ZRR SESSION DOSAGE GIVEN: 2
ARIA ZRR SESSION DOSAGE GIVEN: 2.06
BASOPHILS # BLD: 0.01 K/UL (ref 0.01–0.1)
BASOPHILS NFR BLD: 0.1 %
BUN SERPL-MCNC: 29 MG/DL (ref 7–25)
CALCIUM SERPL-MCNC: 9.6 MG/DL (ref 8.6–10.3)
CHLORIDE SERPL-SCNC: 106 MEQ/L (ref 98–107)
CO2 SERPL-SCNC: 26 MEQ/L (ref 21–31)
CREAT SERPL-MCNC: 1.4 MG/DL (ref 0.6–1.2)
DIFFERENTIAL METHOD BLD: ABNORMAL
EGFRCR SERPLBLD CKD-EPI 2021: 39.6 ML/MIN/1.73M*2
EOSINOPHIL # BLD: 0.01 K/UL (ref 0.04–0.36)
EOSINOPHIL NFR BLD: 0.1 %
ERYTHROCYTE [DISTWIDTH] IN BLOOD BY AUTOMATED COUNT: 13.1 % (ref 11.7–14.4)
GLUCOSE SERPL-MCNC: 82 MG/DL (ref 70–99)
HCT VFR BLD AUTO: 25.2 % (ref 35–45)
HGB BLD-MCNC: 8.2 G/DL (ref 11.8–15.7)
IMM GRANULOCYTES # BLD AUTO: 0.15 K/UL (ref 0–0.08)
IMM GRANULOCYTES NFR BLD AUTO: 2.1 %
LYMPHOCYTES # BLD: 0.55 K/UL (ref 1.2–3.5)
LYMPHOCYTES NFR BLD: 7.8 %
MCH RBC QN AUTO: 28.3 PG (ref 28–33.2)
MCHC RBC AUTO-ENTMCNC: 32.5 G/DL (ref 32.2–35.5)
MCV RBC AUTO: 86.9 FL (ref 83–98)
MONOCYTES # BLD: 0.13 K/UL (ref 0.28–0.8)
MONOCYTES NFR BLD: 1.9 %
NEUTROPHILS # BLD: 6.16 K/UL (ref 1.7–7)
NEUTROPHILS # BLD: 6.16 K/UL (ref 1.7–7)
NEUTS SEG NFR BLD: 88 %
NRBC BLD-RTO: 0 %
PLATELET # BLD AUTO: 143 K/UL (ref 150–369)
PMV BLD AUTO: 8.6 FL (ref 9.4–12.3)
POTASSIUM SERPL-SCNC: 3.9 MEQ/L (ref 3.5–5.1)
RBC # BLD AUTO: 2.9 M/UL (ref 3.93–5.22)
SODIUM SERPL-SCNC: 137 MEQ/L (ref 136–145)
WBC # BLD AUTO: 7.01 K/UL (ref 3.8–10.5)

## 2025-04-21 PROCEDURE — 85025 COMPLETE CBC W/AUTO DIFF WBC: CPT | Performed by: HOSPITALIST

## 2025-04-21 PROCEDURE — 80048 BASIC METABOLIC PNL TOTAL CA: CPT | Performed by: HOSPITALIST

## 2025-04-21 PROCEDURE — 36591 DRAW BLOOD OFF VENOUS DEVICE: CPT

## 2025-04-21 PROCEDURE — 77386 HC IMRT DELIVERY COMPLEX: CPT | Performed by: RADIOLOGY

## 2025-04-21 RX ORDER — HEPARIN 100 UNIT/ML
500 SYRINGE INTRAVENOUS AS NEEDED
Status: DISCONTINUED | OUTPATIENT
Start: 2025-04-21 | End: 2025-04-22 | Stop reason: HOSPADM

## 2025-04-21 RX ORDER — HEPARIN 100 UNIT/ML
500 SYRINGE INTRAVENOUS AS NEEDED
Status: CANCELLED | OUTPATIENT
Start: 2025-04-21

## 2025-04-21 RX ADMIN — HEPARIN 500 UNITS: 100 SYRINGE at 09:14

## 2025-04-21 NOTE — PROGRESS NOTES
Pt arrived for pre tx labs. Port accessed with positive blood return. Labs drawn and sent. No issues noted. Pt is scheduled out appropriately.

## 2025-04-22 ENCOUNTER — HOSPITAL ENCOUNTER (OUTPATIENT)
Dept: RADIATION ONCOLOGY | Facility: HOSPITAL | Age: 75
Setting detail: RADIATION/ONCOLOGY SERIES
Discharge: HOME | End: 2025-04-22
Attending: RADIOLOGY
Payer: MEDICARE

## 2025-04-22 ENCOUNTER — RAD ONC OTV (OUTPATIENT)
Dept: RADIATION ONCOLOGY | Facility: HOSPITAL | Age: 75
End: 2025-04-22
Payer: MEDICARE

## 2025-04-22 ENCOUNTER — HOSPITAL ENCOUNTER (OUTPATIENT)
Dept: HEMATOLOGY/ONCOLOGY | Facility: HOSPITAL | Age: 75
Setting detail: INFUSION SERIES
Discharge: HOME | End: 2025-04-22
Attending: HOSPITALIST
Payer: MEDICARE

## 2025-04-22 VITALS
SYSTOLIC BLOOD PRESSURE: 162 MMHG | WEIGHT: 166 LBS | DIASTOLIC BLOOD PRESSURE: 69 MMHG | BODY MASS INDEX: 26 KG/M2 | OXYGEN SATURATION: 100 % | HEART RATE: 67 BPM | TEMPERATURE: 96.3 F

## 2025-04-22 VITALS
SYSTOLIC BLOOD PRESSURE: 142 MMHG | HEART RATE: 83 BPM | WEIGHT: 166 LBS | OXYGEN SATURATION: 97 % | BODY MASS INDEX: 26 KG/M2 | DIASTOLIC BLOOD PRESSURE: 60 MMHG

## 2025-04-22 DIAGNOSIS — C67.8 MALIGNANT NEOPLASM OF OVERLAPPING SITES OF BLADDER (CMS/HCC): ICD-10-CM

## 2025-04-22 DIAGNOSIS — C67.8 MALIGNANT NEOPLASM OF OVERLAPPING SITES OF BLADDER (CMS/HCC): Primary | ICD-10-CM

## 2025-04-22 DIAGNOSIS — D50.0 IRON DEFICIENCY ANEMIA DUE TO CHRONIC BLOOD LOSS: ICD-10-CM

## 2025-04-22 DIAGNOSIS — C67.9 MALIGNANT NEOPLASM OF URINARY BLADDER, UNSPECIFIED SITE (CMS/HCC): Primary | ICD-10-CM

## 2025-04-22 LAB
ARIA ZRC COURSE ID: NORMAL
ARIA ZRC COURSE START DATE: NORMAL
ARIA ZRC TREATMENT ELAPSED DAYS: NORMAL
ARIA ZRP FRACTIONS TREATED TO DATE: NORMAL
ARIA ZRP PLAN ID: NORMAL
ARIA ZRP PRESCRIBED DOSE CGY: 5000
ARIA ZRP PRESCRIBED DOSE PER FRACTION: 2
ARIA ZRR DOSAGE GIVEN TO DATE: 34
ARIA ZRR DOSAGE GIVEN TO DATE: 35.05
ARIA ZRR DOSAGE GIVEN TO DATE: 50
ARIA ZRR REFERENCE POINT ID: NORMAL
ARIA ZRR SESSION DOSAGE GIVEN: 2
ARIA ZRR SESSION DOSAGE GIVEN: 2
ARIA ZRR SESSION DOSAGE GIVEN: 2.06

## 2025-04-22 PROCEDURE — 63600000 HC DRUGS/DETAIL CODE: Performed by: HOSPITALIST

## 2025-04-22 PROCEDURE — 77386 HC IMRT DELIVERY COMPLEX: CPT | Performed by: RADIOLOGY

## 2025-04-22 PROCEDURE — 25800000 HC PHARMACY IV SOLUTIONS: Performed by: HOSPITALIST

## 2025-04-22 PROCEDURE — 96367 TX/PROPH/DG ADDL SEQ IV INF: CPT

## 2025-04-22 PROCEDURE — 96413 CHEMO IV INFUSION 1 HR: CPT

## 2025-04-22 RX ORDER — HEPARIN 100 UNIT/ML
500 SYRINGE INTRAVENOUS AS NEEDED
Status: CANCELLED | OUTPATIENT
Start: 2025-04-22

## 2025-04-22 RX ORDER — HEPARIN 100 UNIT/ML
500 SYRINGE INTRAVENOUS AS NEEDED
Status: DISCONTINUED | OUTPATIENT
Start: 2025-04-22 | End: 2025-04-23 | Stop reason: HOSPADM

## 2025-04-22 RX ADMIN — DEXAMETHASONE SODIUM PHOSPHATE: 4 INJECTION, SOLUTION INTRA-ARTICULAR; INTRALESIONAL; INTRAMUSCULAR; INTRAVENOUS; SOFT TISSUE at 08:12

## 2025-04-22 RX ADMIN — GEMCITABINE 51 MG: 38 INJECTION, SOLUTION INTRAVENOUS at 08:59

## 2025-04-22 RX ADMIN — HEPARIN 500 UNITS: 100 SYRINGE at 09:34

## 2025-04-22 ASSESSMENT — PAIN SCALES - GENERAL: PAINLEVEL_OUTOF10: 0-NO PAIN

## 2025-04-22 ASSESSMENT — ENCOUNTER SYMPTOMS
DEPRESSION: 0
OCCASIONAL FEELINGS OF UNSTEADINESS: 0
LOSS OF SENSATION IN FEET: 0

## 2025-04-22 NOTE — NURSING NOTE
6 more cycles of chemo.  Pt is on Prednisone from chemo rash - which is now resolving.  Remains off Macrobid.

## 2025-04-22 NOTE — PROGRESS NOTES
Patient ID:   Gladys Woo  1950  [id number]   Diagnosis Plan   1. Malignant neoplasm of overlapping sites of bladder (CMS/[facility])           Referring Physician:   No referring provider defined for this encounter.  Primary Care Provider:  Zoe Sanders MD     Problem List:  Patient Active Problem List    Diagnosis Date Noted    Drug rash 04/17/2025    Neoplastic malignant related fatigue 04/11/2025    Acute unilateral obstructive uropathy 03/28/2025    Acute cystitis without hematuria 03/26/2025    Sepsis (CMS/[facility]) 03/25/2025    Hypomagnesemia 03/25/2025    Syncope and collapse 03/24/2025    Iron deficiency anemia due to chronic blood loss 03/20/2025    Chronic kidney disease 01/31/2025    Constipation 01/27/2025    Anemia 01/26/2025    Bladder cancer (CMS/HCC) 01/25/2025    Bladder mass 01/25/2025    FIDELIA (acute kidney injury) (CMS/HCC) 01/25/2025    Abnormal finding of diagnostic imaging 01/24/2025    Hemorrhoids 01/24/2025    Recurrent major depressive disorder in partial remission (CMS/HCC) 01/24/2025    SVT (supraventricular tachycardia) (CMS/HCC) 01/24/2025    Suhail hematuria 12/10/2024    Obstructive sleep apnea 11/22/2024    Arrhythmia 10/01/2024    Generalized anxiety disorder with panic attacks 10/01/2024    Hyperlipemia 10/01/2024    Major depressive disorder 10/01/2024    Pacemaker 10/24/2019       Cancer Staging   Bladder cancer (CMS/HCC)  Staging form: Urinary Bladder, AJCC 8th Edition  - Clinical: Stage IIIA (cT3, cN0, cM0) - Signed by Ochsner, Gregory J, MD on 2/25/2025      TREATMENT PLAN SUMMARY:  Radiation Treatments       Active   Plans   PS_a-partial bladder_GTV [1a_PartBldr]   Most recent treatment: Dose planned: 200 cGy (fraction 8 of 8 on 3/28/2025)   Total: Dose planned: 1,600 cGy   Elapsed Days: 10 days as of 2025-03-28 @ 08:3308      1b_Pelvis   Most recent treatment: Dose planned: 200 cGy (fraction 16 of 25 on 4/21/2025)   Total: Dose planned: 5,000 cGy   Elapsed Days: 34  days as of 2025-04-21 @ 08:3149      Reference Points   1a_Part Bladder   Most recent treatment: Dose given: 200 cGy (on 3/28/2025)   Total: Dose given: 1,600 cGy   Elapsed Days: 10 days as of 2025-03-28 @ 08:3308      1a_calc   Most recent treatment: Dose given: 201 cGy (on 3/28/2025)   Total: Dose given: 1,608 cGy   Elapsed Days: 10 days as of 2025-03-28 @ 08:3308      1b_Pelvis   Most recent treatment: Dose given: 200 cGy (on 4/21/2025)   Total: Dose given: 3,200 cGy   Elapsed Days: 34 days as of 2025-04-21 @ 08:3149      1b_calc   Most recent treatment: Dose given: 206 cGy (on 4/21/2025)   Total: Dose given: 3,299 cGy   Elapsed Days: 34 days as of 2025-04-21 @ 08:3149      1c_Trgt total   Most recent treatment: Dose given: 200 cGy (on 4/21/2025)   Total: Dose given: 4,800 cGy   Elapsed Days: 34 days as of 2025-04-21 @ 08:3149           Historical   Plans   PS1   Most recent treatment: Dose planned: 200 cGy (fraction 0 of 25 on 3/11/2025)   Total: Dose planned: 5,000 cGy   Elapsed Days: : @ --      PS2   Most recent treatment: Dose planned: 200 cGy (fraction 0 of 25 on 3/11/2025)   Total: Dose planned: 5,000 cGy   Elapsed Days: : @ --      PS_GTV   Most recent treatment: Dose planned: 200 cGy (fraction 0 of 8 on 3/11/2025)   Total: Dose planned: 1,600 cGy   Elapsed Days: : @ --      Reference Points   1a_Part Bladder   Most recent treatment: Course completed on 3/11/2025   Total: Dose given: 0 cGy   Elapsed Days: : @ --      1a_calc   Most recent treatment: Course completed on 3/11/2025   Total: Dose given: 0 cGy   Elapsed Days: : @ --      1b_Pelvis   Most recent treatment: Course completed on 3/11/2025   Total: Dose given: 0 cGy   Elapsed Days: : @ --      1b_calc   Most recent treatment: Course completed on 3/11/2025   Total: Dose given: 0 cGy   Elapsed Days: : @ --                     Subjective doing well skin rash resolved on prednisone.  Stop Macrobid.  Denies urinary symptoms other than urine frequency.   "Denies gross hematuria.  Apparently recent urinalysis per Dr. Jimenez urologist was clear.  Interval Notes:    Vital Signs for this encounter:  BP: 162/69  Temp: 35.7 °C (96.3 °F)  Heart Rate: 67  SpO2: 100 %  Weight: 75.3 kg (166 lb) (04/22 0700)      PAIN ASSESSMENT:  Score    Location    Pain Intervention      TOXICITY ASSESSMENT:             Objective      Physical Exam:  Physical Exam abdomen soft nontender.  Flank nontender.  Performance status 80.    LABS:  CMP   Lab Results   Component Value Date    ALBUMIN 3.0 (L) 04/02/2025    CO2 26 04/21/2025    BUN 29 (H) 04/21/2025    CREATININE 1.4 (H) 04/21/2025    GLUCOSE 82 04/21/2025    AST 11 (L) 04/02/2025    ALT 34 04/02/2025    EGFR 39.6 (L) 04/21/2025     CBC   Lab Results   Component Value Date    WBC 7.01 04/21/2025    HGB 8.2 (L) 04/21/2025    HCT 25.2 (L) 04/21/2025    MCV 86.9 04/21/2025     (L) 04/21/2025     Tumor Marker No results found for: \"\", \"\", \"PSA\", \"\", \"CEA\", \"HCGQUANT\"         Assessment/Plan patient with history of clinical stage IIIa bladder cancer undergoing definitive radiation therapy with chemo.  Patient had urinary tract infection responded to antibiotics was on Macrobid prophylactically but discontinued secondary to rash.  Continue to hold off antibiotics if she has symptoms again she will alert us right away.  Urine frequency likely from radiation therapy at this point.    Diagnoses and Orders Associated With This Visit:  There are no diagnoses linked to this encounter.                       "

## 2025-04-22 NOTE — PROGRESS NOTES
Pt here for D22C1. Labs and consent reviewed.  Port accessed with +BR/flush, pt good to go per Dr. Soria note. Pt premedicated with zofran/dex.          Pt tolerated Gemzar. Tolerated well. Port de-accessed. AVS declined, pt left ambulatory.

## 2025-04-23 ENCOUNTER — PATIENT OUTREACH (OUTPATIENT)
Dept: CASE MANAGEMENT | Facility: CLINIC | Age: 75
End: 2025-04-23
Payer: MEDICARE

## 2025-04-23 ENCOUNTER — HOSPITAL ENCOUNTER (OUTPATIENT)
Dept: RADIATION ONCOLOGY | Facility: HOSPITAL | Age: 75
Setting detail: RADIATION/ONCOLOGY SERIES
Discharge: HOME | End: 2025-04-23
Attending: RADIOLOGY
Payer: MEDICARE

## 2025-04-23 LAB
ARIA ZRC COURSE ID: NORMAL
ARIA ZRC COURSE START DATE: NORMAL
ARIA ZRC TREATMENT ELAPSED DAYS: NORMAL
ARIA ZRP FRACTIONS TREATED TO DATE: NORMAL
ARIA ZRP PLAN ID: NORMAL
ARIA ZRP PRESCRIBED DOSE CGY: 5000
ARIA ZRP PRESCRIBED DOSE PER FRACTION: 2
ARIA ZRR DOSAGE GIVEN TO DATE: 36
ARIA ZRR DOSAGE GIVEN TO DATE: 37.11
ARIA ZRR DOSAGE GIVEN TO DATE: 52
ARIA ZRR REFERENCE POINT ID: NORMAL
ARIA ZRR SESSION DOSAGE GIVEN: 2
ARIA ZRR SESSION DOSAGE GIVEN: 2
ARIA ZRR SESSION DOSAGE GIVEN: 2.06

## 2025-04-23 PROCEDURE — 77386 HC IMRT DELIVERY COMPLEX: CPT | Performed by: RADIOLOGY

## 2025-04-23 NOTE — PROGRESS NOTES
H follow-up call.    Spoke with pt today, pt doing well.  Pt had lab work on 4/21/2025 showing kidney function trending down.  Pt seen by Urology on 4/21/2025.  Pt states her urine is clear and free from infection or blood.  Pt will follow-up in 6 weeks after last treatment.  Pt will have cystoscopy and bladder wash.  Results will determine any further treatment.    Pt following medical regimen.  CM to continue to follow.    Kellie Jiménez RN BSN   [phone]

## 2025-04-24 ENCOUNTER — HOSPITAL ENCOUNTER (OUTPATIENT)
Dept: RADIATION ONCOLOGY | Facility: HOSPITAL | Age: 75
Setting detail: RADIATION/ONCOLOGY SERIES
Discharge: HOME | End: 2025-04-24
Attending: RADIOLOGY
Payer: MEDICARE

## 2025-04-24 ENCOUNTER — HOSPITAL ENCOUNTER (OUTPATIENT)
Dept: HEMATOLOGY/ONCOLOGY | Facility: HOSPITAL | Age: 75
Setting detail: INFUSION SERIES
Discharge: HOME | End: 2025-04-24
Attending: HOSPITALIST
Payer: MEDICARE

## 2025-04-24 DIAGNOSIS — D50.0 IRON DEFICIENCY ANEMIA DUE TO CHRONIC BLOOD LOSS: ICD-10-CM

## 2025-04-24 DIAGNOSIS — C67.9 MALIGNANT NEOPLASM OF URINARY BLADDER, UNSPECIFIED SITE (CMS/HCC): Primary | ICD-10-CM

## 2025-04-24 DIAGNOSIS — C67.8 MALIGNANT NEOPLASM OF OVERLAPPING SITES OF BLADDER (CMS/HCC): ICD-10-CM

## 2025-04-24 LAB
ANION GAP SERPL CALC-SCNC: 7 MEQ/L (ref 3–15)
ARIA ZRC COURSE ID: NORMAL
ARIA ZRC COURSE START DATE: NORMAL
ARIA ZRC TREATMENT ELAPSED DAYS: NORMAL
ARIA ZRP FRACTIONS TREATED TO DATE: NORMAL
ARIA ZRP PLAN ID: NORMAL
ARIA ZRP PRESCRIBED DOSE CGY: 5000
ARIA ZRP PRESCRIBED DOSE PER FRACTION: 2
ARIA ZRR DOSAGE GIVEN TO DATE: 38
ARIA ZRR DOSAGE GIVEN TO DATE: 39.18
ARIA ZRR DOSAGE GIVEN TO DATE: 54
ARIA ZRR REFERENCE POINT ID: NORMAL
ARIA ZRR SESSION DOSAGE GIVEN: 2
ARIA ZRR SESSION DOSAGE GIVEN: 2
ARIA ZRR SESSION DOSAGE GIVEN: 2.06
BASOPHILS # BLD: 0.02 K/UL (ref 0.01–0.1)
BASOPHILS NFR BLD: 0.3 %
BUN SERPL-MCNC: 34 MG/DL (ref 7–25)
CALCIUM SERPL-MCNC: 8.8 MG/DL (ref 8.6–10.3)
CHLORIDE SERPL-SCNC: 104 MEQ/L (ref 98–107)
CO2 SERPL-SCNC: 25 MEQ/L (ref 21–31)
CREAT SERPL-MCNC: 1.4 MG/DL (ref 0.6–1.2)
DIFFERENTIAL METHOD BLD: ABNORMAL
EGFRCR SERPLBLD CKD-EPI 2021: 39.6 ML/MIN/1.73M*2
EOSINOPHIL # BLD: 0.01 K/UL (ref 0.04–0.36)
EOSINOPHIL NFR BLD: 0.1 %
ERYTHROCYTE [DISTWIDTH] IN BLOOD BY AUTOMATED COUNT: 13.2 % (ref 11.7–14.4)
GLUCOSE SERPL-MCNC: 150 MG/DL (ref 70–99)
HCT VFR BLD AUTO: 25.9 % (ref 35–45)
HGB BLD-MCNC: 8.5 G/DL (ref 11.8–15.7)
IMM GRANULOCYTES # BLD AUTO: 0.31 K/UL (ref 0–0.08)
IMM GRANULOCYTES NFR BLD AUTO: 4 %
LYMPHOCYTES # BLD: 0.58 K/UL (ref 1.2–3.5)
LYMPHOCYTES NFR BLD: 7.6 %
MCH RBC QN AUTO: 28.4 PG (ref 28–33.2)
MCHC RBC AUTO-ENTMCNC: 32.8 G/DL (ref 32.2–35.5)
MCV RBC AUTO: 86.6 FL (ref 83–98)
MONOCYTES # BLD: 0.21 K/UL (ref 0.28–0.8)
MONOCYTES NFR BLD: 2.7 %
NEUTROPHILS # BLD: 6.55 K/UL (ref 1.7–7)
NEUTROPHILS # BLD: 6.55 K/UL (ref 1.7–7)
NEUTS SEG NFR BLD: 85.3 %
NRBC BLD-RTO: 0 %
PLATELET # BLD AUTO: 185 K/UL (ref 150–369)
PMV BLD AUTO: 9.5 FL (ref 9.4–12.3)
POTASSIUM SERPL-SCNC: 3.5 MEQ/L (ref 3.5–5.1)
RBC # BLD AUTO: 2.99 M/UL (ref 3.93–5.22)
SODIUM SERPL-SCNC: 136 MEQ/L (ref 136–145)
WBC # BLD AUTO: 7.68 K/UL (ref 3.8–10.5)

## 2025-04-24 PROCEDURE — 77336 RADIATION PHYSICS CONSULT: CPT | Performed by: RADIOLOGY

## 2025-04-24 PROCEDURE — 80048 BASIC METABOLIC PNL TOTAL CA: CPT | Performed by: HOSPITALIST

## 2025-04-24 PROCEDURE — 36591 DRAW BLOOD OFF VENOUS DEVICE: CPT

## 2025-04-24 PROCEDURE — 85025 COMPLETE CBC W/AUTO DIFF WBC: CPT | Performed by: HOSPITALIST

## 2025-04-24 PROCEDURE — 77386 HC IMRT DELIVERY COMPLEX: CPT | Performed by: RADIOLOGY

## 2025-04-24 RX ORDER — HEPARIN 100 UNIT/ML
500 SYRINGE INTRAVENOUS AS NEEDED
Status: CANCELLED | OUTPATIENT
Start: 2025-04-24

## 2025-04-24 RX ORDER — HEPARIN 100 UNIT/ML
500 SYRINGE INTRAVENOUS AS NEEDED
Status: DISCONTINUED | OUTPATIENT
Start: 2025-04-24 | End: 2025-04-25 | Stop reason: HOSPADM

## 2025-04-24 RX ADMIN — HEPARIN 500 UNITS: 100 SYRINGE at 09:09

## 2025-04-24 NOTE — PROGRESS NOTES
Pt arrived for pre-tx lab work. LCW Port accessed, +BR, de-accessed with heparin. Labs sent. Pt scheduled out appropriately.

## 2025-04-25 ENCOUNTER — OFFICE VISIT (OUTPATIENT)
Dept: HEMATOLOGY/ONCOLOGY | Facility: CLINIC | Age: 75
End: 2025-04-25
Attending: HOSPITALIST
Payer: MEDICARE

## 2025-04-25 ENCOUNTER — HOSPITAL ENCOUNTER (OUTPATIENT)
Dept: HEMATOLOGY/ONCOLOGY | Facility: HOSPITAL | Age: 75
Setting detail: INFUSION SERIES
Discharge: HOME | End: 2025-04-25
Attending: HOSPITALIST
Payer: MEDICARE

## 2025-04-25 ENCOUNTER — HOSPITAL ENCOUNTER (OUTPATIENT)
Dept: RADIATION ONCOLOGY | Facility: HOSPITAL | Age: 75
Setting detail: RADIATION/ONCOLOGY SERIES
Discharge: HOME | End: 2025-04-25
Attending: RADIOLOGY
Payer: MEDICARE

## 2025-04-25 VITALS
TEMPERATURE: 97.9 F | HEART RATE: 89 BPM | DIASTOLIC BLOOD PRESSURE: 72 MMHG | OXYGEN SATURATION: 99 % | SYSTOLIC BLOOD PRESSURE: 161 MMHG | RESPIRATION RATE: 18 BRPM

## 2025-04-25 VITALS
OXYGEN SATURATION: 100 % | HEART RATE: 101 BPM | BODY MASS INDEX: 26.59 KG/M2 | TEMPERATURE: 96.2 F | HEIGHT: 67 IN | SYSTOLIC BLOOD PRESSURE: 145 MMHG | DIASTOLIC BLOOD PRESSURE: 75 MMHG | WEIGHT: 169.4 LBS

## 2025-04-25 DIAGNOSIS — C67.8 MALIGNANT NEOPLASM OF OVERLAPPING SITES OF BLADDER (CMS/HCC): ICD-10-CM

## 2025-04-25 DIAGNOSIS — C67.9 MALIGNANT NEOPLASM OF URINARY BLADDER, UNSPECIFIED SITE (CMS/HCC): Primary | ICD-10-CM

## 2025-04-25 DIAGNOSIS — R42 LIGHT HEADED: ICD-10-CM

## 2025-04-25 DIAGNOSIS — D50.0 IRON DEFICIENCY ANEMIA DUE TO CHRONIC BLOOD LOSS: ICD-10-CM

## 2025-04-25 DIAGNOSIS — R42 LIGHT HEADEDNESS: ICD-10-CM

## 2025-04-25 DIAGNOSIS — L27.0 DRUG RASH: ICD-10-CM

## 2025-04-25 LAB
ABO + RH BLD: NORMAL
ARIA ZRC COURSE ID: NORMAL
ARIA ZRC COURSE START DATE: NORMAL
ARIA ZRC TREATMENT ELAPSED DAYS: NORMAL
ARIA ZRP FRACTIONS TREATED TO DATE: NORMAL
ARIA ZRP PLAN ID: NORMAL
ARIA ZRP PRESCRIBED DOSE CGY: 5000
ARIA ZRP PRESCRIBED DOSE PER FRACTION: 2
ARIA ZRR DOSAGE GIVEN TO DATE: 40
ARIA ZRR DOSAGE GIVEN TO DATE: 41.24
ARIA ZRR DOSAGE GIVEN TO DATE: 56
ARIA ZRR REFERENCE POINT ID: NORMAL
ARIA ZRR SESSION DOSAGE GIVEN: 2
ARIA ZRR SESSION DOSAGE GIVEN: 2
ARIA ZRR SESSION DOSAGE GIVEN: 2.06
BLD GP AB SCN SERPL QL: NEGATIVE
D AG BLD QL: POSITIVE
LABORATORY COMMENT REPORT: NORMAL
SPECIMEN EXP DATE BLD: NORMAL

## 2025-04-25 PROCEDURE — 63600000 HC DRUGS/DETAIL CODE: Mod: JZ | Performed by: HOSPITALIST

## 2025-04-25 PROCEDURE — 36415 COLL VENOUS BLD VENIPUNCTURE: CPT

## 2025-04-25 PROCEDURE — 77386 HC IMRT DELIVERY COMPLEX: CPT | Performed by: RADIOLOGY

## 2025-04-25 PROCEDURE — P9016 RBC LEUKOCYTES REDUCED: HCPCS

## 2025-04-25 PROCEDURE — 96367 TX/PROPH/DG ADDL SEQ IV INF: CPT

## 2025-04-25 PROCEDURE — 36591 DRAW BLOOD OFF VENOUS DEVICE: CPT

## 2025-04-25 PROCEDURE — 36430 TRANSFUSION BLD/BLD COMPNT: CPT

## 2025-04-25 PROCEDURE — 25800000 HC PHARMACY IV SOLUTIONS: Performed by: HOSPITALIST

## 2025-04-25 PROCEDURE — 99214 OFFICE O/P EST MOD 30 MIN: CPT | Performed by: HOSPITALIST

## 2025-04-25 PROCEDURE — 86850 RBC ANTIBODY SCREEN: CPT

## 2025-04-25 PROCEDURE — 96413 CHEMO IV INFUSION 1 HR: CPT

## 2025-04-25 RX ORDER — DIPHENHYDRAMINE HCL 50 MG/ML
12.5 VIAL (ML) INJECTION ONCE AS NEEDED
Status: CANCELLED | OUTPATIENT
Start: 2025-04-25

## 2025-04-25 RX ORDER — HEPARIN 100 UNIT/ML
500 SYRINGE INTRAVENOUS AS NEEDED
Status: DISCONTINUED | OUTPATIENT
Start: 2025-04-25 | End: 2025-04-26 | Stop reason: HOSPADM

## 2025-04-25 RX ORDER — SODIUM CHLORIDE 9 MG/ML
5 INJECTION, SOLUTION INTRAVENOUS AS NEEDED
Status: DISCONTINUED | OUTPATIENT
Start: 2025-04-25 | End: 2025-04-26 | Stop reason: HOSPADM

## 2025-04-25 RX ORDER — HEPARIN 100 UNIT/ML
500 SYRINGE INTRAVENOUS AS NEEDED
Status: CANCELLED | OUTPATIENT
Start: 2025-04-25

## 2025-04-25 RX ORDER — FAMOTIDINE 10 MG/ML
20 INJECTION, SOLUTION INTRAVENOUS ONCE AS NEEDED
Status: CANCELLED | OUTPATIENT
Start: 2025-04-25

## 2025-04-25 RX ORDER — SODIUM CHLORIDE 9 MG/ML
5 INJECTION, SOLUTION INTRAVENOUS AS NEEDED
Status: CANCELLED | OUTPATIENT
Start: 2025-04-25 | End: 2025-04-26

## 2025-04-25 RX ADMIN — HEPARIN 500 UNITS: 100 SYRINGE at 14:06

## 2025-04-25 RX ADMIN — GEMCITABINE 52 MG: 38 INJECTION, SOLUTION INTRAVENOUS at 10:35

## 2025-04-25 RX ADMIN — DEXAMETHASONE SODIUM PHOSPHATE: 4 INJECTION, SOLUTION INTRA-ARTICULAR; INTRALESIONAL; INTRAMUSCULAR; INTRAVENOUS; SOFT TISSUE at 10:06

## 2025-04-25 ASSESSMENT — ENCOUNTER SYMPTOMS
FATIGUE: 1
SHORTNESS OF BREATH: 1

## 2025-04-25 ASSESSMENT — PAIN SCALES - GENERAL: PAINLEVEL_OUTOF10: 0-NO PAIN

## 2025-04-25 NOTE — PROGRESS NOTES
Pt seen and cleared for D25 C1 tx by Dr. Soria.  Port accessed +BR noted.  T&S drawn and sent.  Pt received and tolerated Gemzar infusion.  Pt transfused with 1 unit of PRBC without incident.  AVS declined.  Port flushed and de accessed.  Pt left ambulatory.

## 2025-04-25 NOTE — PROGRESS NOTES
Hematology/Oncology -  [facility]  Follow up Progress Note       Gladys Woo is a 75 y.o. female,   :  1950  REFERRING PHYSICIAN:   Wenceslao Soria MD  [address]  [address]  [place] [address]  PRIMARY CARE PROVIDER:  Zoe Sanders MD    Encounter Diagnoses   Name Primary?    Malignant neoplasm of urinary bladder, unspecified site (CMS/HCC) Yes    Malignant neoplasm of overlapping sites of bladder (CMS/HCC)     Iron deficiency anemia due to chronic blood loss     [facility]        [facility] Team    Surgical:   Radiation: [facility]  Med Onc: [facility]ella    Staging       Cancer Staging   Bladder cancer (CMS/HCC)  Staging form: Urinary Bladder, AJCC 8th Edition  - Clinical: Stage IIIA (cT3, cN0, cM0) - Signed by [facility], Gregory J, MD on 2025        Treatment Plan     Treatment Plans       Name Type Plan Dates Plan Provider         Active    CISplatin with Concurrent Radiation, 7 Day Cycles - bladder cancer ONCOLOGY TREATMENT 3/5/2025 - Present Wenceslao Soria MD                        Oncology History     Oncology History Overview Note   Gladys Woo is a very pleasant 75 y.o. patient with PMHx anxiety and hyperlipidemia.  She noticed some blood in her underwear since 2024.  She was initially treated for vagina dystrophy with no success. She was examined and no evidence of GYN tumor and eventually noted to have bleeding from urethra.  Patient seen by Dr. Jimenez who ordered a CT urogram which revealed a 8.6 cm irregular urinary bladder mass centered along the right posterior lateral bladder wall.  There is stranding in the right extraperitoneal space and right retroperitoneum along the course of the distalmost right ureter which may be from obstructive physiology without extension of neoplasm beyond the urinary bladder is theoretically possible.  Moderate right hydronephrosis to the level of the right UVJ's secondary to mass.  In addition an intermediate left  adrenal nodule was noted for which MRI was suggested.  Scattered subcentimeter sclerotic foci nonspecific but likely bony islands.  Bone scan suggested to rule out metastatic disease.  Patient presented last month to [facility] emergency department and CAT scan of the abdomen and pelvis revealed a large bladder mass with moderate right hydronephrosis and hydroureter.  CT angiogram chest abdomen pelvis showed no evidence of metastatic disease.  Creatinine at the time was elevated 1.7.  On 1/30/2025 Dr. Jimenez performed TURBT.  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  Suspicious for lymphovascular invasion.  Muscle invasion present.  Patient has not noted further bleeding per urethra. Patient met with Dr. Corona medical oncology to discuss neoadjuvant chemotherapy prior to cystectomy.  Patient wants to have bladder preserving strategy.    She went to [place] and saw Dr. Patel with recommendation of neoadjuvant chemotherapy, followed up by surgery.    She is aware of her options but still want to try chemoradiation at this point.     Bladder cancer (CMS/[facility])   2/25/2025 Cancer Staged    Staging form: Urinary Bladder, AJCC 8th Edition  - Clinical: Stage IIIA (cT3, cN0, cM0)     3/6/2025 -  Chemotherapy    GENERAL INFUSION ORDERS FOR HYDRATION  Plan Provider: Wenceslao Soria MD     3/18/2025 -  Chemotherapy    MEDIPORT FLUSH (SINGLE LUMEN) - PATIENT ON TREATMENT  Plan Provider: Wenceslao Soria MD     3/19/2025 - 3/19/2025 Chemotherapy    CISplatin with Concurrent Radiation, 7 Day Cycles - bladder cancer  Plan Provider: Wenceslao Sroia MD  Treatment goal: Curative  Line of treatment: First Line     4/1/2025 -  Chemotherapy    Gemcitabine, twice a week with radiation therapy - Bladder  Plan Provider: Wenceslao Soria MD  Treatment goal: Curative  Line of treatment: First Line           History Of Present Illness     Gladys Woo is a very pleasant  "75 y.o. patient with PMHx of  Bladder cancer, stage III, T3N0M0, who is here for concurrent chemoradiation therapy.    She is here alone.      Pt seen by Urology on 4/21/2025.  Pt states her urine is clear and free from infection or blood.  Pt will follow-up in 6 weeks after last treatment.  Pt will have cystoscopy and bladder wash.  Results will determine any further treatment.      Lab reviewed.    Skin rash continues to improve.  She has lightheadedness in the last couple of days and feels dizzy when she stands up.  She has been drinking at least 64 ounces of fluid every day.    Fatigue remains.                  Gladys Woo is seen today for follow up.       ROS     Review of Systems   Constitutional:  Positive for fatigue.   Respiratory:  Positive for shortness of breath.    All other systems reviewed and are negative.    Review of Systems:  Nursing assessment reviewed. Pertinent positive and negative symptoms noted in HPI, all others negative.    PHYSICAL EXAMINATION     Temp:  [35.7 °C (96.2 °F)] 35.7 °C (96.2 °F)  Heart Rate:  [101] 101  BP: (145)/(75) 145/75  Visit Vitals  BP (!) 145/75 (BP Location: Right upper arm, Patient Position: Sitting)   Pulse (!) 101   Temp (!) 35.7 °C (96.2 °F) (Temporal)   Ht 1.702 m (5' 7\")   Wt 76.8 kg (169 lb 6.4 oz)   SpO2 100%   BMI 26.53 kg/m²       Physical Exam  Vitals reviewed.   Constitutional:       Appearance: She is not ill-appearing.   Cardiovascular:      Rate and Rhythm: Regular rhythm. Tachycardia present.      Heart sounds: No murmur heard.  Pulmonary:      Effort: Pulmonary effort is normal. No respiratory distress.      Breath sounds: Normal breath sounds. No wheezing.   Abdominal:      Palpations: Abdomen is soft.   Musculoskeletal:         General: No swelling or tenderness.   Skin:     Coloration: Skin is not jaundiced or pale.      Findings: Rash present.      Comments: Papular rash around her chest, down to her groin area, and her back no " blisters.  No secretion.    2025: Her skin rash is more flat, greatly improving.   Neurological:      General: No focal deficit present.      Mental Status: She is alert.   Psychiatric:         Mood and Affect: Mood normal.           Past Medical History     Past Medical History[1]    Past Surgical History     Past Surgical History   Procedure Laterality Date    A-v cardiac pacemaker insertion      Colonoscopy      CYSTO, TURBT TRANS-URETHRAL RESECTION BLADDER TUMOR N/A 2025    Performed by Eugenio Jimenez MD at  OR    Insert / replace / remove pacemaker      Knee arthroscopy w/ meniscal repair      Tonsillectomy         OB/GYN History     OB History    Para Term  AB Living   3 1   2    SAB IAB Ectopic Multiple Live Births   2          # Outcome Date GA Lbr Franck/2nd Weight Sex Type Anes PTL Lv   3 SAB            2 SAB            1 Para              Gynecologic History:  Age at menarche: No age on file  Age at first live birth: No age on file   Age at menopause: No age on file    Social History     Social History     Tobacco Use    Smoking status: Former     Types: Cigarettes    Smokeless tobacco: Never   Substance Use Topics    Alcohol use: Yes     Alcohol/week: 2.0 standard drinks of alcohol     Types: 2 Glasses of wine per week     Comment: socially    Drug use: Never       Family History     Family History   Problem Relation Name Age of Onset    Heart disease Biological Mother      Diabetes Biological Mother      Heart disease Biological Father      Coronary artery disease Biological Father      Liver cancer Biological Brother      Prostate cancer Biological Brother         Allergies     Amoxicillin    Medications     Current Outpatient Medications   Medication Sig Dispense Refill    acetaminophen (TYLENOL) 500 mg tablet Take 500 mg by mouth every 6 (six) hours as needed for pain.      busPIRone (BUSPAR) 5 mg tablet Take 5 mg by mouth daily.      cranberry conc-C-bacillus coag (AZO  CRANBERRY + PROBIOTIC) 250-30-15 mg tablet Take 1 tablet by mouth nightly.      diphenhydrAMINE (BENADRYL) 25 mg capsule Take 25 mg by mouth every 6 (six) hours as needed for itching.      hydrocortisone 1 % cream Apply 1 Application topically 2 (two) times a day. 30 g 0    ondansetron (ZOFRAN) 8 mg tablet Take 1 tablet (8 mg total) by mouth every 8 (eight) hours as needed for nausea or vomiting. 30 tablet 3    predniSONE (DELTASONE) 20 mg tablet Take 2 tablets (40 mg total) by mouth daily for 10 days. 20 tablet 0    prochlorperazine (COMPAZINE) 10 mg tablet Take 1 tablet (10 mg total) by mouth every 6 (six) hours as needed for nausea or vomiting. 30 tablet 3    rosuvastatin (CRESTOR) 40 mg tablet Take 40 mg by mouth daily.      nitrofurantoin, macrocrystal-monohydrate, (MACROBID) 100 mg capsule Take 1 capsule (100 mg total) by mouth once daily. (Patient not taking: Reported on 4/25/2025) 30 capsule 0     No current facility-administered medications for this visit.     Facility-Administered Medications Ordered in Other Visits   Medication Dose Route Frequency Provider Last Rate Last Admin    gemcitabine (GEMZAR) 52 mg in sodium chloride 0.9 % 276.3676 mL chemo IVPB  27 mg/m2 intravenous Once EstellaWenceslao MD        ondansetron (ZOFRAN) 8 mg, dexAMETHasone (DECADRON) 8 mg in sodium chloride 0.9 % 50 mL IVPB   intravenous Once Estella, MD Wenceslao              Labs     Lab Results   Component Value Date    WBC 7.68 04/24/2025    HGB 8.5 (L) 04/24/2025    HCT 25.9 (L) 04/24/2025    MCV 86.6 04/24/2025     04/24/2025       Lab Results   Component Value Date    GLUCOSE 150 (H) 04/24/2025    CALCIUM 8.8 04/24/2025     04/24/2025    K 3.5 04/24/2025    CO2 25 04/24/2025     04/24/2025    BUN 34 (H) 04/24/2025    CREATININE 1.4 (H) 04/24/2025       Lab Results   Component Value Date    ALT 34 04/02/2025    AST 11 (L) 04/02/2025    ALKPHOS 78 04/02/2025    BILITOT 0.2 (L) 04/02/2025       Lab Results   Component  "Value Date    IRON <10 (L) 03/19/2025    TIBC 231 (L) 03/19/2025    FERRITIN 313 (H) 03/19/2025       No results found for: \"SPEP\", \"UPEP\"    No results found for: \"PTT\"    Lab Results   Component Value Date    INR 1.1 03/13/2025       No results found for: \"FUBNXBGD43\"    No results found for: \"FOLATE\"    No results found for: \"RETICCTPCT\"    Lab Results   Component Value Date    DDIMER 1.16 (H) 01/24/2025         Pathology     Pathology Results       ** No results found for the last 720 hours. **            Radiology     No new Radiology.  IR PORT PLACEMENT  IR PORT PLACEMENT, ULTRASOUND GUIDED VASCULAR ACCESS  Result Date: 3/13/2025  Narrative: CLINICAL HISTORY:    Bladder cancer     Impression: IMPRESSION: Successful placement of a right IJ CT Injectable chest port with ultrasound and fluoroscopic guidance, which is ready for use. Device: Pinpointe PowerPort COMMENT: PROCEDURE:  Right IJ chest port placement, under ultrasound and fluoroscopic guidance. RADIOLOGIST:  Quang Torres MD ANESTHESIA : Lidocaine 1% CONTRAST:  None. MEDICATIONS: Vancomycin 1.25 gm IV REFERENCE AIR KERMA: 14 mGy Versed 1 mg IV and Fentanyl 100 mcg IV were administered intravenously for moderate sedation, under the supervision of the physician.  Pulse oximetry, heart rate and blood pressure were continuously monitored by the trained nursing staff present.  The physician spent a total of 30 minutes of face to face sedation time with the patient. All the procedural conscious sedation guidelines were followed and documented including the Mallampati airway assessment, confirmation of NPO status, anesthesia history and  ASA classification.. DESCRIPTION OF PROCEDURE:  Written informed consent was obtained following explanation of risks and benefits of the procedure. The right neck and chest were draped and prepped in the usual sterile manner. The right internal jugular vein was noted to be compressible and patent on gray scale ultrasound. Under " real-time grayscale ultrasound, the internal jugular vein was cannulated with a 21-gauge micropuncture needle following local anesthesia and a skin nick. An ultrasound-guided access image was saved to PACS. An 0.018 wire was advanced. The needle was exchanged for a 5 Lao micropuncture catheter. The inner catheter and wire were removed, and an 0.035 Amplatz wire was advanced under fluoroscopic guidance into the IVC and secured with a flow-switch. An appropriate port site was selected in the upper chest and the area was anesthetized with Lidocaine. A 3 centimeter horizontal incision was created and blunt dissection was performed superiorly to create a port pocket. Hemostasis was achieved. A subcutaneous tunnel was then created to the IJ entry site following local anesthesia, with blunt dissection. An 8F Bard PowerPort catheter was advanced through the tunnel. The port was tested demonstrating no leakage. A peel-away sheath was advanced over the Amplatz wire, and the catheter was advanced through the peel-away sheath and positioned within the SVC/right atrial junction. The peel-away sheath was removed. The port catheter was appropriately sized and connected to the port. The port was successfully positioned within the pocket with the distal catheter tip terminating at the cavoatrial junction. The port was tested demonstrating appropriate flush and aspiration. The port was then flushed with Heparin following good blood return and was subsequently deaccessed. . Tissues superficial to the port were closed with interrupted deep 2-0 Vicryl suture and running subcutaneous 4-0 Vicryl suture. Skin closure at the internal jugular vein access site was achieved with Dermabond adhesive. Dermabond was applied to the pocket incision site. The patient was discharged in good condition. Performance Met:  PQRS MEASURE 76, CPT II 6030F:  All elements of maximal sterile technique were utilized, including cap, mask, sterile gown, sterile  gloves, and sterile drape.  Additionally, sterile ultrasound techniques were followed, including use of sterile probe cover and sterile ultrasound gel. Ultrasound-guided vascular access - Grayscale ultrasound evaluation demonstrated the right  internal jugular vein to be patent. The right internal jugular vein was accessed under real-time ultrasound access. An access image was saved/archived to PACS. The number of guidewires used, and their integrity, was confirmed at the end of the procedure.      Assessment and Plan     In summary, Gladys Woo is a very pleasant 75 y.o. patient with PMHx of anxiety and dyslipidemia.     # Bladder cancer, stage III, T3N0M0. 1/30/3025 she had Transurethral resection of bladder tumor (8cm). OP note:  Cystoscopic evaluation demonstrated a large and invasive appearing tumor encompassing the entirety of the trigone and left lateral wall measuring at least 8 cm.  A monopolar working element was inserted and the tumor was then  systematically resected down to the level of the normal bladder wall.  There did appear to be a large volume of bladder wall invasion visually.  The bladder tumor specimens were then irrigated from the bladder and repeat cystoscopy demonstrated no evidence of hannah perforation.  - need to complete workup with MRI to better evaluate the left adrenal gland lesion.  Because she has no bone pain we can skip nuclear medicine bone scan at this point.  - per current NCCN guideline, optimal candidates for bladder preservation with chemoradiotherapy include patients with tumors that present without moderate/severe hydronephrosis, are without concurrent extensive or multifocal Tis, and are <6 cm. Ideally, tumors should allow a visually complete or maximally debulking TURBT. Apparently she has no other lesions based on operating note.  Her creatinine was improving.  Based on operation note her cancer is 8 cm, bigger than the 6 cm recommendation.  So I did have a  discussion of neoadjuvant chemotherapy followed by radical cystectomy.  She is not interested in radical cystectomy.  Indeed Dr. Corona and Dr. Patel talked to her about this too.    - For patients receiving concurrent chemoradiation, the optimal radiosensitizing chemotherapy regimen is not established, as many of these regimens have not been directly compared in randomized trials. Selection of chemotherapy is based upon patient performance status and comorbidities, kidney function, and provider experience.   - With her overall fit status and Cr 1.2, we started c1 with cisplatin 20mg/m2 (3/19/2025) but she had FIDELIA and syncope. As such, we changed chemo to low-dose biweekly gemcitabine (27 mg/m2 twice per week with daily RT) and so far she is tolerating it well. C1D1=4/1/2025.     Citation: Bladder Preservation With Twice-a-Day Radiation Plus Fluorouracil/Cisplatin or Once Daily Radiation Plus Gemcitabine for Muscle-Invasive Bladder Cancer: NRG/RTOG 0712-A Randomized Phase II Trial. JCO 2018.     #Drug rash likely due to Macrobid which she started on 4/8.    - Currently off antibiotics.  She has no signs or symptoms of infection.  - Skin rash continues to improve.  - She has 2 more days of steroids.    #Normocytic anemia.  - s/p IV Monoferric  - received PRBC to keep HGB >8.  - Hemoglobin 8.5 yesterday.    #Tachycardia and lightheadedness.  Patient had a similar episode and had a syncope had to be admitted.     # Fatigue: due to chemo and anemia  - Encourage hydration, exercise as tolerated.    # UTI:   - Currently off Macrobid due to rash  - [facility] asked for repeat urine culture at [facility]. This can be done as needed.     04/25/25  - ok for chemo twice a week, no dose adjustment.  - MD visit in 1 week or sooner as needed.  - LAB: CBC/BMP twice a week  - Because of her tachycardia and lightheadedness, patient is instructed to change positions slowly.  Will give her 1 unit of PRBC today for symptomatic anemia.  She has  been drinking at least 64 ounces of fluid.  I will hold on IV fluid today.  -Patient should get an MRI done to evaluate left adrenal nodule.        No problem-specific Assessment & Plan notes found for this encounter.      Wenceslao Soria MD/PhD  Hematology/Oncology         [1]   Past Medical History:  Diagnosis Date    Bladder cancer (CMS/HCC)     Coronary artery disease     KERRI (generalized anxiety disorder)     TREVOR (obstructive sleep apnea)

## 2025-04-28 ENCOUNTER — RAD ONC OTV (OUTPATIENT)
Dept: RADIATION ONCOLOGY | Facility: HOSPITAL | Age: 75
End: 2025-04-28
Payer: MEDICARE

## 2025-04-28 ENCOUNTER — RESULTS FOLLOW-UP (OUTPATIENT)
Dept: HEMATOLOGY/ONCOLOGY | Facility: CLINIC | Age: 75
End: 2025-04-28

## 2025-04-28 ENCOUNTER — HOSPITAL ENCOUNTER (OUTPATIENT)
Dept: RADIATION ONCOLOGY | Facility: HOSPITAL | Age: 75
Setting detail: RADIATION/ONCOLOGY SERIES
Discharge: HOME | End: 2025-04-28
Attending: RADIOLOGY
Payer: MEDICARE

## 2025-04-28 ENCOUNTER — HOSPITAL ENCOUNTER (OUTPATIENT)
Dept: HEMATOLOGY/ONCOLOGY | Facility: HOSPITAL | Age: 75
Setting detail: INFUSION SERIES
Discharge: HOME | End: 2025-04-28
Attending: HOSPITALIST
Payer: MEDICARE

## 2025-04-28 VITALS
SYSTOLIC BLOOD PRESSURE: 130 MMHG | OXYGEN SATURATION: 99 % | WEIGHT: 171.2 LBS | BODY MASS INDEX: 26.81 KG/M2 | DIASTOLIC BLOOD PRESSURE: 60 MMHG | HEART RATE: 84 BPM

## 2025-04-28 DIAGNOSIS — D50.0 IRON DEFICIENCY ANEMIA DUE TO CHRONIC BLOOD LOSS: ICD-10-CM

## 2025-04-28 DIAGNOSIS — C67.9 MALIGNANT NEOPLASM OF URINARY BLADDER, UNSPECIFIED SITE (CMS/HCC): Primary | ICD-10-CM

## 2025-04-28 DIAGNOSIS — C67.8 MALIGNANT NEOPLASM OF OVERLAPPING SITES OF BLADDER (CMS/HCC): Primary | ICD-10-CM

## 2025-04-28 DIAGNOSIS — C67.8 MALIGNANT NEOPLASM OF OVERLAPPING SITES OF BLADDER (CMS/HCC): ICD-10-CM

## 2025-04-28 LAB
ANION GAP SERPL CALC-SCNC: 7 MEQ/L (ref 3–15)
ARIA ZRC COURSE ID: NORMAL
ARIA ZRC COURSE START DATE: NORMAL
ARIA ZRC TREATMENT ELAPSED DAYS: NORMAL
ARIA ZRP FRACTIONS TREATED TO DATE: NORMAL
ARIA ZRP PLAN ID: NORMAL
ARIA ZRP PRESCRIBED DOSE CGY: 5000
ARIA ZRP PRESCRIBED DOSE PER FRACTION: 2
ARIA ZRR DOSAGE GIVEN TO DATE: 42
ARIA ZRR DOSAGE GIVEN TO DATE: 43.3
ARIA ZRR DOSAGE GIVEN TO DATE: 58
ARIA ZRR REFERENCE POINT ID: NORMAL
ARIA ZRR SESSION DOSAGE GIVEN: 2
ARIA ZRR SESSION DOSAGE GIVEN: 2
ARIA ZRR SESSION DOSAGE GIVEN: 2.06
BASOPHILS # BLD: 0.03 K/UL (ref 0.01–0.1)
BASOPHILS NFR BLD: 0.4 %
BUN SERPL-MCNC: 36 MG/DL (ref 7–25)
CALCIUM SERPL-MCNC: 8.9 MG/DL (ref 8.6–10.3)
CHLORIDE SERPL-SCNC: 106 MEQ/L (ref 98–107)
CO2 SERPL-SCNC: 25 MEQ/L (ref 21–31)
CREAT SERPL-MCNC: 1.5 MG/DL (ref 0.6–1.2)
DIFFERENTIAL METHOD BLD: ABNORMAL
EGFRCR SERPLBLD CKD-EPI 2021: 36.4 ML/MIN/1.73M*2
EOSINOPHIL # BLD: 0.16 K/UL (ref 0.04–0.36)
EOSINOPHIL NFR BLD: 2.2 %
ERYTHROCYTE [DISTWIDTH] IN BLOOD BY AUTOMATED COUNT: 13.7 % (ref 11.7–14.4)
GLUCOSE SERPL-MCNC: 99 MG/DL (ref 70–99)
HCT VFR BLD AUTO: 31 % (ref 35–45)
HGB BLD-MCNC: 10 G/DL (ref 11.8–15.7)
IMM GRANULOCYTES # BLD AUTO: 0.23 K/UL (ref 0–0.08)
IMM GRANULOCYTES NFR BLD AUTO: 3.2 %
LYMPHOCYTES # BLD: 0.5 K/UL (ref 1.2–3.5)
LYMPHOCYTES NFR BLD: 7 %
MCH RBC QN AUTO: 28.6 PG (ref 28–33.2)
MCHC RBC AUTO-ENTMCNC: 32.3 G/DL (ref 32.2–35.5)
MCV RBC AUTO: 88.6 FL (ref 83–98)
MONOCYTES # BLD: 0.21 K/UL (ref 0.28–0.8)
MONOCYTES NFR BLD: 2.9 %
NEUTROPHILS # BLD: 6.03 K/UL (ref 1.7–7)
NEUTROPHILS # BLD: 6.03 K/UL (ref 1.7–7)
NEUTS SEG NFR BLD: 84.3 %
NRBC BLD-RTO: 0 %
PLATELET # BLD AUTO: 202 K/UL (ref 150–369)
PMV BLD AUTO: 9.4 FL (ref 9.4–12.3)
POTASSIUM SERPL-SCNC: 3.4 MEQ/L (ref 3.5–5.1)
RBC # BLD AUTO: 3.5 M/UL (ref 3.93–5.22)
SODIUM SERPL-SCNC: 138 MEQ/L (ref 136–145)
WBC # BLD AUTO: 7.16 K/UL (ref 3.8–10.5)

## 2025-04-28 PROCEDURE — 85025 COMPLETE CBC W/AUTO DIFF WBC: CPT | Performed by: HOSPITALIST

## 2025-04-28 PROCEDURE — 80048 BASIC METABOLIC PNL TOTAL CA: CPT | Performed by: HOSPITALIST

## 2025-04-28 PROCEDURE — 36591 DRAW BLOOD OFF VENOUS DEVICE: CPT

## 2025-04-28 PROCEDURE — 77386 HC IMRT DELIVERY COMPLEX: CPT | Performed by: RADIOLOGY

## 2025-04-28 RX ORDER — HEPARIN 100 UNIT/ML
500 SYRINGE INTRAVENOUS AS NEEDED
Status: CANCELLED | OUTPATIENT
Start: 2025-04-28

## 2025-04-28 RX ORDER — HEPARIN 100 UNIT/ML
500 SYRINGE INTRAVENOUS AS NEEDED
Status: DISCONTINUED | OUTPATIENT
Start: 2025-04-28 | End: 2025-04-29 | Stop reason: HOSPADM

## 2025-04-28 RX ADMIN — HEPARIN 500 UNITS: 100 SYRINGE at 08:45

## 2025-04-28 ASSESSMENT — PAIN SCALES - GENERAL: PAINLEVEL_OUTOF10: 0-NO PAIN

## 2025-04-28 NOTE — RADIATION TREATMENT MANAGEMENT
Patient ID:   Gladys Woo  1950  [id number]   Diagnosis Plan   1. Malignant neoplasm of overlapping sites of bladder (CMS/HCC)           Referring Physician:   No referring provider defined for this encounter.  Primary Care Provider:  Zoe Sanders MD     Problem List:  Patient Active Problem List    Diagnosis Date Noted    Light headed 04/25/2025    Drug rash 04/17/2025    Neoplastic malignant related fatigue 04/11/2025    Acute unilateral obstructive uropathy 03/28/2025    Acute cystitis without hematuria 03/26/2025    Sepsis (CMS/[facility]) 03/25/2025    Hypomagnesemia 03/25/2025    Syncope and collapse 03/24/2025    Iron deficiency anemia due to chronic blood loss 03/20/2025    Chronic kidney disease 01/31/2025    Constipation 01/27/2025    Anemia 01/26/2025    Bladder cancer (CMS/[facility]) 01/25/2025    Bladder mass 01/25/2025    FIDELIA (acute kidney injury) (CMS/HCC) 01/25/2025    Abnormal finding of diagnostic imaging 01/24/2025    Hemorrhoids 01/24/2025    Recurrent major depressive disorder in partial remission (CMS/[facility]) 01/24/2025    SVT (supraventricular tachycardia) (CMS/HCC) 01/24/2025    [place]ail hematuria 12/10/2024    Obstructive sleep apnea 11/22/2024    Arrhythmia 10/01/2024    Generalized anxiety disorder with panic attacks 10/01/2024    Hyperlipemia 10/01/2024    Major depressive disorder 10/01/2024    Pacemaker 10/24/2019       Cancer Staging   Bladder cancer (CMS/HCC)  Staging form: Urinary Bladder, AJCC 8th Edition  - Clinical: Stage IIIA (cT3, cN0, cM0) - Signed by Ochsner, Gregory J, MD on 2/25/2025      TREATMENT PLAN SUMMARY:  Radiation Treatments       Active   Plans   1b_Pelvis   Most recent treatment: Dose planned: 200 cGy (fraction 21 of 25 on 4/28/2025)   Total: Dose planned: 5,000 cGy   Elapsed Days: 41 days as of 2025-04-28 @ 08:2144      Reference Points   1b_Pelvis   Most recent treatment: Dose given: 200 cGy (on 4/28/2025)   Total: Dose given: 4,200 cGy   Elapsed Days: 41 days  as of 2025-04-28 @ 08:2144      1b_calc   Most recent treatment: Dose given: 206 cGy (on 4/28/2025)   Total: Dose given: 4,330 cGy   Elapsed Days: 41 days as of 2025-04-28 @ 08:2144      1c_Trgt total   Most recent treatment: Dose given: 200 cGy (on 4/28/2025)   Total: Dose given: 5,800 cGy   Elapsed Days: 41 days as of 2025-04-28 @ 08:2144           Historical   Plans   PS_a-partial bladder_GTV [1a_PartBldr]   Most recent treatment: Dose planned: 200 cGy (fraction 8 of 8 on 3/28/2025)   Total: Dose planned: 1,600 cGy   Elapsed Days: 10 days as of 2025-03-28 @ 08:3308      PS1   Most recent treatment: Dose planned: 200 cGy (fraction 0 of 25 on 3/11/2025)   Total: Dose planned: 5,000 cGy   Elapsed Days: : @ --      PS2   Most recent treatment: Dose planned: 200 cGy (fraction 0 of 25 on 3/11/2025)   Total: Dose planned: 5,000 cGy   Elapsed Days: : @ --      PS_GTV   Most recent treatment: Dose planned: 200 cGy (fraction 0 of 8 on 3/11/2025)   Total: Dose planned: 1,600 cGy   Elapsed Days: : @ --      Reference Points   1a_Part Bladder   Most recent treatment: Dose given: 200 cGy (on 3/28/2025)   Total: Dose given: 1,600 cGy   Elapsed Days: 10 days as of 2025-03-28 @ 08:3308      1a_calc   Most recent treatment: Dose given: 201 cGy (on 3/28/2025)   Total: Dose given: 1,608 cGy   Elapsed Days: 10 days as of 2025-03-28 @ 08:3308      1a_Part Bladder   Most recent treatment: Course completed on 3/11/2025   Total: Dose given: 0 cGy   Elapsed Days: : @ --      1a_calc   Most recent treatment: Course completed on 3/11/2025   Total: Dose given: 0 cGy   Elapsed Days: : @ --      1b_Pelvis   Most recent treatment: Course completed on 3/11/2025   Total: Dose given: 0 cGy   Elapsed Days: : @ --      1b_calc   Most recent treatment: Course completed on 3/11/2025   Total: Dose given: 0 cGy   Elapsed Days: : @ --                     Subjective good spirits denies urinary symptoms.  Denies signs of infection including flank pain  "fever or chills.  Denies urine frequency or dysuria.  Denies blood per urine.  Denies GI symptoms.  Interval Notes:    Vital Signs for this encounter:  BP: 130/60  Heart Rate: 84  SpO2: 99 %  Weight: 77.7 kg (171 lb 3.2 oz) (04/28 0822)      PAIN ASSESSMENT:  Score Zero    Location    Pain Intervention      TOXICITY ASSESSMENT:  General  Fatigue: Fatigue relieved by rest  Weight Loss: Absent or within normal limits  Anxiety: Absent or within normal limits  Depression: Absent or within normal limits  Gastrointestinal  Abdominal Pain: Absent or within normal limits  Constipation: Absent or within normal limits  Diarrhea: Absent or within normal limits  Nausea: Absent or within normal limits  Renal and Urinary  Hematuria: Absent or within normal limits  Urinary Frequency: Present  Urinary Retention: Absent or within normal limits  Urinary Tract Pain: Absent or within normal limits  Urinary Urgency: Absent or within normal limits  Skin and Subcutaneous Tissue  Dermatitis Radiation: Absent or within normal limits  Pain Assessment  Pain Score: 0-No pain          Objective      Physical Exam:  Physical Exam abdomen soft nontender.  Vital signs stable.  No flank pain noted.  Performance status 90.    LABS:  CMP   Lab Results   Component Value Date    ALBUMIN 3.0 (L) 04/02/2025    CO2 25 04/24/2025    BUN 34 (H) 04/24/2025    CREATININE 1.4 (H) 04/24/2025    GLUCOSE 150 (H) 04/24/2025    AST 11 (L) 04/02/2025    ALT 34 04/02/2025    EGFR 39.6 (L) 04/24/2025     CBC   Lab Results   Component Value Date    WBC 7.68 04/24/2025    HGB 8.5 (L) 04/24/2025    HCT 25.9 (L) 04/24/2025    MCV 86.6 04/24/2025     04/24/2025     Tumor Marker No results found for: \"\", \"\", \"PSA\", \"\", \"CEA\", \"HCGQUANT\"         Assessment/Plan continue definitive radiation therapy with concurrent chemotherapy for clinical stage IIIa bladder cancer.  Patient tolerating treatments extremely well.  Follow-up in 2 to 3 weeks after " completing treatment later this week.    Diagnoses and Orders Associated With This Visit:  There are no diagnoses linked to this encounter.

## 2025-04-29 ENCOUNTER — HOSPITAL ENCOUNTER (OUTPATIENT)
Dept: HEMATOLOGY/ONCOLOGY | Facility: HOSPITAL | Age: 75
Setting detail: INFUSION SERIES
Discharge: HOME | End: 2025-04-29
Attending: HOSPITALIST
Payer: MEDICARE

## 2025-04-29 ENCOUNTER — HOSPITAL ENCOUNTER (OUTPATIENT)
Dept: RADIATION ONCOLOGY | Facility: HOSPITAL | Age: 75
Setting detail: RADIATION/ONCOLOGY SERIES
Discharge: HOME | End: 2025-04-29
Attending: RADIOLOGY
Payer: MEDICARE

## 2025-04-29 VITALS
DIASTOLIC BLOOD PRESSURE: 68 MMHG | RESPIRATION RATE: 18 BRPM | HEART RATE: 81 BPM | SYSTOLIC BLOOD PRESSURE: 142 MMHG | TEMPERATURE: 98.2 F | OXYGEN SATURATION: 99 %

## 2025-04-29 DIAGNOSIS — C67.9 MALIGNANT NEOPLASM OF URINARY BLADDER, UNSPECIFIED SITE (CMS/HCC): Primary | ICD-10-CM

## 2025-04-29 DIAGNOSIS — C67.8 MALIGNANT NEOPLASM OF OVERLAPPING SITES OF BLADDER (CMS/HCC): ICD-10-CM

## 2025-04-29 DIAGNOSIS — D50.0 IRON DEFICIENCY ANEMIA DUE TO CHRONIC BLOOD LOSS: ICD-10-CM

## 2025-04-29 LAB
ARIA ZRC COURSE ID: NORMAL
ARIA ZRC COURSE START DATE: NORMAL
ARIA ZRC TREATMENT ELAPSED DAYS: NORMAL
ARIA ZRP FRACTIONS TREATED TO DATE: NORMAL
ARIA ZRP PLAN ID: NORMAL
ARIA ZRP PRESCRIBED DOSE CGY: 5000
ARIA ZRP PRESCRIBED DOSE PER FRACTION: 2
ARIA ZRR DOSAGE GIVEN TO DATE: 44
ARIA ZRR DOSAGE GIVEN TO DATE: 45.36
ARIA ZRR DOSAGE GIVEN TO DATE: 60
ARIA ZRR REFERENCE POINT ID: NORMAL
ARIA ZRR SESSION DOSAGE GIVEN: 2
ARIA ZRR SESSION DOSAGE GIVEN: 2
ARIA ZRR SESSION DOSAGE GIVEN: 2.06

## 2025-04-29 PROCEDURE — 63600000 HC DRUGS/DETAIL CODE: Performed by: HOSPITALIST

## 2025-04-29 PROCEDURE — 96367 TX/PROPH/DG ADDL SEQ IV INF: CPT

## 2025-04-29 PROCEDURE — 25800000 HC PHARMACY IV SOLUTIONS: Performed by: HOSPITALIST

## 2025-04-29 PROCEDURE — 96413 CHEMO IV INFUSION 1 HR: CPT

## 2025-04-29 PROCEDURE — 77386 HC IMRT DELIVERY COMPLEX: CPT | Performed by: RADIOLOGY

## 2025-04-29 RX ORDER — HEPARIN 100 UNIT/ML
500 SYRINGE INTRAVENOUS AS NEEDED
Status: CANCELLED | OUTPATIENT
Start: 2025-04-29

## 2025-04-29 RX ORDER — HEPARIN 100 UNIT/ML
500 SYRINGE INTRAVENOUS AS NEEDED
Status: DISCONTINUED | OUTPATIENT
Start: 2025-04-29 | End: 2025-04-30 | Stop reason: HOSPADM

## 2025-04-29 RX ADMIN — HEPARIN 500 UNITS: 100 SYRINGE at 10:17

## 2025-04-29 RX ADMIN — GEMCITABINE 52 MG: 38 INJECTION, SOLUTION INTRAVENOUS at 09:44

## 2025-04-29 RX ADMIN — DEXAMETHASONE SODIUM PHOSPHATE: 4 INJECTION, SOLUTION INTRA-ARTICULAR; INTRALESIONAL; INTRAMUSCULAR; INTRAVENOUS; SOFT TISSUE at 09:13

## 2025-04-29 ASSESSMENT — ENCOUNTER SYMPTOMS
OCCASIONAL FEELINGS OF UNSTEADINESS: 0
LOSS OF SENSATION IN FEET: 0
DEPRESSION: 0

## 2025-04-29 NOTE — PROGRESS NOTES
Pt arrived for D29C1 Gemzar, cleared by Dr. Soria at visit on 4/25/2025. RCW port accessed, +BR/flush. Premedicated with Zofran/Dex. Gemzar infused without issue. Pt declined need for IV hydration. Port de-accessed, AVS declined. Pt left stable.

## 2025-04-30 ENCOUNTER — HOSPITAL ENCOUNTER (OUTPATIENT)
Dept: RADIATION ONCOLOGY | Facility: HOSPITAL | Age: 75
Setting detail: RADIATION/ONCOLOGY SERIES
Discharge: HOME | End: 2025-04-30
Attending: RADIOLOGY
Payer: MEDICARE

## 2025-04-30 ENCOUNTER — PATIENT OUTREACH (OUTPATIENT)
Dept: CASE MANAGEMENT | Facility: CLINIC | Age: 75
End: 2025-04-30
Payer: MEDICARE

## 2025-04-30 LAB
ARIA ZRC COURSE ID: NORMAL
ARIA ZRC COURSE START DATE: NORMAL
ARIA ZRC TREATMENT ELAPSED DAYS: NORMAL
ARIA ZRP FRACTIONS TREATED TO DATE: NORMAL
ARIA ZRP PLAN ID: NORMAL
ARIA ZRP PRESCRIBED DOSE CGY: 5000
ARIA ZRP PRESCRIBED DOSE PER FRACTION: 2
ARIA ZRR DOSAGE GIVEN TO DATE: 46
ARIA ZRR DOSAGE GIVEN TO DATE: 47.42
ARIA ZRR DOSAGE GIVEN TO DATE: 62
ARIA ZRR REFERENCE POINT ID: NORMAL
ARIA ZRR SESSION DOSAGE GIVEN: 2
ARIA ZRR SESSION DOSAGE GIVEN: 2
ARIA ZRR SESSION DOSAGE GIVEN: 2.06

## 2025-04-30 PROCEDURE — 77386 HC IMRT DELIVERY COMPLEX: CPT | Performed by: RADIOLOGY

## 2025-05-01 ENCOUNTER — HOSPITAL ENCOUNTER (OUTPATIENT)
Dept: RADIATION ONCOLOGY | Facility: HOSPITAL | Age: 75
Setting detail: RADIATION/ONCOLOGY SERIES
Discharge: HOME | End: 2025-05-01
Attending: RADIOLOGY
Payer: MEDICARE

## 2025-05-01 ENCOUNTER — HOSPITAL ENCOUNTER (OUTPATIENT)
Dept: HEMATOLOGY/ONCOLOGY | Facility: HOSPITAL | Age: 75
Setting detail: INFUSION SERIES
Discharge: HOME | End: 2025-05-01
Attending: HOSPITALIST
Payer: MEDICARE

## 2025-05-01 DIAGNOSIS — C67.9 MALIGNANT NEOPLASM OF URINARY BLADDER, UNSPECIFIED SITE (CMS/HCC): Primary | ICD-10-CM

## 2025-05-01 DIAGNOSIS — D50.0 IRON DEFICIENCY ANEMIA DUE TO CHRONIC BLOOD LOSS: ICD-10-CM

## 2025-05-01 DIAGNOSIS — C67.8 MALIGNANT NEOPLASM OF OVERLAPPING SITES OF BLADDER (CMS/HCC): ICD-10-CM

## 2025-05-01 LAB
ANION GAP SERPL CALC-SCNC: 5 MEQ/L (ref 3–15)
ARIA ZRC COURSE ID: NORMAL
ARIA ZRC COURSE START DATE: NORMAL
ARIA ZRC TREATMENT ELAPSED DAYS: NORMAL
ARIA ZRP FRACTIONS TREATED TO DATE: NORMAL
ARIA ZRP PLAN ID: NORMAL
ARIA ZRP PRESCRIBED DOSE CGY: 5000
ARIA ZRP PRESCRIBED DOSE PER FRACTION: 2
ARIA ZRR DOSAGE GIVEN TO DATE: 48
ARIA ZRR DOSAGE GIVEN TO DATE: 49.48
ARIA ZRR DOSAGE GIVEN TO DATE: 64
ARIA ZRR REFERENCE POINT ID: NORMAL
ARIA ZRR SESSION DOSAGE GIVEN: 2
ARIA ZRR SESSION DOSAGE GIVEN: 2
ARIA ZRR SESSION DOSAGE GIVEN: 2.06
BASOPHILS # BLD: 0.01 K/UL (ref 0.01–0.1)
BASOPHILS NFR BLD: 0.2 %
BUN SERPL-MCNC: 26 MG/DL (ref 7–25)
CALCIUM SERPL-MCNC: 8.5 MG/DL (ref 8.6–10.3)
CHLORIDE SERPL-SCNC: 107 MEQ/L (ref 98–107)
CO2 SERPL-SCNC: 26 MEQ/L (ref 21–31)
CREAT SERPL-MCNC: 1.2 MG/DL (ref 0.6–1.2)
DIFFERENTIAL METHOD BLD: ABNORMAL
EGFRCR SERPLBLD CKD-EPI 2021: 47.6 ML/MIN/1.73M*2
EOSINOPHIL # BLD: 0.15 K/UL (ref 0.04–0.36)
EOSINOPHIL NFR BLD: 2.7 %
ERYTHROCYTE [DISTWIDTH] IN BLOOD BY AUTOMATED COUNT: 13.9 % (ref 11.7–14.4)
GLUCOSE SERPL-MCNC: 97 MG/DL (ref 70–99)
HCT VFR BLD AUTO: 28 % (ref 35–45)
HGB BLD-MCNC: 9.3 G/DL (ref 11.8–15.7)
IMM GRANULOCYTES # BLD AUTO: 0.12 K/UL (ref 0–0.08)
IMM GRANULOCYTES NFR BLD AUTO: 2.2 %
LYMPHOCYTES # BLD: 0.32 K/UL (ref 1.2–3.5)
LYMPHOCYTES NFR BLD: 5.8 %
MCH RBC QN AUTO: 29.2 PG (ref 28–33.2)
MCHC RBC AUTO-ENTMCNC: 33.2 G/DL (ref 32.2–35.5)
MCV RBC AUTO: 88.1 FL (ref 83–98)
MONOCYTES # BLD: 0.22 K/UL (ref 0.28–0.8)
MONOCYTES NFR BLD: 4 %
NEUTROPHILS # BLD: 4.67 K/UL (ref 1.7–7)
NEUTROPHILS # BLD: 4.67 K/UL (ref 1.7–7)
NEUTS SEG NFR BLD: 85.1 %
NRBC BLD-RTO: 0 %
PLATELET # BLD AUTO: 175 K/UL (ref 150–369)
PMV BLD AUTO: 9.3 FL (ref 9.4–12.3)
POTASSIUM SERPL-SCNC: 3.7 MEQ/L (ref 3.5–5.1)
RBC # BLD AUTO: 3.18 M/UL (ref 3.93–5.22)
SODIUM SERPL-SCNC: 138 MEQ/L (ref 136–145)
WBC # BLD AUTO: 5.49 K/UL (ref 3.8–10.5)

## 2025-05-01 PROCEDURE — 36591 DRAW BLOOD OFF VENOUS DEVICE: CPT

## 2025-05-01 PROCEDURE — 82310 ASSAY OF CALCIUM: CPT | Performed by: HOSPITALIST

## 2025-05-01 PROCEDURE — 36415 COLL VENOUS BLD VENIPUNCTURE: CPT | Performed by: HOSPITALIST

## 2025-05-01 PROCEDURE — 85025 COMPLETE CBC W/AUTO DIFF WBC: CPT | Performed by: HOSPITALIST

## 2025-05-01 PROCEDURE — 77336 RADIATION PHYSICS CONSULT: CPT | Performed by: RADIOLOGY

## 2025-05-01 PROCEDURE — 77386 HC IMRT DELIVERY COMPLEX: CPT | Performed by: RADIOLOGY

## 2025-05-01 RX ORDER — HEPARIN 100 UNIT/ML
500 SYRINGE INTRAVENOUS AS NEEDED
Status: DISCONTINUED | OUTPATIENT
Start: 2025-05-01 | End: 2025-05-02 | Stop reason: HOSPADM

## 2025-05-01 RX ORDER — HEPARIN 100 UNIT/ML
500 SYRINGE INTRAVENOUS AS NEEDED
Status: CANCELLED | OUTPATIENT
Start: 2025-05-01

## 2025-05-01 RX ADMIN — HEPARIN 500 UNITS: 100 SYRINGE at 09:12

## 2025-05-02 ENCOUNTER — OFFICE VISIT (OUTPATIENT)
Dept: HEMATOLOGY/ONCOLOGY | Facility: CLINIC | Age: 75
End: 2025-05-02
Attending: HOSPITALIST
Payer: MEDICARE

## 2025-05-02 ENCOUNTER — HOSPITAL ENCOUNTER (OUTPATIENT)
Dept: HEMATOLOGY/ONCOLOGY | Facility: HOSPITAL | Age: 75
Setting detail: INFUSION SERIES
Discharge: HOME | End: 2025-05-02
Attending: HOSPITALIST
Payer: MEDICARE

## 2025-05-02 ENCOUNTER — HOSPITAL ENCOUNTER (OUTPATIENT)
Dept: RADIATION ONCOLOGY | Facility: HOSPITAL | Age: 75
Setting detail: RADIATION/ONCOLOGY SERIES
Discharge: HOME | End: 2025-05-02
Attending: RADIOLOGY
Payer: MEDICARE

## 2025-05-02 VITALS
SYSTOLIC BLOOD PRESSURE: 137 MMHG | HEART RATE: 88 BPM | DIASTOLIC BLOOD PRESSURE: 76 MMHG | HEIGHT: 67 IN | BODY MASS INDEX: 26.59 KG/M2 | WEIGHT: 169.4 LBS | TEMPERATURE: 96.5 F | OXYGEN SATURATION: 99 %

## 2025-05-02 DIAGNOSIS — C67.9 MALIGNANT NEOPLASM OF URINARY BLADDER, UNSPECIFIED SITE (CMS/HCC): Primary | ICD-10-CM

## 2025-05-02 DIAGNOSIS — C67.8 MALIGNANT NEOPLASM OF OVERLAPPING SITES OF BLADDER (CMS/HCC): ICD-10-CM

## 2025-05-02 DIAGNOSIS — L27.0 DRUG RASH: ICD-10-CM

## 2025-05-02 DIAGNOSIS — D50.0 IRON DEFICIENCY ANEMIA DUE TO CHRONIC BLOOD LOSS: ICD-10-CM

## 2025-05-02 LAB
ARIA ZRC COURSE ID: NORMAL
ARIA ZRC COURSE ID: NORMAL
ARIA ZRC COURSE START DATE: NORMAL
ARIA ZRC COURSE START DATE: NORMAL
ARIA ZRC TREATMENT ELAPSED DAYS: NORMAL
ARIA ZRC TREATMENT ELAPSED DAYS: NORMAL
ARIA ZRP FRACTIONS TREATED TO DATE: NORMAL
ARIA ZRP PLAN ID: NORMAL
ARIA ZRP PLAN NAME: NORMAL
ARIA ZRP PLAN NAME: NORMAL
ARIA ZRP PRESCRIBED DOSE CGY: 1600
ARIA ZRP PRESCRIBED DOSE CGY: 5000
ARIA ZRP PRESCRIBED DOSE CGY: 5000
ARIA ZRP PRESCRIBED DOSE PER FRACTION: 2
ARIA ZRR DOSAGE GIVEN TO DATE: 16
ARIA ZRR DOSAGE GIVEN TO DATE: 16.08
ARIA ZRR DOSAGE GIVEN TO DATE: 50
ARIA ZRR DOSAGE GIVEN TO DATE: 50
ARIA ZRR DOSAGE GIVEN TO DATE: 51.55
ARIA ZRR DOSAGE GIVEN TO DATE: 51.55
ARIA ZRR DOSAGE GIVEN TO DATE: 66
ARIA ZRR DOSAGE GIVEN TO DATE: 66
ARIA ZRR REFERENCE POINT ID: NORMAL
ARIA ZRR SESSION DOSAGE GIVEN: 2
ARIA ZRR SESSION DOSAGE GIVEN: 2
ARIA ZRR SESSION DOSAGE GIVEN: 2.06

## 2025-05-02 PROCEDURE — 63600000 HC DRUGS/DETAIL CODE: Mod: JZ | Performed by: HOSPITALIST

## 2025-05-02 PROCEDURE — 96413 CHEMO IV INFUSION 1 HR: CPT

## 2025-05-02 PROCEDURE — 25800000 HC PHARMACY IV SOLUTIONS: Performed by: HOSPITALIST

## 2025-05-02 PROCEDURE — 99214 OFFICE O/P EST MOD 30 MIN: CPT | Performed by: HOSPITALIST

## 2025-05-02 PROCEDURE — 96367 TX/PROPH/DG ADDL SEQ IV INF: CPT

## 2025-05-02 PROCEDURE — 77386 HC IMRT DELIVERY COMPLEX: CPT | Performed by: RADIOLOGY

## 2025-05-02 RX ORDER — HEPARIN 100 UNIT/ML
500 SYRINGE INTRAVENOUS AS NEEDED
Status: CANCELLED | OUTPATIENT
Start: 2025-05-02

## 2025-05-02 RX ORDER — HEPARIN 100 UNIT/ML
500 SYRINGE INTRAVENOUS AS NEEDED
Status: DISCONTINUED | OUTPATIENT
Start: 2025-05-02 | End: 2025-05-03 | Stop reason: HOSPADM

## 2025-05-02 RX ADMIN — HEPARIN 500 UNITS: 100 SYRINGE at 11:33

## 2025-05-02 RX ADMIN — GEMCITABINE 52 MG: 38 INJECTION, SOLUTION INTRAVENOUS at 10:57

## 2025-05-02 RX ADMIN — DEXAMETHASONE SODIUM PHOSPHATE: 4 INJECTION, SOLUTION INTRA-ARTICULAR; INTRALESIONAL; INTRAMUSCULAR; INTRAVENOUS; SOFT TISSUE at 10:13

## 2025-05-05 ENCOUNTER — HOSPITAL ENCOUNTER (OUTPATIENT)
Dept: HEMATOLOGY/ONCOLOGY | Facility: HOSPITAL | Age: 75
Setting detail: INFUSION SERIES
Discharge: HOME | End: 2025-05-05
Attending: HOSPITALIST
Payer: MEDICARE

## 2025-05-05 DIAGNOSIS — D50.0 IRON DEFICIENCY ANEMIA DUE TO CHRONIC BLOOD LOSS: ICD-10-CM

## 2025-05-05 DIAGNOSIS — C67.8 MALIGNANT NEOPLASM OF OVERLAPPING SITES OF BLADDER (CMS/HCC): ICD-10-CM

## 2025-05-05 DIAGNOSIS — C67.9 MALIGNANT NEOPLASM OF URINARY BLADDER, UNSPECIFIED SITE (CMS/HCC): Primary | ICD-10-CM

## 2025-05-05 LAB
ANION GAP SERPL CALC-SCNC: 7 MEQ/L (ref 3–15)
BASOPHILS # BLD: 0.02 K/UL (ref 0.01–0.1)
BASOPHILS NFR BLD: 0.5 %
BUN SERPL-MCNC: 24 MG/DL (ref 7–25)
CALCIUM SERPL-MCNC: 8.5 MG/DL (ref 8.6–10.3)
CHLORIDE SERPL-SCNC: 107 MEQ/L (ref 98–107)
CO2 SERPL-SCNC: 25 MEQ/L (ref 21–31)
CREAT SERPL-MCNC: 1.2 MG/DL (ref 0.6–1.2)
DIFFERENTIAL METHOD BLD: ABNORMAL
EGFRCR SERPLBLD CKD-EPI 2021: 47.6 ML/MIN/1.73M*2
EOSINOPHIL # BLD: 0.12 K/UL (ref 0.04–0.36)
EOSINOPHIL NFR BLD: 2.8 %
ERYTHROCYTE [DISTWIDTH] IN BLOOD BY AUTOMATED COUNT: 14.6 % (ref 11.7–14.4)
GLUCOSE SERPL-MCNC: 119 MG/DL (ref 70–99)
HCT VFR BLD AUTO: 26.6 % (ref 35–45)
HGB BLD-MCNC: 8.6 G/DL (ref 11.8–15.7)
IMM GRANULOCYTES # BLD AUTO: 0.07 K/UL (ref 0–0.08)
IMM GRANULOCYTES NFR BLD AUTO: 1.6 %
LYMPHOCYTES # BLD: 0.28 K/UL (ref 1.2–3.5)
LYMPHOCYTES NFR BLD: 6.6 %
MCH RBC QN AUTO: 28.8 PG (ref 28–33.2)
MCHC RBC AUTO-ENTMCNC: 32.3 G/DL (ref 32.2–35.5)
MCV RBC AUTO: 89 FL (ref 83–98)
MONOCYTES # BLD: 0.11 K/UL (ref 0.28–0.8)
MONOCYTES NFR BLD: 2.6 %
NEUTROPHILS # BLD: 3.66 K/UL (ref 1.7–7)
NEUTROPHILS # BLD: 3.66 K/UL (ref 1.7–7)
NEUTS SEG NFR BLD: 85.9 %
NRBC BLD-RTO: 0 %
PLATELET # BLD AUTO: 178 K/UL (ref 150–369)
PMV BLD AUTO: 10.4 FL (ref 9.4–12.3)
POTASSIUM SERPL-SCNC: 3.6 MEQ/L (ref 3.5–5.1)
RBC # BLD AUTO: 2.99 M/UL (ref 3.93–5.22)
SODIUM SERPL-SCNC: 139 MEQ/L (ref 136–145)
WBC # BLD AUTO: 4.26 K/UL (ref 3.8–10.5)

## 2025-05-05 PROCEDURE — 80048 BASIC METABOLIC PNL TOTAL CA: CPT | Performed by: HOSPITALIST

## 2025-05-05 PROCEDURE — 36591 DRAW BLOOD OFF VENOUS DEVICE: CPT

## 2025-05-05 PROCEDURE — 85025 COMPLETE CBC W/AUTO DIFF WBC: CPT | Performed by: HOSPITALIST

## 2025-05-05 RX ORDER — HEPARIN 100 UNIT/ML
500 SYRINGE INTRAVENOUS AS NEEDED
Status: CANCELLED | OUTPATIENT
Start: 2025-05-05

## 2025-05-05 RX ORDER — HEPARIN 100 UNIT/ML
500 SYRINGE INTRAVENOUS AS NEEDED
Status: DISCONTINUED | OUTPATIENT
Start: 2025-05-05 | End: 2025-05-06 | Stop reason: HOSPADM

## 2025-05-05 RX ADMIN — HEPARIN 500 UNITS: 100 SYRINGE at 14:01

## 2025-05-06 ENCOUNTER — HOSPITAL ENCOUNTER (OUTPATIENT)
Dept: HEMATOLOGY/ONCOLOGY | Facility: HOSPITAL | Age: 75
Setting detail: INFUSION SERIES
Discharge: HOME | End: 2025-05-06
Attending: HOSPITALIST
Payer: MEDICARE

## 2025-05-06 VITALS
SYSTOLIC BLOOD PRESSURE: 129 MMHG | TEMPERATURE: 97.1 F | DIASTOLIC BLOOD PRESSURE: 62 MMHG | HEART RATE: 97 BPM | OXYGEN SATURATION: 98 %

## 2025-05-06 DIAGNOSIS — C67.8 MALIGNANT NEOPLASM OF OVERLAPPING SITES OF BLADDER (CMS/HCC): ICD-10-CM

## 2025-05-06 DIAGNOSIS — C67.9 MALIGNANT NEOPLASM OF URINARY BLADDER, UNSPECIFIED SITE (CMS/HCC): Primary | ICD-10-CM

## 2025-05-06 DIAGNOSIS — D50.0 IRON DEFICIENCY ANEMIA DUE TO CHRONIC BLOOD LOSS: ICD-10-CM

## 2025-05-06 PROCEDURE — 96413 CHEMO IV INFUSION 1 HR: CPT

## 2025-05-06 PROCEDURE — 63600000 HC DRUGS/DETAIL CODE: Mod: JZ | Performed by: HOSPITALIST

## 2025-05-06 PROCEDURE — 25800000 HC PHARMACY IV SOLUTIONS: Performed by: HOSPITALIST

## 2025-05-06 PROCEDURE — 96367 TX/PROPH/DG ADDL SEQ IV INF: CPT

## 2025-05-06 RX ORDER — HEPARIN 100 UNIT/ML
500 SYRINGE INTRAVENOUS AS NEEDED
Status: DISCONTINUED | OUTPATIENT
Start: 2025-05-06 | End: 2025-05-07 | Stop reason: HOSPADM

## 2025-05-06 RX ORDER — HEPARIN 100 UNIT/ML
500 SYRINGE INTRAVENOUS AS NEEDED
Status: CANCELLED | OUTPATIENT
Start: 2025-05-06

## 2025-05-06 RX ADMIN — HEPARIN 500 UNITS: 100 SYRINGE at 09:38

## 2025-05-06 RX ADMIN — GEMCITABINE HYDROCHLORIDE 52 MG: 38 INJECTION, SOLUTION INTRAVENOUS at 08:58

## 2025-05-06 RX ADMIN — DEXAMETHASONE SODIUM PHOSPHATE: 4 INJECTION, SOLUTION INTRA-ARTICULAR; INTRALESIONAL; INTRAMUSCULAR; INTRAVENOUS; SOFT TISSUE at 08:15

## 2025-05-08 ENCOUNTER — HOSPITAL ENCOUNTER (OUTPATIENT)
Dept: HEMATOLOGY/ONCOLOGY | Facility: HOSPITAL | Age: 75
Setting detail: INFUSION SERIES
Discharge: HOME | End: 2025-05-08
Attending: HOSPITALIST
Payer: MEDICARE

## 2025-05-08 DIAGNOSIS — C67.8 MALIGNANT NEOPLASM OF OVERLAPPING SITES OF BLADDER (CMS/HCC): ICD-10-CM

## 2025-05-08 DIAGNOSIS — D50.0 IRON DEFICIENCY ANEMIA DUE TO CHRONIC BLOOD LOSS: ICD-10-CM

## 2025-05-08 DIAGNOSIS — C67.9 MALIGNANT NEOPLASM OF URINARY BLADDER, UNSPECIFIED SITE (CMS/HCC): Primary | ICD-10-CM

## 2025-05-08 LAB
ANION GAP SERPL CALC-SCNC: 8 MEQ/L (ref 3–15)
BASOPHILS # BLD: 0.01 K/UL (ref 0.01–0.1)
BASOPHILS NFR BLD: 0.2 %
BUN SERPL-MCNC: 25 MG/DL (ref 7–25)
CALCIUM SERPL-MCNC: 8.3 MG/DL (ref 8.6–10.3)
CHLORIDE SERPL-SCNC: 109 MEQ/L (ref 98–107)
CO2 SERPL-SCNC: 24 MEQ/L (ref 21–31)
CREAT SERPL-MCNC: 1.3 MG/DL (ref 0.6–1.2)
DIFFERENTIAL METHOD BLD: ABNORMAL
EGFRCR SERPLBLD CKD-EPI 2021: 43.2 ML/MIN/1.73M*2
EOSINOPHIL # BLD: 0.12 K/UL (ref 0.04–0.36)
EOSINOPHIL NFR BLD: 2.4 %
ERYTHROCYTE [DISTWIDTH] IN BLOOD BY AUTOMATED COUNT: 15.1 % (ref 11.7–14.4)
GLUCOSE SERPL-MCNC: 98 MG/DL (ref 70–99)
HCT VFR BLD AUTO: 25.2 % (ref 35–45)
HGB BLD-MCNC: 8 G/DL (ref 11.8–15.7)
IMM GRANULOCYTES # BLD AUTO: 0.05 K/UL (ref 0–0.08)
IMM GRANULOCYTES NFR BLD AUTO: 1 %
LYMPHOCYTES # BLD: 0.28 K/UL (ref 1.2–3.5)
LYMPHOCYTES NFR BLD: 5.6 %
MCH RBC QN AUTO: 28.4 PG (ref 28–33.2)
MCHC RBC AUTO-ENTMCNC: 31.7 G/DL (ref 32.2–35.5)
MCV RBC AUTO: 89.4 FL (ref 83–98)
MONOCYTES # BLD: 0.06 K/UL (ref 0.28–0.8)
MONOCYTES NFR BLD: 1.2 %
NEUTROPHILS # BLD: 4.47 K/UL (ref 1.7–7)
NEUTROPHILS # BLD: 4.47 K/UL (ref 1.7–7)
NEUTS SEG NFR BLD: 89.6 %
NRBC BLD-RTO: 0 %
PLATELET # BLD AUTO: 138 K/UL (ref 150–369)
PMV BLD AUTO: 9.3 FL (ref 9.4–12.3)
POTASSIUM SERPL-SCNC: 3.5 MEQ/L (ref 3.5–5.1)
RBC # BLD AUTO: 2.82 M/UL (ref 3.93–5.22)
SODIUM SERPL-SCNC: 141 MEQ/L (ref 136–145)
WBC # BLD AUTO: 4.99 K/UL (ref 3.8–10.5)

## 2025-05-08 PROCEDURE — 85025 COMPLETE CBC W/AUTO DIFF WBC: CPT | Performed by: HOSPITALIST

## 2025-05-08 PROCEDURE — 80048 BASIC METABOLIC PNL TOTAL CA: CPT | Performed by: HOSPITALIST

## 2025-05-08 PROCEDURE — 36415 COLL VENOUS BLD VENIPUNCTURE: CPT | Performed by: HOSPITALIST

## 2025-05-08 PROCEDURE — 36591 DRAW BLOOD OFF VENOUS DEVICE: CPT

## 2025-05-08 RX ORDER — HEPARIN 100 UNIT/ML
500 SYRINGE INTRAVENOUS AS NEEDED
Status: CANCELLED | OUTPATIENT
Start: 2025-05-08

## 2025-05-08 RX ORDER — HEPARIN 100 UNIT/ML
500 SYRINGE INTRAVENOUS AS NEEDED
Status: DISCONTINUED | OUTPATIENT
Start: 2025-05-08 | End: 2025-05-09 | Stop reason: HOSPADM

## 2025-05-08 RX ADMIN — HEPARIN 500 UNITS: 100 SYRINGE at 08:49

## 2025-05-09 ENCOUNTER — HOSPITAL ENCOUNTER (OUTPATIENT)
Dept: HEMATOLOGY/ONCOLOGY | Facility: HOSPITAL | Age: 75
Setting detail: INFUSION SERIES
Discharge: HOME | End: 2025-05-09
Attending: HOSPITALIST
Payer: MEDICARE

## 2025-05-09 ENCOUNTER — OFFICE VISIT (OUTPATIENT)
Dept: HEMATOLOGY/ONCOLOGY | Facility: CLINIC | Age: 75
End: 2025-05-09
Attending: HOSPITALIST
Payer: MEDICARE

## 2025-05-09 VITALS
HEART RATE: 88 BPM | SYSTOLIC BLOOD PRESSURE: 119 MMHG | WEIGHT: 173.2 LBS | BODY MASS INDEX: 27.13 KG/M2 | DIASTOLIC BLOOD PRESSURE: 75 MMHG | TEMPERATURE: 96.9 F | OXYGEN SATURATION: 98 %

## 2025-05-09 DIAGNOSIS — D50.0 IRON DEFICIENCY ANEMIA DUE TO CHRONIC BLOOD LOSS: ICD-10-CM

## 2025-05-09 DIAGNOSIS — C67.9 MALIGNANT NEOPLASM OF URINARY BLADDER, UNSPECIFIED SITE (CMS/HCC): Primary | ICD-10-CM

## 2025-05-09 DIAGNOSIS — N17.9 AKI (ACUTE KIDNEY INJURY) (CMS/HCC): ICD-10-CM

## 2025-05-09 DIAGNOSIS — C67.8 MALIGNANT NEOPLASM OF OVERLAPPING SITES OF BLADDER (CMS/HCC): ICD-10-CM

## 2025-05-09 PROCEDURE — 96413 CHEMO IV INFUSION 1 HR: CPT

## 2025-05-09 PROCEDURE — 63600000 HC DRUGS/DETAIL CODE: Performed by: HOSPITALIST

## 2025-05-09 PROCEDURE — 96367 TX/PROPH/DG ADDL SEQ IV INF: CPT

## 2025-05-09 PROCEDURE — 99214 OFFICE O/P EST MOD 30 MIN: CPT | Performed by: HOSPITALIST

## 2025-05-09 PROCEDURE — 25800000 HC PHARMACY IV SOLUTIONS: Performed by: HOSPITALIST

## 2025-05-09 RX ORDER — HEPARIN 100 UNIT/ML
500 SYRINGE INTRAVENOUS AS NEEDED
Status: DISCONTINUED | OUTPATIENT
Start: 2025-05-09 | End: 2025-05-10 | Stop reason: HOSPADM

## 2025-05-09 RX ORDER — HEPARIN 100 UNIT/ML
500 SYRINGE INTRAVENOUS AS NEEDED
Status: CANCELLED | OUTPATIENT
Start: 2025-05-09

## 2025-05-09 RX ADMIN — DEXAMETHASONE SODIUM PHOSPHATE: 4 INJECTION, SOLUTION INTRA-ARTICULAR; INTRALESIONAL; INTRAMUSCULAR; INTRAVENOUS; SOFT TISSUE at 11:07

## 2025-05-09 RX ADMIN — GEMCITABINE 52 MG: 38 INJECTION, SOLUTION INTRAVENOUS at 11:39

## 2025-05-09 RX ADMIN — HEPARIN 500 UNITS: 100 SYRINGE at 12:13

## 2025-05-13 ENCOUNTER — TELEPHONE (OUTPATIENT)
Dept: HEMATOLOGY/ONCOLOGY | Facility: CLINIC | Age: 75
End: 2025-05-13
Payer: MEDICARE

## 2025-05-13 DIAGNOSIS — C67.8 MALIGNANT NEOPLASM OF OVERLAPPING SITES OF BLADDER (CMS/HCC): ICD-10-CM

## 2025-05-13 DIAGNOSIS — D50.0 IRON DEFICIENCY ANEMIA DUE TO CHRONIC BLOOD LOSS: ICD-10-CM

## 2025-05-13 DIAGNOSIS — C67.9 MALIGNANT NEOPLASM OF URINARY BLADDER, UNSPECIFIED SITE (CMS/HCC): Primary | ICD-10-CM

## 2025-05-14 ENCOUNTER — HOSPITAL ENCOUNTER (OUTPATIENT)
Dept: HEMATOLOGY/ONCOLOGY | Facility: HOSPITAL | Age: 75
Setting detail: INFUSION SERIES
Discharge: HOME | End: 2025-05-14
Attending: HOSPITALIST
Payer: MEDICARE

## 2025-05-14 VITALS
SYSTOLIC BLOOD PRESSURE: 134 MMHG | HEART RATE: 91 BPM | OXYGEN SATURATION: 98 % | TEMPERATURE: 97.8 F | RESPIRATION RATE: 16 BRPM | DIASTOLIC BLOOD PRESSURE: 59 MMHG

## 2025-05-14 DIAGNOSIS — D50.0 IRON DEFICIENCY ANEMIA DUE TO CHRONIC BLOOD LOSS: ICD-10-CM

## 2025-05-14 DIAGNOSIS — C67.9 MALIGNANT NEOPLASM OF URINARY BLADDER, UNSPECIFIED SITE (CMS/HCC): Primary | ICD-10-CM

## 2025-05-14 DIAGNOSIS — C67.8 MALIGNANT NEOPLASM OF OVERLAPPING SITES OF BLADDER (CMS/HCC): ICD-10-CM

## 2025-05-14 DIAGNOSIS — N17.9 AKI (ACUTE KIDNEY INJURY) (CMS/HCC): ICD-10-CM

## 2025-05-14 LAB
ABO + RH BLD: NORMAL
ANION GAP SERPL CALC-SCNC: 7 MEQ/L (ref 3–15)
ANISOCYTOSIS BLD QL SMEAR: ABNORMAL
BASOPHILS # BLD: 0.04 K/UL (ref 0.01–0.1)
BASOPHILS NFR BLD: 1 %
BLD GP AB SCN SERPL QL: NEGATIVE
BUN SERPL-MCNC: 16 MG/DL (ref 7–25)
CALCIUM SERPL-MCNC: 8.5 MG/DL (ref 8.6–10.3)
CHLORIDE SERPL-SCNC: 106 MEQ/L (ref 98–107)
CO2 SERPL-SCNC: 25 MEQ/L (ref 21–31)
CREAT SERPL-MCNC: 1.4 MG/DL (ref 0.6–1.2)
D AG BLD QL: POSITIVE
DACRYOCYTES BLD QL SMEAR: ABNORMAL
DIFFERENTIAL METHOD BLD: ABNORMAL
EGFRCR SERPLBLD CKD-EPI 2021: 39.6 ML/MIN/1.73M*2
EOSINOPHIL # BLD: 0.04 K/UL (ref 0.04–0.36)
EOSINOPHIL NFR BLD: 1 %
ERYTHROCYTE [DISTWIDTH] IN BLOOD BY AUTOMATED COUNT: 15 % (ref 11.7–14.4)
FERRITIN SERPL-MCNC: 1283 NG/ML (ref 11–250)
GLUCOSE SERPL-MCNC: 129 MG/DL (ref 70–99)
HCT VFR BLD AUTO: 23.2 % (ref 35–45)
HGB BLD-MCNC: 7.5 G/DL (ref 11.8–15.7)
HYPOCHROMIA BLD QL SMEAR: ABNORMAL
IRON SATN MFR SERPL: 15 % (ref 15–45)
IRON SERPL-MCNC: 35 UG/DL (ref 35–150)
LABORATORY COMMENT REPORT: NORMAL
LYMPHOCYTES # BLD: 0.17 K/UL (ref 1.2–3.5)
LYMPHOCYTES NFR BLD: 4 %
MCH RBC QN AUTO: 29.2 PG (ref 28–33.2)
MCHC RBC AUTO-ENTMCNC: 32.3 G/DL (ref 32.2–35.5)
MCV RBC AUTO: 90.3 FL (ref 83–98)
MONOCYTES # BLD: 0.3 K/UL (ref 0.28–0.8)
MONOCYTES NFR BLD: 7 %
MYELOCYTES # BLD MANUAL: 0.04 K/UL
MYELOCYTES NFR BLD MANUAL: 1 %
NEUTS BAND # BLD: 0.25 K/UL (ref 0–0.53)
NEUTS BAND # BLD: 3.33 K/UL (ref 1.7–7)
NEUTS BAND NFR BLD: 6 %
NEUTS SEG NFR BLD: 79 %
NRBC BLD MANUAL-RTO: 1 /100 WBC
OVALOCYTES BLD QL SMEAR: ABNORMAL
PLATELET # BLD AUTO: 115 K/UL (ref 150–369)
PLATELET # BLD EST: ABNORMAL 10*3/UL
PLATELET CLUMP BLD QL SMEAR: PRESENT
PMV BLD AUTO: 8.7 FL (ref 9.4–12.3)
POLYCHROMASIA BLD QL SMEAR: ABNORMAL
POTASSIUM SERPL-SCNC: 4 MEQ/L (ref 3.5–5.1)
RBC # BLD AUTO: 2.57 M/UL (ref 3.93–5.22)
SODIUM SERPL-SCNC: 138 MEQ/L (ref 136–145)
SPECIMEN EXP DATE BLD: NORMAL
TIBC SERPL-MCNC: 241 UG/DL (ref 270–460)
UIBC SERPL-MCNC: 206 UG/DL (ref 180–360)
VARIANT LYMPHS # BLD MANUAL: 0.04 K/UL
VARIANT LYMPHS NFR BLD: 1 %
WBC # BLD AUTO: 4.22 K/UL (ref 3.8–10.5)

## 2025-05-14 PROCEDURE — 86923 COMPATIBILITY TEST ELECTRIC: CPT

## 2025-05-14 PROCEDURE — 86901 BLOOD TYPING SEROLOGIC RH(D): CPT

## 2025-05-14 PROCEDURE — 82728 ASSAY OF FERRITIN: CPT | Performed by: HOSPITALIST

## 2025-05-14 PROCEDURE — 36430 TRANSFUSION BLD/BLD COMPNT: CPT

## 2025-05-14 PROCEDURE — 36591 DRAW BLOOD OFF VENOUS DEVICE: CPT

## 2025-05-14 PROCEDURE — 80048 BASIC METABOLIC PNL TOTAL CA: CPT | Performed by: STUDENT IN AN ORGANIZED HEALTH CARE EDUCATION/TRAINING PROGRAM

## 2025-05-14 PROCEDURE — 85025 COMPLETE CBC W/AUTO DIFF WBC: CPT | Performed by: HOSPITALIST

## 2025-05-14 PROCEDURE — 83540 ASSAY OF IRON: CPT | Performed by: HOSPITALIST

## 2025-05-14 PROCEDURE — 83550 IRON BINDING TEST: CPT | Performed by: HOSPITALIST

## 2025-05-14 RX ORDER — DIPHENHYDRAMINE HCL 50 MG/ML
12.5 VIAL (ML) INJECTION ONCE AS NEEDED
Status: CANCELLED | OUTPATIENT
Start: 2025-05-14

## 2025-05-14 RX ORDER — HEPARIN 100 UNIT/ML
500 SYRINGE INTRAVENOUS AS NEEDED
Status: DISCONTINUED | OUTPATIENT
Start: 2025-05-14 | End: 2025-05-15 | Stop reason: HOSPADM

## 2025-05-14 RX ORDER — SODIUM CHLORIDE 9 MG/ML
5 INJECTION, SOLUTION INTRAVENOUS AS NEEDED
Status: CANCELLED | OUTPATIENT
Start: 2025-05-14 | End: 2025-05-15

## 2025-05-14 RX ORDER — FAMOTIDINE 10 MG/ML
20 INJECTION, SOLUTION INTRAVENOUS ONCE AS NEEDED
Status: CANCELLED | OUTPATIENT
Start: 2025-05-14

## 2025-05-14 RX ORDER — HEPARIN 100 UNIT/ML
500 SYRINGE INTRAVENOUS AS NEEDED
Status: CANCELLED | OUTPATIENT
Start: 2025-05-14

## 2025-05-14 RX ADMIN — HEPARIN 500 UNITS: 100 SYRINGE at 15:19

## 2025-05-23 ENCOUNTER — DOCUMENTATION (OUTPATIENT)
Dept: RADIATION ONCOLOGY | Facility: HOSPITAL | Age: 75
End: 2025-05-23
Payer: MEDICARE

## 2025-05-30 ENCOUNTER — HOSPITAL ENCOUNTER (OUTPATIENT)
Dept: HEMATOLOGY/ONCOLOGY | Facility: HOSPITAL | Age: 75
Setting detail: INFUSION SERIES
Discharge: HOME | End: 2025-05-30
Attending: HOSPITALIST
Payer: MEDICARE

## 2025-05-30 ENCOUNTER — HOSPITAL ENCOUNTER (OUTPATIENT)
Dept: RADIATION ONCOLOGY | Facility: HOSPITAL | Age: 75
Setting detail: RADIATION/ONCOLOGY SERIES
Discharge: HOME | End: 2025-05-30
Attending: RADIOLOGY
Payer: MEDICARE

## 2025-05-30 ENCOUNTER — OFFICE VISIT (OUTPATIENT)
Dept: HEMATOLOGY/ONCOLOGY | Facility: CLINIC | Age: 75
End: 2025-05-30
Attending: HOSPITALIST
Payer: MEDICARE

## 2025-05-30 ENCOUNTER — RESULTS FOLLOW-UP (OUTPATIENT)
Dept: HEMATOLOGY/ONCOLOGY | Facility: CLINIC | Age: 75
End: 2025-05-30

## 2025-05-30 VITALS
DIASTOLIC BLOOD PRESSURE: 54 MMHG | HEART RATE: 91 BPM | OXYGEN SATURATION: 98 % | WEIGHT: 170 LBS | SYSTOLIC BLOOD PRESSURE: 120 MMHG | HEIGHT: 67 IN | TEMPERATURE: 96.8 F | BODY MASS INDEX: 26.68 KG/M2

## 2025-05-30 VITALS
DIASTOLIC BLOOD PRESSURE: 54 MMHG | BODY MASS INDEX: 26.63 KG/M2 | WEIGHT: 170 LBS | TEMPERATURE: 96.8 F | SYSTOLIC BLOOD PRESSURE: 120 MMHG | OXYGEN SATURATION: 98 %

## 2025-05-30 DIAGNOSIS — D50.0 IRON DEFICIENCY ANEMIA DUE TO CHRONIC BLOOD LOSS: ICD-10-CM

## 2025-05-30 DIAGNOSIS — C67.8 MALIGNANT NEOPLASM OF OVERLAPPING SITES OF BLADDER (CMS/HCC): Primary | ICD-10-CM

## 2025-05-30 DIAGNOSIS — C67.9 MALIGNANT NEOPLASM OF URINARY BLADDER, UNSPECIFIED SITE (CMS/HCC): Primary | ICD-10-CM

## 2025-05-30 DIAGNOSIS — C67.8 MALIGNANT NEOPLASM OF OVERLAPPING SITES OF BLADDER (CMS/HCC): ICD-10-CM

## 2025-05-30 LAB
ABO + RH BLD: NORMAL
ALBUMIN SERPL-MCNC: 3.9 G/DL (ref 3.5–5.7)
ALP SERPL-CCNC: 59 IU/L (ref 34–125)
ALT SERPL-CCNC: 11 IU/L (ref 7–52)
ANION GAP SERPL CALC-SCNC: 8 MEQ/L (ref 3–15)
AST SERPL-CCNC: 13 IU/L (ref 13–39)
BASOPHILS # BLD: 0.07 K/UL (ref 0.01–0.1)
BASOPHILS NFR BLD: 0.9 %
BILIRUB SERPL-MCNC: 0.7 MG/DL (ref 0.3–1.2)
BLD GP AB SCN SERPL QL: NEGATIVE
BUN SERPL-MCNC: 14 MG/DL (ref 7–25)
CALCIUM SERPL-MCNC: 9.3 MG/DL (ref 8.6–10.3)
CHLORIDE SERPL-SCNC: 108 MEQ/L (ref 98–107)
CO2 SERPL-SCNC: 23 MEQ/L (ref 21–31)
CREAT SERPL-MCNC: 1.5 MG/DL (ref 0.6–1.2)
D AG BLD QL: POSITIVE
DIFFERENTIAL METHOD BLD: ABNORMAL
EGFRCR SERPLBLD CKD-EPI 2021: 36.4 ML/MIN/1.73M*2
EOSINOPHIL # BLD: 0.16 K/UL (ref 0.04–0.36)
EOSINOPHIL NFR BLD: 2 %
ERYTHROCYTE [DISTWIDTH] IN BLOOD BY AUTOMATED COUNT: 20.9 % (ref 11.7–14.4)
GLUCOSE SERPL-MCNC: 114 MG/DL (ref 70–99)
HCT VFR BLD AUTO: 22.9 % (ref 35–45)
HGB BLD-MCNC: 7.1 G/DL (ref 11.8–15.7)
HGB RETIC QN AUTO: 31 PG (ref 31.9–37)
IMM GRANULOCYTES # BLD AUTO: 0.16 K/UL (ref 0–0.08)
IMM GRANULOCYTES NFR BLD AUTO: 2 %
IRON SATN MFR SERPL: 20 % (ref 15–45)
IRON SERPL-MCNC: 58 UG/DL (ref 35–150)
LABORATORY COMMENT REPORT: NORMAL
LYMPHOCYTES # BLD: 1.73 K/UL (ref 1.2–3.5)
LYMPHOCYTES NFR BLD: 22 %
MCH RBC QN AUTO: 30.9 PG (ref 28–33.2)
MCHC RBC AUTO-ENTMCNC: 31 G/DL (ref 32.2–35.5)
MCV RBC AUTO: 99.6 FL (ref 83–98)
MONOCYTES # BLD: 1.15 K/UL (ref 0.28–0.8)
MONOCYTES NFR BLD: 14.6 %
NEUTROPHILS # BLD: 4.58 K/UL (ref 1.7–7)
NEUTS SEG NFR BLD: 58.5 %
NRBC BLD-RTO: 0.5 %
PLATELET # BLD AUTO: 387 K/UL (ref 150–369)
PMV BLD AUTO: 9.8 FL (ref 9.4–12.3)
POTASSIUM SERPL-SCNC: 4.1 MEQ/L (ref 3.5–5.1)
PROT SERPL-MCNC: 5.9 G/DL (ref 6–8.2)
RBC # BLD AUTO: 2.3 M/UL (ref 3.93–5.22)
SODIUM SERPL-SCNC: 139 MEQ/L (ref 136–145)
SPECIMEN EXP DATE BLD: NORMAL
TIBC SERPL-MCNC: 286 UG/DL (ref 270–460)
UIBC SERPL-MCNC: 228 UG/DL (ref 180–360)
WBC # BLD AUTO: 7.85 K/UL (ref 3.8–10.5)

## 2025-05-30 PROCEDURE — 85025 COMPLETE CBC W/AUTO DIFF WBC: CPT | Performed by: HOSPITALIST

## 2025-05-30 PROCEDURE — 80053 COMPREHEN METABOLIC PANEL: CPT | Performed by: RADIOLOGY

## 2025-05-30 PROCEDURE — 86923 COMPATIBILITY TEST ELECTRIC: CPT

## 2025-05-30 PROCEDURE — 36591 DRAW BLOOD OFF VENOUS DEVICE: CPT

## 2025-05-30 PROCEDURE — 86901 BLOOD TYPING SEROLOGIC RH(D): CPT

## 2025-05-30 PROCEDURE — 99215 OFFICE O/P EST HI 40 MIN: CPT | Performed by: HOSPITALIST

## 2025-05-30 PROCEDURE — 85046 RETICYTE/HGB CONCENTRATE: CPT | Performed by: HOSPITALIST

## 2025-05-30 PROCEDURE — 83540 ASSAY OF IRON: CPT | Performed by: HOSPITALIST

## 2025-05-30 PROCEDURE — 36415 COLL VENOUS BLD VENIPUNCTURE: CPT | Performed by: HOSPITALIST

## 2025-05-30 RX ORDER — DIPHENHYDRAMINE HCL 50 MG/ML
25 VIAL (ML) INJECTION ONCE AS NEEDED
OUTPATIENT
Start: 2025-06-06

## 2025-05-30 RX ORDER — DIPHENHYDRAMINE HCL 50 MG/ML
12.5 VIAL (ML) INJECTION ONCE AS NEEDED
OUTPATIENT
Start: 2025-05-30

## 2025-05-30 RX ORDER — EPINEPHRINE 1 MG/ML
0.5 INJECTION, SOLUTION INTRAMUSCULAR; SUBCUTANEOUS ONCE AS NEEDED
OUTPATIENT
Start: 2025-06-06

## 2025-05-30 RX ORDER — HEPARIN 100 UNIT/ML
500 SYRINGE INTRAVENOUS AS NEEDED
Status: DISCONTINUED | OUTPATIENT
Start: 2025-05-30 | End: 2025-05-31 | Stop reason: HOSPADM

## 2025-05-30 RX ORDER — SODIUM CHLORIDE 9 MG/ML
5 INJECTION, SOLUTION INTRAVENOUS AS NEEDED
OUTPATIENT
Start: 2025-05-30 | End: 2025-05-31

## 2025-05-30 RX ORDER — FAMOTIDINE 10 MG/ML
20 INJECTION, SOLUTION INTRAVENOUS ONCE AS NEEDED
OUTPATIENT
Start: 2025-06-06

## 2025-05-30 RX ORDER — FAMOTIDINE 10 MG/ML
20 INJECTION, SOLUTION INTRAVENOUS ONCE AS NEEDED
OUTPATIENT
Start: 2025-05-30

## 2025-05-30 RX ORDER — HEPARIN 100 UNIT/ML
500 SYRINGE INTRAVENOUS AS NEEDED
Status: CANCELLED | OUTPATIENT
Start: 2025-05-30

## 2025-05-30 RX ADMIN — HEPARIN 500 UNITS: 100 SYRINGE at 14:05

## 2025-06-02 ENCOUNTER — HOSPITAL ENCOUNTER (OUTPATIENT)
Dept: HEMATOLOGY/ONCOLOGY | Facility: HOSPITAL | Age: 75
Setting detail: INFUSION SERIES
Discharge: HOME | End: 2025-06-02
Attending: HOSPITALIST
Payer: MEDICARE

## 2025-06-02 VITALS
TEMPERATURE: 96.7 F | RESPIRATION RATE: 18 BRPM | SYSTOLIC BLOOD PRESSURE: 143 MMHG | HEART RATE: 64 BPM | DIASTOLIC BLOOD PRESSURE: 63 MMHG | OXYGEN SATURATION: 99 %

## 2025-06-02 DIAGNOSIS — D50.0 IRON DEFICIENCY ANEMIA DUE TO CHRONIC BLOOD LOSS: ICD-10-CM

## 2025-06-02 DIAGNOSIS — C67.9 MALIGNANT NEOPLASM OF URINARY BLADDER, UNSPECIFIED SITE (CMS/HCC): Primary | ICD-10-CM

## 2025-06-02 DIAGNOSIS — C67.8 MALIGNANT NEOPLASM OF OVERLAPPING SITES OF BLADDER (CMS/HCC): ICD-10-CM

## 2025-06-02 PROCEDURE — 36430 TRANSFUSION BLD/BLD COMPNT: CPT

## 2025-06-02 PROCEDURE — P9016 RBC LEUKOCYTES REDUCED: HCPCS

## 2025-06-02 RX ORDER — HEPARIN 100 UNIT/ML
500 SYRINGE INTRAVENOUS AS NEEDED
Status: DISCONTINUED | OUTPATIENT
Start: 2025-06-02 | End: 2025-06-03 | Stop reason: HOSPADM

## 2025-06-02 RX ORDER — HEPARIN 100 UNIT/ML
500 SYRINGE INTRAVENOUS AS NEEDED
Status: CANCELLED | OUTPATIENT
Start: 2025-06-02

## 2025-06-02 RX ADMIN — HEPARIN 500 UNITS: 100 SYRINGE at 14:41

## 2025-06-02 ASSESSMENT — ENCOUNTER SYMPTOMS
FATIGUE: 1
LOSS OF SENSATION IN FEET: 0
EXTREMITY WEAKNESS: 1
LIGHT-HEADEDNESS: 1
DEPRESSION: 0
DIZZINESS: 1
SHORTNESS OF BREATH: 1
OCCASIONAL FEELINGS OF UNSTEADINESS: 0

## 2025-06-02 ASSESSMENT — PAIN SCALES - GENERAL: PAINLEVEL_OUTOF10: 0-NO PAIN

## 2025-06-02 NOTE — PROGRESS NOTES
Review of Systems   Constitutional:  Positive for fatigue.   Respiratory:  Positive for shortness of breath.    Neurological:  Positive for dizziness, extremity weakness and light-headedness.   All other systems reviewed and are negative.     Patient tolerated 1 unit of PRBCs via CWP with out issue. VSS. Patient left stable.

## 2025-06-03 LAB
CROSSMATCH: NORMAL
ISBT CODE: 1700
PRODUCT CODE: NORMAL
PRODUCT STATUS: NORMAL
SPECIMEN EXP DATE BLD: NORMAL
UNIT ABO: NORMAL
UNIT ID: NORMAL
UNIT RH: NEGATIVE

## 2025-06-05 ENCOUNTER — HOSPITAL ENCOUNTER (OUTPATIENT)
Dept: HEMATOLOGY/ONCOLOGY | Facility: HOSPITAL | Age: 75
Setting detail: INFUSION SERIES
Discharge: HOME | End: 2025-06-05
Attending: HOSPITALIST
Payer: MEDICARE

## 2025-06-05 VITALS
OXYGEN SATURATION: 99 % | TEMPERATURE: 96.8 F | SYSTOLIC BLOOD PRESSURE: 133 MMHG | HEART RATE: 77 BPM | DIASTOLIC BLOOD PRESSURE: 57 MMHG

## 2025-06-05 DIAGNOSIS — D50.0 IRON DEFICIENCY ANEMIA DUE TO CHRONIC BLOOD LOSS: ICD-10-CM

## 2025-06-05 DIAGNOSIS — C67.8 MALIGNANT NEOPLASM OF OVERLAPPING SITES OF BLADDER (CMS/HCC): ICD-10-CM

## 2025-06-05 DIAGNOSIS — C67.9 MALIGNANT NEOPLASM OF URINARY BLADDER, UNSPECIFIED SITE (CMS/HCC): Primary | ICD-10-CM

## 2025-06-05 LAB
ABO + RH BLD: NORMAL
BASOPHILS # BLD: 0.06 K/UL (ref 0.01–0.1)
BASOPHILS NFR BLD: 0.8 %
BLD GP AB SCN SERPL QL: NEGATIVE
D AG BLD QL: POSITIVE
DIFFERENTIAL METHOD BLD: ABNORMAL
EOSINOPHIL # BLD: 0.16 K/UL (ref 0.04–0.36)
EOSINOPHIL NFR BLD: 2.1 %
ERYTHROCYTE [DISTWIDTH] IN BLOOD BY AUTOMATED COUNT: 19.5 % (ref 11.7–14.4)
HCT VFR BLD AUTO: 26.3 % (ref 35–45)
HGB BLD-MCNC: 8.3 G/DL (ref 11.8–15.7)
IMM GRANULOCYTES # BLD AUTO: 0.05 K/UL (ref 0–0.08)
IMM GRANULOCYTES NFR BLD AUTO: 0.7 %
LABORATORY COMMENT REPORT: NORMAL
LYMPHOCYTES # BLD: 1.4 K/UL (ref 1.2–3.5)
LYMPHOCYTES NFR BLD: 18.7 %
MCH RBC QN AUTO: 31 PG (ref 28–33.2)
MCHC RBC AUTO-ENTMCNC: 31.6 G/DL (ref 32.2–35.5)
MCV RBC AUTO: 98.1 FL (ref 83–98)
MONOCYTES # BLD: 0.98 K/UL (ref 0.28–0.8)
MONOCYTES NFR BLD: 13.1 %
NEUTROPHILS # BLD: 4.84 K/UL (ref 1.7–7)
NEUTS SEG NFR BLD: 64.6 %
NRBC BLD-RTO: 0 %
PLATELET # BLD AUTO: 322 K/UL (ref 150–369)
PMV BLD AUTO: 9.8 FL (ref 9.4–12.3)
RBC # BLD AUTO: 2.68 M/UL (ref 3.93–5.22)
SPECIMEN EXP DATE BLD: NORMAL
WBC # BLD AUTO: 7.49 K/UL (ref 3.8–10.5)

## 2025-06-05 PROCEDURE — 86850 RBC ANTIBODY SCREEN: CPT

## 2025-06-05 PROCEDURE — 85025 COMPLETE CBC W/AUTO DIFF WBC: CPT | Performed by: HOSPITALIST

## 2025-06-05 PROCEDURE — 25800000 HC PHARMACY IV SOLUTIONS: Performed by: HOSPITALIST

## 2025-06-05 PROCEDURE — 36415 COLL VENOUS BLD VENIPUNCTURE: CPT | Performed by: HOSPITALIST

## 2025-06-05 PROCEDURE — 96365 THER/PROPH/DIAG IV INF INIT: CPT

## 2025-06-05 PROCEDURE — 63600000 HC DRUGS/DETAIL CODE: Mod: JZ,TB | Performed by: HOSPITALIST

## 2025-06-05 RX ORDER — DIPHENHYDRAMINE HCL 50 MG/ML
25 VIAL (ML) INJECTION ONCE AS NEEDED
OUTPATIENT
Start: 2025-06-05

## 2025-06-05 RX ORDER — HEPARIN 100 UNIT/ML
500 SYRINGE INTRAVENOUS AS NEEDED
OUTPATIENT
Start: 2025-06-05

## 2025-06-05 RX ORDER — FAMOTIDINE 10 MG/ML
20 INJECTION, SOLUTION INTRAVENOUS ONCE AS NEEDED
OUTPATIENT
Start: 2025-06-05

## 2025-06-05 RX ORDER — HEPARIN 100 UNIT/ML
500 SYRINGE INTRAVENOUS AS NEEDED
Status: DISCONTINUED | OUTPATIENT
Start: 2025-06-05 | End: 2025-06-06 | Stop reason: HOSPADM

## 2025-06-05 RX ORDER — EPINEPHRINE 1 MG/ML
0.5 INJECTION, SOLUTION INTRAMUSCULAR; SUBCUTANEOUS ONCE AS NEEDED
OUTPATIENT
Start: 2025-06-05

## 2025-06-05 RX ADMIN — HEPARIN 500 UNITS: 100 SYRINGE at 14:46

## 2025-06-05 RX ADMIN — FERRIC DERISOMALTOSE 1000 MG: 1000 SOLUTION INTRAVENOUS at 13:50

## 2025-06-05 ASSESSMENT — ENCOUNTER SYMPTOMS
FATIGUE: 1
DEPRESSION: 0
OCCASIONAL FEELINGS OF UNSTEADINESS: 0
GASTROINTESTINAL NEGATIVE: 1
EYES NEGATIVE: 1
ENDOCRINE NEGATIVE: 1
LIGHT-HEADEDNESS: 1
HEMATOLOGIC/LYMPHATIC NEGATIVE: 1
MUSCULOSKELETAL NEGATIVE: 1
SHORTNESS OF BREATH: 1
DIZZINESS: 1
LOSS OF SENSATION IN FEET: 0
PSYCHIATRIC NEGATIVE: 1
CARDIOVASCULAR NEGATIVE: 1

## 2025-06-05 NOTE — PROGRESS NOTES
Review of Systems   Constitutional:  Positive for fatigue.   HENT:  Negative.     Eyes: Negative.    Respiratory:  Positive for shortness of breath.    Cardiovascular: Negative.    Gastrointestinal: Negative.    Endocrine: Negative.    Genitourinary: Negative.     Musculoskeletal: Negative.    Skin: Negative.    Neurological:  Positive for dizziness and light-headedness.   Hematological: Negative.    Psychiatric/Behavioral: Negative.        Pt arrived for labs and monoferric. Port accessed with positive blood return. VSS. Labs drawn and sent (CBC, Type and screen). Pt tolerated iron infusion with nl issues. 30minute observation was uneventful. AVS declined. Port flushed with heparin and de accessed. Pt is scheduled out appropriately.

## 2025-06-05 NOTE — PATIENT INSTRUCTIONS
After an iron infusion, you should  Drink plenty of fluids  Rest for the remainder of the day  Avoid strenuous activity for some time  Take all of your regular medications  Follow up with your doctor for additional blood tests and monitoring    Common side effects of iron infusions include:  Bloating or swelling of face, arms, hands, lower legs, or feet  Dizziness, faintness, or lightheadedness when getting up suddenly  Headaches  Flu-like symptoms  High or low blood pressure  Nausea  Injection site reactions  Constipation

## 2025-06-12 ENCOUNTER — HOSPITAL ENCOUNTER (OUTPATIENT)
Dept: HEMATOLOGY/ONCOLOGY | Facility: HOSPITAL | Age: 75
Setting detail: INFUSION SERIES
Discharge: HOME | End: 2025-06-12
Attending: HOSPITALIST
Payer: MEDICARE

## 2025-06-12 ENCOUNTER — TELEPHONE (OUTPATIENT)
Facility: HOSPITAL | Age: 75
End: 2025-06-12
Payer: MEDICARE

## 2025-06-12 DIAGNOSIS — C67.9 MALIGNANT NEOPLASM OF URINARY BLADDER, UNSPECIFIED SITE (CMS/HCC): Primary | ICD-10-CM

## 2025-06-12 DIAGNOSIS — D50.0 IRON DEFICIENCY ANEMIA DUE TO CHRONIC BLOOD LOSS: ICD-10-CM

## 2025-06-12 DIAGNOSIS — C67.8 MALIGNANT NEOPLASM OF OVERLAPPING SITES OF BLADDER (CMS/HCC): ICD-10-CM

## 2025-06-12 LAB
ABO + RH BLD: ABNORMAL
BASOPHILS # BLD: 0.07 K/UL (ref 0.01–0.1)
BASOPHILS NFR BLD: 0.9 %
BLD GP AB INVEST PLASRBC-IMP: NORMAL
BLD GP AB SCN SERPL QL: POSITIVE
D AG BLD QL: POSITIVE
DIFFERENTIAL METHOD BLD: ABNORMAL
EOSINOPHIL # BLD: 0.25 K/UL (ref 0.04–0.36)
EOSINOPHIL NFR BLD: 3.1 %
ERYTHROCYTE [DISTWIDTH] IN BLOOD BY AUTOMATED COUNT: 17.6 % (ref 11.7–14.4)
HCT VFR BLD AUTO: 25 % (ref 35–45)
HGB BLD-MCNC: 8 G/DL (ref 11.8–15.7)
IMM GRANULOCYTES # BLD AUTO: 0.06 K/UL (ref 0–0.08)
IMM GRANULOCYTES NFR BLD AUTO: 0.7 %
LABORATORY COMMENT REPORT: ABNORMAL
LYMPHOCYTES # BLD: 1.17 K/UL (ref 1.2–3.5)
LYMPHOCYTES NFR BLD: 14.6 %
MCH RBC QN AUTO: 32 PG (ref 28–33.2)
MCHC RBC AUTO-ENTMCNC: 32 G/DL (ref 32.2–35.5)
MCV RBC AUTO: 100 FL (ref 83–98)
MONOCYTES # BLD: 0.8 K/UL (ref 0.28–0.8)
MONOCYTES NFR BLD: 10 %
NEUTROPHILS # BLD: 5.69 K/UL (ref 1.7–7)
NEUTS SEG NFR BLD: 70.7 %
NRBC BLD-RTO: 0 %
PLATELET # BLD AUTO: 243 K/UL (ref 150–369)
PMV BLD AUTO: 9.3 FL (ref 9.4–12.3)
RBC # BLD AUTO: 2.5 M/UL (ref 3.93–5.22)
SPECIMEN EXP DATE BLD: ABNORMAL
WBC # BLD AUTO: 8.04 K/UL (ref 3.8–10.5)

## 2025-06-12 PROCEDURE — 86870 RBC ANTIBODY IDENTIFICATION: CPT

## 2025-06-12 PROCEDURE — 85025 COMPLETE CBC W/AUTO DIFF WBC: CPT | Performed by: HOSPITALIST

## 2025-06-12 PROCEDURE — 36415 COLL VENOUS BLD VENIPUNCTURE: CPT | Performed by: HOSPITALIST

## 2025-06-12 PROCEDURE — 36591 DRAW BLOOD OFF VENOUS DEVICE: CPT

## 2025-06-12 RX ORDER — HEPARIN 100 UNIT/ML
500 SYRINGE INTRAVENOUS AS NEEDED
OUTPATIENT
Start: 2025-06-12

## 2025-06-12 RX ORDER — HEPARIN 100 UNIT/ML
500 SYRINGE INTRAVENOUS AS NEEDED
Status: DISCONTINUED | OUTPATIENT
Start: 2025-06-12 | End: 2025-06-13 | Stop reason: HOSPADM

## 2025-06-12 RX ADMIN — HEPARIN 500 UNITS: 100 SYRINGE at 12:50

## 2025-06-12 ASSESSMENT — ENCOUNTER SYMPTOMS
DEPRESSION: 0
OCCASIONAL FEELINGS OF UNSTEADINESS: 0
LOSS OF SENSATION IN FEET: 0

## 2025-06-12 NOTE — TELEPHONE ENCOUNTER
I called her since her hemoglobin is 8.  She received Monoferric on June 5.  She has been conserve her energy and she is feeling pretty good, not exhausted.  She will repeat her lab next Wednesday and we will decide whether she need any blood transfusion.  For now she does not need blood transfusion.

## 2025-06-18 ENCOUNTER — HOSPITAL ENCOUNTER (OUTPATIENT)
Dept: HEMATOLOGY/ONCOLOGY | Facility: HOSPITAL | Age: 75
Setting detail: INFUSION SERIES
Discharge: HOME | End: 2025-06-18
Attending: HOSPITALIST
Payer: MEDICARE

## 2025-06-18 ENCOUNTER — OFFICE VISIT (OUTPATIENT)
Facility: HOSPITAL | Age: 75
End: 2025-06-18
Attending: HOSPITALIST
Payer: MEDICARE

## 2025-06-18 VITALS
HEART RATE: 62 BPM | BODY MASS INDEX: 26.68 KG/M2 | OXYGEN SATURATION: 99 % | HEIGHT: 67 IN | SYSTOLIC BLOOD PRESSURE: 110 MMHG | TEMPERATURE: 96.8 F | WEIGHT: 170 LBS | DIASTOLIC BLOOD PRESSURE: 68 MMHG

## 2025-06-18 DIAGNOSIS — C67.8 MALIGNANT NEOPLASM OF OVERLAPPING SITES OF BLADDER (CMS/HCC): ICD-10-CM

## 2025-06-18 DIAGNOSIS — C67.9 MALIGNANT NEOPLASM OF URINARY BLADDER, UNSPECIFIED SITE (CMS/HCC): Primary | ICD-10-CM

## 2025-06-18 DIAGNOSIS — D50.0 IRON DEFICIENCY ANEMIA DUE TO CHRONIC BLOOD LOSS: ICD-10-CM

## 2025-06-18 LAB
ABO + RH BLD: NORMAL
BASOPHILS # BLD: 0.08 K/UL (ref 0.01–0.1)
BASOPHILS NFR BLD: 0.9 %
BLD GP AB SCN SERPL QL: NEGATIVE
D AG BLD QL: POSITIVE
DIFFERENTIAL METHOD BLD: ABNORMAL
EOSINOPHIL # BLD: 0.44 K/UL (ref 0.04–0.36)
EOSINOPHIL NFR BLD: 4.8 %
ERYTHROCYTE [DISTWIDTH] IN BLOOD BY AUTOMATED COUNT: 16.3 % (ref 11.7–14.4)
HCT VFR BLD AUTO: 25 % (ref 35–45)
HGB BLD-MCNC: 8.2 G/DL (ref 11.8–15.7)
IMM GRANULOCYTES # BLD AUTO: 0.06 K/UL (ref 0–0.08)
IMM GRANULOCYTES NFR BLD AUTO: 0.7 %
LABORATORY COMMENT REPORT: NORMAL
LYMPHOCYTES # BLD: 1.19 K/UL (ref 1.2–3.5)
LYMPHOCYTES NFR BLD: 13.1 %
MCH RBC QN AUTO: 33.1 PG (ref 28–33.2)
MCHC RBC AUTO-ENTMCNC: 32.8 G/DL (ref 32.2–35.5)
MCV RBC AUTO: 100.8 FL (ref 83–98)
MONOCYTES # BLD: 0.73 K/UL (ref 0.28–0.8)
MONOCYTES NFR BLD: 8 %
NEUTROPHILS # BLD: 6.58 K/UL (ref 1.7–7)
NEUTS SEG NFR BLD: 72.5 %
NRBC BLD-RTO: 0 %
PLATELET # BLD AUTO: 293 K/UL (ref 150–369)
PMV BLD AUTO: 9.2 FL (ref 9.4–12.3)
RBC # BLD AUTO: 2.48 M/UL (ref 3.93–5.22)
SPECIMEN EXP DATE BLD: NORMAL
WBC # BLD AUTO: 9.08 K/UL (ref 3.8–10.5)

## 2025-06-18 PROCEDURE — 86901 BLOOD TYPING SEROLOGIC RH(D): CPT

## 2025-06-18 PROCEDURE — 36591 DRAW BLOOD OFF VENOUS DEVICE: CPT

## 2025-06-18 PROCEDURE — 36415 COLL VENOUS BLD VENIPUNCTURE: CPT | Performed by: HOSPITALIST

## 2025-06-18 PROCEDURE — 85025 COMPLETE CBC W/AUTO DIFF WBC: CPT | Performed by: HOSPITALIST

## 2025-06-18 PROCEDURE — 99214 OFFICE O/P EST MOD 30 MIN: CPT | Performed by: HOSPITALIST

## 2025-06-18 PROCEDURE — G0463 HOSPITAL OUTPT CLINIC VISIT: HCPCS | Performed by: HOSPITALIST

## 2025-06-18 RX ORDER — HEPARIN 100 UNIT/ML
500 SYRINGE INTRAVENOUS AS NEEDED
Status: DISCONTINUED | OUTPATIENT
Start: 2025-06-18 | End: 2025-06-19 | Stop reason: HOSPADM

## 2025-06-18 RX ORDER — HEPARIN 100 UNIT/ML
500 SYRINGE INTRAVENOUS AS NEEDED
OUTPATIENT
Start: 2025-06-18

## 2025-06-18 RX ADMIN — HEPARIN 500 UNITS: 100 SYRINGE at 10:01

## 2025-06-18 ASSESSMENT — ENCOUNTER SYMPTOMS
CONSTITUTIONAL NEGATIVE: 1
PSYCHIATRIC NEGATIVE: 1
HEMATOLOGIC/LYMPHATIC NEGATIVE: 1
GASTROINTESTINAL NEGATIVE: 1
EYES NEGATIVE: 1
SHORTNESS OF BREATH: 0
FATIGUE: 0
NEUROLOGICAL NEGATIVE: 1
CARDIOVASCULAR NEGATIVE: 1
ENDOCRINE NEGATIVE: 1
RESPIRATORY NEGATIVE: 1
BACK PAIN: 1

## 2025-06-18 ASSESSMENT — PAIN SCALES - GENERAL: PAINLEVEL_OUTOF10: 8

## 2025-06-18 NOTE — PROGRESS NOTES
Hematology/Oncology -  [facility]  Follow up Progress Note       Gladys Woo is a 75 y.o. female,   :  1950  REFERRING PHYSICIAN:   No referring provider defined for this encounter.  PRIMARY CARE PROVIDER:  Zoe Sanders MD    Encounter Diagnoses   Name Primary?    Malignant neoplasm of urinary bladder, unspecified site (CMS/HCC) Yes    Malignant neoplasm of overlapping sites of bladder (CMS/HCC)     Iron deficiency anemia due to chronic blood loss        Care Team    Urologist: Eugenio Jimenez  Radiation: [facility]  Med Onc: [facility]    Staging       Cancer Staging   Bladder cancer (CMS/HCC)  Staging form: Urinary Bladder, AJCC 8th Edition  - Clinical: Stage IIIA (cT3, cN0, cM0) - Signed by [facility], Gregory J, MD on 2025        Treatment Plan     Treatment Plans       Name Type Plan Dates Plan Provider         Active    CISplatin with Concurrent Radiation, 7 Day Cycles - bladder cancer ONCOLOGY TREATMENT 3/5/2025 - Present Wenceslao Soria MD                        Oncology History     Oncology History Overview Note   Gladys Woo is a very pleasant 75 y.o. patient with PMHx anxiety and hyperlipidemia.  She noticed some blood in her underwear since 2024.  She was initially treated for vagina dystrophy with no success. She was examined and no evidence of GYN tumor and eventually noted to have bleeding from urethra.  Patient seen by Dr. Jimenez who ordered a CT urogram which revealed a 8.6 cm irregular urinary bladder mass centered along the right posterior lateral bladder wall.  There is stranding in the right extraperitoneal space and right retroperitoneum along the course of the distalmost right ureter which may be from obstructive physiology without extension of neoplasm beyond the urinary bladder is theoretically possible.  Moderate right hydronephrosis to the level of the right UVJ's secondary to mass.  In addition an intermediate left adrenal nodule was noted for  which MRI was suggested.  Scattered subcentimeter sclerotic foci nonspecific but likely bony islands.  Bone scan suggested to rule out metastatic disease.  Patient presented last month to [facility] emergency department and CAT scan of the abdomen and pelvis revealed a large bladder mass with moderate right hydronephrosis and hydroureter.  CT angiogram chest abdomen pelvis showed no evidence of metastatic disease.  Creatinine at the time was elevated 1.7.  On 1/30/2025 Dr. Jimenez performed TURBT.  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  Suspicious for lymphovascular invasion.  Muscle invasion present.  Patient has not noted further bleeding per urethra. Patient met with Dr. Corona medical oncology to discuss neoadjuvant chemotherapy prior to cystectomy.  Patient wants to have bladder preserving strategy.    She went to [place] and saw Dr. Patel with recommendation of neoadjuvant chemotherapy, followed up by surgery.    She is aware of her options but still want to try chemoradiation at this point.     Bladder cancer (CMS/[facility])   2/25/2025 Cancer Staged    Staging form: Urinary Bladder, AJCC 8th Edition  - Clinical: Stage IIIA (cT3, cN0, cM0)     3/6/2025 -  Chemotherapy    GENERAL INFUSION ORDERS FOR HYDRATION  Plan Provider: Wenceslao Soria MD     3/18/2025 -  Chemotherapy    MEDIPORT FLUSH (SINGLE LUMEN) - PATIENT ON TREATMENT  Plan Provider: Wenceslao Soria MD     3/19/2025 - 3/19/2025 Chemotherapy    CISplatin with Concurrent Radiation, 7 Day Cycles - bladder cancer  Plan Provider: Wenceslao Soria MD  Treatment goal: Curative  Line of treatment: First Line     4/1/2025 -  Chemotherapy    Gemcitabine, twice a week with radiation therapy - Bladder  Plan Provider: Wenceslao Soria MD  Treatment goal: Curative  Line of treatment: First Line           History Of Present Illness     Gladys Woo is a very pleasant 75 y.o. patient with PMHx of   "Bladder cancer, stage III, T3N0M0, s/p concurrent chemoradiation therapy with gemcitabine who is here for follow up.    She is here alone.    Same fatigue.  She felt much better with the last Monoferric.    Stained her back yesterday.     Will see Dr. Eugenio Jimenez on 6/26/2025.  She postponed the visit because her CT scan will be scheduled on June 20th.                   Gladys Woo is seen today for follow up.       ROS     Review of Systems   Constitutional:  Negative for fatigue.   Respiratory:  Negative for shortness of breath.    All other systems reviewed and are negative.    Review of Systems:  Nursing assessment reviewed. Pertinent positive and negative symptoms noted in HPI, all others negative.    PHYSICAL EXAMINATION     Temp:  [36 °C (96.8 °F)] 36 °C (96.8 °F)  Heart Rate:  [62] 62  BP: (110)/(68) 110/68  Visit Vitals  /68 (BP Location: Right upper arm, Patient Position: Sitting)   Pulse 62   Temp (!) 36 °C (96.8 °F) (Temporal)   Ht 1.702 m (5' 7\")   Wt 77.1 kg (170 lb)   SpO2 99%   BMI 26.63 kg/m²         Physical Exam  Vitals reviewed.   Constitutional:       Appearance: She is not ill-appearing.   Cardiovascular:      Rate and Rhythm: Normal rate and regular rhythm.      Heart sounds: No murmur heard.  Pulmonary:      Effort: Pulmonary effort is normal. No respiratory distress.      Breath sounds: Normal breath sounds. No wheezing.   Abdominal:      Palpations: Abdomen is soft.   Musculoskeletal:         General: No swelling or tenderness.   Skin:     Coloration: Skin is pale. Skin is not jaundiced.      Findings: No rash.   Neurological:      General: No focal deficit present.      Mental Status: She is alert.   Psychiatric:         Mood and Affect: Mood normal.           Past Medical History     Past Medical History[1]    Past Surgical History     Past Surgical History   Procedure Laterality Date    A-v cardiac pacemaker insertion      Colonoscopy      CYSTO, TURBT TRANS-URETHRAL " RESECTION BLADDER TUMOR N/A 2025    Performed by Eugenio Jimenez MD at  OR    Insert / replace / remove pacemaker      Knee arthroscopy w/ meniscal repair      Tonsillectomy         OB/GYN History     OB History    Para Term  AB Living   3 1   2    SAB IAB Ectopic Multiple Live Births   2          # Outcome Date GA Lbr Franck/2nd Weight Sex Type Anes PTL Lv   3 SAB            2 SAB            1 Para              Gynecologic History:  Age at menarche: No age on file  Age at first live birth: No age on file   Age at menopause: No age on file    Social History     Social History     Tobacco Use    Smoking status: Former     Types: Cigarettes    Smokeless tobacco: Never   Substance Use Topics    Alcohol use: Yes     Alcohol/week: 2.0 standard drinks of alcohol     Types: 2 Glasses of wine per week     Comment: socially    Drug use: Never       Family History     Family History   Problem Relation Name Age of Onset    Heart disease Biological Mother      Diabetes Biological Mother      Heart disease Biological Father      Coronary artery disease Biological Father      Liver cancer Biological Brother      Prostate cancer Biological Brother         Allergies     Amoxicillin    Medications     Current Outpatient Medications   Medication Sig Dispense Refill    acetaminophen (TYLENOL) 500 mg tablet Take 500 mg by mouth every 6 (six) hours as needed for pain.      busPIRone (BUSPAR) 5 mg tablet Take 5 mg by mouth daily.      cranberry conc-C-bacillus coag (AZO CRANBERRY + PROBIOTIC) 250-30-15 mg tablet Take 1 tablet by mouth nightly.      naproxen sodium (ALEVE ORAL) Take by mouth as needed.      ondansetron (ZOFRAN) 8 mg tablet Take 1 tablet (8 mg total) by mouth every 8 (eight) hours as needed for nausea or vomiting. 30 tablet 3    prochlorperazine (COMPAZINE) 10 mg tablet Take 1 tablet (10 mg total) by mouth every 6 (six) hours as needed for nausea or vomiting. 30 tablet 3    rosuvastatin (CRESTOR) 40 mg  "tablet Take 40 mg by mouth daily.      diphenhydrAMINE (BENADRYL) 25 mg capsule Take 25 mg by mouth every 6 (six) hours as needed for itching. (Patient not taking: Reported on 5/9/2025)      hydrocortisone 1 % cream Apply 1 Application topically 2 (two) times a day. (Patient not taking: Reported on 5/9/2025) 30 g 0     No current facility-administered medications for this visit.     Facility-Administered Medications Ordered in Other Visits   Medication Dose Route Frequency Provider Last Rate Last Admin    heparin, porcine (PF) flush 500 Units  500 Units intra-catheter PRN Wenceslao Soria MD   500 Units at 06/18/25 1001          Labs     Lab Results   Component Value Date    WBC 9.08 06/18/2025    HGB 8.2 (L) 06/18/2025    HCT 25.0 (L) 06/18/2025    .8 (H) 06/18/2025     06/18/2025       Lab Results   Component Value Date    GLUCOSE 114 (H) 05/30/2025    CALCIUM 9.3 05/30/2025     05/30/2025    K 4.1 05/30/2025    CO2 23 05/30/2025     (H) 05/30/2025    BUN 14 05/30/2025    CREATININE 1.5 (H) 05/30/2025       Lab Results   Component Value Date    ALT 11 05/30/2025    AST 13 05/30/2025    ALKPHOS 59 05/30/2025    BILITOT 0.7 05/30/2025       Lab Results   Component Value Date    IRON 58 05/30/2025    TIBC 286 05/30/2025    FERRITIN 1,283 (H) 05/14/2025       No results found for: \"SPEP\", \"UPEP\"    No results found for: \"PTT\"    Lab Results   Component Value Date    INR 1.1 03/13/2025       No results found for: \"YLKVMERV52\"    No results found for: \"FOLATE\"    No results found for: \"RETICCTPCT\"    Lab Results   Component Value Date    DDIMER 1.16 (H) 01/24/2025         Pathology     Pathology Results       ** No results found for the last 720 hours. **            Radiology     No new Radiology.  IR PORT PLACEMENT  IR PORT PLACEMENT, ULTRASOUND GUIDED VASCULAR ACCESS  Result Date: 3/13/2025  Narrative: CLINICAL HISTORY:    Bladder cancer     Impression: IMPRESSION: Successful placement of a right " IJ CT Injectable chest port with ultrasound and fluoroscopic guidance, which is ready for use. Device: [employer] PowerPort COMMENT: PROCEDURE:  Right IJ chest port placement, under ultrasound and fluoroscopic guidance. RADIOLOGIST:  Quang Torres MD ANESTHESIA : Lidocaine 1% CONTRAST:  None. MEDICATIONS: Vancomycin 1.25 gm IV REFERENCE AIR KERMA: 14 mGy Versed 1 mg IV and Fentanyl 100 mcg IV were administered intravenously for moderate sedation, under the supervision of the physician.  Pulse oximetry, heart rate and blood pressure were continuously monitored by the trained nursing staff present.  The physician spent a total of 30 minutes of face to face sedation time with the patient. All the procedural conscious sedation guidelines were followed and documented including the Mallampati airway assessment, confirmation of NPO status, anesthesia history and  ASA classification.. DESCRIPTION OF PROCEDURE:  Written informed consent was obtained following explanation of risks and benefits of the procedure. The right neck and chest were draped and prepped in the usual sterile manner. The right internal jugular vein was noted to be compressible and patent on gray scale ultrasound. Under real-time grayscale ultrasound, the internal jugular vein was cannulated with a 21-gauge micropuncture needle following local anesthesia and a skin nick. An ultrasound-guided access image was saved to PACS. An 0.018 wire was advanced. The needle was exchanged for a 5 [place] micropuncture catheter. The inner catheter and wire were removed, and an 0.035 Amplatz wire was advanced under fluoroscopic guidance into the IVC and secured with a flow-switch. An appropriate port site was selected in the upper chest and the area was anesthetized with Lidocaine. A 3 centimeter horizontal incision was created and blunt dissection was performed superiorly to create a port pocket. Hemostasis was achieved. A subcutaneous tunnel was then created to the IJ entry  site following local anesthesia, with blunt dissection. An 8F Bard PowerPort catheter was advanced through the tunnel. The port was tested demonstrating no leakage. A peel-away sheath was advanced over the Amplatz wire, and the catheter was advanced through the peel-away sheath and positioned within the SVC/right atrial junction. The peel-away sheath was removed. The port catheter was appropriately sized and connected to the port. The port was successfully positioned within the pocket with the distal catheter tip terminating at the cavoatrial junction. The port was tested demonstrating appropriate flush and aspiration. The port was then flushed with Heparin following good blood return and was subsequently deaccessed. . Tissues superficial to the port were closed with interrupted deep 2-0 Vicryl suture and running subcutaneous 4-0 Vicryl suture. Skin closure at the internal jugular vein access site was achieved with Dermabond adhesive. Dermabond was applied to the pocket incision site. The patient was discharged in good condition. Performance Met:  PQRS MEASURE 76, CPT II 6030F:  All elements of maximal sterile technique were utilized, including cap, mask, sterile gown, sterile gloves, and sterile drape.  Additionally, sterile ultrasound techniques were followed, including use of sterile probe cover and sterile ultrasound gel. Ultrasound-guided vascular access - Grayscale ultrasound evaluation demonstrated the right  internal jugular vein to be patent. The right internal jugular vein was accessed under real-time ultrasound access. An access image was saved/archived to PACS. The number of guidewires used, and their integrity, was confirmed at the end of the procedure.      Assessment and Plan     In summary, Gladys Woo is a very pleasant 75 y.o. patient with PMHx of anxiety and dyslipidemia.     # Bladder cancer, stage III, T3N0M0. 1/30/3025 she had Transurethral resection of bladder tumor (8cm). OP note:   Cystoscopic evaluation demonstrated a large and invasive appearing tumor encompassing the entirety of the trigone and left lateral wall measuring at least 8 cm.  A monopolar working element was inserted and the tumor was then  systematically resected down to the level of the normal bladder wall.  There did appear to be a large volume of bladder wall invasion visually.  The bladder tumor specimens were then irrigated from the bladder and repeat cystoscopy demonstrated no evidence of hannah perforation.  - need to complete workup with MRI to better evaluate the left adrenal gland lesion.  Because she has no bone pain we can skip nuclear medicine bone scan at this point.  - per current NCCN guideline, optimal candidates for bladder preservation with chemoradiotherapy include patients with tumors that present without moderate/severe hydronephrosis, are without concurrent extensive or multifocal Tis, and are <6 cm. Ideally, tumors should allow a visually complete or maximally debulking TURBT. Apparently she has no other lesions based on operating note.  Her creatinine was improving.  Based on operation note her cancer is 8 cm, bigger than the 6 cm recommendation.  So I did have a discussion of neoadjuvant chemotherapy followed by radical cystectomy.  She is not interested in radical cystectomy.  Indeed Dr. Corona and Dr. Patel talked to her about this too.    - For patients receiving concurrent chemoradiation, the optimal radiosensitizing chemotherapy regimen is not established, as many of these regimens have not been directly compared in randomized trials. Selection of chemotherapy is based upon patient performance status and comorbidities, kidney function, and provider experience.   - With her overall fit status and Cr 1.2, we started c1 with cisplatin 20mg/m2 (3/19/2025) but she had FIDELIA and syncope. As such, we changed chemo to low-dose biweekly gemcitabine (27 mg/m2 twice per week with daily RT) and so far she is  tolerating it well. Chemo 4/1-5/12/2025.   - Will get a monitor CT chest abdomen pelvis on June 20th and cystoscopy with Dr. Jimenez June 26.  -Follow-up with me July 3.    #Drug rash likely due to Macrobid - resolved.     #Normocytic anemia.  - s/p IV Monoferric and PRBC transfusion.   - Except her hemoglobin continue to improve.  - We have a weekly CBC scheduled.    # Fatigue: due to chemo and anemia  - Encourage hydration, exercise as tolerated.    # UTI history.  Patient has no symptoms.    06/18/25  - Continue weekly CBC.  Hemoglobin is improving, 8.2.  No need for blood transfusion today.  - Will get a monitor CT chest abdomen pelvis on June 20th and cystoscopy with Dr. Jimenez June 26.  -Follow-up with me July 3.        No problem-specific Assessment & Plan notes found for this encounter.      Wenceslao Soria MD/PhD  Hematology/Oncology         [1]   Past Medical History:  Diagnosis Date    Bladder cancer (CMS/HCC)     Coronary artery disease     KERRI (generalized anxiety disorder)     TREVOR (obstructive sleep apnea)

## 2025-06-18 NOTE — PROGRESS NOTES
Review of Systems   Constitutional: Negative.    HENT:  Negative.     Eyes: Negative.    Respiratory: Negative.     Cardiovascular: Negative.    Gastrointestinal: Negative.    Endocrine: Negative.    Genitourinary: Negative.     Musculoskeletal:  Positive for back pain (strained back 2 days ago).   Skin: Negative.    Neurological: Negative.    Hematological: Negative.    Psychiatric/Behavioral: Negative.

## 2025-06-20 ENCOUNTER — HOSPITAL ENCOUNTER (OUTPATIENT)
Dept: RADIOLOGY | Facility: HOSPITAL | Age: 75
Discharge: HOME | End: 2025-06-20
Attending: RADIOLOGY
Payer: MEDICARE

## 2025-06-20 DIAGNOSIS — C67.8 MALIGNANT NEOPLASM OF OVERLAPPING SITES OF BLADDER (CMS/HCC): ICD-10-CM

## 2025-06-20 PROCEDURE — 63600105 HC IODINE BASED CONTRAST: Mod: JZ | Performed by: RADIOLOGY

## 2025-06-20 PROCEDURE — 71260 CT THORAX DX C+: CPT

## 2025-06-20 PROCEDURE — 25500000 HC DRUGS/INCIDENT RAD: Performed by: RADIOLOGY

## 2025-06-20 RX ORDER — IOPAMIDOL 755 MG/ML
100 INJECTION, SOLUTION INTRAVASCULAR
Status: COMPLETED | OUTPATIENT
Start: 2025-06-20 | End: 2025-06-20

## 2025-06-20 RX ADMIN — BARIUM SULFATE 900 ML: 20 SUSPENSION ORAL at 08:35

## 2025-06-20 RX ADMIN — IOPAMIDOL 100 ML: 755 INJECTION, SOLUTION INTRAVENOUS at 09:16

## 2025-06-21 ENCOUNTER — HOSPITAL ENCOUNTER (EMERGENCY)
Facility: HOSPITAL | Age: 75
Discharge: HOME | End: 2025-06-21
Attending: EMERGENCY MEDICINE
Payer: MEDICARE

## 2025-06-21 VITALS
SYSTOLIC BLOOD PRESSURE: 162 MMHG | BODY MASS INDEX: 26.68 KG/M2 | WEIGHT: 170 LBS | DIASTOLIC BLOOD PRESSURE: 70 MMHG | HEART RATE: 82 BPM | HEIGHT: 67 IN | TEMPERATURE: 97.6 F | RESPIRATION RATE: 20 BRPM | OXYGEN SATURATION: 99 %

## 2025-06-21 DIAGNOSIS — M54.50 ACUTE BILATERAL LOW BACK PAIN WITHOUT SCIATICA: ICD-10-CM

## 2025-06-21 DIAGNOSIS — D64.9 ANEMIA, UNSPECIFIED TYPE: ICD-10-CM

## 2025-06-21 DIAGNOSIS — R31.9 URINARY TRACT INFECTION WITH HEMATURIA, SITE UNSPECIFIED: ICD-10-CM

## 2025-06-21 DIAGNOSIS — N39.0 URINARY TRACT INFECTION WITH HEMATURIA, SITE UNSPECIFIED: ICD-10-CM

## 2025-06-21 LAB
ALBUMIN SERPL-MCNC: 3.8 G/DL (ref 3.5–5.7)
ALP SERPL-CCNC: 61 IU/L (ref 34–125)
ALT SERPL-CCNC: 8 IU/L (ref 7–52)
ANION GAP SERPL CALC-SCNC: 5 MEQ/L (ref 3–15)
AST SERPL-CCNC: 11 IU/L (ref 13–39)
BACTERIA URNS QL MICRO: ABNORMAL /HPF
BASOPHILS # BLD: 0.08 K/UL (ref 0.01–0.1)
BASOPHILS NFR BLD: 0.7 %
BILIRUB SERPL-MCNC: 0.4 MG/DL (ref 0.3–1.2)
BILIRUB UR QL STRIP.AUTO: NEGATIVE MG/DL
BUN SERPL-MCNC: 18 MG/DL (ref 7–25)
CALCIUM SERPL-MCNC: 9.7 MG/DL (ref 8.6–10.3)
CHLORIDE SERPL-SCNC: 110 MEQ/L (ref 98–107)
CLARITY UR REFRACT.AUTO: ABNORMAL
CO2 SERPL-SCNC: 24 MEQ/L (ref 21–31)
COLOR UR AUTO: YELLOW
CREAT SERPL-MCNC: 1.3 MG/DL (ref 0.6–1.2)
DIFFERENTIAL METHOD BLD: ABNORMAL
EGFRCR SERPLBLD CKD-EPI 2021: 43.2 ML/MIN/1.73M*2
EOSINOPHIL # BLD: 0.55 K/UL (ref 0.04–0.36)
EOSINOPHIL NFR BLD: 5.1 %
ERYTHROCYTE [DISTWIDTH] IN BLOOD BY AUTOMATED COUNT: 15.8 % (ref 11.7–14.4)
GLUCOSE SERPL-MCNC: 101 MG/DL (ref 70–99)
GLUCOSE UR STRIP.AUTO-MCNC: NEGATIVE MG/DL
HCT VFR BLD AUTO: 25.7 % (ref 35–45)
HGB BLD-MCNC: 8 G/DL (ref 11.8–15.7)
HGB UR QL STRIP.AUTO: ABNORMAL
HYALINE CASTS #/AREA URNS LPF: ABNORMAL /LPF
IMM GRANULOCYTES # BLD AUTO: 0.08 K/UL (ref 0–0.08)
IMM GRANULOCYTES NFR BLD AUTO: 0.7 %
KETONES UR STRIP.AUTO-MCNC: NEGATIVE MG/DL
LEUKOCYTE ESTERASE UR QL STRIP.AUTO: 3
LYMPHOCYTES # BLD: 1.37 K/UL (ref 1.2–3.5)
LYMPHOCYTES NFR BLD: 12.7 %
MCH RBC QN AUTO: 31.5 PG (ref 28–33.2)
MCHC RBC AUTO-ENTMCNC: 31.1 G/DL (ref 32.2–35.5)
MCV RBC AUTO: 101.2 FL (ref 83–98)
MONOCYTES # BLD: 0.89 K/UL (ref 0.28–0.8)
MONOCYTES NFR BLD: 8.2 %
NEUTROPHILS # BLD: 7.84 K/UL (ref 1.7–7)
NEUTS SEG NFR BLD: 72.6 %
NITRITE UR QL STRIP.AUTO: NEGATIVE
NRBC BLD-RTO: 0 %
PH UR STRIP.AUTO: 6.5 [PH]
PLATELET # BLD AUTO: 289 K/UL (ref 150–369)
PMV BLD AUTO: 9.4 FL (ref 9.4–12.3)
POTASSIUM SERPL-SCNC: 4.3 MEQ/L (ref 3.5–5.1)
PROT SERPL-MCNC: 6.3 G/DL (ref 6–8.2)
PROT UR QL STRIP.AUTO: 1
RBC # BLD AUTO: 2.54 M/UL (ref 3.93–5.22)
RBC #/AREA URNS HPF: ABNORMAL /HPF
SODIUM SERPL-SCNC: 139 MEQ/L (ref 136–145)
SP GR UR REFRACT.AUTO: 1.01
SQUAMOUS URNS QL MICRO: ABNORMAL /HPF
UROBILINOGEN UR STRIP-ACNC: 0.2 EU/DL
WBC # BLD AUTO: 10.81 K/UL (ref 3.8–10.5)
WBC #/AREA URNS HPF: ABNORMAL /HPF

## 2025-06-21 PROCEDURE — 99284 EMERGENCY DEPT VISIT MOD MDM: CPT | Mod: 25

## 2025-06-21 PROCEDURE — 80053 COMPREHEN METABOLIC PANEL: CPT

## 2025-06-21 PROCEDURE — 93005 ELECTROCARDIOGRAM TRACING: CPT | Performed by: EMERGENCY MEDICINE

## 2025-06-21 PROCEDURE — 81003 URINALYSIS AUTO W/O SCOPE: CPT

## 2025-06-21 PROCEDURE — 96374 THER/PROPH/DIAG INJ IV PUSH: CPT

## 2025-06-21 PROCEDURE — 63600000 HC DRUGS/DETAIL CODE: Mod: JZ

## 2025-06-21 PROCEDURE — 36415 COLL VENOUS BLD VENIPUNCTURE: CPT

## 2025-06-21 PROCEDURE — 3E033NZ INTRODUCTION OF ANALGESICS, HYPNOTICS, SEDATIVES INTO PERIPHERAL VEIN, PERCUTANEOUS APPROACH: ICD-10-PCS | Performed by: EMERGENCY MEDICINE

## 2025-06-21 PROCEDURE — 93005 ELECTROCARDIOGRAM TRACING: CPT

## 2025-06-21 PROCEDURE — 63700000 HC SELF-ADMINISTRABLE DRUG

## 2025-06-21 PROCEDURE — 87077 CULTURE AEROBIC IDENTIFY: CPT | Mod: 59

## 2025-06-21 PROCEDURE — 85025 COMPLETE CBC W/AUTO DIFF WBC: CPT

## 2025-06-21 RX ORDER — CEFUROXIME AXETIL 500 MG/1
500 TABLET ORAL 2 TIMES DAILY
Qty: 14 TABLET | Refills: 0 | Status: SHIPPED | OUTPATIENT
Start: 2025-06-21 | End: 2025-06-28

## 2025-06-21 RX ORDER — OXYCODONE HYDROCHLORIDE 5 MG/1
5 TABLET ORAL EVERY 8 HOURS PRN
Qty: 12 TABLET | Refills: 0 | Status: SHIPPED | OUTPATIENT
Start: 2025-06-21 | End: 2025-06-25

## 2025-06-21 RX ORDER — MORPHINE SULFATE 2 MG/ML
2 INJECTION, SOLUTION INTRAMUSCULAR; INTRAVENOUS ONCE
Status: COMPLETED | OUTPATIENT
Start: 2025-06-21 | End: 2025-06-21

## 2025-06-21 RX ORDER — CEFUROXIME AXETIL 250 MG/1
500 TABLET ORAL ONCE
Status: COMPLETED | OUTPATIENT
Start: 2025-06-21 | End: 2025-06-21

## 2025-06-21 RX ORDER — OXYCODONE HYDROCHLORIDE 5 MG/1
5 TABLET ORAL ONCE
Refills: 0 | Status: COMPLETED | OUTPATIENT
Start: 2025-06-21 | End: 2025-06-21

## 2025-06-21 RX ADMIN — CEFUROXIME AXETIL 500 MG: 250 TABLET, FILM COATED ORAL at 20:03

## 2025-06-21 RX ADMIN — OXYCODONE HYDROCHLORIDE 5 MG: 5 TABLET ORAL at 21:19

## 2025-06-21 RX ADMIN — MORPHINE SULFATE 2 MG: 2 INJECTION, SOLUTION INTRAMUSCULAR; INTRAVENOUS at 18:45

## 2025-06-21 ASSESSMENT — ENCOUNTER SYMPTOMS
PALPITATIONS: 0
ABDOMINAL PAIN: 0
JOINT SWELLING: 0
NECK PAIN: 0
CONSTIPATION: 0
HEADACHES: 0
CONFUSION: 0
SHORTNESS OF BREATH: 1
WEAKNESS: 0
MYALGIAS: 0
SEIZURES: 0
VOMITING: 0
FATIGUE: 0
DYSURIA: 0
FLANK PAIN: 0
ARTHRALGIAS: 0
SPEECH DIFFICULTY: 0
DIARRHEA: 0
LIGHT-HEADEDNESS: 0
COUGH: 0
FEVER: 0
NAUSEA: 0
BACK PAIN: 1
NECK STIFFNESS: 0
CHOKING: 0
TROUBLE SWALLOWING: 0
FACIAL ASYMMETRY: 0
DIAPHORESIS: 0
APNEA: 0
DIZZINESS: 0
FREQUENCY: 0
DIFFICULTY URINATING: 0
WHEEZING: 0
ABDOMINAL DISTENTION: 0
CHILLS: 0
NUMBNESS: 0
CHEST TIGHTNESS: 0
HEMATURIA: 0
VOICE CHANGE: 0
COLOR CHANGE: 0

## 2025-06-21 NOTE — ED PROVIDER NOTES
HPI   HISTORY OF PRESENT ILLNESS     Patient is a 75-year-old female with past history of bladder cancer, CAD, generalized Rima disorder, and obstructive sleep apnea who arrives to the emergency department for back pain near the sacrum x 1 week.  Patient states she had a CT chest/abdomen/pelvis performed yesterday and has not had the results yet.  Patient reports taking Aleve at home with no relief of symptoms.  Patient reports pain with range of motion.  No back pain red flags.  No acute neurodeficits.  Neurovascular intact.  Patient reports pain is a constant, throbbing, sharp, 7 out of 10 pain with no radiation and is aggravated with range of motion.  Patient denies chest pain, shortness of breath, palpitations, cough, fever, chills, headache, vision changes, diaphoresis, lightheadedness, dizziness, numbness, tingling, nausea, or vomiting.        History provided by:  Patient  Back Pain  Associated symptoms: no abdominal pain, no chest pain, no dysuria, no fever, no headaches, no numbness, no pelvic pain and no weakness          Patient History   PAST HISTORY     Reviewed from Nursing Triage:       Past Medical History:   Diagnosis Date    Bladder cancer (CMS/HCC)     Coronary artery disease     KERRI (generalized anxiety disorder)     TREVOR (obstructive sleep apnea)        Past Surgical History   Procedure Laterality Date    A-v cardiac pacemaker insertion      Colonoscopy      CYSTO, TURBT TRANS-URETHRAL RESECTION BLADDER TUMOR N/A 1/30/2025    Performed by Eugenio Jimenez MD at  [facility]    Insert / replace / remove pacemaker      Knee arthroscopy w/ meniscal repair      Tonsillectomy         Family History   Problem Relation Name Age of Onset    Heart disease Biological Mother      Diabetes Biological Mother      Heart disease Biological Father      Coronary artery disease Biological Father      Liver cancer Biological Brother      Prostate cancer Biological Brother         Social History     Tobacco Use     Smoking status: Former     Types: Cigarettes    Smokeless tobacco: Never   Substance Use Topics    Alcohol use: Yes     Alcohol/week: 2.0 standard drinks of alcohol     Types: 2 Glasses of wine per week     Comment: socially    Drug use: Never         Review of Systems   REVIEW OF SYSTEMS     Review of Systems   Constitutional:  Negative for chills, diaphoresis, fatigue and fever.   HENT:  Negative for trouble swallowing and voice change.    Eyes:  Negative for visual disturbance.   Respiratory:  Positive for shortness of breath (Patient states she is short of breath at baseline due to finishing chemo treatment and replenishing RBCs). Negative for apnea, cough, choking, chest tightness and wheezing.    Cardiovascular:  Negative for chest pain, palpitations and leg swelling.   Gastrointestinal:  Negative for abdominal distention, abdominal pain, constipation, diarrhea, nausea and vomiting.   Genitourinary:  Negative for difficulty urinating, dysuria, flank pain, frequency, hematuria, pelvic pain and urgency.   Musculoskeletal:  Positive for back pain (Sacral back pain). Negative for arthralgias, gait problem, joint swelling, myalgias, neck pain and neck stiffness.   Skin:  Negative for color change and rash.   Neurological:  Negative for dizziness, seizures, syncope, facial asymmetry, speech difficulty, weakness, light-headedness, numbness and headaches.   Psychiatric/Behavioral:  Negative for confusion.    All other systems reviewed and are negative.        VITALS     ED Vitals      Date/Time Temp Pulse Resp BP SpO2 [facility]   06/21/25 1623 36.6 °C (97.9 °F) 86 18 161/75 97 % MJB          Pulse Ox %: 97 % (06/21/25 1838)  Pulse Ox Interpretation: Normal (06/21/25 1838)  Heart Rate: 86 (06/21/25 1838)  Rhythm Strip Interpretation: Normal Sinus Rhythm (06/21/25 1838)     Physical Exam   PHYSICAL EXAM     Physical Exam  Vitals and nursing note reviewed.   Constitutional:       General: She is not in acute distress.      Appearance: Normal appearance. She is well-developed. She is not ill-appearing or diaphoretic.   HENT:      Head: Normocephalic and atraumatic.      Nose: No congestion or rhinorrhea.      Mouth/Throat:      Mouth: Mucous membranes are moist.   Eyes:      Extraocular Movements: Extraocular movements intact.      Conjunctiva/sclera: Conjunctivae normal.      Pupils: Pupils are equal, round, and reactive to light.   Cardiovascular:      Rate and Rhythm: Normal rate and regular rhythm.      Pulses: Normal pulses.      Heart sounds: Normal heart sounds.   Pulmonary:      Effort: Pulmonary effort is normal. No respiratory distress.      Breath sounds: Normal breath sounds.   Abdominal:      General: Bowel sounds are normal. There is no distension.      Palpations: Abdomen is soft.      Tenderness: There is no abdominal tenderness. There is no right CVA tenderness, left CVA tenderness, guarding or rebound.   Musculoskeletal:         General: Tenderness (Bilateral sacral back pain) present. No swelling or deformity.      Cervical back: Normal range of motion and neck supple.      Right lower leg: No edema.      Left lower leg: No edema.   Skin:     General: Skin is warm and dry.      Capillary Refill: Capillary refill takes less than 2 seconds.   Neurological:      General: No focal deficit present.      Mental Status: She is alert and oriented to person, place, and time.      Cranial Nerves: No cranial nerve deficit.      Sensory: No sensory deficit.      Motor: No weakness (5/5 bilateral strength in upper lower extremities).      Gait: Gait normal.   Psychiatric:         Mood and Affect: Mood normal.         Behavior: Behavior normal.           PROCEDURES     Procedures     DATA     Results       Procedure Component Value Units Date/Time    CBC and differential [[id number]] Collected: 06/21/25 1836    Specimen: Blood, Venous Updated: 06/21/25 1841    Comprehensive metabolic panel [[id number]] Collected: 06/21/25 1836     Specimen: Blood, Venous Updated: 06/21/25 1841            Imaging Results    None         ECG 12 lead    (Results Pending)       Scoring tools                                  ED Course & MDM   MDM / ED COURSE / CLINICAL IMPRESSION / DISPO     Medical Decision Making  Impression: Patient is alert and oriented.  Patient presents to the ED for back pain near the sacrum x 1 week.  Patient states she had a CT chest/abdomen/pelvis performed yesterday and has not had the results yet.  Patient reports taking Aleve at home with no relief of symptoms.  Patient reports pain with range of motion.  No back pain red flags.  No acute neurodeficits.  Neurovascular intact. Back pain most consistent with sacral back pain.  Differential diagnoses include lumbago versus musculoskeletal spasm/strain versus sciatica.  No back pain red flags on history or physical.  Presentation not consistent with fracture (no trauma, no bony tenderness to palpation), cauda equina (no bowel or urinary incontinence/retention, no saddle anesthesia, no distal weakness), AAA, viscus perforation, pulmonary embolism, renal colic, pyelonephritis.  Plan to obtain laboratory studies and symptomatically treat.  Plan to review CT chest/abdomen/pelvis performed outpatient at [facility] on 6/20/2025.  PERRLA.  Lungs are clear.  Bowel sounds present.  Abdomen soft and nontender.  Normal motor, strength, sensory in bilateral upper and lower extremities.  Negative crepitus or step-off on spinal exam.  Neurovascular intact.  No acute neurodeficits.  Patient can ambulate without difficulty.  Patient is afebrile.  VSS and SPO2 97% on room air.  Urine cloudy, +3 leukocytes, WBC too numerous to count.  Creatinine 1.3, AST 11, GFR 43.2, WBC 10.81, hemoglobin 8, RBC 2.54.  Plan: CBC, CMP, UA, pain management, serial reassessment  Rx: Ceftin, oxycodone  Dispo: Discharge home with strict return precautions.  Follow-up with PCP and urologist within 2 days for reevaluation.  Diagnosis  and management discussed with patient who understood and is comfortable with discharge plan.        Problems Addressed:  Acute bilateral low back pain without sciatica: acute illness or injury  Anemia, unspecified type: chronic illness or injury  Urinary tract infection with hematuria, site unspecified: acute illness or injury    Amount and/or Complexity of Data Reviewed  Labs: ordered. Decision-making details documented in ED Course.  ECG/medicine tests: ordered and independent interpretation performed.    Risk  Prescription drug management.        Vital Signs Review: Vital signs have been reviewed. The oxygen saturation is SpO2: 97 % which is normal.    ED Course as of 06/21/25 2118   Sat Jun 21, 2025   1833 Patient evaluated for back pain near the sacrum x 1 week.  Patient states she had a CT chest/abdomen/pelvis performed yesterday and has not had the results yet.  Patient reports taking Aleve at home with no relief of symptoms.  Patient reports pain with range of motion.  No back pain red flags.  No acute neurodeficits.  Neurovascular intact. [JG]   1901 Clarity, Urine(!): Cloudy [JG]   1901 Leukocyte Esterase(!): +3 [JG]   1901 Blood, Urine(!): Trace [JG]   2000   IMPRESSION:  1.  There has been significant interval decrease in size of a lobulated mass  within the bladder lumen extending along the right posterior lateral bladder  wall involving the level of the ureteral orifice.  There is ongoing moderate  right hydroureteronephrosis.  There is urothelial thickening and enhancement  throughout the right ureter which may be on the basis of chronic inflammation.  Superimposed urinary tract infection is not excluded and clinical correlation is  advised.  2.  The urinary bladder is incompletely distended and suboptimally evaluated as  a result.  There is suggestion of diffuse bladder wall thickening which may be  accentuated due to incomplete distention.  A nonspecific cystitis is not  excluded.  3.  Interval  evolution of right renal atrophy.  4.  There is a new 17 mm pleural-based nodule or mass within the right upper  lobe posteriorly.  This is surrounded by groundglass attenuation.  This is  nonspecific and could be infectious or inflammatory in nature.  The possibility  of a metastasis is not excluded.  No additional pulmonary nodules or mass  lesions are demonstrated.  5.  No focal lytic or blastic lesions are demonstrated throughout the visualized  skeleton.   [JG]   2113 Diagnosis and management discussed with patient who understood and is comfortable with discharge plan. [JG]      ED Course User Index  [JG] Hunter Williamson CRNP     Clinical Impression      None               Hunter Williamson CRNP  06/21/25 2120       Hunter Williamson CRNP  06/21/25 2125

## 2025-06-22 LAB
ATRIAL RATE: 92
P AXIS: 43
PR INTERVAL: 160
QRS DURATION: 136
QT INTERVAL: 396
QTC CALCULATION(BAZETT): 489
R AXIS: -55
T WAVE AXIS: 96
VENTRICULAR RATE: 92

## 2025-06-22 PROCEDURE — 93010 ELECTROCARDIOGRAM REPORT: CPT | Performed by: STUDENT IN AN ORGANIZED HEALTH CARE EDUCATION/TRAINING PROGRAM

## 2025-06-22 NOTE — DISCHARGE INSTRUCTIONS
You were evaluated in the emergency department today for back pain.  Your evaluation showed evidence of a urinary tract infection.  Please take antibiotics as directed and for the full course.  We recommend you take 600 mg ibuprofen every 6 hours or Tylenol 650 mg every 6 hours as needed for pain.  Please take Percocet as directed for severe pain; do not drive while taking this medication.  Please schedule an appointment for follow-up with your primary care physician and urologist within 2 days for reevaluation. Return to the emergency department if you experience worsening back pain, difficulty walking, fevers, numbness, tingling, incontinence, or any other concerning symptoms.

## 2025-06-24 ENCOUNTER — HOSPITAL ENCOUNTER (EMERGENCY)
Facility: HOSPITAL | Age: 75
Discharge: HOME | End: 2025-06-24
Attending: EMERGENCY MEDICINE | Admitting: EMERGENCY MEDICINE
Payer: MEDICARE

## 2025-06-24 VITALS
SYSTOLIC BLOOD PRESSURE: 109 MMHG | RESPIRATION RATE: 18 BRPM | DIASTOLIC BLOOD PRESSURE: 55 MMHG | TEMPERATURE: 97.4 F | OXYGEN SATURATION: 99 % | HEART RATE: 71 BPM | HEIGHT: 67 IN | WEIGHT: 170 LBS | BODY MASS INDEX: 26.68 KG/M2

## 2025-06-24 DIAGNOSIS — M54.32 BILATERAL SCIATICA: Primary | ICD-10-CM

## 2025-06-24 DIAGNOSIS — M54.31 BILATERAL SCIATICA: Primary | ICD-10-CM

## 2025-06-24 LAB
ABO + RH BLD: ABNORMAL
ALBUMIN SERPL-MCNC: 3.5 G/DL (ref 3.5–5.7)
ALP SERPL-CCNC: 57 IU/L (ref 34–125)
ALT SERPL-CCNC: 6 IU/L (ref 7–52)
ANION GAP SERPL CALC-SCNC: 5 MEQ/L (ref 3–15)
AST SERPL-CCNC: 9 IU/L (ref 13–39)
BASOPHILS # BLD: 0.06 K/UL (ref 0.01–0.1)
BASOPHILS NFR BLD: 0.7 %
BILIRUB SERPL-MCNC: 0.4 MG/DL (ref 0.3–1.2)
BLD GP AB SCN SERPL QL: POSITIVE
BUN SERPL-MCNC: 21 MG/DL (ref 7–25)
CALCIUM SERPL-MCNC: 9.4 MG/DL (ref 8.6–10.3)
CHLORIDE SERPL-SCNC: 106 MEQ/L (ref 98–107)
CO2 SERPL-SCNC: 25 MEQ/L (ref 21–31)
CREAT SERPL-MCNC: 1.5 MG/DL (ref 0.6–1.2)
D AG BLD QL: POSITIVE
DIFFERENTIAL METHOD BLD: ABNORMAL
EGFRCR SERPLBLD CKD-EPI 2021: 36.4 ML/MIN/1.73M*2
EOSINOPHIL # BLD: 0.54 K/UL (ref 0.04–0.36)
EOSINOPHIL NFR BLD: 6.4 %
ERYTHROCYTE [DISTWIDTH] IN BLOOD BY AUTOMATED COUNT: 15.1 % (ref 11.7–14.4)
GLUCOSE SERPL-MCNC: 94 MG/DL (ref 70–99)
HCT VFR BLD AUTO: 23 % (ref 35–45)
HGB BLD-MCNC: 7.4 G/DL (ref 11.8–15.7)
IMM GRANULOCYTES # BLD AUTO: 0.06 K/UL (ref 0–0.08)
IMM GRANULOCYTES NFR BLD AUTO: 0.7 %
LABORATORY COMMENT REPORT: ABNORMAL
LYMPHOCYTES # BLD: 1.21 K/UL (ref 1.2–3.5)
LYMPHOCYTES NFR BLD: 14.4 %
MCH RBC QN AUTO: 32.3 PG (ref 28–33.2)
MCHC RBC AUTO-ENTMCNC: 32.2 G/DL (ref 32.2–35.5)
MCV RBC AUTO: 100.4 FL (ref 83–98)
MONOCYTES # BLD: 0.75 K/UL (ref 0.28–0.8)
MONOCYTES NFR BLD: 8.9 %
NEUTROPHILS # BLD: 5.81 K/UL (ref 1.7–7)
NEUTS SEG NFR BLD: 68.9 %
NRBC BLD-RTO: 0 %
PLATELET # BLD AUTO: 247 K/UL (ref 150–369)
PMV BLD AUTO: 9.3 FL (ref 9.4–12.3)
POTASSIUM SERPL-SCNC: 4.1 MEQ/L (ref 3.5–5.1)
PROT SERPL-MCNC: 6.3 G/DL (ref 6–8.2)
RBC # BLD AUTO: 2.29 M/UL (ref 3.93–5.22)
SELECTED CELL PANEL: NEGATIVE
SODIUM SERPL-SCNC: 136 MEQ/L (ref 136–145)
SPECIMEN EXP DATE BLD: ABNORMAL
WBC # BLD AUTO: 8.43 K/UL (ref 3.8–10.5)

## 2025-06-24 PROCEDURE — 3E033GC INTRODUCTION OF OTHER THERAPEUTIC SUBSTANCE INTO PERIPHERAL VEIN, PERCUTANEOUS APPROACH: ICD-10-PCS | Performed by: EMERGENCY MEDICINE

## 2025-06-24 PROCEDURE — 96376 TX/PRO/DX INJ SAME DRUG ADON: CPT

## 2025-06-24 PROCEDURE — 99284 EMERGENCY DEPT VISIT MOD MDM: CPT | Mod: 25

## 2025-06-24 PROCEDURE — 63700000 HC SELF-ADMINISTRABLE DRUG: Performed by: PHYSICIAN ASSISTANT

## 2025-06-24 PROCEDURE — 85025 COMPLETE CBC W/AUTO DIFF WBC: CPT | Performed by: PHYSICIAN ASSISTANT

## 2025-06-24 PROCEDURE — 63600000 HC DRUGS/DETAIL CODE: Mod: JZ | Performed by: PHYSICIAN ASSISTANT

## 2025-06-24 PROCEDURE — 3E0333Z INTRODUCTION OF ANTI-INFLAMMATORY INTO PERIPHERAL VEIN, PERCUTANEOUS APPROACH: ICD-10-PCS | Performed by: EMERGENCY MEDICINE

## 2025-06-24 PROCEDURE — 86850 RBC ANTIBODY SCREEN: CPT

## 2025-06-24 PROCEDURE — 96375 TX/PRO/DX INJ NEW DRUG ADDON: CPT

## 2025-06-24 PROCEDURE — 36415 COLL VENOUS BLD VENIPUNCTURE: CPT | Performed by: PHYSICIAN ASSISTANT

## 2025-06-24 PROCEDURE — 96374 THER/PROPH/DIAG INJ IV PUSH: CPT

## 2025-06-24 PROCEDURE — 80053 COMPREHEN METABOLIC PANEL: CPT | Performed by: PHYSICIAN ASSISTANT

## 2025-06-24 RX ORDER — KETOROLAC TROMETHAMINE 15 MG/ML
15 INJECTION, SOLUTION INTRAMUSCULAR; INTRAVENOUS ONCE
Status: COMPLETED | OUTPATIENT
Start: 2025-06-24 | End: 2025-06-24

## 2025-06-24 RX ORDER — OXYCODONE HYDROCHLORIDE 5 MG/1
5 TABLET ORAL EVERY 6 HOURS PRN
Qty: 12 TABLET | Refills: 0 | Status: SHIPPED | OUTPATIENT
Start: 2025-06-24

## 2025-06-24 RX ORDER — MELOXICAM 15 MG/1
15 TABLET ORAL DAILY
Qty: 30 TABLET | Refills: 0 | Status: SHIPPED | OUTPATIENT
Start: 2025-06-24 | End: 2025-07-24

## 2025-06-24 RX ORDER — HEPARIN 100 UNIT/ML
500 SYRINGE INTRAVENOUS ONCE
Status: COMPLETED | OUTPATIENT
Start: 2025-06-24 | End: 2025-06-24

## 2025-06-24 RX ORDER — CYCLOBENZAPRINE HCL 10 MG
10 TABLET ORAL 2 TIMES DAILY PRN
Qty: 14 TABLET | Refills: 0 | Status: SHIPPED | OUTPATIENT
Start: 2025-06-24

## 2025-06-24 RX ORDER — CYCLOBENZAPRINE HCL 10 MG
10 TABLET ORAL ONCE
Status: COMPLETED | OUTPATIENT
Start: 2025-06-24 | End: 2025-06-24

## 2025-06-24 RX ORDER — OXYCODONE HYDROCHLORIDE 5 MG/1
5 TABLET ORAL ONCE
Refills: 0 | Status: COMPLETED | OUTPATIENT
Start: 2025-06-24 | End: 2025-06-24

## 2025-06-24 RX ADMIN — HEPARIN 500 UNITS: 100 SYRINGE at 14:40

## 2025-06-24 RX ADMIN — CYCLOBENZAPRINE 10 MG: 10 TABLET, FILM COATED ORAL at 12:48

## 2025-06-24 RX ADMIN — KETOROLAC TROMETHAMINE 15 MG: 15 INJECTION, SOLUTION INTRAMUSCULAR; INTRAVENOUS at 13:04

## 2025-06-24 RX ADMIN — OXYCODONE HYDROCHLORIDE 5 MG: 5 TABLET ORAL at 12:48

## 2025-06-24 RX ADMIN — KETOROLAC TROMETHAMINE 15 MG: 15 INJECTION, SOLUTION INTRAMUSCULAR; INTRAVENOUS at 14:40

## 2025-06-24 NOTE — ED PROVIDER NOTES
HPI   HISTORY OF PRESENT ILLNESS     75-year-old female with past medical history of bladder cancer status post TURP and chemo, CAD, anxiety, sleep apnea presents with back pain.  Patient was here on 6/21 for lower back pain.  States the pain is going into her buttocks.  Pain is worse with movement.  Patient had labs, urine and CT scan.  CT scan did not show any osseous lesions.  Urine was infected and patient was started on Ceftin.  Patient was prescribed oxycodone.  Patient is taking the oxycodone every 8 hours and is not getting better.  States she cannot get out of bed and walk because of the pain.  Denies fever, chills, anise, headache, Ganga pain, flank pain, dysuria, hematuria, frequency.  Denies injury.  Denies fall.        Urine culture from 6/21 grew mixed gram-positive organisms.  Daughter is concerned because patient has history of Aerococcus urinae infections which not all labs test for and could present like mixed gram-positive organisms.  Patient is asking to see if our lab did rule that out or can rule that out.                Patient History   PAST HISTORY     Reviewed from Nursing Triage:       Past Medical History:   Diagnosis Date    Bladder cancer (CMS/HCC)     Coronary artery disease     KERRI (generalized anxiety disorder)     TREVOR (obstructive sleep apnea)        Past Surgical History   Procedure Laterality Date    A-v cardiac pacemaker insertion      Colonoscopy      CYSTO, TURBT TRANS-URETHRAL RESECTION BLADDER TUMOR N/A 1/30/2025    Performed by Eugenio Jimenez MD at  [facility]    Insert / replace / remove pacemaker      Knee arthroscopy w/ meniscal repair      Tonsillectomy         Family History   Problem Relation Name Age of Onset    Heart disease Biological Mother      Diabetes Biological Mother      Heart disease Biological Father      Coronary artery disease Biological Father      Liver cancer Biological Brother      Prostate cancer Biological Brother         Social History     Tobacco  Use    Smoking status: Former     Types: Cigarettes    Smokeless tobacco: Never   Substance Use Topics    Alcohol use: Yes     Alcohol/week: 2.0 standard drinks of alcohol     Types: 2 Glasses of wine per week     Comment: socially    Drug use: Never         Review of Systems   REVIEW OF SYSTEMS     Review of Systems      VITALS     ED Vitals      Date/Time Temp Pulse Resp BP SpO2 [facility]   06/24/25 1445 -- 71 18 109/55 99 %    06/24/25 1330 -- 79 16 118/58 96 %    06/24/25 1300 -- 78 17 125/56 97 %    06/24/25 1156 36.3 °C (97.4 °F) 77 18 130/65 97 % [facility]                         Physical Exam   PHYSICAL EXAM     Physical Exam  Vitals and nursing note reviewed.   Constitutional:       General: She is not in acute distress.     Appearance: She is well-developed.   HENT:      Head: Atraumatic.   Eyes:      Conjunctiva/sclera: Conjunctivae normal.   Cardiovascular:      Rate and Rhythm: Normal rate.   Pulmonary:      Effort: Pulmonary effort is normal.   Abdominal:      General: Bowel sounds are normal.      Palpations: Abdomen is soft.      Tenderness: There is no abdominal tenderness.   Musculoskeletal:         General: No deformity.   Skin:     General: Skin is warm and dry.   Neurological:      Mental Status: She is alert. Mental status is at baseline.      Sensory: No sensory deficit.      Motor: No weakness.   Psychiatric:         Behavior: Behavior normal.           PROCEDURES     Procedures     DATA     Results       Procedure Component Value Units Date/Time    Type and screen [[id number]]  (Abnormal) Collected: 06/24/25 1308    Specimen: Blood, Venous Updated: 06/24/25 1424     Specimen Expiration 06/27/2025     Antibody Screen Positive     ABO B     Rh Factor Positive     History Check Previous type on file    Comprehensive metabolic panel [[id number]]  (Abnormal) Collected: 06/24/25 1308    Specimen: Blood, Venous Updated: 06/24/25 1340     Sodium 136 mEQ/L      Potassium 4.1 mEQ/L      Comment: Results  obtained on plasma. Plasma Potassium values may be up to 0.4 mEQ/L less than serum values. The differences may be greater for patients with high platelet or white cell counts.        Chloride 106 mEQ/L      CO2 25 mEQ/L      BUN 21 mg/dL      Creatinine 1.5 mg/dL      Glucose 94 mg/dL      Calcium 9.4 mg/dL      AST (SGOT) 9 IU/L      ALT (SGPT) 6 IU/L      Alkaline Phosphatase 57 IU/L      Total Protein 6.3 g/dL      Comment: Test performed on plasma which typically contains approximately 0.4 g/dL more protein than serum.        Albumin 3.5 g/dL      Bilirubin, Total 0.4 mg/dL      eGFR 36.4 mL/min/1.73m*2      Comment: Calculation based on the Chronic Kidney Disease Epidemiology Collaboration (CKD-EPI) equation refit without adjustment for race.        Anion Gap 5 mEQ/L     CBC and differential [[id number]]  (Abnormal) Collected: 06/24/25 1308    Specimen: Blood, Venous Updated: 06/24/25 1318     WBC 8.43 K/uL      RBC 2.29 M/uL      Hemoglobin 7.4 g/dL      Hematocrit 23.0 %      .4 fL      MCH 32.3 pg      MCHC 32.2 g/dL      RDW 15.1 %      Platelets 247 K/uL      MPV 9.3 fL      Differential Type Auto     nRBC 0.0 %      Immature Granulocytes 0.7 %      Neutrophils 68.9 %      Lymphocytes 14.4 %      Monocytes 8.9 %      Eosinophils 6.4 %      Basophils 0.7 %      Immature Granulocytes, Absolute 0.06 K/uL      Neutrophils, Absolute 5.81 K/uL      Lymphocytes, Absolute 1.21 K/uL      Monocytes, Absolute 0.75 K/uL      Eosinophils, Absolute 0.54 K/uL      Basophils, Absolute 0.06 K/uL             Imaging Results    None         No orders to display       Scoring tools                                  ED Course & MDM   MDM / ED COURSE / CLINICAL IMPRESSION / DISPO   Vital Signs Review: Vital signs have been reviewed.   The oxygen saturation is SpO2: 97 % which is normal.    Medical Decision Making  Problems Addressed:  Bilateral sciatica: acute illness or injury    Amount and/or Complexity of Data  Reviewed  Labs: ordered. Decision-making details documented in ED Course.    Risk  Prescription drug management.          ED Course as of 06/24/25 1745 Tue Jun 24, 2025   1300 Patient is due for blood work tomorrow for oncology.  Asking if he can get done today.  Will do labs while we try the back pain protocol.  Patient and family agree with plan [DB]   1347 Hemoglobin(!): 7.4  Decreased from 3 days ago  [DB]   1347 Creatinine(!): 1.5  Baseline 1.3  [DB]   1407 discussed with the lab about testing for  Aerococcus urinae.  They have to do special testing.  They can do it with-year-old from the [facility].  I will take a couple days.  They do not test for sensitivities.  Family is okay with this.  Ceftin should still treat this if it comes back positive. [DB]   1423 Patient was able to stand on the side of the bed and feels much better.  Feels comfortable going home with plan. [DB]      ED Course User Index  [DB] Susanne Lovell PA C     Clinical Impression      Bilateral sciatica     _________________       ED Disposition   Discharge                         Patient seen during a time of significantly increased volumes, increased boarding in ED, decreased capacity and staff which may have contributed to lengthened stay in ED. Portion of management and initial evaluation may have been done while in the waiting room because of this.  Extensive detailed charting also may be limited secondary to high ED volumes and may not reflect all conversations and decisions made between patient and provider.     This document was created using dragon dictation software.  There might be some typographical errors due to this technology.       Susanne Lovell PA C  06/24/25 1745

## 2025-06-24 NOTE — ED ATTESTATION NOTE
Procedures  Physical Exam  Review of Systems    6/24/20251:24 PM  I have personally seen and examined the patient.  I reviewed and agree with the PA/NP/Resident's assessment and plan of care.    My examination, assessment, and plan of care of Gladys Woo is as follows:  The patient presents with parasacral back pain for the past week.  The patient had a recent CAT scan in the past 2 or 3 days but does not have any results yet.  Over-the-counter medication has not afforded the patient any relief.  She denies any difficulty with bowel or bladder habits.  Exam: The patient was alert in no acute distress.  There was some tenderness to palpation over the low back at L5-S1.  There was no palpable deformity.  Straight leg raising was negative.  The patient was neurovascularly intact in both lower studies.  Impression/Plan: Lab work revealed hemoglobin of 8.0 which is baseline for this patient.  Chemistries were unremarkable.  Urinalysis was consistent with urinary tract infection.   The patient was treated with analgesia and she was treated with antibiotics for the urinary infection.  She was ultimately discharged home with a prescription for antibiotics and she will follow-up with her own PCP and back pain specialist.  Strict return precautions were provided.    Vital Sign Review: Vital signs have been ordered and reviewed. The oxygen saturation is 97% on room air, normal.    Medical Decision Making  Amount and/or Complexity of Data Reviewed  Labs: ordered. Decision-making details documented in ED Course.  ECG/medicine tests: ordered and independent interpretation performed.    Risk  Prescription drug management.         I was physically present for the key/critical portions of the following procedures: None    This document was created using dragon dictation software.  There might be some typographical errors due to this technology.     Joni Joshi, DO  06/24/25 9462

## 2025-06-24 NOTE — ED ATTESTATION NOTE
The patient was evaluated and managed by the physician assistant / nurse practitioner.  I agree.  I discussed the case with the PA who saw and evaluated the patient.     Michel Patterson MD  06/24/25 8904

## 2025-06-24 NOTE — DISCHARGE INSTRUCTIONS
The lab is taking the urine culture from the [facility] and testing it for Aerococcus urinae.  However the antibiotic you are on should treat this    You were evaluated in the Emergency Department today for back pain. Your evaluation suggests no acute abnormalities which require further intervention at this time.     Move around as tolerated but avoid heavy lifting. “Bed rest” is not recommended nor is it the best treatment for low back pain. However recommend refraining from lifting heavy objects until your pain is improved.     Can use heat to the back but  Can use over-the-counter meds like IcyHot, Biofreeze, lidocaine patches, etc.    Take meloxicam once a day    We prescribed Oxycodone for breakthrough pain.    Do not drink alcohol, drive a car, operate machinery, or get up on ladders or heights when taking any prescribed pain medications.    We also prescribed a muscle relaxant to help with pain.  Do not drive or operate heavy machinery on this medication as it can make you sleepy      Please follow-up with your primary care doctor for further evaluation.    Follow up with a back pain specialist for further evaluation     Return to the ED immediately if you develop any of the following problems:  Leaking urine or difficulty urinating;  Inability to control your bowels;  New numbness or weakness in your legs or numbness between your legs;  Inability to walk  Fever

## 2025-06-25 ENCOUNTER — TELEPHONE (OUTPATIENT)
Facility: HOSPITAL | Age: 75
End: 2025-06-25
Payer: MEDICARE

## 2025-06-25 ENCOUNTER — RESULTS FOLLOW-UP (OUTPATIENT)
Dept: EMERGENCY MEDICINE | Facility: HOSPITAL | Age: 75
End: 2025-06-25

## 2025-06-25 DIAGNOSIS — C67.9 MALIGNANT NEOPLASM OF URINARY BLADDER, UNSPECIFIED SITE (CMS/HCC): Primary | ICD-10-CM

## 2025-06-25 DIAGNOSIS — D50.0 IRON DEFICIENCY ANEMIA DUE TO CHRONIC BLOOD LOSS: ICD-10-CM

## 2025-06-25 DIAGNOSIS — C67.8 MALIGNANT NEOPLASM OF OVERLAPPING SITES OF BLADDER (CMS/HCC): ICD-10-CM

## 2025-06-25 LAB
BACTERIA UR CULT: ABNORMAL

## 2025-06-25 NOTE — TELEPHONE ENCOUNTER
I called her regarding her recent ED visit and low HGB. She is feeling ok. She will contact us if she has fatigue/SOB for blood transfusion.

## 2025-07-02 ENCOUNTER — DOCUMENTATION (OUTPATIENT)
Dept: HEMATOLOGY/ONCOLOGY | Facility: HOSPITAL | Age: 75
End: 2025-07-02
Payer: MEDICARE

## 2025-07-02 DIAGNOSIS — D50.0 IRON DEFICIENCY ANEMIA DUE TO CHRONIC BLOOD LOSS: ICD-10-CM

## 2025-07-02 DIAGNOSIS — C67.8 MALIGNANT NEOPLASM OF OVERLAPPING SITES OF BLADDER (CMS/HCC): ICD-10-CM

## 2025-07-02 DIAGNOSIS — C67.9 MALIGNANT NEOPLASM OF URINARY BLADDER, UNSPECIFIED SITE (CMS/HCC): Primary | ICD-10-CM

## 2025-07-02 NOTE — PROGRESS NOTES
Message left with triage. Pt's appt 7/3 for OV and labs canceled via automated reminder system. Pt had no further appointments scheduled at this time.

## 2025-08-12 ENCOUNTER — TELEPHONE (OUTPATIENT)
Facility: HOSPITAL | Age: 75
End: 2025-08-12
Payer: MEDICARE

## 2025-08-12 DIAGNOSIS — D50.0 IRON DEFICIENCY ANEMIA DUE TO CHRONIC BLOOD LOSS: ICD-10-CM

## 2025-08-12 DIAGNOSIS — C67.8 MALIGNANT NEOPLASM OF OVERLAPPING SITES OF BLADDER (CMS/HCC): ICD-10-CM

## 2025-08-12 DIAGNOSIS — C67.9 MALIGNANT NEOPLASM OF URINARY BLADDER, UNSPECIFIED SITE (CMS/HCC): Primary | ICD-10-CM

## 2025-08-13 DIAGNOSIS — D50.0 IRON DEFICIENCY ANEMIA DUE TO CHRONIC BLOOD LOSS: ICD-10-CM

## 2025-08-13 DIAGNOSIS — C67.9 MALIGNANT NEOPLASM OF URINARY BLADDER, UNSPECIFIED SITE (CMS/HCC): Primary | ICD-10-CM

## 2025-08-13 DIAGNOSIS — C67.8 MALIGNANT NEOPLASM OF OVERLAPPING SITES OF BLADDER (CMS/HCC): ICD-10-CM

## 2025-08-13 RX ORDER — SODIUM CHLORIDE 9 MG/ML
1000 INJECTION, SOLUTION INTRAVENOUS CONTINUOUS PRN
OUTPATIENT
Start: 2025-08-27 | End: 2025-08-28

## 2025-08-13 RX ORDER — SODIUM CHLORIDE 9 MG/ML
1000 INJECTION, SOLUTION INTRAVENOUS CONTINUOUS PRN
OUTPATIENT
Start: 2025-08-13 | End: 2025-08-14

## 2025-08-13 RX ORDER — DIPHENHYDRAMINE HCL 50 MG/ML
12.5 VIAL (ML) INJECTION ONCE AS NEEDED
OUTPATIENT
Start: 2025-08-13

## 2025-08-13 RX ORDER — FAMOTIDINE 10 MG/ML
20 INJECTION, SOLUTION INTRAVENOUS ONCE AS NEEDED
OUTPATIENT
Start: 2025-08-20

## 2025-08-13 RX ORDER — EPINEPHRINE 1 MG/ML
0.3 INJECTION, SOLUTION INTRAMUSCULAR; SUBCUTANEOUS ONCE AS NEEDED
OUTPATIENT
Start: 2025-08-27

## 2025-08-13 RX ORDER — SODIUM CHLORIDE 9 MG/ML
1000 INJECTION, SOLUTION INTRAVENOUS CONTINUOUS PRN
OUTPATIENT
Start: 2025-09-03 | End: 2025-09-04

## 2025-08-13 RX ORDER — DIPHENHYDRAMINE HCL 50 MG/ML
12.5 VIAL (ML) INJECTION ONCE AS NEEDED
OUTPATIENT
Start: 2025-09-03

## 2025-08-13 RX ORDER — EPINEPHRINE 1 MG/ML
0.3 INJECTION, SOLUTION INTRAMUSCULAR; SUBCUTANEOUS ONCE AS NEEDED
OUTPATIENT
Start: 2025-09-03

## 2025-08-13 RX ORDER — SODIUM CHLORIDE 9 MG/ML
1000 INJECTION, SOLUTION INTRAVENOUS CONTINUOUS PRN
OUTPATIENT
Start: 2025-08-20 | End: 2025-08-21

## 2025-08-13 RX ORDER — DIPHENHYDRAMINE HCL 50 MG/ML
12.5 VIAL (ML) INJECTION ONCE AS NEEDED
OUTPATIENT
Start: 2025-08-20

## 2025-08-13 RX ORDER — DIPHENHYDRAMINE HCL 50 MG/ML
12.5 VIAL (ML) INJECTION ONCE AS NEEDED
OUTPATIENT
Start: 2025-08-27

## 2025-08-13 RX ORDER — FAMOTIDINE 10 MG/ML
20 INJECTION, SOLUTION INTRAVENOUS ONCE AS NEEDED
OUTPATIENT
Start: 2025-09-03

## 2025-08-13 RX ORDER — EPINEPHRINE 1 MG/ML
0.3 INJECTION, SOLUTION INTRAMUSCULAR; SUBCUTANEOUS ONCE AS NEEDED
OUTPATIENT
Start: 2025-08-13

## 2025-08-13 RX ORDER — FAMOTIDINE 10 MG/ML
20 INJECTION, SOLUTION INTRAVENOUS ONCE AS NEEDED
OUTPATIENT
Start: 2025-08-13

## 2025-08-13 RX ORDER — FAMOTIDINE 10 MG/ML
20 INJECTION, SOLUTION INTRAVENOUS ONCE AS NEEDED
OUTPATIENT
Start: 2025-08-27

## 2025-08-13 RX ORDER — EPINEPHRINE 1 MG/ML
0.3 INJECTION, SOLUTION INTRAMUSCULAR; SUBCUTANEOUS ONCE AS NEEDED
OUTPATIENT
Start: 2025-08-20

## 2025-08-15 ENCOUNTER — OFFICE VISIT (OUTPATIENT)
Facility: HOSPITAL | Age: 75
End: 2025-08-15
Attending: HOSPITALIST
Payer: MEDICARE

## 2025-08-15 ENCOUNTER — TELEPHONE (OUTPATIENT)
Dept: HEMATOLOGY/ONCOLOGY | Facility: CLINIC | Age: 75
End: 2025-08-15
Payer: MEDICARE

## 2025-08-15 ENCOUNTER — TELEPHONE (OUTPATIENT)
Facility: HOSPITAL | Age: 75
End: 2025-08-15

## 2025-08-15 ENCOUNTER — HOSPITAL ENCOUNTER (OUTPATIENT)
Dept: HEMATOLOGY/ONCOLOGY | Facility: HOSPITAL | Age: 75
Setting detail: INFUSION SERIES
Discharge: HOME | End: 2025-08-15
Attending: HOSPITALIST
Payer: MEDICARE

## 2025-08-15 VITALS
RESPIRATION RATE: 18 BRPM | SYSTOLIC BLOOD PRESSURE: 167 MMHG | DIASTOLIC BLOOD PRESSURE: 76 MMHG | TEMPERATURE: 97.6 F | HEART RATE: 82 BPM

## 2025-08-15 DIAGNOSIS — D50.0 IRON DEFICIENCY ANEMIA DUE TO CHRONIC BLOOD LOSS: ICD-10-CM

## 2025-08-15 DIAGNOSIS — C67.8 MALIGNANT NEOPLASM OF OVERLAPPING SITES OF BLADDER (CMS/HCC): ICD-10-CM

## 2025-08-15 DIAGNOSIS — C67.9 MALIGNANT NEOPLASM OF URINARY BLADDER, UNSPECIFIED SITE (CMS/HCC): Primary | ICD-10-CM

## 2025-08-15 DIAGNOSIS — M54.50 ACUTE LOW BACK PAIN, UNSPECIFIED BACK PAIN LATERALITY, UNSPECIFIED WHETHER SCIATICA PRESENT: ICD-10-CM

## 2025-08-15 DIAGNOSIS — M54.32 BILATERAL SCIATICA: ICD-10-CM

## 2025-08-15 DIAGNOSIS — M54.31 BILATERAL SCIATICA: ICD-10-CM

## 2025-08-15 PROBLEM — M81.0 OSTEOPOROSIS: Status: ACTIVE | Noted: 2025-08-15

## 2025-08-15 LAB
ABO + RH BLD: NORMAL
BASOPHILS # BLD: 0.07 K/UL (ref 0.01–0.1)
BASOPHILS NFR BLD: 0.8 %
BLD GP AB SCN SERPL QL: NEGATIVE
D AG BLD QL: POSITIVE
DIFFERENTIAL METHOD BLD: ABNORMAL
EOSINOPHIL # BLD: 0.24 K/UL (ref 0.04–0.36)
EOSINOPHIL NFR BLD: 2.7 %
ERYTHROCYTE [DISTWIDTH] IN BLOOD BY AUTOMATED COUNT: 13.5 % (ref 11.7–14.4)
FERRITIN SERPL-MCNC: 1296 NG/ML (ref 11–250)
HCT VFR BLD AUTO: 26 % (ref 35–45)
HGB BLD-MCNC: 8.1 G/DL (ref 11.8–15.7)
HGB RETIC QN AUTO: 31.9 PG (ref 31.9–37)
IMM GRANULOCYTES # BLD AUTO: 0.05 K/UL (ref 0–0.08)
IMM GRANULOCYTES NFR BLD AUTO: 0.6 %
LABORATORY COMMENT REPORT: NORMAL
LYMPHOCYTES # BLD: 0.84 K/UL (ref 1.2–3.5)
LYMPHOCYTES NFR BLD: 9.6 %
MCH RBC QN AUTO: 30.2 PG (ref 28–33.2)
MCHC RBC AUTO-ENTMCNC: 31.2 G/DL (ref 32.2–35.5)
MCV RBC AUTO: 97 FL (ref 83–98)
MONOCYTES # BLD: 0.65 K/UL (ref 0.28–0.8)
MONOCYTES NFR BLD: 7.4 %
NEUTROPHILS # BLD: 6.9 K/UL (ref 1.7–7)
NEUTROPHILS # BLD: 6.9 K/UL (ref 1.7–7)
NEUTS SEG NFR BLD: 78.9 %
NRBC BLD-RTO: 0 %
PLATELET # BLD AUTO: 341 K/UL (ref 150–369)
PMV BLD AUTO: 9.1 FL (ref 9.4–12.3)
RBC # BLD AUTO: 2.68 M/UL (ref 3.93–5.22)
SPECIMEN EXP DATE BLD: NORMAL
WBC # BLD AUTO: 8.75 K/UL (ref 3.8–10.5)

## 2025-08-15 PROCEDURE — 63700000 HC SELF-ADMINISTRABLE DRUG: Performed by: HOSPITALIST

## 2025-08-15 PROCEDURE — 86900 BLOOD TYPING SEROLOGIC ABO: CPT

## 2025-08-15 PROCEDURE — 36591 DRAW BLOOD OFF VENOUS DEVICE: CPT

## 2025-08-15 PROCEDURE — 82728 ASSAY OF FERRITIN: CPT | Performed by: HOSPITALIST

## 2025-08-15 PROCEDURE — P9016 RBC LEUKOCYTES REDUCED: HCPCS

## 2025-08-15 PROCEDURE — G0463 HOSPITAL OUTPT CLINIC VISIT: HCPCS | Performed by: HOSPITALIST

## 2025-08-15 PROCEDURE — 85046 RETICYTE/HGB CONCENTRATE: CPT | Performed by: HOSPITALIST

## 2025-08-15 PROCEDURE — G2212 PROLONG OUTPT/OFFICE VIS: HCPCS | Performed by: HOSPITALIST

## 2025-08-15 PROCEDURE — 36430 TRANSFUSION BLD/BLD COMPNT: CPT

## 2025-08-15 PROCEDURE — 82668 ASSAY OF ERYTHROPOIETIN: CPT | Performed by: HOSPITALIST

## 2025-08-15 PROCEDURE — 85025 COMPLETE CBC W/AUTO DIFF WBC: CPT | Performed by: HOSPITALIST

## 2025-08-15 RX ORDER — POLYETHYLENE GLYCOL 3350 17 G/17G
17 POWDER, FOR SOLUTION ORAL DAILY
Qty: 30 PACKET | Refills: 0 | Status: SHIPPED | OUTPATIENT
Start: 2025-08-15 | End: 2025-09-14

## 2025-08-15 RX ORDER — OXYCODONE HYDROCHLORIDE 5 MG/1
10 TABLET ORAL EVERY 6 HOURS PRN
Refills: 0 | Status: CANCELLED | OUTPATIENT
Start: 2025-08-15

## 2025-08-15 RX ORDER — GABAPENTIN 300 MG/1
300 CAPSULE ORAL 3 TIMES DAILY
Qty: 270 CAPSULE | Refills: 1 | Status: SHIPPED | OUTPATIENT
Start: 2025-08-15 | End: 2026-02-11

## 2025-08-15 RX ORDER — OXYCODONE HYDROCHLORIDE 5 MG/1
10 TABLET ORAL ONCE
Refills: 0 | Status: DISCONTINUED | OUTPATIENT
Start: 2025-08-15 | End: 2025-08-15 | Stop reason: HOSPADM

## 2025-08-15 RX ORDER — BISACODYL 10 MG/1
10 SUPPOSITORY RECTAL DAILY
COMMUNITY

## 2025-08-15 RX ORDER — OXYCODONE HCL 10 MG/1
10 TABLET, FILM COATED, EXTENDED RELEASE ORAL EVERY 12 HOURS
Qty: 60 TABLET | Refills: 0 | Status: SHIPPED | OUTPATIENT
Start: 2025-08-15 | End: 2025-09-14

## 2025-08-15 RX ORDER — HEPARIN 100 UNIT/ML
500 SYRINGE INTRAVENOUS AS NEEDED
OUTPATIENT
Start: 2025-08-15

## 2025-08-15 RX ORDER — MORPHINE SULFATE 1 MG/ML
4 INJECTION, SOLUTION EPIDURAL; INTRATHECAL; INTRAVENOUS ONCE
Refills: 0 | Status: DISCONTINUED | OUTPATIENT
Start: 2025-08-15 | End: 2025-08-15

## 2025-08-15 RX ORDER — HEPARIN 100 UNIT/ML
500 SYRINGE INTRAVENOUS AS NEEDED
Status: DISCONTINUED | OUTPATIENT
Start: 2025-08-15 | End: 2025-08-16 | Stop reason: HOSPADM

## 2025-08-15 RX ORDER — SERTRALINE HYDROCHLORIDE 25 MG/1
25 TABLET, FILM COATED ORAL DAILY
Qty: 30 TABLET | Refills: 0 | Status: SHIPPED | OUTPATIENT
Start: 2025-08-15 | End: 2025-09-14

## 2025-08-15 RX ORDER — OXYCODONE HYDROCHLORIDE 5 MG/1
5 TABLET ORAL EVERY 8 HOURS PRN
Qty: 12 TABLET | Refills: 0 | Status: SHIPPED | OUTPATIENT
Start: 2025-08-15

## 2025-08-15 RX ORDER — OXYCODONE HYDROCHLORIDE 5 MG/1
10 TABLET ORAL EVERY 6 HOURS PRN
Status: DISCONTINUED | OUTPATIENT
Start: 2025-08-15 | End: 2025-08-16 | Stop reason: HOSPADM

## 2025-08-15 RX ORDER — LIDOCAINE 560 MG/1
1 PATCH PERCUTANEOUS; TOPICAL; TRANSDERMAL DAILY
COMMUNITY

## 2025-08-15 RX ORDER — ENOXAPARIN SODIUM 100 MG/ML
40 INJECTION SUBCUTANEOUS
COMMUNITY

## 2025-08-15 RX ORDER — SERTRALINE HYDROCHLORIDE 25 MG/1
25 TABLET, FILM COATED ORAL DAILY
COMMUNITY
End: 2025-08-15 | Stop reason: SDUPTHER

## 2025-08-15 RX ORDER — ALENDRONATE SODIUM 70 MG/1
70 TABLET ORAL WEEKLY
Qty: 12 TABLET | Refills: 1 | Status: SHIPPED | OUTPATIENT
Start: 2025-08-15 | End: 2026-02-11

## 2025-08-15 RX ORDER — AMOXICILLIN 250 MG
2 CAPSULE ORAL 2 TIMES DAILY
COMMUNITY

## 2025-08-15 RX ORDER — INFANT FORMULA WITH IRON
POWDER (GRAM) ORAL AS NEEDED
COMMUNITY

## 2025-08-15 RX ADMIN — HEPARIN 500 UNITS: 100 SYRINGE at 13:49

## 2025-08-15 RX ADMIN — OXYCODONE HYDROCHLORIDE 10 MG: 5 TABLET ORAL at 14:22

## 2025-08-15 ASSESSMENT — ENCOUNTER SYMPTOMS
SHORTNESS OF BREATH: 0
OCCASIONAL FEELINGS OF UNSTEADINESS: 1
LOSS OF SENSATION IN FEET: 0
FATIGUE: 0
DEPRESSION: 0

## 2025-08-16 LAB
CROSSMATCH: NORMAL
EPO SERPL-ACNC: 27.2 MIU/ML (ref 2.6–18.5)
ISBT CODE: 7300
PRODUCT CODE: NORMAL
PRODUCT STATUS: NORMAL
SPECIMEN EXP DATE BLD: NORMAL
UNIT ABO: NORMAL
UNIT ID: NORMAL
UNIT RH: POSITIVE

## 2025-08-29 ENCOUNTER — TELEPHONE (OUTPATIENT)
Facility: HOSPITAL | Age: 75
End: 2025-08-29
Payer: MEDICARE

## 2025-08-29 DIAGNOSIS — C67.8 MALIGNANT NEOPLASM OF OVERLAPPING SITES OF BLADDER (CMS/HCC): ICD-10-CM

## 2025-08-29 DIAGNOSIS — C67.9 MALIGNANT NEOPLASM OF URINARY BLADDER, UNSPECIFIED SITE (CMS/HCC): Primary | ICD-10-CM

## 2025-08-29 DIAGNOSIS — D50.0 IRON DEFICIENCY ANEMIA DUE TO CHRONIC BLOOD LOSS: ICD-10-CM

## 2025-09-03 LAB
BASOPHILS # BLD AUTO: 0.1 X10E3/UL (ref 0–0.2)
BASOPHILS NFR BLD AUTO: 1 %
EOSINOPHIL # BLD AUTO: 0.3 X10E3/UL (ref 0–0.4)
EOSINOPHIL NFR BLD AUTO: 3 %
ERYTHROCYTE [DISTWIDTH] IN BLOOD BY AUTOMATED COUNT: 13.2 % (ref 11.7–15.4)
HCT VFR BLD AUTO: 30.4 % (ref 34–46.6)
HGB BLD-MCNC: 9.9 G/DL (ref 11.1–15.9)
IMM GRANULOCYTES # BLD AUTO: 0.1 X10E3/UL (ref 0–0.1)
IMM GRANULOCYTES NFR BLD AUTO: 1 %
LYMPHOCYTES # BLD AUTO: 1.2 X10E3/UL (ref 0.7–3.1)
LYMPHOCYTES NFR BLD AUTO: 12 %
MCH RBC QN AUTO: 30.6 PG (ref 26.6–33)
MCHC RBC AUTO-ENTMCNC: 32.6 G/DL (ref 31.5–35.7)
MCV RBC AUTO: 94 FL (ref 79–97)
MONOCYTES # BLD AUTO: 0.7 X10E3/UL (ref 0.1–0.9)
MONOCYTES NFR BLD AUTO: 7 %
NEUTROPHILS # BLD AUTO: 7.5 X10E3/UL (ref 1.4–7)
NEUTROPHILS NFR BLD AUTO: 76 %
PLATELET # BLD AUTO: 379 X10E3/UL (ref 150–450)
RBC # BLD AUTO: 3.24 X10E6/UL (ref 3.77–5.28)
WBC # BLD AUTO: 9.8 X10E3/UL (ref 3.4–10.8)

## (undated) DEVICE — SOLN IRRIG STER WATER 1000ML

## (undated) DEVICE — HEEL  ELBOW PROTECTOR

## (undated) DEVICE — LOOP CUTTING RT ANG 24FR STABILIZED .012 WIRE

## (undated) DEVICE — SLEEVE SCD COMFORT KNEE LENGTH MED

## (undated) DEVICE — Device

## (undated) DEVICE — PACK RFID CYSTOSCOPY

## (undated) DEVICE — GLOVE SZ 7 PROTEXIS PI

## (undated) DEVICE — GLOVE SZ 7.5 PROTEXIS PI

## (undated) DEVICE — CORD ACTIVE DISPOSABLE

## (undated) DEVICE — SORBITOL-MANNITOL FOR IRR  3L